# Patient Record
Sex: MALE | Race: WHITE | NOT HISPANIC OR LATINO | Employment: OTHER | URBAN - METROPOLITAN AREA
[De-identification: names, ages, dates, MRNs, and addresses within clinical notes are randomized per-mention and may not be internally consistent; named-entity substitution may affect disease eponyms.]

---

## 2017-01-03 ENCOUNTER — ANTICOAGULATION VISIT (OUTPATIENT)
Dept: FAMILY MEDICINE CLINIC | Facility: CLINIC | Age: 72
End: 2017-01-03

## 2017-01-03 LAB — INR PPP: 3.3

## 2017-01-03 RX ORDER — WARFARIN SODIUM 5 MG/1
5 TABLET ORAL
Qty: 30 TABLET | Refills: 5 | Status: SHIPPED | OUTPATIENT
Start: 2017-01-03 | End: 2017-08-01 | Stop reason: SDUPTHER

## 2017-01-03 RX ORDER — WARFARIN SODIUM 1 MG/1
1 TABLET ORAL
Qty: 30 TABLET | Refills: 5 | Status: SHIPPED | OUTPATIENT
Start: 2017-01-03 | End: 2017-01-09 | Stop reason: SDUPTHER

## 2017-01-05 ENCOUNTER — APPOINTMENT (OUTPATIENT)
Dept: WOUND CARE | Facility: HOSPITAL | Age: 72
End: 2017-01-05
Attending: SURGERY

## 2017-01-06 ENCOUNTER — ANTICOAGULATION VISIT (OUTPATIENT)
Dept: FAMILY MEDICINE CLINIC | Facility: CLINIC | Age: 72
End: 2017-01-06

## 2017-01-06 LAB — INR PPP: 2.8

## 2017-01-09 RX ORDER — WARFARIN SODIUM 1 MG/1
1 TABLET ORAL
Qty: 30 TABLET | Refills: 5 | Status: SHIPPED | OUTPATIENT
Start: 2017-01-09 | End: 2017-12-08 | Stop reason: HOSPADM

## 2017-01-10 ENCOUNTER — OFFICE VISIT (OUTPATIENT)
Dept: FAMILY MEDICINE CLINIC | Facility: CLINIC | Age: 72
End: 2017-01-10

## 2017-01-10 VITALS
SYSTOLIC BLOOD PRESSURE: 124 MMHG | BODY MASS INDEX: 32.44 KG/M2 | DIASTOLIC BLOOD PRESSURE: 70 MMHG | WEIGHT: 219 LBS | HEIGHT: 69 IN

## 2017-01-10 DIAGNOSIS — Z51.81 ANTICOAGULATION GOAL OF INR 2 TO 3: ICD-10-CM

## 2017-01-10 DIAGNOSIS — I48.0 PAROXYSMAL ATRIAL FIBRILLATION (HCC): ICD-10-CM

## 2017-01-10 DIAGNOSIS — Z79.01 ANTICOAGULATION GOAL OF INR 2 TO 3: ICD-10-CM

## 2017-01-10 DIAGNOSIS — I63.9 CEREBROVASCULAR ACCIDENT (CVA), UNSPECIFIED MECHANISM (HCC): Primary | ICD-10-CM

## 2017-01-10 DIAGNOSIS — I10 ESSENTIAL HYPERTENSION: ICD-10-CM

## 2017-01-10 LAB — INR PPP: 2.9 (ref 2–3)

## 2017-01-10 PROCEDURE — 85610 PROTHROMBIN TIME: CPT | Performed by: FAMILY MEDICINE

## 2017-01-10 PROCEDURE — 99213 OFFICE O/P EST LOW 20 MIN: CPT | Performed by: FAMILY MEDICINE

## 2017-01-10 PROCEDURE — 36416 COLLJ CAPILLARY BLOOD SPEC: CPT | Performed by: FAMILY MEDICINE

## 2017-01-10 RX ORDER — TORSEMIDE 10 MG/1
20 TABLET ORAL DAILY
COMMUNITY
Start: 2017-01-02 | End: 2018-01-02 | Stop reason: SDUPTHER

## 2017-01-10 RX ORDER — MEMANTINE HYDROCHLORIDE 10 MG/1
TABLET ORAL
COMMUNITY
Start: 2016-10-20 | End: 2017-01-10

## 2017-01-10 NOTE — MR AVS SNAPSHOT
Deacon Martin   1/10/2017 11:30 AM   Office Visit    Dept Phone:  [phone]   Encounter #:  [id number]    Provider:  Doroteo Victoria MD   Department:  [facility]                Your Full Care Plan              Today's Medication Changes          These changes are accurate as of: 1/10/17  1:31 PM.  If you have any questions, ask your nurse or doctor.               Medication(s)that have changed:     insulin  UNIT/ML injection   Commonly known as:  humuLIN N,novoLIN N   Inject 12 Units under the skin Every Night.   What changed:  Another medication with the same name was removed. Continue taking this medication, and follow the directions you see here.       insulin regular 100 UNIT/ML injection   Commonly known as:  humuLIN R,novoLIN R   Inject 7 Units under the skin Every Morning Before Breakfast.   What changed:  Another medication with the same name was removed. Continue taking this medication, and follow the directions you see here.         Stop taking medication(s)listed here:     memantine 10 MG tablet   Commonly known as:  NAMENDA   Stopped by:  Doroteo Victoria MD                      Your Updated Medication List          This list is accurate as of: 1/10/17  1:31 PM.  Always use your most recent med list.                ACCU-CHEK TYLER test strip   Generic drug:  glucose blood   Test 4 times daily and as needed       ACCU-CHEK FASTCLIX LANCET kit   Use as directed for blood glucose testing       acetaminophen 325 MG tablet   Commonly known as:  TYLENOL   Take 2 tablets by mouth Every 6 (Six) Hours As Needed for mild pain (1-3) or fever.       allopurinol 100 MG tablet   Commonly known as:  ZYLOPRIM   Take 1 tablet by mouth Daily.       amLODIPine 5 MG tablet   Commonly known as:  NORVASC   Take 1 tablet by mouth Daily.       aspirin 81 MG tablet       atorvastatin 10 MG tablet   Commonly known as:  LIPITOR   TAKE ONE TABLET BY  MOUTH DAILY       CloNIDine 0.1 MG tablet   Commonly known as:  CATAPRES   Take 1 tablet by mouth 2 (Two) Times a Day.       donepezil 10 MG tablet   Commonly known as:  ARICEPT   Take 1 tablet by mouth every night.       DULoxetine 60 MG capsule   Commonly known as:  CYMBALTA   Take 1 capsule by mouth daily.       insulin  UNIT/ML injection   Commonly known as:  humuLIN N,novoLIN N   Inject 12 Units under the skin Every Night.       Insulin Pen Needle 32G X 4 MM misc       insulin regular 100 UNIT/ML injection   Commonly known as:  humuLIN R,novoLIN R   Inject 7 Units under the skin Every Morning Before Breakfast.       metoprolol succinate XL 50 MG 24 hr tablet   Commonly known as:  TOPROL-XL   Take 1 tablet by mouth Daily.       sodium hypochlorite 0.0625 % solution topical solution 0.0625%   Commonly known as:  DAKIN'S 1/8 STRENGTH   Irrigate with 1,000 mL as directed Every 12 (Twelve) Hours.       tamsulosin 0.4 MG capsule 24 hr capsule   Commonly known as:  FLOMAX       torsemide 10 MG tablet   Commonly known as:  DEMADEX       vitamin D 41934 UNITS capsule capsule   Commonly known as:  ERGOCALCIFEROL   Take 1 capsule by mouth 2 (Two) Times a Week.       * warfarin 5 MG tablet   Commonly known as:  COUMADIN   Take 1 tablet by mouth Daily.       * warfarin 1 MG tablet   Commonly known as:  COUMADIN   Take 1 tablet by mouth Daily.       * Notice:  This list has 2 medication(s) that are the same as other medications prescribed for you. Read the directions carefully, and ask your doctor or other care provider to review them with you.            We Performed the Following     POC INR       You Were Diagnosed With        Codes Comments    Cerebrovascular accident (CVA), unspecified mechanism    -  Primary ICD-10-CM: I63.9  ICD-9-CM: 434.91     Paroxysmal atrial fibrillation     ICD-10-CM: I48.0  ICD-9-CM: 427.31     Essential hypertension     ICD-10-CM: I10  ICD-9-CM: 401.9     Anticoagulation goal of INR 2  to 3     ICD-10-CM: Z51.81, Z79.01  ICD-9-CM: V58.83, V58.61       Instructions     None    Patient Instructions History      Anticoagulation Summary as of 1/10/2017     INR goal 2.0-3.0    Today's INR 2.90    Next INR check       January 2017 Details    Sun Mon Tue Wed Thu Fri Sat     1               2               3               4               5               6               7                 8               9               10      4 mg   See details      11      4 mg         12      4 mg         13      4 mg         14      4 mg           15      4 mg         16      4 mg         17      4 mg         18      4 mg         19      4 mg         20      4 mg         21      4 mg           22      4 mg         23      4 mg         24      4 mg         25      4 mg         26      4 mg         27      4 mg         28      4 mg           29      4 mg         30      4 mg         31      4 mg              Date Details   01/10 This INR check      Date of next INR: No date specified         How to take your warfarin dose     To take:  4 mg Take 4 of the 1 mg tablets.           Upcoming Appointments     Visit Type Date Time Department    [facility]    [facility]2017  [facility]    FOLLOW UP 4/11/2017 11:40 AM [facility]    FOLLOW UP 8/25/2017  2:30 PM [facility]      MyChart Signup     Our records indicate that your [facility] [employer] account has been deactivated. If you would like to reactivate your account, please email [email or web address] or call [phone] to talk to our [employer] staff.             Other Info from Your Visit           Your Appointments     Feb 03, 2017  3:50 PM EST   Follow Up with Doroteo Victoria MD   [facility] (--)    [address] [place] [id number] [phone]           Arrive 15 minutes prior to appointment.            Apr 11, 2017 11:40 AM  "EDT   Follow Up with Doroteo Victoria MD   [facility] FAMILY AND INTERNAL MEDICINE (--)    [address]  [facility] [address] [phone]           Arrive 15 minutes prior to appointment.            Aug 25, 2017  2:30 PM EDT   Follow Up with INÉS Sen   [facility] NEUROSURGERY (--)    [address]  [facility] [address] [phone]           Arrive 15 minutes prior to appointment.              Allergies     No Known Drug Allergy        Reason for Visit     Hospital Follow Up f/u for poss stroke d/c 11/27/16 to doing fine     Coagulation Disorder Protime results in chart       Vital Signs     Blood Pressure Height Weight Body Mass Index Smoking Status       124/70 (BP Location: Left arm, Patient Position: Sitting, Cuff Size: Adult) 69\" (175.3 cm) 219 lb (99.3 kg) 32.34 kg/m2 Former Smoker       Problems and Diagnoses Noted     Anticoagulation goal of INR 2 to 3    Atrial fibrillation (irregular heartbeat)    High blood pressure    Stroke    -  Primary      Results     POC INR      Component Value Standard Range & Units    INR 2.90 2 - 3    34.4 pt taking 4mg qd                     "

## 2017-01-10 NOTE — PROGRESS NOTES
Subjective: Deacon Martin is a 71 y.o. male presents   here today for       Chief Complaint and HPI   I have reviewed the patient's medical history in detail and updated the computerized patient record.    Hospital Follow Up (f/u for poss stroke d/c 11/27/16 to doing fine ) and Coagulation Disorder (Protime results in chart )      Review of Systems   Constitutional: Negative.    HENT: Negative.    Eyes: Negative.    Respiratory: Negative.    Cardiovascular: Negative.    Gastrointestinal: Negative.    Endocrine: Negative.    Genitourinary: Negative.    Musculoskeletal: Negative.    Skin: Negative.    Allergic/Immunologic: Negative.    Neurological: Negative.    Hematological: Negative.    Psychiatric/Behavioral: Negative.        Physical Exam   Constitutional: He is oriented to person, place, and time. He appears well-developed and well-nourished.   Cardiovascular: Normal rate, regular rhythm, normal heart sounds and intact distal pulses.    Neurological: He is alert and oriented to person, place, and time. He has normal reflexes.   Psychiatric: He has a normal mood and affect. His behavior is normal.   Vitals reviewed.      Procedures    Assessment:   Diagnosis Plan   1. Cerebrovascular accident (CVA), unspecified mechanism  POC INR   2. Paroxysmal atrial fibrillation     3. Essential hypertension     4. Anticoagulation goal of INR 2 to 3         Plan:  Orders Placed This Encounter   Procedures   • POC INR     This is an external result entered through the Results Console.       Requested Prescriptions      No prescriptions requested or ordered in this encounter       All tests and consults since last visit reviewed with patient    Return in about 3 months (around 4/10/2017) for Recheck.

## 2017-01-13 ENCOUNTER — ANTICOAGULATION VISIT (OUTPATIENT)
Dept: FAMILY MEDICINE CLINIC | Facility: CLINIC | Age: 72
End: 2017-01-13

## 2017-01-13 DIAGNOSIS — I63.9 CEREBROVASCULAR ACCIDENT (CVA), UNSPECIFIED MECHANISM (HCC): Primary | ICD-10-CM

## 2017-01-13 LAB — INR PPP: 3.3 (ref 0.9–1.1)

## 2017-01-13 PROCEDURE — 36416 COLLJ CAPILLARY BLOOD SPEC: CPT | Performed by: FAMILY MEDICINE

## 2017-01-13 PROCEDURE — 85610 PROTHROMBIN TIME: CPT | Performed by: FAMILY MEDICINE

## 2017-01-17 ENCOUNTER — ANTICOAGULATION VISIT (OUTPATIENT)
Dept: FAMILY MEDICINE CLINIC | Facility: CLINIC | Age: 72
End: 2017-01-17

## 2017-01-17 LAB — INR PPP: 2.1

## 2017-01-19 ENCOUNTER — OFFICE VISIT (OUTPATIENT)
Dept: WOUND CARE | Facility: HOSPITAL | Age: 72
End: 2017-01-19
Attending: SURGERY

## 2017-01-20 NOTE — WOUND CARE CLINIC NOTE
CHIEF COMPLAINT: Bilateral venous stasis ulcers of anterior tibial areas complicated with diabetes mellitus (E11.622, I83.012, I83.022, L97.212, L97.222).     HISTORY OF PRESENT ILLNESS: This is a 71-year-old male who has had multiple medical problems, including diabetes which is now under relatively good control. He has had renal insufficiency with atrial fibrillation, cardiac disease, and chronic venous hypertension. He also has a history of stroke and mild carotid disease. He had tissue necrosis on both lower extremities over the calf and anterior tibia, and this was initially treated with Santyl. Before being seen in the Wound Care Clinic he was in the hospital for multiple medical problems, and at that time he had to have dialysis and had problems with his blood pressure. These conditions have improved and he no longer needs dialysis. He had been using Dakin's solution originally on his wounds and then we switched to using collagen. We have been lightly wrapping his legs with ACE wraps and foam dressing over his collagen. He has done well and not really any major problems, but he has noticed some minor blistered areas on his right upper leg above the wounds that we have been profiling. He thinks that this may be where some gauze dressing was. Other than that he has not had any real problems.     PHYSICAL EXAMINATION:  GENERAL: Well-nourished, well-developed, male in no acute distress.   VITAL SIGNS: Temperature 98, pulse 88, respirations 18, blood pressure 123/68.   EXTREMITIES: Examination of both of his legs reveal that there is just mild swelling which is markedly improved. He has the wounds on his right lower extremity that seem to be getting more superficial and healing. These measure 0.7 x 0.7 x 0.1 cm. He has what amounts to small skin tears on his right upper shin area which are not really measured at this time. He has the wound that has been the major wound on his left shin area that measures 2.4 x  1 x 0.3 cm. This has some exudate and it goes down to the subcutaneous fat, but is markedly improved. I think it is smaller than it was last time.     DESCRIPTION OF PROCEDURE: The patient was given informed consent of the need for an excisional debridement of necrotic tissue, eschar, and slough over the left lower extremity wound. He was given 4% lidocaine gel for topical anesthesia, and using a 4 mm curette debridement was performed to remove some necrotic subcutaneous fat, eschar, slough, and biofilm. The postdebridement size was 2.4 x 1 x 0.4 cm. The total area of debridement was 2.4 sq cm. He tolerated it well. He had minimum bleeding and the wound bed was markedly improved.     ASSESSMENT AND PLAN: The patient is doing better overall. The wounds that we have been following are much better. The wounds of his right lower extremity are very superficial and are healing and showing good signs of epithelialization and the wound on his left leg, which has continued to reduce in size and showing epithelialization, has a minimal amount of dead tissue and he has some tears on his upper right leg. We just need to be careful when he dresses this and we may use some foam dressing. It should be more gentle than the gauze I think they were using. We are going to continue with the ACE wraps, wrapping him, and we will see him again next week. Hopefully he is going to continue the same improvement.       Bryan Barajas MD  TOR:ab  D:   01/19/2017 18:32:35  T:   01/20/2017 16:27:36  Job ID:   [id number]  Document ID:   [id number]  Rev:  0  cc:

## 2017-01-24 ENCOUNTER — ANTICOAGULATION VISIT (OUTPATIENT)
Dept: FAMILY MEDICINE CLINIC | Facility: CLINIC | Age: 72
End: 2017-01-24

## 2017-01-24 LAB — INR PPP: 2.1

## 2017-01-25 PROBLEM — Z53.21 PATIENT LEFT AFTER TRIAGE: Status: ACTIVE | Noted: 2017-01-25

## 2017-01-26 ENCOUNTER — OFFICE VISIT (OUTPATIENT)
Dept: WOUND CARE | Facility: HOSPITAL | Age: 72
End: 2017-01-26
Attending: SURGERY

## 2017-01-27 NOTE — WOUND CARE CLINIC NOTE
DATE OF VISIT: 01/26/2017     CHIEF COMPLAINT: Bilateral venous stasis ulcers of anterior tibial area complicated with diabetes (E11.622, I83.012, I83.022, L97.212, L97.222).     HISTORY OF PRESENT ILLNESS: This is a 71-year-old male who has had multiple medical problems including diabetes, which is now under relatively good control. He has had renal insufficiency with atrial fibrillation, cardiac disease, chronic venous hypertension, and history of stroke and mild carotid disease. He had some necrosis on both extremities over his calves, and this was initially treated with Santyl. Before being seen in the Wound Care Clinic, he was in the hospital for multiple medical problems including dialysis and problems with his blood pressure; these conditions improved and he no longer needed dialysis. He was using Dakin's solution originally and then we switched to using collagen. We have been lightly wrapping his legs with Ace wraps and foam dressings over collagen. He has been doing well and recently really begun to improve in size. He has had no problems since last time we saw him and he feels like the right wound has essentially healed.     PHYSICAL EXAMINATION:  GENERAL: A well-nourished, well-developed, male in no acute distress.   VITAL SIGNS: Temperature 97, pulse 64, respirations 16, blood pressure 120/65.   EXTREMITIES: Examination of his right lower extremity reveals there is mild edema. The wounds that we have been following have essentially healed completely. There is no wound and this has healed. The wound on the left shin has made remarkable improvement. It measures 1.8 x 0.4 x 0.1 cm. It has a mild amount of superficial exudate and slough.     DESCRIPTION OF PROCEDURE: The patient was given informed consent for the need for debridement of the left shin wound. He was given 2% lidocaine gel for topical anesthesia. A 4 mm curette was used to remove superficial slough, exudate and biofilm. This was a  superficial selective debridement and did not go into the subcutaneous tissue. The patient tolerated this well. The final wound size after debridement was 1.8 x 0.4 x 0.1 cm. The surface area of debridement was approximately 0.7 cm sq. There was minimum bleeding, controlled easily with pressure.     ASSESSMENT/PLAN: The right wound is completely healed, the left wound is markedly improved. We are going to go to dressing changes only every 3rd day using collagen and wrapping with an Ace. We are going to get him compression hose to wear once he is completely healed.           Bryan Barajas MD  TOR:co  D:   01/26/2017 10:29:46  T:   01/27/2017 06:21:19  Job ID:   [id number]  Document ID:   [id number]  Rev:  0  cc:

## 2017-01-31 ENCOUNTER — ANTICOAGULATION VISIT (OUTPATIENT)
Dept: FAMILY MEDICINE CLINIC | Facility: CLINIC | Age: 72
End: 2017-01-31

## 2017-01-31 LAB — INR PPP: 3

## 2017-02-02 ENCOUNTER — OFFICE VISIT (OUTPATIENT)
Dept: WOUND CARE | Facility: HOSPITAL | Age: 72
End: 2017-02-02
Attending: SURGERY

## 2017-02-09 ENCOUNTER — OFFICE VISIT (OUTPATIENT)
Dept: WOUND CARE | Facility: HOSPITAL | Age: 72
End: 2017-02-09
Attending: SURGERY

## 2017-02-10 NOTE — WOUND CARE CLINIC NOTE
DATE OF SERVICE: 02/09/2017    CHIEF COMPLAINT: Bilateral venous stasis ulcers of the anterior tibial area complicated with diabetes (E11.622, I83.012, I83.022, L97.212, L97.222).     HISTORY OF PRESENT ILLNESS: This is a 71-year-old male who has had multiple medical problems including diabetes, which is now under relatively good control. He also has had renal insufficiency and he now requires dialysis. He has atrial fibrillation, cardiac disease, venous stasis and hypertension. He also has a history of stroke and mild carotid disease. He had some necrosis of both extremities over his calves and this was initially treated with Santyl and then he was switched to Dakins and most recently we have been using collagen and wrapping his legs lightly with Ace wraps and foam dressings. The wound on his right leg basically healed several weeks ago and this has not been a problem. The wound over his left shin has really finally responded and is markedly smaller. Last week there was a little blister on the inferior portion of the wound and we opened that up and that has also improved over the week. The son is doing the dressing every 3 days and is almost having trouble getting the Puracol dressing in.    PHYSICAL EXAMINATION:  VITAL SIGNS: Temperature is 97.7, pulse 88, respirations 20, blood pressure 194/95.   EXTREMITIES: Examination of the right lower leg reveals the wound is still healed. There is no swelling. There is some general redness over the area, but is not tender and is not warm. I do not think it is infection. Examination of the left lower extremity reveals that there is no swelling. The wound is remarkably smaller. It shows no redness. No drainage. There is some slough and exudate on top of the wound, which I think is probably mostly collagen. The blistered area below it seems to be improving. The wound size was 2.5 x 0.5 x 0.1 cm. There is something that needs to be debrided in a selective way.      DESCRIPTION OF PROCEDURE: The patient was given informed consent for the need for selective debridement of superficial debris and slough. The patient was given 2% lidocaine gel for topical anesthesia. A 4 mm curette was used for debridement of superficial selective slough and debris, and biofilm. The patient tolerated this well. The post debridement measurements were 2.3 x 0.4 x 0.1 cm. The total surface area debrided was approximately 1 sq cm. There was minimum bleeding.     ASSESSMENT AND PLAN: The patient continues to improve. I think that the wounds are healing rapidly now. The left leg I think after probably no more than 1-2 more weeks will probably have him healed. We will continue the same plan.        Bryan Barajas MD  TOR:kelsey  D:   02/09/2017 09:43:01  T:   02/10/2017 00:19:54  Job ID:   [id number]  Document ID:   [id number]  Rev:  1  cc:

## 2017-02-16 ENCOUNTER — OFFICE VISIT (OUTPATIENT)
Dept: WOUND CARE | Facility: HOSPITAL | Age: 72
End: 2017-02-16
Attending: SURGERY

## 2017-02-16 PROCEDURE — 17250 CHEM CAUT OF GRANLTJ TISSUE: CPT

## 2017-02-17 NOTE — WOUND CARE CLINIC NOTE
DATE OF SERVICE: 02/16/2017      CHIEF COMPLAINT: Venous stasis ulcer of the left (diagnosis E11.622, I83.012, I83.022, L97.212, L97.222).     HISTORY OF PRESENT ILLNESS: The patient is a 71-year-old gentleman with multiple medical problems including diabetes and renal insufficiency on dialysis. He has required treatment of bilateral lower extremity wounds by Dr. Barajas and is making good progress. He says he felt like the wounds were almost healed last week.     PHYSICAL EXAMINATION: Today the wound has decreased in size and measures 0.3 x 0.2 x 0.1 cm. The periwound is very fragile, but there is no significant erythema or drainage. Silver nitrate was applied over the surface of this for some hypertrophic granulation tissue in the wound and it is now smooth with the surface.     ASSESSMENT AND PLAN:   1.  Patient is going to continue topical wound care.   2.  He is going to follow up with Dr. Barajas in 1 week. Hopefully, his wound will be healed soon.          JENY Jimenez:olimpia  D:   02/16/2017 09:10:01  T:   02/16/2017 18:57:01  Job ID:   [id number]  Document ID:   [id number]  Rev:  1  cc:

## 2017-02-23 ENCOUNTER — OFFICE VISIT (OUTPATIENT)
Dept: WOUND CARE | Facility: HOSPITAL | Age: 72
End: 2017-02-23
Attending: SURGERY

## 2017-02-23 PROCEDURE — G0463 HOSPITAL OUTPT CLINIC VISIT: HCPCS

## 2017-02-24 NOTE — WOUND CARE CLINIC NOTE
DATE OF SERVICE:  02/23/2017    CHIEF COMPLAINT: Bilateral venous stasis ulcers of anterior tibial area complicated with diabetes (E11.622, I83.012, I83.022, L97.212, L97.222).     HISTORY OF PRESENT ILLNESS: The patient is a 71-year-old male who has had multiple medical problems including diabetes, which is now under relatively good control. He also has had renal insufficiency, and he now requires dialysis. He also has atrial fibrillation, cardiac disease, venous stasis and hypertension. He has a history of stroke and mild carotid disease. He had some necrosis of both lower extremities over his calves, and this was initially treated with Santyl and then switched to Dakin solution and more recently he has been using collagen and wrapping his legs with Ace wraps and foam dressings. The wound on his right leg basically healed several weeks ago, and this has not been a further problem.  The wound over his left shin has responded well and become markedly smaller, and he is getting close to healing over the last several weeks.  Last week he saw Dr. Adams who silver nitrated an area on the left side, but there was no major other issues.     PHYSICAL EXAMINATION:  VITAL SIGNS: Temperature 98.2, pulse 84, blood pressure is 158/71, respirations 20.   EXTREMITIES: Examination of his right lower extremity reveals that the edema is under control. The wounds that were there have remained healed and there is no sign of further problems.  Examination of his left lower extremity reveals that the swelling and edema is also under control with the Ace wrap. There are some scabs present over the wounds, but these are dry, and once these scabs are removed there remains no wound so the wound has completely healed, and no further problems.     ASSESSMENT AND PLAN: The patient has healed wounds bilaterally. His edema is under control. He is instructed to use some moisturizing lotion and also a foam dressing over the area of concern for  the next 2 weeks. He can use Ace wraps, but when he feels comfortable he can apply the compression hose he has at home.  He is already using one on the right leg.  He can start using the one on the left leg when he can apply this without trouble.  He can then stop using Ace wrap.  Otherwise, we will see him as needed.        Bryan Barajas MD  TOR:karlos  D:   02/23/2017 09:43:38  T:   02/23/2017 22:43:29  Job ID:   [id number]  Document ID:   [id number]  Rev:  0  cc:

## 2017-03-14 DIAGNOSIS — E78.5 DYSLIPIDEMIA: ICD-10-CM

## 2017-03-14 DIAGNOSIS — E79.0 HYPERURICEMIA: ICD-10-CM

## 2017-03-14 DIAGNOSIS — I87.2 CHRONIC VENOUS INSUFFICIENCY: ICD-10-CM

## 2017-03-14 DIAGNOSIS — I10 ESSENTIAL HYPERTENSION: ICD-10-CM

## 2017-03-14 RX ORDER — DULOXETIN HYDROCHLORIDE 60 MG/1
CAPSULE, DELAYED RELEASE ORAL
Qty: 30 CAPSULE | Refills: 4 | OUTPATIENT
Start: 2017-03-14

## 2017-04-04 DIAGNOSIS — E78.5 DYSLIPIDEMIA: ICD-10-CM

## 2017-04-04 DIAGNOSIS — I87.2 CHRONIC VENOUS INSUFFICIENCY: ICD-10-CM

## 2017-04-04 DIAGNOSIS — I10 ESSENTIAL HYPERTENSION: ICD-10-CM

## 2017-04-04 DIAGNOSIS — F01.50 VASCULAR DEMENTIA WITHOUT BEHAVIORAL DISTURBANCE (HCC): ICD-10-CM

## 2017-04-04 DIAGNOSIS — E79.0 HYPERURICEMIA: ICD-10-CM

## 2017-04-04 RX ORDER — DULOXETIN HYDROCHLORIDE 60 MG/1
CAPSULE, DELAYED RELEASE ORAL
Qty: 30 CAPSULE | Refills: 4 | OUTPATIENT
Start: 2017-04-04

## 2017-04-04 RX ORDER — DONEPEZIL HYDROCHLORIDE 10 MG/1
TABLET, FILM COATED ORAL
Qty: 30 TABLET | Refills: 4 | OUTPATIENT
Start: 2017-04-04

## 2017-04-10 ENCOUNTER — TELEPHONE (OUTPATIENT)
Dept: CARDIOLOGY | Facility: CLINIC | Age: 72
End: 2017-04-10

## 2017-04-10 ENCOUNTER — HOSPITAL ENCOUNTER (OUTPATIENT)
Facility: HOSPITAL | Age: 72
Setting detail: HOSPITAL OUTPATIENT SURGERY
Discharge: HOME | End: 2017-04-10
Attending: UROLOGY | Admitting: UROLOGY

## 2017-04-10 NOTE — TELEPHONE ENCOUNTER
----- Message from Lissa ORTIZ Darryl sent at 4/10/2017  3:42 PM EDT -----  Regarding: CARDIAC CLEARANCE FOR 4/20  [facility]/DR GARZA'S OFFICE [phone]  CYSTO W/RETRO PYLOGRAM ON 4/20

## 2017-04-18 ENCOUNTER — HOSPITAL ENCOUNTER (OUTPATIENT)
Dept: CARDIOLOGY | Facility: HOSPITAL | Age: 72
Discharge: HOME | End: 2017-04-18
Admitting: INTERNAL MEDICINE

## 2017-04-18 ENCOUNTER — OFFICE VISIT (OUTPATIENT)
Dept: CARDIOLOGY | Facility: CLINIC | Age: 72
End: 2017-04-18

## 2017-04-18 VITALS
HEIGHT: 70 IN | WEIGHT: 218 LBS | SYSTOLIC BLOOD PRESSURE: 179 MMHG | HEART RATE: 99 BPM | DIASTOLIC BLOOD PRESSURE: 81 MMHG | BODY MASS INDEX: 31.21 KG/M2

## 2017-04-18 DIAGNOSIS — M48.02 CERVICAL SPINAL STENOSIS: ICD-10-CM

## 2017-04-18 DIAGNOSIS — I10 HTN (HYPERTENSION), BENIGN: ICD-10-CM

## 2017-04-18 DIAGNOSIS — I48.92 ATRIAL FIBRILLATION AND FLUTTER (HCC): Primary | ICD-10-CM

## 2017-04-18 DIAGNOSIS — I48.91 ATRIAL FIBRILLATION AND FLUTTER (HCC): Primary | ICD-10-CM

## 2017-04-18 DIAGNOSIS — N13.8 BPH (BENIGN PROSTATIC HYPERTROPHY) WITH URINARY OBSTRUCTION: ICD-10-CM

## 2017-04-18 DIAGNOSIS — N40.1 BPH (BENIGN PROSTATIC HYPERTROPHY) WITH URINARY OBSTRUCTION: ICD-10-CM

## 2017-04-18 DIAGNOSIS — Z51.81 ANTICOAGULATION GOAL OF INR 2 TO 3: ICD-10-CM

## 2017-04-18 DIAGNOSIS — N18.4 CKD (CHRONIC KIDNEY DISEASE) STAGE 4, GFR 15-29 ML/MIN (HCC): ICD-10-CM

## 2017-04-18 DIAGNOSIS — D63.1 ANEMIA OF CHRONIC RENAL FAILURE, STAGE 4 (SEVERE) (HCC): ICD-10-CM

## 2017-04-18 DIAGNOSIS — N18.4 ANEMIA OF CHRONIC RENAL FAILURE, STAGE 4 (SEVERE) (HCC): ICD-10-CM

## 2017-04-18 DIAGNOSIS — Z79.01 ANTICOAGULATION GOAL OF INR 2 TO 3: ICD-10-CM

## 2017-04-18 DIAGNOSIS — Z86.73 HISTORY OF CARDIOEMBOLIC STROKE: ICD-10-CM

## 2017-04-18 PROCEDURE — 85610 PROTHROMBIN TIME: CPT | Performed by: INTERNAL MEDICINE

## 2017-04-18 PROCEDURE — 93000 ELECTROCARDIOGRAM COMPLETE: CPT | Performed by: INTERNAL MEDICINE

## 2017-04-18 PROCEDURE — 99214 OFFICE O/P EST MOD 30 MIN: CPT | Performed by: INTERNAL MEDICINE
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
## 2017-04-20 ENCOUNTER — TELEPHONE (OUTPATIENT)
Dept: CARDIOLOGY | Facility: CLINIC | Age: 72
End: 2017-04-20

## 2017-04-20 ENCOUNTER — HOSPITAL ENCOUNTER (OUTPATIENT)
Dept: CARDIOLOGY | Facility: HOSPITAL | Age: 72
Discharge: HOME | End: 2017-04-20
Admitting: INTERNAL MEDICINE

## 2017-04-20 DIAGNOSIS — Z01.810 PREOPERATIVE CARDIOVASCULAR EXAMINATION: Primary | ICD-10-CM

## 2017-04-20 DIAGNOSIS — I48.20 CHRONIC A-FIB (HCC): Primary | ICD-10-CM

## 2017-04-20 DIAGNOSIS — Z01.810 PREOPERATIVE CARDIOVASCULAR EXAMINATION: ICD-10-CM

## 2017-04-20 DIAGNOSIS — I10 ESSENTIAL HYPERTENSION: ICD-10-CM

## 2017-04-20 DIAGNOSIS — I48.20 CHRONIC A-FIB (HCC): ICD-10-CM

## 2017-04-20 PROCEDURE — 85610 PROTHROMBIN TIME: CPT | Performed by: INTERNAL MEDICINE
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
## 2017-04-21 ENCOUNTER — HOSPITAL ENCOUNTER (OUTPATIENT)
Dept: CARDIOLOGY | Facility: HOSPITAL | Age: 72
Discharge: HOME | End: 2017-04-21
Admitting: INTERNAL MEDICINE

## 2017-04-21 PROCEDURE — 85610 PROTHROMBIN TIME: CPT | Performed by: INTERNAL MEDICINE

## 2017-04-24 ENCOUNTER — HOSPITAL ENCOUNTER (OUTPATIENT)
Dept: CARDIOLOGY | Facility: HOSPITAL | Age: 72
Discharge: HOME | End: 2017-04-24
Admitting: INTERNAL MEDICINE

## 2017-04-24 PROCEDURE — 85610 PROTHROMBIN TIME: CPT | Performed by: INTERNAL MEDICINE

## 2017-04-25 ENCOUNTER — APPOINTMENT (OUTPATIENT)
Dept: PREADMISSION TESTING | Facility: HOSPITAL | Age: 72
End: 2017-04-25

## 2017-04-28 ENCOUNTER — HOSPITAL ENCOUNTER (OUTPATIENT)
Dept: CARDIOLOGY | Facility: HOSPITAL | Age: 72
Discharge: HOME | End: 2017-04-28
Admitting: INTERNAL MEDICINE

## 2017-04-28 PROCEDURE — 85610 PROTHROMBIN TIME: CPT | Performed by: INTERNAL MEDICINE

## 2017-05-01 ENCOUNTER — TELEPHONE (OUTPATIENT)
Dept: ENDOCRINOLOGY | Age: 72
End: 2017-05-01

## 2017-05-02 ENCOUNTER — HOSPITAL ENCOUNTER (OUTPATIENT)
Dept: CARDIOLOGY | Facility: HOSPITAL | Age: 72
Discharge: HOME | End: 2017-05-02
Attending: INTERNAL MEDICINE

## 2017-05-02 VITALS
RESPIRATION RATE: 20 BRPM | OXYGEN SATURATION: 93 % | DIASTOLIC BLOOD PRESSURE: 82 MMHG | SYSTOLIC BLOOD PRESSURE: 134 MMHG | HEART RATE: 100 BPM

## 2017-05-02 VITALS — BODY MASS INDEX: 31.21 KG/M2 | WEIGHT: 218 LBS | HEIGHT: 70 IN

## 2017-05-02 LAB
BH CV ECHO MEAS - ACS: 1.5 CM
BH CV ECHO MEAS - AO MAX PG (FULL): 6.6 MMHG
BH CV ECHO MEAS - AO MAX PG: 10.9 MMHG
BH CV ECHO MEAS - AO MEAN PG (FULL): 4 MMHG
BH CV ECHO MEAS - AO MEAN PG: 6 MMHG
BH CV ECHO MEAS - AO ROOT AREA (BSA CORRECTED): 1.8
BH CV ECHO MEAS - AO ROOT AREA: 11.9 CM^2
BH CV ECHO MEAS - AO ROOT DIAM: 3.9 CM
BH CV ECHO MEAS - AO V2 MAX: 165 CM/SEC
BH CV ECHO MEAS - AO V2 MEAN: 116 CM/SEC
BH CV ECHO MEAS - AO V2 VTI: 35.2 CM
BH CV ECHO MEAS - AVA(I,A): 2.9 CM^2
BH CV ECHO MEAS - AVA(I,D): 2.9 CM^2
BH CV ECHO MEAS - AVA(V,A): 2.6 CM^2
BH CV ECHO MEAS - AVA(V,D): 2.6 CM^2
BH CV ECHO MEAS - BSA(HAYCOCK): 2.2 M^2
BH CV ECHO MEAS - BSA: 2.2 M^2
BH CV ECHO MEAS - BZI_BMI: 31.3 KILOGRAMS/M^2
BH CV ECHO MEAS - BZI_METRIC_HEIGHT: 177.8 CM
BH CV ECHO MEAS - BZI_METRIC_WEIGHT: 98.9 KG
BH CV ECHO MEAS - CONTRAST EF 4CH: 51.1 ML/M^2
BH CV ECHO MEAS - EDV(CUBED): 64 ML
BH CV ECHO MEAS - EDV(MOD-SP4): 131 ML
BH CV ECHO MEAS - EDV(TEICH): 70 ML
BH CV ECHO MEAS - EF(CUBED): 57.8 %
BH CV ECHO MEAS - EF(MOD-SP4): 51.1 %
BH CV ECHO MEAS - EF(TEICH): 50 %
BH CV ECHO MEAS - ESV(CUBED): 27 ML
BH CV ECHO MEAS - ESV(MOD-SP4): 64 ML
BH CV ECHO MEAS - ESV(TEICH): 35 ML
BH CV ECHO MEAS - FS: 25 %
BH CV ECHO MEAS - IVS/LVPW: 1
BH CV ECHO MEAS - IVSD: 1.2 CM
BH CV ECHO MEAS - LA DIMENSION: 5.6 CM
BH CV ECHO MEAS - LA/AO: 1.4
BH CV ECHO MEAS - LAT PEAK E' VEL: 12.5 CM/SEC
BH CV ECHO MEAS - LV DIASTOLIC VOL/BSA (35-75): 60.5 ML/M^2
BH CV ECHO MEAS - LV MASS(C)D: 165.5 GRAMS
BH CV ECHO MEAS - LV MASS(C)DI: 76.4 GRAMS/M^2
BH CV ECHO MEAS - LV MAX PG: 4.3 MMHG
BH CV ECHO MEAS - LV MEAN PG: 2 MMHG
BH CV ECHO MEAS - LV SYSTOLIC VOL/BSA (12-30): 29.6 ML/M^2
BH CV ECHO MEAS - LV V1 MAX: 104 CM/SEC
BH CV ECHO MEAS - LV V1 MEAN: 74.1 CM/SEC
BH CV ECHO MEAS - LV V1 VTI: 24.7 CM
BH CV ECHO MEAS - LVIDD: 4 CM
BH CV ECHO MEAS - LVIDS: 3 CM
BH CV ECHO MEAS - LVLD AP4: 7.8 CM
BH CV ECHO MEAS - LVLS AP4: 7.3 CM
BH CV ECHO MEAS - LVOT AREA (M): 4.2 CM^2
BH CV ECHO MEAS - LVOT AREA: 4.2 CM^2
BH CV ECHO MEAS - LVOT DIAM: 2.3 CM
BH CV ECHO MEAS - LVPWD: 1.2 CM
BH CV ECHO MEAS - MED PEAK E' VEL: 6.9 CM/SEC
BH CV ECHO MEAS - MV DEC SLOPE: 787 CM/SEC^2
BH CV ECHO MEAS - MV DEC TIME: 0.13 SEC
BH CV ECHO MEAS - MV E MAX VEL: 132 CM/SEC
BH CV ECHO MEAS - MV MAX PG: 13.7 MMHG
BH CV ECHO MEAS - MV MEAN PG: 6 MMHG
BH CV ECHO MEAS - MV P1/2T MAX VEL: 176 CM/SEC
BH CV ECHO MEAS - MV P1/2T: 65.5 MSEC
BH CV ECHO MEAS - MV V2 MAX: 185 CM/SEC
BH CV ECHO MEAS - MV V2 MEAN: 108 CM/SEC
BH CV ECHO MEAS - MV V2 VTI: 29.7 CM
BH CV ECHO MEAS - MVA P1/2T LCG: 1.3 CM^2
BH CV ECHO MEAS - MVA(P1/2T): 3.4 CM^2
BH CV ECHO MEAS - MVA(VTI): 3.5 CM^2
BH CV ECHO MEAS - PA MAX PG (FULL): 1.2 MMHG
BH CV ECHO MEAS - PA MAX PG: 3.4 MMHG
BH CV ECHO MEAS - PA V2 MAX: 92.3 CM/SEC
BH CV ECHO MEAS - PVA(V,A): 4.6 CM^2
BH CV ECHO MEAS - PVA(V,D): 4.6 CM^2
BH CV ECHO MEAS - QP/QS: 0.94
BH CV ECHO MEAS - RV MAX PG: 2.2 MMHG
BH CV ECHO MEAS - RV MEAN PG: 1 MMHG
BH CV ECHO MEAS - RV V1 MAX: 74.6 CM/SEC
BH CV ECHO MEAS - RV V1 MEAN: 53.7 CM/SEC
BH CV ECHO MEAS - RV V1 VTI: 16.8 CM
BH CV ECHO MEAS - RVDD: 3 CM
BH CV ECHO MEAS - RVOT AREA: 5.7 CM^2
BH CV ECHO MEAS - RVOT DIAM: 2.7 CM
BH CV ECHO MEAS - SI(AO): 194.2 ML/M^2
BH CV ECHO MEAS - SI(CUBED): 17.1 ML/M^2
BH CV ECHO MEAS - SI(LVOT): 47.4 ML/M^2
BH CV ECHO MEAS - SI(MOD-SP4): 30.9 ML/M^2
BH CV ECHO MEAS - SI(TEICH): 16.2 ML/M^2
BH CV ECHO MEAS - SV(AO): 420.5 ML
BH CV ECHO MEAS - SV(CUBED): 37 ML
BH CV ECHO MEAS - SV(LVOT): 102.6 ML
BH CV ECHO MEAS - SV(MOD-SP4): 67 ML
BH CV ECHO MEAS - SV(RVOT): 96.2 ML
BH CV ECHO MEAS - SV(TEICH): 35 ML
BH CV ECHO MEAS - TAPSE (>1.6): 2 CM2
BH CV STRESS BP STAGE 1: NORMAL
BH CV STRESS COMMENTS STAGE 1: NORMAL
BH CV STRESS DOSE REGADENOSON STAGE 1: 0.4
BH CV STRESS DURATION MIN STAGE 1: 0
BH CV STRESS DURATION SEC STAGE 1: 15
BH CV STRESS HR STAGE 1: 105
BH CV STRESS O2 STAGE 1: 97
BH CV STRESS PROTOCOL 1: NORMAL
BH CV STRESS RECOVERY BP: NORMAL MMHG
BH CV STRESS RECOVERY HR: 100 BPM
BH CV STRESS RECOVERY O2: 95 %
BH CV STRESS STAGE 1: 1
BH CV XLRA - RV BASE: 3.5 CM
BH CV XLRA - RV LENGTH: 7.2 CM
BH CV XLRA - RV MID: 2.6 CM
BH CV XLRA - TDI S': 11.5 CM/SEC
LEFT ATRIUM VOLUME INDEX: 57.6 ML/M2
LV EF 2D ECHO EST: 45 %
MAXIMAL PREDICTED HEART RATE: 146 BPM
PERCENT MAX PREDICTED HR: 71.92 %
STRESS BASELINE BP: NORMAL MMHG
STRESS BASELINE HR: 95 BPM
STRESS O2 SAT REST: 93 %
STRESS PERCENT HR: 85 %
STRESS POST ESTIMATED WORKLOAD: 1 METS
STRESS POST EXERCISE DUR MIN: 0 MIN
STRESS POST EXERCISE DUR SEC: 0 SEC
STRESS POST O2 SAT PEAK: 97 %
STRESS POST PEAK BP: NORMAL MMHG
STRESS POST PEAK HR: 105 BPM
STRESS TARGET HR: 124 BPM

## 2017-05-02 PROCEDURE — 25010000002 AMINOPHYLLINE PER 250 MG: Performed by: INTERNAL MEDICINE

## 2017-05-02 PROCEDURE — 78452 HT MUSCLE IMAGE SPECT MULT: CPT | Performed by: INTERNAL MEDICINE

## 2017-05-02 PROCEDURE — 93306 TTE W/DOPPLER COMPLETE: CPT | Performed by: INTERNAL MEDICINE

## 2017-05-02 PROCEDURE — A9500 TC99M SESTAMIBI: HCPCS | Performed by: INTERNAL MEDICINE

## 2017-05-02 PROCEDURE — 25010000002 REGADENOSON 0.4 MG/5ML SOLUTION: Performed by: INTERNAL MEDICINE

## 2017-05-02 PROCEDURE — 93306 TTE W/DOPPLER COMPLETE: CPT

## 2017-05-02 PROCEDURE — 0 TECHNETIUM SESTAMIBI: Performed by: INTERNAL MEDICINE

## 2017-05-02 PROCEDURE — 93018 CV STRESS TEST I&R ONLY: CPT | Performed by: INTERNAL MEDICINE

## 2017-05-02 PROCEDURE — 78452 HT MUSCLE IMAGE SPECT MULT: CPT

## 2017-05-02 PROCEDURE — 93017 CV STRESS TEST TRACING ONLY: CPT

## 2017-05-02 RX ORDER — AMINOPHYLLINE DIHYDRATE 25 MG/ML
125 INJECTION, SOLUTION INTRAVENOUS ONCE
Status: COMPLETED | OUTPATIENT
Start: 2017-05-02 | End: 2017-05-02

## 2017-05-02 RX ADMIN — Medication 1 DOSE: at 08:26

## 2017-05-02 RX ADMIN — REGADENOSON 0.4 MG: 0.08 INJECTION, SOLUTION INTRAVENOUS at 11:56

## 2017-05-02 RX ADMIN — AMINOPHYLLINE 125 MG: 25 INJECTION, SOLUTION INTRAVENOUS at 11:03

## 2017-05-02 RX ADMIN — Medication 1 DOSE: at 11:56

## 2017-05-03 ENCOUNTER — TELEPHONE (OUTPATIENT)
Dept: CARDIOLOGY | Facility: CLINIC | Age: 72
End: 2017-05-03

## 2017-05-12 ENCOUNTER — HOSPITAL ENCOUNTER (OUTPATIENT)
Dept: GENERAL RADIOLOGY | Facility: HOSPITAL | Age: 72
Discharge: HOME | End: 2017-05-12
Admitting: NURSE PRACTITIONER

## 2017-05-12 ENCOUNTER — OFFICE VISIT (OUTPATIENT)
Dept: FAMILY MEDICINE CLINIC | Facility: CLINIC | Age: 72
End: 2017-05-12

## 2017-05-12 VITALS
DIASTOLIC BLOOD PRESSURE: 76 MMHG | BODY MASS INDEX: 30.82 KG/M2 | OXYGEN SATURATION: 98 % | HEART RATE: 83 BPM | TEMPERATURE: 97.6 F | SYSTOLIC BLOOD PRESSURE: 142 MMHG | HEIGHT: 70 IN | WEIGHT: 215.3 LBS

## 2017-05-12 DIAGNOSIS — E11.65 POORLY CONTROLLED TYPE 2 DIABETES MELLITUS (HCC): ICD-10-CM

## 2017-05-12 DIAGNOSIS — I10 ESSENTIAL HYPERTENSION: ICD-10-CM

## 2017-05-12 DIAGNOSIS — N18.4 CKD (CHRONIC KIDNEY DISEASE) STAGE 4, GFR 15-29 ML/MIN (HCC): ICD-10-CM

## 2017-05-12 DIAGNOSIS — Z12.5 SCREENING FOR PROSTATE CANCER: ICD-10-CM

## 2017-05-12 DIAGNOSIS — N40.0 BENIGN PROSTATIC HYPERPLASIA WITHOUT LOWER URINARY TRACT SYMPTOMS, UNSPECIFIED MORPHOLOGY: ICD-10-CM

## 2017-05-12 DIAGNOSIS — Z79.899 ENCOUNTER FOR LONG-TERM (CURRENT) USE OF MEDICATIONS: ICD-10-CM

## 2017-05-12 DIAGNOSIS — Z00.00 ROUTINE GENERAL MEDICAL EXAMINATION AT A HEALTH CARE FACILITY: ICD-10-CM

## 2017-05-12 DIAGNOSIS — Z12.5 ENCOUNTER FOR PROSTATE CANCER SCREENING: ICD-10-CM

## 2017-05-12 DIAGNOSIS — I10 ESSENTIAL HYPERTENSION: Primary | ICD-10-CM

## 2017-05-12 DIAGNOSIS — M25.552 LEFT HIP PAIN: ICD-10-CM

## 2017-05-12 DIAGNOSIS — I48.91 ATRIAL FIBRILLATION, UNSPECIFIED TYPE (HCC): ICD-10-CM

## 2017-05-12 DIAGNOSIS — N40.0 ENLARGED PROSTATE: ICD-10-CM

## 2017-05-12 DIAGNOSIS — Z00.00 ROUTINE GENERAL MEDICAL EXAMINATION AT A HEALTH CARE FACILITY: Primary | ICD-10-CM

## 2017-05-12 DIAGNOSIS — E78.5 HYPERLIPIDEMIA, UNSPECIFIED HYPERLIPIDEMIA TYPE: ICD-10-CM

## 2017-05-12 DIAGNOSIS — Z86.73 HISTORY OF CARDIOEMBOLIC STROKE: ICD-10-CM

## 2017-05-12 PROCEDURE — G0439 PPPS, SUBSEQ VISIT: HCPCS | Performed by: NURSE PRACTITIONER

## 2017-05-12 PROCEDURE — 99213 OFFICE O/P EST LOW 20 MIN: CPT | Performed by: NURSE PRACTITIONER

## 2017-05-12 PROCEDURE — 73502 X-RAY EXAM HIP UNI 2-3 VIEWS: CPT

## 2017-05-12 RX ORDER — TRAMADOL HYDROCHLORIDE 50 MG/1
TABLET ORAL
Qty: 90 TABLET | Refills: 0 | Status: SHIPPED | OUTPATIENT
Start: 2017-05-12 | End: 2017-06-27

## 2017-05-13 LAB
ALBUMIN SERPL-MCNC: 4 G/DL (ref 3.5–5.2)
ALBUMIN/CREAT UR: 5355.8 MG/G CREAT (ref 0–30)
ALBUMIN/GLOB SERPL: 1.4 G/DL
ALP SERPL-CCNC: 84 U/L (ref 39–117)
ALT SERPL-CCNC: 13 U/L (ref 1–41)
AST SERPL-CCNC: 11 U/L (ref 1–40)
BASOPHILS # BLD AUTO: 0.06 10*3/MM3 (ref 0–0.2)
BASOPHILS NFR BLD AUTO: 0.6 % (ref 0–1.5)
BILIRUB SERPL-MCNC: 1.3 MG/DL (ref 0.1–1.2)
BUN SERPL-MCNC: 43 MG/DL (ref 8–23)
BUN/CREAT SERPL: 8.5 (ref 7–25)
CALCIUM SERPL-MCNC: 9.5 MG/DL (ref 8.6–10.5)
CHLORIDE SERPL-SCNC: 91 MMOL/L (ref 98–107)
CHOLEST SERPL-MCNC: 155 MG/DL (ref 0–200)
CO2 SERPL-SCNC: 27.6 MMOL/L (ref 22–29)
CREAT SERPL-MCNC: 5.08 MG/DL (ref 0.76–1.27)
CREAT UR-MCNC: 46.8 MG/DL
EOSINOPHIL # BLD AUTO: 0.28 10*3/MM3 (ref 0–0.7)
EOSINOPHIL NFR BLD AUTO: 2.9 % (ref 0.3–6.2)
ERYTHROCYTE [DISTWIDTH] IN BLOOD BY AUTOMATED COUNT: 15.1 % (ref 11.5–14.5)
FT4I SERPL CALC-MCNC: 2.2 (ref 1.2–4.9)
GLOBULIN SER CALC-MCNC: 2.8 GM/DL
GLUCOSE SERPL-MCNC: 369 MG/DL (ref 65–99)
HBA1C MFR BLD: 9.6 % (ref 4.8–5.6)
HCT VFR BLD AUTO: 40 % (ref 40.4–52.2)
HCV AB S/CO SERPL IA: 0.1 S/CO RATIO (ref 0–0.9)
HDLC SERPL-MCNC: 31 MG/DL (ref 40–60)
HGB BLD-MCNC: 13.4 G/DL (ref 13.7–17.6)
IMM GRANULOCYTES # BLD: 0.02 10*3/MM3 (ref 0–0.03)
IMM GRANULOCYTES NFR BLD: 0.2 % (ref 0–0.5)
LDLC SERPL CALC-MCNC: 67 MG/DL (ref 0–100)
LDLC/HDLC SERPL: 2.17 {RATIO}
LYMPHOCYTES # BLD AUTO: 1.68 10*3/MM3 (ref 0.9–4.8)
LYMPHOCYTES NFR BLD AUTO: 17.2 % (ref 19.6–45.3)
MCH RBC QN AUTO: 32.1 PG (ref 27–32.7)
MCHC RBC AUTO-ENTMCNC: 33.5 G/DL (ref 32.6–36.4)
MCV RBC AUTO: 95.9 FL (ref 79.8–96.2)
MICROALBUMIN UR-MCNC: 2506.5 UG/ML
MONOCYTES # BLD AUTO: 0.73 10*3/MM3 (ref 0.2–1.2)
MONOCYTES NFR BLD AUTO: 7.5 % (ref 5–12)
NEUTROPHILS # BLD AUTO: 6.99 10*3/MM3 (ref 1.9–8.1)
NEUTROPHILS NFR BLD AUTO: 71.6 % (ref 42.7–76)
PLATELET # BLD AUTO: 192 10*3/MM3 (ref 140–500)
POTASSIUM SERPL-SCNC: 4.4 MMOL/L (ref 3.5–5.2)
PROT SERPL-MCNC: 6.8 G/DL (ref 6–8.5)
PSA SERPL-MCNC: 3.6 NG/ML (ref 0–4)
RBC # BLD AUTO: 4.17 10*6/MM3 (ref 4.6–6)
SODIUM SERPL-SCNC: 135 MMOL/L (ref 136–145)
T3RU NFR SERPL: 29 % (ref 24–39)
T4 SERPL-MCNC: 7.6 UG/DL (ref 4.5–12)
TRIGL SERPL-MCNC: 284 MG/DL (ref 0–150)
TSH SERPL DL<=0.005 MIU/L-ACNC: 2.92 UIU/ML (ref 0.45–4.5)
VLDLC SERPL CALC-MCNC: 56.8 MG/DL (ref 5–40)
WBC # BLD AUTO: 9.76 10*3/MM3 (ref 4.5–10.7)

## 2017-05-18 ENCOUNTER — APPOINTMENT (OUTPATIENT)
Dept: CARDIOLOGY | Facility: HOSPITAL | Age: 72
End: 2017-05-18

## 2017-05-19 ENCOUNTER — HOSPITAL ENCOUNTER (OUTPATIENT)
Dept: CARDIOLOGY | Facility: HOSPITAL | Age: 72
Discharge: HOME | End: 2017-05-19
Admitting: INTERNAL MEDICINE

## 2017-05-19 PROCEDURE — 85610 PROTHROMBIN TIME: CPT | Performed by: INTERNAL MEDICINE

## 2017-05-23 DIAGNOSIS — E11.65 POORLY CONTROLLED TYPE 2 DIABETES MELLITUS (HCC): Primary | ICD-10-CM

## 2017-05-23 DIAGNOSIS — E55.9 VITAMIN D DEFICIENCY: ICD-10-CM

## 2017-05-24 DIAGNOSIS — Z12.11 ENCOUNTER FOR SCREENING COLONOSCOPY: Primary | ICD-10-CM

## 2017-05-25 DIAGNOSIS — I10 ESSENTIAL HYPERTENSION: ICD-10-CM

## 2017-05-25 DIAGNOSIS — E79.0 HYPERURICEMIA: ICD-10-CM

## 2017-05-25 DIAGNOSIS — E78.5 DYSLIPIDEMIA: ICD-10-CM

## 2017-05-25 DIAGNOSIS — I87.2 CHRONIC VENOUS INSUFFICIENCY: ICD-10-CM

## 2017-05-25 RX ORDER — DULOXETIN HYDROCHLORIDE 60 MG/1
CAPSULE, DELAYED RELEASE ORAL
Qty: 30 CAPSULE | Refills: 4 | OUTPATIENT
Start: 2017-05-25

## 2017-05-25 RX ORDER — TAMSULOSIN HYDROCHLORIDE 0.4 MG/1
CAPSULE ORAL
Qty: 30 CAPSULE | Refills: 5 | Status: SHIPPED | OUTPATIENT
Start: 2017-05-25 | End: 2017-10-14 | Stop reason: SDUPTHER

## 2017-06-01 RX ORDER — ATORVASTATIN CALCIUM 10 MG/1
TABLET, FILM COATED ORAL
Qty: 90 TABLET | Refills: 0 | Status: SHIPPED | OUTPATIENT
Start: 2017-06-01 | End: 2017-06-27 | Stop reason: SDUPTHER

## 2017-06-09 ENCOUNTER — HOSPITAL ENCOUNTER (OUTPATIENT)
Dept: CARDIOLOGY | Facility: HOSPITAL | Age: 72
Discharge: HOME | End: 2017-06-09
Admitting: INTERNAL MEDICINE

## 2017-06-09 PROCEDURE — 85610 PROTHROMBIN TIME: CPT | Performed by: INTERNAL MEDICINE

## 2017-06-13 ENCOUNTER — LAB (OUTPATIENT)
Dept: ENDOCRINOLOGY | Age: 72
End: 2017-06-13

## 2017-06-13 DIAGNOSIS — E55.9 VITAMIN D DEFICIENCY: ICD-10-CM

## 2017-06-13 DIAGNOSIS — E11.65 POORLY CONTROLLED TYPE 2 DIABETES MELLITUS (HCC): ICD-10-CM

## 2017-06-14 ENCOUNTER — TELEPHONE (OUTPATIENT)
Dept: ENDOCRINOLOGY | Age: 72
End: 2017-06-14

## 2017-06-14 LAB
25(OH)D3+25(OH)D2 SERPL-MCNC: 18.7 NG/ML (ref 30–100)
ALBUMIN SERPL-MCNC: 3.6 G/DL (ref 3.5–4.8)
ALBUMIN/GLOB SERPL: 1.3 {RATIO} (ref 1.2–2.2)
ALP SERPL-CCNC: 83 IU/L (ref 39–117)
ALT SERPL-CCNC: 25 IU/L (ref 0–44)
AST SERPL-CCNC: 22 IU/L (ref 0–40)
BILIRUB SERPL-MCNC: 1.1 MG/DL (ref 0–1.2)
BUN SERPL-MCNC: 31 MG/DL (ref 8–27)
BUN/CREAT SERPL: 8 (ref 10–24)
C PEPTIDE SERPL-MCNC: 21.5 NG/ML (ref 1.1–4.4)
CALCIUM SERPL-MCNC: 9.2 MG/DL (ref 8.6–10.2)
CHLORIDE SERPL-SCNC: 90 MMOL/L (ref 96–106)
CHOLEST SERPL-MCNC: 163 MG/DL (ref 100–199)
CO2 SERPL-SCNC: 22 MMOL/L (ref 18–29)
CREAT SERPL-MCNC: 3.72 MG/DL (ref 0.76–1.27)
GLOBULIN SER CALC-MCNC: 2.8 G/DL (ref 1.5–4.5)
GLUCOSE SERPL-MCNC: 515 MG/DL (ref 65–99)
HBA1C MFR BLD: 11 % (ref 4.8–5.6)
HDLC SERPL-MCNC: 15 MG/DL
LDLC SERPL CALC-MCNC: ABNORMAL MG/DL (ref 0–99)
MICROALBUMIN UR-MCNC: 1170.3 UG/ML
POTASSIUM SERPL-SCNC: 4.4 MMOL/L (ref 3.5–5.2)
PROT SERPL-MCNC: 6.4 G/DL (ref 6–8.5)
SODIUM SERPL-SCNC: 134 MMOL/L (ref 134–144)
TRIGL SERPL-MCNC: 1312 MG/DL (ref 0–149)
VLDLC SERPL CALC-MCNC: ABNORMAL MG/DL (ref 5–40)

## 2017-06-14 NOTE — TELEPHONE ENCOUNTER
----- Message from INÉS More sent at 6/14/2017 11:11 AM EDT -----  Contact: [facility]  Call his home and find out if he is taking his meds  ----- Message -----     From: Ghislaine Baxter MA     Sent: 6/14/2017  11:00 AM       To: INÉS More        ----- Message -----     From: Toshia Boswell     Sent: 6/14/2017   9:30 AM       To: Ghislaine Baxter MA    Critical Glucose of 515 on 6-13    I have called the patients home and left a voicemail for patient. If he calls back, i just need to know if he is taking his medications.

## 2017-06-16 ENCOUNTER — HOSPITAL ENCOUNTER (OUTPATIENT)
Dept: CARDIOLOGY | Facility: HOSPITAL | Age: 72
Discharge: HOME | End: 2017-06-16
Admitting: INTERNAL MEDICINE

## 2017-06-16 PROCEDURE — 85610 PROTHROMBIN TIME: CPT | Performed by: INTERNAL MEDICINE

## 2017-06-23 ENCOUNTER — HOSPITAL ENCOUNTER (OUTPATIENT)
Dept: CARDIOLOGY | Facility: HOSPITAL | Age: 72
Discharge: HOME | End: 2017-06-23
Admitting: INTERNAL MEDICINE

## 2017-06-23 PROCEDURE — 85610 PROTHROMBIN TIME: CPT | Performed by: INTERNAL MEDICINE

## 2017-06-27 ENCOUNTER — OFFICE VISIT (OUTPATIENT)
Dept: ENDOCRINOLOGY | Age: 72
End: 2017-06-27

## 2017-06-27 VITALS
BODY MASS INDEX: 30.46 KG/M2 | WEIGHT: 212.8 LBS | HEIGHT: 70 IN | DIASTOLIC BLOOD PRESSURE: 80 MMHG | SYSTOLIC BLOOD PRESSURE: 128 MMHG

## 2017-06-27 DIAGNOSIS — E11.42 DIABETIC PERIPHERAL NEUROPATHY (HCC): ICD-10-CM

## 2017-06-27 DIAGNOSIS — E11.65 POORLY CONTROLLED TYPE 2 DIABETES MELLITUS (HCC): Primary | ICD-10-CM

## 2017-06-27 DIAGNOSIS — E78.5 HYPERLIPIDEMIA, UNSPECIFIED HYPERLIPIDEMIA TYPE: ICD-10-CM

## 2017-06-27 DIAGNOSIS — N18.4 CKD (CHRONIC KIDNEY DISEASE) STAGE 4, GFR 15-29 ML/MIN (HCC): ICD-10-CM

## 2017-06-27 PROCEDURE — 99214 OFFICE O/P EST MOD 30 MIN: CPT | Performed by: NURSE PRACTITIONER

## 2017-06-27 RX ORDER — ERGOCALCIFEROL 1.25 MG/1
CAPSULE ORAL
Qty: 36 CAPSULE | Refills: 5 | Status: SHIPPED | OUTPATIENT
Start: 2017-06-27 | End: 2017-12-08 | Stop reason: HOSPADM

## 2017-06-27 RX ORDER — LANCETS
EACH MISCELLANEOUS
Qty: 200 EACH | Refills: 5 | Status: SHIPPED | OUTPATIENT
Start: 2017-06-27 | End: 2018-06-21 | Stop reason: SDUPTHER

## 2017-06-27 RX ORDER — DULOXETIN HYDROCHLORIDE 60 MG/1
60 CAPSULE, DELAYED RELEASE ORAL DAILY
Qty: 90 CAPSULE | Refills: 1 | Status: SHIPPED | OUTPATIENT
Start: 2017-06-27 | End: 2018-01-24 | Stop reason: SDUPTHER

## 2017-06-27 RX ORDER — DONEPEZIL HYDROCHLORIDE 10 MG/1
10 TABLET, FILM COATED ORAL NIGHTLY
COMMUNITY
End: 2019-01-01 | Stop reason: SDUPTHER

## 2017-06-27 RX ORDER — ATORVASTATIN CALCIUM 40 MG/1
40 TABLET, FILM COATED ORAL DAILY
Qty: 90 TABLET | Refills: 1 | Status: SHIPPED | OUTPATIENT
Start: 2017-06-27 | End: 2018-01-26 | Stop reason: SDUPTHER

## 2017-06-27 RX ORDER — CALCIUM ACETATE 667 MG/1
1334 CAPSULE ORAL 3 TIMES DAILY
COMMUNITY
Start: 2017-05-25 | End: 2019-01-01 | Stop reason: HOSPADM

## 2017-06-27 NOTE — PROGRESS NOTES
"Subjective   Deacon Martin is a 72 y.o. male is here today for follow-up.  Chief Complaint   Patient presents with   • Diabetes     recent labs, pt is not testing BG due to wrong strips.    • Vitamin D Deficiency     /80  Ht 70\" (177.8 cm)  Wt 212 lb 12.8 oz (96.5 kg)  BMI 30.53 kg/m2  Current Outpatient Prescriptions on File Prior to Visit   Medication Sig   • acetaminophen (TYLENOL) 325 MG tablet Take 2 tablets by mouth Every 6 (Six) Hours As Needed for mild pain (1-3) or fever.   • allopurinol (ZYLOPRIM) 100 MG tablet Take 1 tablet by mouth Daily. (Patient taking differently: Take 300 mg by mouth Daily.)   • amLODIPine (NORVASC) 5 MG tablet Take 1 tablet by mouth Daily.   • aspirin 81 MG tablet Take 81 mg by mouth Daily.   • atorvastatin (LIPITOR) 10 MG tablet TAKE ONE TABLET BY MOUTH DAILY   • CloNIDine (CATAPRES) 0.1 MG tablet Take 1 tablet by mouth 2 (Two) Times a Day.   • insulin NPH (humuLIN N,novoLIN N) 100 UNIT/ML injection Inject 12 Units under the skin Every Night. (Patient taking differently: Inject  under the skin 2 (Two) Times a Day Before Meals. 18 units ac breakfast and 12 units ac dinner)   • Insulin Pen Needle 32G X 4 MM misc 4 (Four) Times a Day As Needed.   • insulin regular (humuLIN R,novoLIN R) 100 UNIT/ML injection Inject 7 Units under the skin Every Morning Before Breakfast. (Patient taking differently: Inject 7 Units under the skin 2 (Two) Times a Day Before Meals.)   • Lancets Misc. (ACCU-CHEK FASTCLIX LANCET) kit Use as directed for blood glucose testing   • metoprolol succinate XL (TOPROL-XL) 50 MG 24 hr tablet Take 1 tablet by mouth Daily.   • mupirocin (BACTROBAN) 2 % ointment Apply  topically 3 (Three) Times a Day.   • sodium hypochlorite (DAKIN'S 1/8 STRENGTH) 0.0625 % solution topical solution 0.0625% Irrigate with 1,000 mL as directed Every 12 (Twelve) Hours.   • tamsulosin (FLOMAX) 0.4 MG capsule 24 hr capsule TAKE ONE CAPSULE BY MOUTH ONCE NIGHTLY   • torsemide (DEMADEX) 10 " MG tablet Take 10 mg by mouth 2 (Two) Times a Day.   • vitamin D (ERGOCALCIFEROL) 97814 UNITS capsule capsule Take 1 capsule by mouth 2 (Two) Times a Week.   • warfarin (COUMADIN) 1 MG tablet Take 1 tablet by mouth Daily.   • warfarin (COUMADIN) 5 MG tablet Take 1 tablet by mouth Daily.   • [DISCONTINUED] tamsulosin (FLOMAX) 0.4 MG capsule 24 hr capsule Take 1 capsule by mouth Daily.   • [DISCONTINUED] ACCU-CHEK TYLER test strip Test 4 times daily and as needed   • [DISCONTINUED] traMADol (ULTRAM) 50 MG tablet Three times a day as needed for pain     No current facility-administered medications on file prior to visit.      Family History   Problem Relation Age of Onset   • Arthritis Mother    • Diabetes Father    • Heart disease Father    • Hyperlipidemia Father    • Hypertension Father    • Obesity Father    • Cancer Brother      esophagus   • Heart disease Maternal Grandfather      Social History   Substance Use Topics   • Smoking status: Former Smoker     Packs/day: 1.00     Years: 5.00     Types: Cigarettes     Quit date: 1966   • Smokeless tobacco: Never Used   • Alcohol use Yes     Allergies   Allergen Reactions   • No Known Drug Allergy          History of Present Illness  Encounter Diagnoses   Name Primary?   • Poorly controlled type 2 diabetes mellitus Yes   • Diabetic peripheral neuropathy    • CKD (chronic kidney disease) stage 4, GFR 15-29 ml/min    • Hyperlipidemia, unspecified hyperlipidemia type    This is a 72-year-old gentleman here today for routine follow-up visit.  He has not been seen since July of last year.  He apparently no showed for his appointment with Dr. Lehman.  He has been off his Cymbalta due to denial of refills for not being seen.  He is currently on hemodialysis and has a shunt in his right upper arm.  His hemoglobin A1c is 11 which reflects poor diabetes management.  He has not been checking his blood sugars because he states the strips that he purchases do not match his meter.  He  was provided a new meter at today's visit.  He is a poor historian regarding his medications.  His blood sugar on the day the he had labs from was 515 mg/dL.  He also admits to poor dietary choices.  He has some dementia and is on medication.  His wife helps him with his medical needs.    The following portions of the patient's history were reviewed and updated as appropriate: allergies, current medications, past family history, past medical history, past social history, past surgical history and problem list.    Review of Systems   Constitutional: Negative for fatigue.   HENT: Negative for trouble swallowing.    Eyes: Negative for visual disturbance.   Respiratory: Negative for shortness of breath.    Cardiovascular: Negative for leg swelling.   Endocrine: Negative for polyphagia.   Skin: Negative for wound.   Neurological: Negative for numbness.       Objective   Physical Exam   Constitutional: He is oriented to person, place, and time. He appears well-developed and well-nourished. No distress.   HENT:   Head: Normocephalic and atraumatic.   Right Ear: External ear normal.   Left Ear: External ear normal.   Nose: Nose normal.   Eyes: Pupils are equal, round, and reactive to light. Right eye exhibits no discharge. Left eye exhibits no discharge.   Neck: Normal range of motion. Neck supple. Carotid bruit is not present. No tracheal deviation, no edema and no erythema present. No thyromegaly present.   Cardiovascular: Normal rate, regular rhythm, normal heart sounds and intact distal pulses.  Exam reveals no gallop and no friction rub.    No murmur heard.  Pulmonary/Chest: Effort normal and breath sounds normal. No respiratory distress. He has no wheezes. He has no rales.   Abdominal: Soft. Bowel sounds are normal. He exhibits no distension. There is no tenderness.   Musculoskeletal: Normal range of motion. He exhibits no edema or deformity.   Lymphadenopathy:     He has no cervical adenopathy.   Neurological: He is  alert and oriented to person, place, and time. Coordination normal.   Neuropathy in both feet   Skin: Skin is warm and dry. No rash noted. He is not diaphoretic. No erythema. No pallor.   Shunt in rt upper arm for dialysis   Psychiatric: He has a normal mood and affect. His behavior is normal. Judgment and thought content normal.   Nursing note and vitals reviewed.        Assessment/Plan   Deacon was seen today for diabetes and vitamin d deficiency.    Diagnoses and all orders for this visit:    Poorly controlled type 2 diabetes mellitus    Diabetic peripheral neuropathy    CKD (chronic kidney disease) stage 4, GFR 15-29 ml/min    Hyperlipidemia, unspecified hyperlipidemia type    Other orders  -     glucose blood (ACCU-CHEK GUIDE) test strip; Use as instructed to check 4 times daily  -     ACCU-CHEK FASTCLIX LANCETS misc; Use as directed for blood glucose testing    In summary, patient was seen and examined.  He was given a new blood glucose meter at today's visit.  I spent considerable time with him discussing the need to get his hemoglobin A1c under better control.  He was educated on with a hemoglobin A1c means.  He has not been checking his blood sugars and he was given a new blood glucose meter at today's visit.  He is accompanied by his wife.  He has been told to resume Cymbalta 60 mg once daily.  I've started him on vascepa 1 g 2 pills twice daily for very high triglycerides.  He is on blood thinners and he has been advised of the caution with using vascepa as well as to let dialysis no.  I've asked that he check his blood sugars 4 times a day and keep me informed what his blood sugars are doing so that I can make additional changes to his medications.  I've increased his regular insulin to 10 units 3 times daily with each meal.  I do not feel that these increases are going to be enough to get his blood sugars down however I want to be more cautious of hypoglycemia and adjust slightly to get blood sugars under  better control.  He is to follow-up with me in 3 months with labs prior  Increase NPH insulin to 30 units twice daily with food  Increase regular insulin to 10 units 3 times daily with a meal  Vascepa 1 g take 2 pills twice daily for high triglycerides.  Caution is advised if you on a blood thinner so please let dialysis know this  Have been provided a new meter and strips and been sent to your pharmacy  Resume Cymbalta 60 mg once daily  Check blood sugars at least 4 times daily prior to your meal  If you have any blood sugars less than 70 mg/dL please contact the office during business hours to let us know.  In the meantime treat with 15 g of a simple sugar an example would be 4 ounces of juice or 4 glucose tablets to raise the blood sugar  Increase vitamin D to 1 capsule 3 times weekly for vitamin D deficiency  Increase atorvastatin to 40 mg once daily      Current Outpatient Prescriptions:   •  acetaminophen (TYLENOL) 325 MG tablet, Take 2 tablets by mouth Every 6 (Six) Hours As Needed for mild pain (1-3) or fever., Disp: 100 tablet, Rfl: 0  •  allopurinol (ZYLOPRIM) 100 MG tablet, Take 1 tablet by mouth Daily. (Patient taking differently: Take 300 mg by mouth Daily.), Disp: 100 tablet, Rfl: 3  •  amLODIPine (NORVASC) 5 MG tablet, Take 1 tablet by mouth Daily., Disp: 30 tablet, Rfl: 5  •  aspirin 81 MG tablet, Take 81 mg by mouth Daily., Disp: , Rfl:   •  atorvastatin (LIPITOR) 40 MG tablet, Take 1 tablet by mouth Daily., Disp: 90 tablet, Rfl: 1  •  calcium acetate (PHOS BINDER,) 667 MG capsule capsule, Take 667 mg by mouth Daily., Disp: , Rfl:   •  CloNIDine (CATAPRES) 0.1 MG tablet, Take 1 tablet by mouth 2 (Two) Times a Day., Disp: 180 tablet, Rfl: 3  •  donepezil (ARICEPT) 10 MG tablet, Take 10 mg by mouth Every Night., Disp: , Rfl:   •  insulin NPH (humuLIN N,novoLIN N) 100 UNIT/ML injection, Inject 30 Units under the skin 2 (Two) Times a Day Before Meals., Disp: 1 Units, Rfl: 12  •  Insulin Pen Needle 32G X  4 MM misc, 4 (Four) Times a Day As Needed., Disp: , Rfl:   •  insulin regular (humuLIN R,novoLIN R) 100 UNIT/ML injection, Inject 10 Units under the skin 3 (Three) Times a Day Before Meals., Disp: 10 mL, Rfl: 1  •  Lancets Misc. (ACCU-CHEK FASTCLIX LANCET) kit, Use as directed for blood glucose testing, Disp: 200 kit, Rfl: 5  •  metoprolol succinate XL (TOPROL-XL) 50 MG 24 hr tablet, Take 1 tablet by mouth Daily., Disp: 30 tablet, Rfl: 11  •  mupirocin (BACTROBAN) 2 % ointment, Apply  topically 3 (Three) Times a Day., Disp: 22 g, Rfl: 1  •  sodium hypochlorite (DAKIN'S 1/8 STRENGTH) 0.0625 % solution topical solution 0.0625%, Irrigate with 1,000 mL as directed Every 12 (Twelve) Hours., Disp: 1000 mL, Rfl: 6  •  tamsulosin (FLOMAX) 0.4 MG capsule 24 hr capsule, TAKE ONE CAPSULE BY MOUTH ONCE NIGHTLY, Disp: 30 capsule, Rfl: 5  •  torsemide (DEMADEX) 10 MG tablet, Take 10 mg by mouth 2 (Two) Times a Day., Disp: , Rfl:   •  vitamin D (ERGOCALCIFEROL) 28333 UNITS capsule capsule, Take 1 capsule 3 times weekly, Disp: 36 capsule, Rfl: 5  •  warfarin (COUMADIN) 1 MG tablet, Take 1 tablet by mouth Daily., Disp: 30 tablet, Rfl: 5  •  warfarin (COUMADIN) 5 MG tablet, Take 1 tablet by mouth Daily., Disp: 30 tablet, Rfl: 5  •  ACCU-CHEK FASTCLIX LANCETS misc, Use as directed for blood glucose testing, Disp: 200 each, Rfl: 5  •  DULoxetine (CYMBALTA) 60 MG capsule, Take 1 capsule by mouth Daily., Disp: 90 capsule, Rfl: 1  •  glucose blood (ACCU-CHEK GUIDE) test strip, Use as instructed to check 4 times daily, Disp: 150 each, Rfl: 5

## 2017-06-27 NOTE — PATIENT INSTRUCTIONS
Increase NPH insulin to 30 units twice daily with food  Increase regular insulin to 10 units 3 times daily with a meal  Vascepa 1 g take 2 pills twice daily for high triglycerides.  Caution is advised if you on a blood thinner so please let dialysis know this  Have been provided a new meter and strips and been sent to your pharmacy  Resume Cymbalta 60 mg once daily  Check blood sugars at least 4 times daily prior to your meal  If you have any blood sugars less than 70 mg/dL please contact the office during business hours to let us know.  In the meantime treat with 15 g of a simple sugar an example would be 4 ounces of juice or 4 glucose tablets to raise the blood sugar  Increase vitamin D to 1 capsule 3 times weekly for vitamin D deficiency  Increase atorvastatin to 40 mg once daily

## 2017-06-30 ENCOUNTER — HOSPITAL ENCOUNTER (OUTPATIENT)
Dept: CARDIOLOGY | Facility: HOSPITAL | Age: 72
Discharge: HOME | End: 2017-06-30
Admitting: INTERNAL MEDICINE

## 2017-06-30 PROCEDURE — 85610 PROTHROMBIN TIME: CPT | Performed by: INTERNAL MEDICINE

## 2017-07-31 RX ORDER — ATORVASTATIN CALCIUM 10 MG/1
TABLET, FILM COATED ORAL
Qty: 90 TABLET | Refills: 0 | Status: SHIPPED | OUTPATIENT
Start: 2017-07-31 | End: 2017-12-08 | Stop reason: HOSPADM

## 2017-08-01 RX ORDER — WARFARIN SODIUM 5 MG/1
5 TABLET ORAL
Qty: 30 TABLET | Refills: 1 | Status: SHIPPED | OUTPATIENT
Start: 2017-08-01 | End: 2017-10-14 | Stop reason: SDUPTHER

## 2017-08-04 ENCOUNTER — HOSPITAL ENCOUNTER (OUTPATIENT)
Dept: CARDIOLOGY | Facility: HOSPITAL | Age: 72
Discharge: HOME | End: 2017-08-04
Admitting: INTERNAL MEDICINE

## 2017-08-04 PROCEDURE — 85610 PROTHROMBIN TIME: CPT | Performed by: INTERNAL MEDICINE

## 2017-08-07 ENCOUNTER — HOSPITAL ENCOUNTER (OUTPATIENT)
Dept: CARDIOLOGY | Facility: HOSPITAL | Age: 72
Discharge: HOME | End: 2017-08-07
Admitting: INTERNAL MEDICINE

## 2017-08-07 PROCEDURE — 85610 PROTHROMBIN TIME: CPT | Performed by: INTERNAL MEDICINE

## 2017-09-14 DIAGNOSIS — E55.9 VITAMIN D DEFICIENCY: ICD-10-CM

## 2017-09-14 DIAGNOSIS — E11.65 POORLY CONTROLLED TYPE 2 DIABETES MELLITUS (HCC): Primary | ICD-10-CM

## 2017-09-20 ENCOUNTER — LAB (OUTPATIENT)
Dept: ENDOCRINOLOGY | Age: 72
End: 2017-09-20

## 2017-09-20 DIAGNOSIS — E55.9 VITAMIN D DEFICIENCY: ICD-10-CM

## 2017-09-20 DIAGNOSIS — E11.65 POORLY CONTROLLED TYPE 2 DIABETES MELLITUS (HCC): ICD-10-CM

## 2017-09-21 LAB
25(OH)D3+25(OH)D2 SERPL-MCNC: 50.7 NG/ML (ref 30–100)
ALBUMIN SERPL-MCNC: 3.8 G/DL (ref 3.5–5.2)
ALBUMIN/GLOB SERPL: 1.7 G/DL
ALP SERPL-CCNC: 82 U/L (ref 39–117)
ALT SERPL-CCNC: 20 U/L (ref 1–41)
APTT PPP: 31 SECONDS (ref 22.7–35.4)
AST SERPL-CCNC: 11 U/L (ref 1–40)
BILIRUB SERPL-MCNC: 1 MG/DL (ref 0.1–1.2)
BUN SERPL-MCNC: 37 MG/DL (ref 8–23)
BUN/CREAT SERPL: 8 (ref 7–25)
C PEPTIDE SERPL-MCNC: 16.8 NG/ML (ref 1.1–4.4)
CALCIUM SERPL-MCNC: 9.4 MG/DL (ref 8.6–10.5)
CHLORIDE SERPL-SCNC: 93 MMOL/L (ref 98–107)
CHOLEST SERPL-MCNC: 112 MG/DL (ref 0–200)
CO2 SERPL-SCNC: 27.4 MMOL/L (ref 22–29)
CREAT SERPL-MCNC: 4.6 MG/DL (ref 0.76–1.27)
GLOBULIN SER CALC-MCNC: 2.3 GM/DL
GLUCOSE SERPL-MCNC: 344 MG/DL (ref 65–99)
HBA1C MFR BLD: 11.1 % (ref 4.8–5.6)
HDLC SERPL-MCNC: 23 MG/DL (ref 40–60)
INR PPP: 1.26 (ref 0.9–1.1)
LDLC SERPL CALC-MCNC: 12 MG/DL (ref 0–100)
MICROALBUMIN UR-MCNC: 460.8 UG/ML
POTASSIUM SERPL-SCNC: 4.2 MMOL/L (ref 3.5–5.2)
PROT SERPL-MCNC: 6.1 G/DL (ref 6–8.5)
PROTHROMBIN TIME: 15.4 SECONDS (ref 11.7–14.2)
SODIUM SERPL-SCNC: 135 MMOL/L (ref 136–145)
TRIGL SERPL-MCNC: 384 MG/DL (ref 0–150)
VLDLC SERPL CALC-MCNC: 76.8 MG/DL (ref 5–40)

## 2017-10-14 ENCOUNTER — OFFICE VISIT (OUTPATIENT)
Dept: FAMILY MEDICINE CLINIC | Facility: CLINIC | Age: 72
End: 2017-10-14

## 2017-10-14 VITALS
DIASTOLIC BLOOD PRESSURE: 84 MMHG | TEMPERATURE: 98.2 F | OXYGEN SATURATION: 99 % | HEIGHT: 70 IN | HEART RATE: 69 BPM | SYSTOLIC BLOOD PRESSURE: 126 MMHG | BODY MASS INDEX: 29.6 KG/M2 | WEIGHT: 206.8 LBS

## 2017-10-14 DIAGNOSIS — F01.50 VASCULAR DEMENTIA WITHOUT BEHAVIORAL DISTURBANCE (HCC): ICD-10-CM

## 2017-10-14 DIAGNOSIS — N40.1 BENIGN PROSTATIC HYPERPLASIA WITH URINARY OBSTRUCTION: ICD-10-CM

## 2017-10-14 DIAGNOSIS — E11.65 POORLY CONTROLLED TYPE 2 DIABETES MELLITUS (HCC): ICD-10-CM

## 2017-10-14 DIAGNOSIS — I10 ESSENTIAL HYPERTENSION: Primary | ICD-10-CM

## 2017-10-14 DIAGNOSIS — Z79.01 ANTICOAGULATION GOAL OF INR 2 TO 3: ICD-10-CM

## 2017-10-14 DIAGNOSIS — N13.8 BENIGN PROSTATIC HYPERPLASIA WITH URINARY OBSTRUCTION: ICD-10-CM

## 2017-10-14 DIAGNOSIS — Z51.81 ANTICOAGULATION GOAL OF INR 2 TO 3: ICD-10-CM

## 2017-10-14 LAB — INR PPP: 2.7 (ref 0.9–1.1)

## 2017-10-14 PROCEDURE — 99213 OFFICE O/P EST LOW 20 MIN: CPT | Performed by: FAMILY MEDICINE

## 2017-10-14 PROCEDURE — 85610 PROTHROMBIN TIME: CPT | Performed by: FAMILY MEDICINE

## 2017-10-14 PROCEDURE — 36416 COLLJ CAPILLARY BLOOD SPEC: CPT | Performed by: FAMILY MEDICINE

## 2017-10-14 RX ORDER — WARFARIN SODIUM 5 MG/1
5 TABLET ORAL
Qty: 90 TABLET | Refills: 3 | Status: SHIPPED | OUTPATIENT
Start: 2017-10-14 | End: 2018-01-01 | Stop reason: HOSPADM

## 2017-10-14 RX ORDER — METOPROLOL SUCCINATE 50 MG/1
50 TABLET, EXTENDED RELEASE ORAL
Qty: 90 TABLET | Refills: 3 | Status: SHIPPED | OUTPATIENT
Start: 2017-10-14 | End: 2018-01-02 | Stop reason: SDUPTHER

## 2017-10-14 RX ORDER — TAMSULOSIN HYDROCHLORIDE 0.4 MG/1
0.4 CAPSULE ORAL DAILY
Qty: 90 CAPSULE | Refills: 3 | Status: SHIPPED | OUTPATIENT
Start: 2017-10-14 | End: 2019-01-01 | Stop reason: SDUPTHER

## 2017-10-14 NOTE — PROGRESS NOTES
Subjective   Deacon Martin is a 72 y.o. male.     History of Present Illness 1) anticoagulation   2)Wart R  2nd finger  3) BPH- nocturia x2  4)Deacon has a history of chronic hypertension and has been well controlled on current medications.  Patient reports has had hypertension for 15 years. He is tolerating medications without side effect. He reports no vision changes, headaches or lightheadedness. He is requesting refills of medications  5) DM has endocriknologist  ok    Review of Systems   Constitutional: Negative.    HENT: Negative.    Eyes: Negative.    Respiratory: Negative.    Cardiovascular: Negative.    Gastrointestinal: Negative.    Endocrine: Negative.    Genitourinary: Negative.    Musculoskeletal: Negative.    Skin: Negative.    Allergic/Immunologic: Negative.    Neurological: Negative.    Hematological: Negative.    Psychiatric/Behavioral: Negative.        Objective   Physical Exam   Constitutional: He is oriented to person, place, and time. He appears well-developed and well-nourished.   Cardiovascular: Normal rate, regular rhythm, normal heart sounds and intact distal pulses.    Pulmonary/Chest: Effort normal and breath sounds normal.   Neurological: He is alert and oriented to person, place, and time. He has normal reflexes.   Psychiatric: He has a normal mood and affect. His behavior is normal.   Nursing note and vitals reviewed.      Assessment/Plan   Problems Addressed this Visit        Cardiovascular and Mediastinum    Essential hypertension - Primary    Relevant Medications    metoprolol succinate XL (TOPROL-XL) 50 MG 24 hr tablet       Endocrine    Poorly controlled type 2 diabetes mellitus       Nervous and Auditory    Vascular dementia       Genitourinary    BPH (benign prostatic hypertrophy) with urinary obstruction    Relevant Medications    tamsulosin (FLOMAX) 0.4 MG capsule 24 hr capsule       Hematopoietic and Hemostatic    Anticoagulation goal of INR 2 to 3    Relevant Orders    Protime-INR

## 2017-12-04 ENCOUNTER — HOSPITAL ENCOUNTER (INPATIENT)
Facility: HOSPITAL | Age: 72
LOS: 3 days | Discharge: HOME HEALTH | End: 2017-12-08
Attending: FAMILY MEDICINE | Admitting: FAMILY MEDICINE

## 2017-12-04 DIAGNOSIS — L98.8 FISTULA: ICD-10-CM

## 2017-12-04 DIAGNOSIS — I48.91 ATRIAL FIBRILLATION, CONTROLLED (HCC): ICD-10-CM

## 2017-12-04 LAB — GLUCOSE BLDC GLUCOMTR-MCNC: 196 MG/DL (ref 70–130)

## 2017-12-04 PROCEDURE — 82962 GLUCOSE BLOOD TEST: CPT

## 2017-12-04 PROCEDURE — G0378 HOSPITAL OBSERVATION PER HR: HCPCS

## 2017-12-04 RX ORDER — NICOTINE POLACRILEX 4 MG
15 LOZENGE BUCCAL
Status: DISCONTINUED | OUTPATIENT
Start: 2017-12-04 | End: 2017-12-08 | Stop reason: HOSPADM

## 2017-12-04 RX ORDER — DEXTROSE MONOHYDRATE 25 G/50ML
25 INJECTION, SOLUTION INTRAVENOUS
Status: DISCONTINUED | OUTPATIENT
Start: 2017-12-04 | End: 2017-12-08 | Stop reason: HOSPADM

## 2017-12-04 RX ORDER — CLONIDINE HYDROCHLORIDE 0.1 MG/1
0.1 TABLET ORAL 2 TIMES DAILY
Status: DISCONTINUED | OUTPATIENT
Start: 2017-12-04 | End: 2017-12-08 | Stop reason: HOSPADM

## 2017-12-04 RX ADMIN — CLONIDINE HYDROCHLORIDE 0.1 MG: 0.1 TABLET ORAL at 23:10

## 2017-12-05 PROBLEM — T85.618A SHUNT MALFUNCTION: Status: ACTIVE | Noted: 2017-12-05

## 2017-12-05 LAB
GLUCOSE BLDC GLUCOMTR-MCNC: 206 MG/DL (ref 70–130)
GLUCOSE BLDC GLUCOMTR-MCNC: 342 MG/DL (ref 70–130)
GLUCOSE BLDC GLUCOMTR-MCNC: 368 MG/DL (ref 70–130)
HBV SURFACE AG SERPL QL IA: NORMAL
INR PPP: 2.95 (ref 0.9–1.1)
PROTHROMBIN TIME: 29.8 SECONDS (ref 11.7–14.2)

## 2017-12-05 PROCEDURE — 87340 HEPATITIS B SURFACE AG IA: CPT | Performed by: INTERNAL MEDICINE

## 2017-12-05 PROCEDURE — 82962 GLUCOSE BLOOD TEST: CPT

## 2017-12-05 PROCEDURE — 85610 PROTHROMBIN TIME: CPT | Performed by: FAMILY MEDICINE

## 2017-12-05 PROCEDURE — 63710000001 INSULIN REGULAR HUMAN PER 5 UNITS: Performed by: FAMILY MEDICINE

## 2017-12-05 PROCEDURE — 63710000001 INSULIN ASPART PER 5 UNITS: Performed by: FAMILY MEDICINE

## 2017-12-05 RX ORDER — DULOXETIN HYDROCHLORIDE 60 MG/1
60 CAPSULE, DELAYED RELEASE ORAL DAILY
Status: DISCONTINUED | OUTPATIENT
Start: 2017-12-05 | End: 2017-12-08 | Stop reason: HOSPADM

## 2017-12-05 RX ORDER — DONEPEZIL HYDROCHLORIDE 10 MG/1
10 TABLET, FILM COATED ORAL NIGHTLY
Status: DISCONTINUED | OUTPATIENT
Start: 2017-12-05 | End: 2017-12-08 | Stop reason: HOSPADM

## 2017-12-05 RX ORDER — SODIUM CHLORIDE 0.9 % (FLUSH) 0.9 %
1-10 SYRINGE (ML) INJECTION AS NEEDED
Status: DISCONTINUED | OUTPATIENT
Start: 2017-12-05 | End: 2017-12-08 | Stop reason: HOSPADM

## 2017-12-05 RX ORDER — ATORVASTATIN CALCIUM 10 MG/1
10 TABLET, FILM COATED ORAL DAILY
Status: DISCONTINUED | OUTPATIENT
Start: 2017-12-05 | End: 2017-12-08 | Stop reason: HOSPADM

## 2017-12-05 RX ORDER — ASPIRIN 81 MG/1
81 TABLET, CHEWABLE ORAL DAILY
Status: DISCONTINUED | OUTPATIENT
Start: 2017-12-05 | End: 2017-12-08 | Stop reason: HOSPADM

## 2017-12-05 RX ORDER — METOPROLOL SUCCINATE 50 MG/1
50 TABLET, EXTENDED RELEASE ORAL
Status: DISCONTINUED | OUTPATIENT
Start: 2017-12-05 | End: 2017-12-08 | Stop reason: HOSPADM

## 2017-12-05 RX ORDER — CALCIUM ACETATE 667 MG/1
667 CAPSULE ORAL DAILY
Status: DISCONTINUED | OUTPATIENT
Start: 2017-12-05 | End: 2017-12-08 | Stop reason: HOSPADM

## 2017-12-05 RX ORDER — TORSEMIDE 10 MG/1
10 TABLET ORAL 2 TIMES DAILY
Status: DISCONTINUED | OUTPATIENT
Start: 2017-12-05 | End: 2017-12-08 | Stop reason: HOSPADM

## 2017-12-05 RX ORDER — TAMSULOSIN HYDROCHLORIDE 0.4 MG/1
0.4 CAPSULE ORAL DAILY
Status: DISCONTINUED | OUTPATIENT
Start: 2017-12-05 | End: 2017-12-08 | Stop reason: HOSPADM

## 2017-12-05 RX ORDER — HYDROCODONE BITARTRATE AND ACETAMINOPHEN 7.5; 325 MG/1; MG/1
1 TABLET ORAL EVERY 6 HOURS PRN
Status: DISCONTINUED | OUTPATIENT
Start: 2017-12-05 | End: 2017-12-08 | Stop reason: HOSPADM

## 2017-12-05 RX ORDER — AMLODIPINE BESYLATE 5 MG/1
5 TABLET ORAL DAILY
Status: DISCONTINUED | OUTPATIENT
Start: 2017-12-05 | End: 2017-12-08 | Stop reason: HOSPADM

## 2017-12-05 RX ORDER — ACETAMINOPHEN 325 MG/1
650 TABLET ORAL EVERY 6 HOURS PRN
Status: DISCONTINUED | OUTPATIENT
Start: 2017-12-05 | End: 2017-12-08 | Stop reason: HOSPADM

## 2017-12-05 RX ORDER — ALLOPURINOL 100 MG/1
100 TABLET ORAL DAILY
Status: DISCONTINUED | OUTPATIENT
Start: 2017-12-05 | End: 2017-12-08 | Stop reason: HOSPADM

## 2017-12-05 RX ADMIN — TORSEMIDE 10 MG: 10 TABLET ORAL at 17:17

## 2017-12-05 RX ADMIN — INSULIN ASPART 7 UNITS: 100 INJECTION, SOLUTION INTRAVENOUS; SUBCUTANEOUS at 22:08

## 2017-12-05 RX ADMIN — TAMSULOSIN HYDROCHLORIDE 0.4 MG: 0.4 CAPSULE ORAL at 17:16

## 2017-12-05 RX ADMIN — ASPIRIN 81 MG: 81 TABLET, CHEWABLE ORAL at 17:16

## 2017-12-05 RX ADMIN — CALCIUM ACETATE 667 MG: 667 CAPSULE ORAL at 17:18

## 2017-12-05 RX ADMIN — ATORVASTATIN CALCIUM 10 MG: 10 TABLET, FILM COATED ORAL at 17:17

## 2017-12-05 RX ADMIN — INSULIN ASPART 2 UNITS: 100 INJECTION, SOLUTION INTRAVENOUS; SUBCUTANEOUS at 17:23

## 2017-12-05 RX ADMIN — INSULIN ASPART 8 UNITS: 100 INJECTION, SOLUTION INTRAVENOUS; SUBCUTANEOUS at 08:18

## 2017-12-05 RX ADMIN — ALLOPURINOL 100 MG: 100 TABLET ORAL at 17:16

## 2017-12-05 RX ADMIN — HUMAN INSULIN 10 UNITS: 100 INJECTION, SOLUTION SUBCUTANEOUS at 17:23

## 2017-12-05 RX ADMIN — CLONIDINE HYDROCHLORIDE 0.1 MG: 0.1 TABLET ORAL at 08:19

## 2017-12-05 RX ADMIN — AMLODIPINE BESYLATE 5 MG: 5 TABLET ORAL at 17:16

## 2017-12-05 RX ADMIN — HYDROCODONE BITARTRATE AND ACETAMINOPHEN 1 TABLET: 7.5; 325 TABLET ORAL at 23:27

## 2017-12-05 RX ADMIN — METOPROLOL SUCCINATE 50 MG: 50 TABLET, FILM COATED, EXTENDED RELEASE ORAL at 17:16

## 2017-12-05 RX ADMIN — DONEPEZIL HYDROCHLORIDE 10 MG: 10 TABLET, FILM COATED ORAL at 21:00

## 2017-12-05 RX ADMIN — DULOXETINE HYDROCHLORIDE 60 MG: 60 CAPSULE, DELAYED RELEASE ORAL at 17:17

## 2017-12-05 RX ADMIN — CLONIDINE HYDROCHLORIDE 0.1 MG: 0.1 TABLET ORAL at 17:17

## 2017-12-06 ENCOUNTER — ANESTHESIA (OUTPATIENT)
Dept: PERIOP | Facility: HOSPITAL | Age: 72
End: 2017-12-06

## 2017-12-06 ENCOUNTER — ANESTHESIA EVENT (OUTPATIENT)
Dept: PERIOP | Facility: HOSPITAL | Age: 72
End: 2017-12-06

## 2017-12-06 LAB
GLUCOSE BLDC GLUCOMTR-MCNC: 230 MG/DL (ref 70–130)
GLUCOSE BLDC GLUCOMTR-MCNC: 243 MG/DL (ref 70–130)
GLUCOSE BLDC GLUCOMTR-MCNC: 247 MG/DL (ref 70–130)
GLUCOSE BLDC GLUCOMTR-MCNC: 252 MG/DL (ref 70–130)
GLUCOSE BLDC GLUCOMTR-MCNC: 258 MG/DL (ref 70–130)
GLUCOSE BLDC GLUCOMTR-MCNC: 271 MG/DL (ref 70–130)
INR PPP: 1.9 (ref 0.9–1.1)
PROTHROMBIN TIME: 21.1 SECONDS (ref 11.7–14.2)

## 2017-12-06 PROCEDURE — P9017 PLASMA 1 DONOR FRZ W/IN 8 HR: HCPCS

## 2017-12-06 PROCEDURE — 87205 SMEAR GRAM STAIN: CPT | Performed by: SURGERY

## 2017-12-06 PROCEDURE — 25010000002 PROPOFOL 10 MG/ML EMULSION: Performed by: NURSE ANESTHETIST, CERTIFIED REGISTERED

## 2017-12-06 PROCEDURE — 87070 CULTURE OTHR SPECIMN AEROBIC: CPT | Performed by: SURGERY

## 2017-12-06 PROCEDURE — 87186 SC STD MICRODIL/AGAR DIL: CPT | Performed by: SURGERY

## 2017-12-06 PROCEDURE — 99231 SBSQ HOSP IP/OBS SF/LOW 25: CPT | Performed by: FAMILY MEDICINE

## 2017-12-06 PROCEDURE — 25010000002 FENTANYL CITRATE (PF) 100 MCG/2ML SOLUTION: Performed by: NURSE ANESTHETIST, CERTIFIED REGISTERED

## 2017-12-06 PROCEDURE — 36430 TRANSFUSION BLD/BLD COMPNT: CPT

## 2017-12-06 PROCEDURE — 85610 PROTHROMBIN TIME: CPT | Performed by: FAMILY MEDICINE

## 2017-12-06 PROCEDURE — 87147 CULTURE TYPE IMMUNOLOGIC: CPT | Performed by: SURGERY

## 2017-12-06 PROCEDURE — 25010000002 MIDAZOLAM PER 1 MG: Performed by: ANESTHESIOLOGY

## 2017-12-06 PROCEDURE — 25010000003 CEFAZOLIN PER 500 MG: Performed by: SURGERY

## 2017-12-06 PROCEDURE — 93005 ELECTROCARDIOGRAM TRACING: CPT | Performed by: FAMILY MEDICINE

## 2017-12-06 PROCEDURE — 25010000002 MEPIVACAINE PF 2 % SOLUTION 20 ML VIAL: Performed by: ANESTHESIOLOGY

## 2017-12-06 PROCEDURE — C1768 GRAFT, VASCULAR: HCPCS | Performed by: SURGERY

## 2017-12-06 PROCEDURE — 25010000002 VANCOMYCIN PER 500 MG: Performed by: SURGERY

## 2017-12-06 PROCEDURE — 86927 PLASMA FRESH FROZEN: CPT

## 2017-12-06 PROCEDURE — 93010 ELECTROCARDIOGRAM REPORT: CPT | Performed by: INTERNAL MEDICINE

## 2017-12-06 PROCEDURE — 25010000002 METHYLPREDNISOLONE PER 125 MG: Performed by: ANESTHESIOLOGY

## 2017-12-06 PROCEDURE — 25010000002 HEPARIN (PORCINE) PER 1000 UNITS: Performed by: NURSE ANESTHETIST, CERTIFIED REGISTERED

## 2017-12-06 PROCEDURE — 63710000001 INSULIN ASPART PER 5 UNITS: Performed by: FAMILY MEDICINE

## 2017-12-06 PROCEDURE — 25010000002 PROTAMINE SULFATE PER 10 MG: Performed by: NURSE ANESTHETIST, CERTIFIED REGISTERED

## 2017-12-06 PROCEDURE — 031709D BYPASS RIGHT BRACHIAL ARTERY TO UPPER ARM VEIN WITH AUTOLOGOUS VENOUS TISSUE, OPEN APPROACH: ICD-10-PCS | Performed by: SURGERY

## 2017-12-06 PROCEDURE — 88304 TISSUE EXAM BY PATHOLOGIST: CPT | Performed by: SURGERY

## 2017-12-06 PROCEDURE — 25010000002 ROPIVACAINE PER 1 MG: Performed by: ANESTHESIOLOGY

## 2017-12-06 PROCEDURE — 82962 GLUCOSE BLOOD TEST: CPT

## 2017-12-06 PROCEDURE — 5A1D70Z PERFORMANCE OF URINARY FILTRATION, INTERMITTENT, LESS THAN 6 HOURS PER DAY: ICD-10-PCS | Performed by: INTERNAL MEDICINE

## 2017-12-06 PROCEDURE — 25010000002 HEPARIN (PORCINE) PER 1000 UNITS: Performed by: SURGERY

## 2017-12-06 DEVICE — IMPLANTABLE DEVICE: Type: IMPLANTABLE DEVICE | Status: FUNCTIONAL

## 2017-12-06 RX ORDER — PROPOFOL 10 MG/ML
VIAL (ML) INTRAVENOUS CONTINUOUS PRN
Status: DISCONTINUED | OUTPATIENT
Start: 2017-12-06 | End: 2017-12-06 | Stop reason: SURG

## 2017-12-06 RX ORDER — SODIUM CHLORIDE 0.9 % (FLUSH) 0.9 %
1-10 SYRINGE (ML) INJECTION AS NEEDED
Status: DISCONTINUED | OUTPATIENT
Start: 2017-12-06 | End: 2017-12-06 | Stop reason: HOSPADM

## 2017-12-06 RX ORDER — DIPHENHYDRAMINE HYDROCHLORIDE 50 MG/ML
12.5 INJECTION INTRAMUSCULAR; INTRAVENOUS
Status: CANCELLED | OUTPATIENT
Start: 2017-12-06

## 2017-12-06 RX ORDER — ONDANSETRON 2 MG/ML
4 INJECTION INTRAMUSCULAR; INTRAVENOUS ONCE AS NEEDED
Status: DISCONTINUED | OUTPATIENT
Start: 2017-12-06 | End: 2017-12-06 | Stop reason: HOSPADM

## 2017-12-06 RX ORDER — PROTAMINE SULFATE 10 MG/ML
INJECTION, SOLUTION INTRAVENOUS AS NEEDED
Status: DISCONTINUED | OUTPATIENT
Start: 2017-12-06 | End: 2017-12-06 | Stop reason: SURG

## 2017-12-06 RX ORDER — HYDROCODONE BITARTRATE AND ACETAMINOPHEN 7.5; 325 MG/1; MG/1
1 TABLET ORAL ONCE AS NEEDED
Status: DISCONTINUED | OUTPATIENT
Start: 2017-12-06 | End: 2017-12-06 | Stop reason: HOSPADM

## 2017-12-06 RX ORDER — SODIUM CHLORIDE 9 MG/ML
9 INJECTION, SOLUTION INTRAVENOUS CONTINUOUS
Status: DISCONTINUED | OUTPATIENT
Start: 2017-12-06 | End: 2017-12-06

## 2017-12-06 RX ORDER — EPHEDRINE SULFATE 50 MG/ML
5 INJECTION, SOLUTION INTRAVENOUS ONCE AS NEEDED
Status: CANCELLED | OUTPATIENT
Start: 2017-12-06

## 2017-12-06 RX ORDER — PROMETHAZINE HYDROCHLORIDE 25 MG/ML
12.5 INJECTION, SOLUTION INTRAMUSCULAR; INTRAVENOUS ONCE AS NEEDED
Status: DISCONTINUED | OUTPATIENT
Start: 2017-12-06 | End: 2017-12-06 | Stop reason: HOSPADM

## 2017-12-06 RX ORDER — OXYCODONE AND ACETAMINOPHEN 7.5; 325 MG/1; MG/1
1 TABLET ORAL ONCE AS NEEDED
Status: CANCELLED | OUTPATIENT
Start: 2017-12-06 | End: 2017-12-07

## 2017-12-06 RX ORDER — MIDAZOLAM HYDROCHLORIDE 1 MG/ML
2 INJECTION INTRAMUSCULAR; INTRAVENOUS
Status: DISCONTINUED | OUTPATIENT
Start: 2017-12-06 | End: 2017-12-06 | Stop reason: HOSPADM

## 2017-12-06 RX ORDER — DIPHENHYDRAMINE HYDROCHLORIDE 50 MG/ML
12.5 INJECTION INTRAMUSCULAR; INTRAVENOUS
Status: DISCONTINUED | OUTPATIENT
Start: 2017-12-06 | End: 2017-12-06 | Stop reason: HOSPADM

## 2017-12-06 RX ORDER — NALOXONE HCL 0.4 MG/ML
0.2 VIAL (ML) INJECTION AS NEEDED
Status: CANCELLED | OUTPATIENT
Start: 2017-12-06

## 2017-12-06 RX ORDER — HYDRALAZINE HYDROCHLORIDE 20 MG/ML
5 INJECTION INTRAMUSCULAR; INTRAVENOUS
Status: CANCELLED | OUTPATIENT
Start: 2017-12-06

## 2017-12-06 RX ORDER — ROPIVACAINE HYDROCHLORIDE 5 MG/ML
INJECTION, SOLUTION EPIDURAL; INFILTRATION; PERINEURAL AS NEEDED
Status: DISCONTINUED | OUTPATIENT
Start: 2017-12-06 | End: 2017-12-06 | Stop reason: SURG

## 2017-12-06 RX ORDER — HYDROCODONE BITARTRATE AND ACETAMINOPHEN 7.5; 325 MG/1; MG/1
1 TABLET ORAL ONCE AS NEEDED
Status: CANCELLED | OUTPATIENT
Start: 2017-12-06 | End: 2017-12-07

## 2017-12-06 RX ORDER — HYDRALAZINE HYDROCHLORIDE 20 MG/ML
5 INJECTION INTRAMUSCULAR; INTRAVENOUS
Status: DISCONTINUED | OUTPATIENT
Start: 2017-12-06 | End: 2017-12-06 | Stop reason: HOSPADM

## 2017-12-06 RX ORDER — OXYCODONE AND ACETAMINOPHEN 7.5; 325 MG/1; MG/1
1 TABLET ORAL ONCE AS NEEDED
Status: DISCONTINUED | OUTPATIENT
Start: 2017-12-06 | End: 2017-12-06 | Stop reason: HOSPADM

## 2017-12-06 RX ORDER — HEPARIN SODIUM 1000 [USP'U]/ML
INJECTION, SOLUTION INTRAVENOUS; SUBCUTANEOUS AS NEEDED
Status: DISCONTINUED | OUTPATIENT
Start: 2017-12-06 | End: 2017-12-06 | Stop reason: SURG

## 2017-12-06 RX ORDER — FENTANYL CITRATE 50 UG/ML
50 INJECTION, SOLUTION INTRAMUSCULAR; INTRAVENOUS
Status: DISCONTINUED | OUTPATIENT
Start: 2017-12-06 | End: 2017-12-06 | Stop reason: HOSPADM

## 2017-12-06 RX ORDER — FLUMAZENIL 0.1 MG/ML
0.2 INJECTION INTRAVENOUS AS NEEDED
Status: CANCELLED | OUTPATIENT
Start: 2017-12-06

## 2017-12-06 RX ORDER — MIDAZOLAM HYDROCHLORIDE 1 MG/ML
1 INJECTION INTRAMUSCULAR; INTRAVENOUS
Status: DISCONTINUED | OUTPATIENT
Start: 2017-12-06 | End: 2017-12-06 | Stop reason: HOSPADM

## 2017-12-06 RX ORDER — FENTANYL CITRATE 50 UG/ML
50 INJECTION, SOLUTION INTRAMUSCULAR; INTRAVENOUS
Status: CANCELLED | OUTPATIENT
Start: 2017-12-06

## 2017-12-06 RX ORDER — FAMOTIDINE 10 MG/ML
20 INJECTION, SOLUTION INTRAVENOUS ONCE
Status: COMPLETED | OUTPATIENT
Start: 2017-12-06 | End: 2017-12-06

## 2017-12-06 RX ORDER — HYDROMORPHONE HCL 110MG/55ML
0.5 PATIENT CONTROLLED ANALGESIA SYRINGE INTRAVENOUS
Status: DISCONTINUED | OUTPATIENT
Start: 2017-12-06 | End: 2017-12-06 | Stop reason: HOSPADM

## 2017-12-06 RX ORDER — PROMETHAZINE HYDROCHLORIDE 25 MG/1
25 TABLET ORAL ONCE AS NEEDED
Status: DISCONTINUED | OUTPATIENT
Start: 2017-12-06 | End: 2017-12-06 | Stop reason: HOSPADM

## 2017-12-06 RX ORDER — PROMETHAZINE HYDROCHLORIDE 25 MG/1
25 SUPPOSITORY RECTAL ONCE AS NEEDED
Status: DISCONTINUED | OUTPATIENT
Start: 2017-12-06 | End: 2017-12-06 | Stop reason: HOSPADM

## 2017-12-06 RX ORDER — FENTANYL CITRATE 50 UG/ML
INJECTION, SOLUTION INTRAMUSCULAR; INTRAVENOUS AS NEEDED
Status: DISCONTINUED | OUTPATIENT
Start: 2017-12-06 | End: 2017-12-06 | Stop reason: SURG

## 2017-12-06 RX ORDER — ONDANSETRON 2 MG/ML
4 INJECTION INTRAMUSCULAR; INTRAVENOUS ONCE AS NEEDED
Status: CANCELLED | OUTPATIENT
Start: 2017-12-06

## 2017-12-06 RX ORDER — FLUMAZENIL 0.1 MG/ML
0.2 INJECTION INTRAVENOUS AS NEEDED
Status: DISCONTINUED | OUTPATIENT
Start: 2017-12-06 | End: 2017-12-06 | Stop reason: HOSPADM

## 2017-12-06 RX ORDER — LABETALOL HYDROCHLORIDE 5 MG/ML
5 INJECTION, SOLUTION INTRAVENOUS
Status: DISCONTINUED | OUTPATIENT
Start: 2017-12-06 | End: 2017-12-06 | Stop reason: HOSPADM

## 2017-12-06 RX ORDER — SODIUM CHLORIDE, SODIUM LACTATE, POTASSIUM CHLORIDE, CALCIUM CHLORIDE 600; 310; 30; 20 MG/100ML; MG/100ML; MG/100ML; MG/100ML
9 INJECTION, SOLUTION INTRAVENOUS CONTINUOUS
Status: DISCONTINUED | OUTPATIENT
Start: 2017-12-06 | End: 2017-12-06

## 2017-12-06 RX ORDER — EPHEDRINE SULFATE 50 MG/ML
5 INJECTION, SOLUTION INTRAVENOUS ONCE AS NEEDED
Status: DISCONTINUED | OUTPATIENT
Start: 2017-12-06 | End: 2017-12-06 | Stop reason: HOSPADM

## 2017-12-06 RX ORDER — NALOXONE HCL 0.4 MG/ML
0.2 VIAL (ML) INJECTION AS NEEDED
Status: DISCONTINUED | OUTPATIENT
Start: 2017-12-06 | End: 2017-12-06 | Stop reason: HOSPADM

## 2017-12-06 RX ORDER — LABETALOL HYDROCHLORIDE 5 MG/ML
5 INJECTION, SOLUTION INTRAVENOUS
Status: CANCELLED | OUTPATIENT
Start: 2017-12-06

## 2017-12-06 RX ORDER — METHYLPREDNISOLONE SODIUM SUCCINATE 125 MG/2ML
INJECTION, POWDER, LYOPHILIZED, FOR SOLUTION INTRAMUSCULAR; INTRAVENOUS AS NEEDED
Status: DISCONTINUED | OUTPATIENT
Start: 2017-12-06 | End: 2017-12-06 | Stop reason: SURG

## 2017-12-06 RX ORDER — VANCOMYCIN HYDROCHLORIDE 1 G/200ML
1000 INJECTION, SOLUTION INTRAVENOUS ONCE
Status: COMPLETED | OUTPATIENT
Start: 2017-12-06 | End: 2017-12-06

## 2017-12-06 RX ORDER — PROMETHAZINE HYDROCHLORIDE 25 MG/1
12.5 TABLET ORAL ONCE AS NEEDED
Status: DISCONTINUED | OUTPATIENT
Start: 2017-12-06 | End: 2017-12-06 | Stop reason: HOSPADM

## 2017-12-06 RX ORDER — SODIUM CHLORIDE 9 MG/ML
INJECTION, SOLUTION INTRAVENOUS CONTINUOUS PRN
Status: DISCONTINUED | OUTPATIENT
Start: 2017-12-06 | End: 2017-12-06 | Stop reason: SURG

## 2017-12-06 RX ADMIN — PROPOFOL 100 MCG/KG/MIN: 10 INJECTION, EMULSION INTRAVENOUS at 14:18

## 2017-12-06 RX ADMIN — SODIUM CHLORIDE: 9 INJECTION, SOLUTION INTRAVENOUS at 14:09

## 2017-12-06 RX ADMIN — VANCOMYCIN HYDROCHLORIDE 1000 MG: 1 INJECTION, SOLUTION INTRAVENOUS at 13:39

## 2017-12-06 RX ADMIN — SODIUM CHLORIDE: 900 INJECTION, SOLUTION INTRAVENOUS at 14:09

## 2017-12-06 RX ADMIN — METHYLPREDNISOLONE SODIUM SUCCINATE 40 MG: 125 INJECTION, POWDER, FOR SOLUTION INTRAMUSCULAR; INTRAVENOUS at 13:50

## 2017-12-06 RX ADMIN — INSULIN ASPART 4 UNITS: 100 INJECTION, SOLUTION INTRAVENOUS; SUBCUTANEOUS at 09:25

## 2017-12-06 RX ADMIN — CLONIDINE HYDROCHLORIDE 0.1 MG: 0.1 TABLET ORAL at 09:25

## 2017-12-06 RX ADMIN — MEPIVACAINE HYDROCHLORIDE 10 ML: 20 INJECTION, SOLUTION EPIDURAL; INFILTRATION at 13:50

## 2017-12-06 RX ADMIN — FAMOTIDINE 20 MG: 10 INJECTION, SOLUTION INTRAVENOUS at 13:46

## 2017-12-06 RX ADMIN — INSULIN ASPART 4 UNITS: 100 INJECTION, SOLUTION INTRAVENOUS; SUBCUTANEOUS at 21:09

## 2017-12-06 RX ADMIN — TORSEMIDE 10 MG: 10 TABLET ORAL at 09:24

## 2017-12-06 RX ADMIN — ROPIVACAINE HYDROCHLORIDE 30 ML: 5 INJECTION, SOLUTION EPIDURAL; INFILTRATION; PERINEURAL at 13:50

## 2017-12-06 RX ADMIN — CLONIDINE HYDROCHLORIDE 0.1 MG: 0.1 TABLET ORAL at 18:48

## 2017-12-06 RX ADMIN — DONEPEZIL HYDROCHLORIDE 10 MG: 10 TABLET, FILM COATED ORAL at 21:09

## 2017-12-06 RX ADMIN — HYDROCODONE BITARTRATE AND ACETAMINOPHEN 1 TABLET: 7.5; 325 TABLET ORAL at 18:54

## 2017-12-06 RX ADMIN — HEPARIN SODIUM 5000 UNITS: 1000 INJECTION, SOLUTION INTRAVENOUS; SUBCUTANEOUS at 14:50

## 2017-12-06 RX ADMIN — MIDAZOLAM 1 MG: 1 INJECTION INTRAMUSCULAR; INTRAVENOUS at 13:45

## 2017-12-06 RX ADMIN — AMLODIPINE BESYLATE 5 MG: 5 TABLET ORAL at 09:25

## 2017-12-06 RX ADMIN — PROTAMINE SULFATE 30 MG: 10 INJECTION, SOLUTION INTRAVENOUS at 15:34

## 2017-12-06 RX ADMIN — INSULIN ASPART 4 UNITS: 100 INJECTION, SOLUTION INTRAVENOUS; SUBCUTANEOUS at 12:37

## 2017-12-06 RX ADMIN — METOPROLOL SUCCINATE 50 MG: 50 TABLET, FILM COATED, EXTENDED RELEASE ORAL at 09:24

## 2017-12-06 RX ADMIN — FENTANYL CITRATE 50 MCG: 50 INJECTION INTRAMUSCULAR; INTRAVENOUS at 14:14

## 2017-12-06 NOTE — ANESTHESIA PROCEDURE NOTES
Peripheral Block    Patient location during procedure: pre-op  Start time: 12/6/2017 1:45 PM  Stop time: 12/6/2017 1:55 PM  Reason for block: at surgeon's request and primary anesthetic  Performed by  Anesthesiologist: REHAN CANNON  Preanesthetic Checklist  Completed: patient identified, site marked, surgical consent, pre-op evaluation, timeout performed, IV checked, risks and benefits discussed and monitors and equipment checked  Prep:  Sterile barriers:cap, gloves, gown, mask and sterile barriers  Prep: ChloraPrep  Patient monitoring: blood pressure monitoring, continuous pulse oximetry and EKG  Procedure  Sedation:yes    Guidance:ultrasound guided  ULTRASOUND INTERPRETATION.  Using ultrasound guidance a 21 G gauge needle was placed in close proximity to the brachial plexus nerve, at which point, under ultrasound guidance anesthetic was injected in the area of the nerve and spread of the anesthesia was seen on ultrasound in close proximity thereto.  There were no abnormalities seen on ultrasound; a digital image was taken; and the patient tolerated the procedure with no complications. Images:still images obtained    Laterality:right  Block Type:supraclavicular  Injection Technique:single-shot  Needle Type:echogenic  Needle Gauge:21 G    Medications  Depomedrol:40 mg  Local Injected:ropivacaine 0.5% and 1.5% Mepivacaine  Post Assessment  Injection Assessment: negative aspiration for heme, no paresthesia on injection and incremental injection  Patient Tolerance:comfortable throughout block  Complications:no

## 2017-12-06 NOTE — ANESTHESIA PREPROCEDURE EVALUATION
Anesthesia Evaluation     Patient summary reviewed and Nursing notes reviewed          Airway   Mallampati: II  no difficulty expected  Dental    (+) upper dentures and poor dentition        Pulmonary    (+) a smoker Former,   Cardiovascular     ECG reviewed  Rhythm: irregular    (+) dysrhythmias Atrial Fib,       Neuro/Psych- negative ROS  GI/Hepatic/Renal/Endo    (+)  renal disease ESRD, diabetes mellitus type 2 using insulin,     Musculoskeletal (-) negative ROS    Abdominal    Substance History - negative use     OB/GYN negative ob/gyn ROS         Other                                        Anesthesia Plan    ASA 4     regional   (Surgeon requests regional)  Anesthetic plan and risks discussed with patient.

## 2017-12-06 NOTE — ANESTHESIA POSTPROCEDURE EVALUATION
Patient: Deacon Martin    Procedure Summary     Date Anesthesia Start Anesthesia Stop Room / Location    12/06/17 [id number]  [facility] / [facility]       Procedure Diagnosis Surgeon Provider    RT. ARM FISTULA REVISION/POSS. TUNNELED CATH (Right ) No diagnosis on file. MD Ki Rai MD          Anesthesia Type: regional  Last vitals  BP   146/77 (12/06/17 1354)   Temp   36.4 °C (97.6 °F) (12/06/17 1354)   Pulse   72 (12/06/17 1354)   Resp   15 (12/06/17 1354)     SpO2   97 % (12/06/17 1354)     Post Anesthesia Care and Evaluation    Patient location during evaluation: PACU  Patient participation: complete - patient participated  Level of consciousness: awake and alert  Pain management: adequate  Airway patency: patent  Anesthetic complications: No anesthetic complications    Cardiovascular status: acceptable  Respiratory status: acceptable  Hydration status: acceptable    Comments: ---------------------------               12/06/17                      1354         ---------------------------   BP:          146/77         Pulse:         72           Resp:          15           Temp:   36.4 °C (97.6 °F)   SpO2:          97%         ---------------------------

## 2017-12-06 NOTE — ANESTHESIA POSTPROCEDURE EVALUATION
Patient: Deacon Martin    Procedure Summary     Date Anesthesia Start Anesthesia Stop Room / Location    12/06/17 [id number]  [facility] / [facility]       Procedure Diagnosis Surgeon Provider    RT. ARM FISTULA REVISION/POSS. TUNNELED CATH (Right ) No diagnosis on file. MD Ki Rai MD          Anesthesia Type: regional  Last vitals  BP   146/77 (12/06/17 1354)   Temp   36.4 °C (97.6 °F) (12/06/17 1354)   Pulse   72 (12/06/17 1354)   Resp   15 (12/06/17 1354)     SpO2   97 % (12/06/17 1354)     Post Anesthesia Care and Evaluation    Patient location during evaluation: PACU  Patient participation: complete - patient participated  Level of consciousness: awake and alert  Pain management: adequate  Airway patency: patent  Anesthetic complications: No anesthetic complications    Cardiovascular status: acceptable  Respiratory status: acceptable  Hydration status: acceptable    Comments: ---------------------------               12/06/17                      1354         ---------------------------   BP:          146/77         Pulse:         72           Resp:          15           Temp:   36.4 °C (97.6 °F)   SpO2:          97%         ---------------------------

## 2017-12-07 LAB
ABO + RH BLD: NORMAL
ABO + RH BLD: NORMAL
ALBUMIN SERPL-MCNC: 3.2 G/DL (ref 3.5–5.2)
ANION GAP SERPL CALCULATED.3IONS-SCNC: 16 MMOL/L
BASOPHILS # BLD AUTO: 0.03 10*3/MM3 (ref 0–0.2)
BASOPHILS NFR BLD AUTO: 0.3 % (ref 0–1.5)
BH BB BLOOD EXPIRATION DATE: NORMAL
BH BB BLOOD EXPIRATION DATE: NORMAL
BH BB BLOOD TYPE BARCODE: 5100
BH BB BLOOD TYPE BARCODE: 5100
BH BB DISPENSE STATUS: NORMAL
BH BB DISPENSE STATUS: NORMAL
BH BB PRODUCT CODE: NORMAL
BH BB PRODUCT CODE: NORMAL
BH BB UNIT NUMBER: NORMAL
BH BB UNIT NUMBER: NORMAL
BUN BLD-MCNC: 51 MG/DL (ref 8–23)
BUN/CREAT SERPL: 9.7 (ref 7–25)
CALCIUM SPEC-SCNC: 8.9 MG/DL (ref 8.6–10.5)
CHLORIDE SERPL-SCNC: 97 MMOL/L (ref 98–107)
CO2 SERPL-SCNC: 24 MMOL/L (ref 22–29)
CREAT BLD-MCNC: 5.28 MG/DL (ref 0.76–1.27)
CRP SERPL-MCNC: 4.33 MG/DL (ref 0–0.5)
DEPRECATED RDW RBC AUTO: 49.3 FL (ref 37–54)
EOSINOPHIL # BLD AUTO: 0.2 10*3/MM3 (ref 0–0.7)
EOSINOPHIL NFR BLD AUTO: 2.1 % (ref 0.3–6.2)
ERYTHROCYTE [DISTWIDTH] IN BLOOD BY AUTOMATED COUNT: 13.8 % (ref 11.5–14.5)
GFR SERPL CREATININE-BSD FRML MDRD: 11 ML/MIN/1.73
GLUCOSE BLD-MCNC: 247 MG/DL (ref 65–99)
GLUCOSE BLDC GLUCOMTR-MCNC: 166 MG/DL (ref 70–130)
GLUCOSE BLDC GLUCOMTR-MCNC: 232 MG/DL (ref 70–130)
GLUCOSE BLDC GLUCOMTR-MCNC: 274 MG/DL (ref 70–130)
GLUCOSE BLDC GLUCOMTR-MCNC: 332 MG/DL (ref 70–130)
HBA1C MFR BLD: 10.81 % (ref 4.8–5.6)
HCT VFR BLD AUTO: 38.8 % (ref 40.4–52.2)
HGB BLD-MCNC: 12.6 G/DL (ref 13.7–17.6)
IMM GRANULOCYTES # BLD: 0.02 10*3/MM3 (ref 0–0.03)
IMM GRANULOCYTES NFR BLD: 0.2 % (ref 0–0.5)
INR PPP: 1.56 (ref 0.9–1.1)
LYMPHOCYTES # BLD AUTO: 0.98 10*3/MM3 (ref 0.9–4.8)
LYMPHOCYTES NFR BLD AUTO: 10.5 % (ref 19.6–45.3)
MCH RBC QN AUTO: 31.7 PG (ref 27–32.7)
MCHC RBC AUTO-ENTMCNC: 32.5 G/DL (ref 32.6–36.4)
MCV RBC AUTO: 97.5 FL (ref 79.8–96.2)
MONOCYTES # BLD AUTO: 0.51 10*3/MM3 (ref 0.2–1.2)
MONOCYTES NFR BLD AUTO: 5.5 % (ref 5–12)
NEUTROPHILS # BLD AUTO: 7.59 10*3/MM3 (ref 1.9–8.1)
NEUTROPHILS NFR BLD AUTO: 81.4 % (ref 42.7–76)
PHOSPHATE SERPL-MCNC: 5.5 MG/DL (ref 2.5–4.5)
PLATELET # BLD AUTO: 172 10*3/MM3 (ref 140–500)
PMV BLD AUTO: 10.8 FL (ref 6–12)
POTASSIUM BLD-SCNC: 4.9 MMOL/L (ref 3.5–5.2)
PROTHROMBIN TIME: 18.1 SECONDS (ref 11.7–14.2)
RBC # BLD AUTO: 3.98 10*6/MM3 (ref 4.6–6)
SODIUM BLD-SCNC: 137 MMOL/L (ref 136–145)
UNIT  ABO: NORMAL
UNIT  ABO: NORMAL
UNIT  RH: NORMAL
UNIT  RH: NORMAL
VANCOMYCIN SERPL-MCNC: 12.6 MCG/ML (ref 5–40)
WBC NRBC COR # BLD: 9.33 10*3/MM3 (ref 4.5–10.7)

## 2017-12-07 PROCEDURE — 85025 COMPLETE CBC W/AUTO DIFF WBC: CPT | Performed by: FAMILY MEDICINE

## 2017-12-07 PROCEDURE — 25010000002 VANCOMYCIN 10 G RECONSTITUTED SOLUTION: Performed by: SURGERY

## 2017-12-07 PROCEDURE — 99223 1ST HOSP IP/OBS HIGH 75: CPT | Performed by: INTERNAL MEDICINE

## 2017-12-07 PROCEDURE — 85610 PROTHROMBIN TIME: CPT | Performed by: FAMILY MEDICINE

## 2017-12-07 PROCEDURE — 80069 RENAL FUNCTION PANEL: CPT | Performed by: INTERNAL MEDICINE

## 2017-12-07 PROCEDURE — 86140 C-REACTIVE PROTEIN: CPT | Performed by: INTERNAL MEDICINE

## 2017-12-07 PROCEDURE — 82962 GLUCOSE BLOOD TEST: CPT

## 2017-12-07 PROCEDURE — 83036 HEMOGLOBIN GLYCOSYLATED A1C: CPT | Performed by: FAMILY MEDICINE

## 2017-12-07 PROCEDURE — 87040 BLOOD CULTURE FOR BACTERIA: CPT | Performed by: INTERNAL MEDICINE

## 2017-12-07 PROCEDURE — 25010000002 ONDANSETRON PER 1 MG: Performed by: FAMILY MEDICINE

## 2017-12-07 PROCEDURE — 63710000001 INSULIN REGULAR HUMAN PER 5 UNITS: Performed by: FAMILY MEDICINE

## 2017-12-07 PROCEDURE — 63710000001 INSULIN ASPART PER 5 UNITS: Performed by: FAMILY MEDICINE

## 2017-12-07 PROCEDURE — 80202 ASSAY OF VANCOMYCIN: CPT | Performed by: SURGERY

## 2017-12-07 PROCEDURE — 63710000001 INSULIN ISOPHANE HUMAN PER 5 UNITS: Performed by: FAMILY MEDICINE

## 2017-12-07 RX ORDER — CLONIDINE HYDROCHLORIDE 0.1 MG/1
0.1 TABLET ORAL ONCE
Status: COMPLETED | OUTPATIENT
Start: 2017-12-07 | End: 2017-12-07

## 2017-12-07 RX ORDER — ALBUMIN (HUMAN) 12.5 G/50ML
12.5 SOLUTION INTRAVENOUS AS NEEDED
Status: DISCONTINUED | OUTPATIENT
Start: 2017-12-07 | End: 2017-12-08 | Stop reason: HOSPADM

## 2017-12-07 RX ORDER — ALBUMIN (HUMAN) 12.5 G/50ML
12.5 SOLUTION INTRAVENOUS AS NEEDED
Status: ACTIVE | OUTPATIENT
Start: 2017-12-07 | End: 2017-12-07

## 2017-12-07 RX ORDER — ONDANSETRON 2 MG/ML
4 INJECTION INTRAMUSCULAR; INTRAVENOUS EVERY 6 HOURS PRN
Status: DISCONTINUED | OUTPATIENT
Start: 2017-12-07 | End: 2017-12-08 | Stop reason: HOSPADM

## 2017-12-07 RX ADMIN — DAKIN'S SOLUTION 0.125% (QUARTER STRENGTH) 946 ML: 0.12 SOLUTION at 13:38

## 2017-12-07 RX ADMIN — AMLODIPINE BESYLATE 5 MG: 5 TABLET ORAL at 13:33

## 2017-12-07 RX ADMIN — DAKIN'S SOLUTION 0.125% (QUARTER STRENGTH) 946 ML: 0.12 SOLUTION at 20:35

## 2017-12-07 RX ADMIN — ATORVASTATIN CALCIUM 10 MG: 10 TABLET, FILM COATED ORAL at 13:34

## 2017-12-07 RX ADMIN — VANCOMYCIN HYDROCHLORIDE 750 MG: 10 INJECTION, POWDER, LYOPHILIZED, FOR SOLUTION INTRAVENOUS at 15:12

## 2017-12-07 RX ADMIN — CALCIUM ACETATE 667 MG: 667 CAPSULE ORAL at 13:32

## 2017-12-07 RX ADMIN — TAMSULOSIN HYDROCHLORIDE 0.4 MG: 0.4 CAPSULE ORAL at 13:31

## 2017-12-07 RX ADMIN — INSULIN ASPART 7 UNITS: 100 INJECTION, SOLUTION INTRAVENOUS; SUBCUTANEOUS at 18:34

## 2017-12-07 RX ADMIN — TORSEMIDE 10 MG: 10 TABLET ORAL at 18:44

## 2017-12-07 RX ADMIN — CLONIDINE HYDROCHLORIDE 0.1 MG: 0.1 TABLET ORAL at 02:29

## 2017-12-07 RX ADMIN — INSULIN ASPART 2 UNITS: 100 INJECTION, SOLUTION INTRAVENOUS; SUBCUTANEOUS at 13:29

## 2017-12-07 RX ADMIN — ONDANSETRON 4 MG: 2 INJECTION INTRAMUSCULAR; INTRAVENOUS at 00:57

## 2017-12-07 RX ADMIN — METOPROLOL SUCCINATE 50 MG: 50 TABLET, FILM COATED, EXTENDED RELEASE ORAL at 13:31

## 2017-12-07 RX ADMIN — ASPIRIN 81 MG: 81 TABLET, CHEWABLE ORAL at 13:34

## 2017-12-07 RX ADMIN — DONEPEZIL HYDROCHLORIDE 10 MG: 10 TABLET, FILM COATED ORAL at 20:34

## 2017-12-07 RX ADMIN — HUMAN INSULIN 30 UNITS: 100 INJECTION, SUSPENSION SUBCUTANEOUS at 18:34

## 2017-12-07 RX ADMIN — HUMAN INSULIN 10 UNITS: 100 INJECTION, SOLUTION SUBCUTANEOUS at 18:34

## 2017-12-07 RX ADMIN — INSULIN ASPART 6 UNITS: 100 INJECTION, SOLUTION INTRAVENOUS; SUBCUTANEOUS at 20:34

## 2017-12-07 RX ADMIN — ALLOPURINOL 100 MG: 100 TABLET ORAL at 13:34

## 2017-12-07 RX ADMIN — CLONIDINE HYDROCHLORIDE 0.1 MG: 0.1 TABLET ORAL at 19:32

## 2017-12-07 RX ADMIN — HYDROCODONE BITARTRATE AND ACETAMINOPHEN 1 TABLET: 7.5; 325 TABLET ORAL at 02:29

## 2017-12-07 RX ADMIN — HYDROCODONE BITARTRATE AND ACETAMINOPHEN 1 TABLET: 7.5; 325 TABLET ORAL at 20:34

## 2017-12-07 RX ADMIN — DULOXETINE HYDROCHLORIDE 60 MG: 60 CAPSULE, DELAYED RELEASE ORAL at 13:32

## 2017-12-07 RX ADMIN — CLONIDINE HYDROCHLORIDE 0.1 MG: 0.1 TABLET ORAL at 13:33

## 2017-12-08 VITALS
HEART RATE: 66 BPM | TEMPERATURE: 98.1 F | HEIGHT: 69 IN | OXYGEN SATURATION: 93 % | WEIGHT: 205.03 LBS | RESPIRATION RATE: 16 BRPM | BODY MASS INDEX: 30.37 KG/M2 | DIASTOLIC BLOOD PRESSURE: 85 MMHG | SYSTOLIC BLOOD PRESSURE: 152 MMHG

## 2017-12-08 LAB
ANION GAP SERPL CALCULATED.3IONS-SCNC: 12.3 MMOL/L
BUN BLD-MCNC: 44 MG/DL (ref 8–23)
BUN/CREAT SERPL: 10 (ref 7–25)
CALCIUM SPEC-SCNC: 9.2 MG/DL (ref 8.6–10.5)
CHLORIDE SERPL-SCNC: 98 MMOL/L (ref 98–107)
CO2 SERPL-SCNC: 28.7 MMOL/L (ref 22–29)
CREAT BLD-MCNC: 4.41 MG/DL (ref 0.76–1.27)
DEPRECATED RDW RBC AUTO: 49.1 FL (ref 37–54)
ERYTHROCYTE [DISTWIDTH] IN BLOOD BY AUTOMATED COUNT: 13.8 % (ref 11.5–14.5)
GFR SERPL CREATININE-BSD FRML MDRD: 13 ML/MIN/1.73
GLUCOSE BLD-MCNC: 162 MG/DL (ref 65–99)
GLUCOSE BLDC GLUCOMTR-MCNC: 178 MG/DL (ref 70–130)
HCT VFR BLD AUTO: 36.6 % (ref 40.4–52.2)
HGB BLD-MCNC: 12 G/DL (ref 13.7–17.6)
INR PPP: 1.39 (ref 0.9–1.1)
LAB AP CASE REPORT: NORMAL
Lab: NORMAL
MCH RBC QN AUTO: 31.7 PG (ref 27–32.7)
MCHC RBC AUTO-ENTMCNC: 32.8 G/DL (ref 32.6–36.4)
MCV RBC AUTO: 96.8 FL (ref 79.8–96.2)
PATH REPORT.FINAL DX SPEC: NORMAL
PATH REPORT.GROSS SPEC: NORMAL
PLATELET # BLD AUTO: 198 10*3/MM3 (ref 140–500)
PMV BLD AUTO: 11.1 FL (ref 6–12)
POTASSIUM BLD-SCNC: 4.3 MMOL/L (ref 3.5–5.2)
PROTHROMBIN TIME: 16.6 SECONDS (ref 11.7–14.2)
RBC # BLD AUTO: 3.78 10*6/MM3 (ref 4.6–6)
SODIUM BLD-SCNC: 139 MMOL/L (ref 136–145)
VANCOMYCIN SERPL-MCNC: 16.9 MCG/ML (ref 5–40)
WBC NRBC COR # BLD: 8.59 10*3/MM3 (ref 4.5–10.7)

## 2017-12-08 PROCEDURE — 99238 HOSP IP/OBS DSCHRG MGMT 30/<: CPT | Performed by: FAMILY MEDICINE

## 2017-12-08 PROCEDURE — 82962 GLUCOSE BLOOD TEST: CPT

## 2017-12-08 PROCEDURE — 99232 SBSQ HOSP IP/OBS MODERATE 35: CPT | Performed by: INTERNAL MEDICINE

## 2017-12-08 PROCEDURE — 80202 ASSAY OF VANCOMYCIN: CPT | Performed by: SURGERY

## 2017-12-08 PROCEDURE — 80048 BASIC METABOLIC PNL TOTAL CA: CPT | Performed by: INTERNAL MEDICINE

## 2017-12-08 PROCEDURE — 63710000001 INSULIN ISOPHANE HUMAN PER 5 UNITS: Performed by: FAMILY MEDICINE

## 2017-12-08 PROCEDURE — 85027 COMPLETE CBC AUTOMATED: CPT | Performed by: INTERNAL MEDICINE

## 2017-12-08 PROCEDURE — 63710000001 INSULIN REGULAR HUMAN PER 5 UNITS: Performed by: FAMILY MEDICINE

## 2017-12-08 PROCEDURE — 85610 PROTHROMBIN TIME: CPT | Performed by: FAMILY MEDICINE

## 2017-12-08 RX ORDER — HYDROCODONE BITARTRATE AND ACETAMINOPHEN 7.5; 325 MG/1; MG/1
1 TABLET ORAL EVERY 6 HOURS PRN
Qty: 40 TABLET | Refills: 0 | Status: SHIPPED | OUTPATIENT
Start: 2017-12-08 | End: 2018-09-25

## 2017-12-08 RX ORDER — WARFARIN SODIUM 5 MG/1
5 TABLET ORAL
Status: DISCONTINUED | OUTPATIENT
Start: 2017-12-08 | End: 2017-12-08 | Stop reason: HOSPADM

## 2017-12-08 RX ADMIN — AMLODIPINE BESYLATE 5 MG: 5 TABLET ORAL at 09:19

## 2017-12-08 RX ADMIN — METOPROLOL SUCCINATE 50 MG: 50 TABLET, FILM COATED, EXTENDED RELEASE ORAL at 09:19

## 2017-12-08 RX ADMIN — HUMAN INSULIN 10 UNITS: 100 INJECTION, SOLUTION SUBCUTANEOUS at 09:22

## 2017-12-08 RX ADMIN — ASPIRIN 81 MG: 81 TABLET, CHEWABLE ORAL at 09:19

## 2017-12-08 RX ADMIN — HUMAN INSULIN 30 UNITS: 100 INJECTION, SUSPENSION SUBCUTANEOUS at 09:22

## 2017-12-08 RX ADMIN — DAKIN'S SOLUTION 0.125% (QUARTER STRENGTH) 946 ML: 0.12 SOLUTION at 09:28

## 2017-12-08 RX ADMIN — CALCIUM ACETATE 667 MG: 667 CAPSULE ORAL at 09:19

## 2017-12-08 RX ADMIN — TAMSULOSIN HYDROCHLORIDE 0.4 MG: 0.4 CAPSULE ORAL at 09:19

## 2017-12-08 RX ADMIN — TORSEMIDE 10 MG: 10 TABLET ORAL at 09:19

## 2017-12-08 RX ADMIN — ALLOPURINOL 100 MG: 100 TABLET ORAL at 09:19

## 2017-12-08 RX ADMIN — ATORVASTATIN CALCIUM 10 MG: 10 TABLET, FILM COATED ORAL at 09:19

## 2017-12-08 RX ADMIN — CLONIDINE HYDROCHLORIDE 0.1 MG: 0.1 TABLET ORAL at 09:19

## 2017-12-08 RX ADMIN — DULOXETINE HYDROCHLORIDE 60 MG: 60 CAPSULE, DELAYED RELEASE ORAL at 09:19

## 2017-12-09 LAB
BACTERIA SPEC AEROBE CULT: ABNORMAL
GRAM STN SPEC: ABNORMAL

## 2017-12-11 ENCOUNTER — EPISODE CHANGES (OUTPATIENT)
Dept: CASE MANAGEMENT | Facility: OTHER | Age: 72
End: 2017-12-11

## 2017-12-12 ENCOUNTER — TELEPHONE (OUTPATIENT)
Dept: FAMILY MEDICINE CLINIC | Facility: CLINIC | Age: 72
End: 2017-12-12

## 2017-12-12 LAB
BACTERIA SPEC AEROBE CULT: NORMAL
BACTERIA SPEC AEROBE CULT: NORMAL

## 2017-12-12 NOTE — TELEPHONE ENCOUNTER
----- Message from Susan Aiken sent at 12/12/2017  4:14 PM EST -----  [facility] [phone]    Please call her regarding the patients blood pressure

## 2017-12-14 NOTE — TELEPHONE ENCOUNTER
Lidya from [facility] called again stating that she did not here back from Dr. Victoria re: pt's Blood Pressure Sugar or INR.  Pt had an INR of 1.6 on 12/11/17.  Her Blood pressure ont he 11th was 162/84 and has been running 160's-170's over 80's-90's. Pt's Blood sugar was 308 on the 11th.  Lidya stated that I am the 4th per she has spoken to since her first call on the 11th.       Please call her at [phone].  She will do another INR today the 14th.

## 2017-12-18 ENCOUNTER — HOSPITAL ENCOUNTER (EMERGENCY)
Facility: HOSPITAL | Age: 72
Discharge: HOME | End: 2017-12-18
Attending: EMERGENCY MEDICINE | Admitting: EMERGENCY MEDICINE

## 2017-12-18 ENCOUNTER — APPOINTMENT (OUTPATIENT)
Dept: CARDIOLOGY | Facility: HOSPITAL | Age: 72
End: 2017-12-18
Attending: EMERGENCY MEDICINE

## 2017-12-18 VITALS
TEMPERATURE: 98.2 F | HEART RATE: 76 BPM | HEIGHT: 70 IN | OXYGEN SATURATION: 100 % | SYSTOLIC BLOOD PRESSURE: 166 MMHG | WEIGHT: 208 LBS | DIASTOLIC BLOOD PRESSURE: 90 MMHG | BODY MASS INDEX: 29.78 KG/M2 | RESPIRATION RATE: 18 BRPM

## 2017-12-18 DIAGNOSIS — M79.89 ARM SWELLING: Primary | ICD-10-CM

## 2017-12-18 DIAGNOSIS — S40.021A CONTUSION OF RIGHT UPPER EXTREMITY, INITIAL ENCOUNTER: ICD-10-CM

## 2017-12-18 LAB
ALBUMIN SERPL-MCNC: 4 G/DL (ref 3.5–5.2)
ALBUMIN/GLOB SERPL: 1.1 G/DL
ALP SERPL-CCNC: 111 U/L (ref 39–117)
ALT SERPL W P-5'-P-CCNC: 25 U/L (ref 1–41)
ANION GAP SERPL CALCULATED.3IONS-SCNC: 10.8 MMOL/L
AST SERPL-CCNC: 17 U/L (ref 1–40)
BASOPHILS # BLD AUTO: 0.07 10*3/MM3 (ref 0–0.2)
BASOPHILS NFR BLD AUTO: 0.8 % (ref 0–1.5)
BILIRUB SERPL-MCNC: 1.2 MG/DL (ref 0.1–1.2)
BUN BLD-MCNC: 23 MG/DL (ref 8–23)
BUN/CREAT SERPL: 8.3 (ref 7–25)
CALCIUM SPEC-SCNC: 9.5 MG/DL (ref 8.6–10.5)
CHLORIDE SERPL-SCNC: 94 MMOL/L (ref 98–107)
CO2 SERPL-SCNC: 32.2 MMOL/L (ref 22–29)
CREAT BLD-MCNC: 2.78 MG/DL (ref 0.76–1.27)
DEPRECATED RDW RBC AUTO: 50 FL (ref 37–54)
EOSINOPHIL # BLD AUTO: 0.28 10*3/MM3 (ref 0–0.7)
EOSINOPHIL NFR BLD AUTO: 3.3 % (ref 0.3–6.2)
ERYTHROCYTE [DISTWIDTH] IN BLOOD BY AUTOMATED COUNT: 14.1 % (ref 11.5–14.5)
GFR SERPL CREATININE-BSD FRML MDRD: 22 ML/MIN/1.73
GLOBULIN UR ELPH-MCNC: 3.7 GM/DL
GLUCOSE BLD-MCNC: 181 MG/DL (ref 65–99)
HCT VFR BLD AUTO: 34.4 % (ref 40.4–52.2)
HGB BLD-MCNC: 11.3 G/DL (ref 13.7–17.6)
HOLD SPECIMEN: NORMAL
HOLD SPECIMEN: NORMAL
IMM GRANULOCYTES # BLD: 0.05 10*3/MM3 (ref 0–0.03)
IMM GRANULOCYTES NFR BLD: 0.6 % (ref 0–0.5)
INR PPP: 2.29 (ref 0.9–1.1)
LYMPHOCYTES # BLD AUTO: 1.67 10*3/MM3 (ref 0.9–4.8)
LYMPHOCYTES NFR BLD AUTO: 19.6 % (ref 19.6–45.3)
MCH RBC QN AUTO: 31.8 PG (ref 27–32.7)
MCHC RBC AUTO-ENTMCNC: 32.8 G/DL (ref 32.6–36.4)
MCV RBC AUTO: 96.9 FL (ref 79.8–96.2)
MONOCYTES # BLD AUTO: 0.69 10*3/MM3 (ref 0.2–1.2)
MONOCYTES NFR BLD AUTO: 8.1 % (ref 5–12)
NEUTROPHILS # BLD AUTO: 5.78 10*3/MM3 (ref 1.9–8.1)
NEUTROPHILS NFR BLD AUTO: 67.6 % (ref 42.7–76)
PLATELET # BLD AUTO: 219 10*3/MM3 (ref 140–500)
PMV BLD AUTO: 10.6 FL (ref 6–12)
POTASSIUM BLD-SCNC: 4.3 MMOL/L (ref 3.5–5.2)
PROT SERPL-MCNC: 7.7 G/DL (ref 6–8.5)
PROTHROMBIN TIME: 24.5 SECONDS (ref 11.7–14.2)
RBC # BLD AUTO: 3.55 10*6/MM3 (ref 4.6–6)
SODIUM BLD-SCNC: 137 MMOL/L (ref 136–145)
WBC NRBC COR # BLD: 8.54 10*3/MM3 (ref 4.5–10.7)
WHOLE BLOOD HOLD SPECIMEN: NORMAL
WHOLE BLOOD HOLD SPECIMEN: NORMAL

## 2017-12-18 PROCEDURE — 93990 DOPPLER FLOW TESTING: CPT

## 2017-12-18 PROCEDURE — 80053 COMPREHEN METABOLIC PANEL: CPT | Performed by: EMERGENCY MEDICINE

## 2017-12-18 PROCEDURE — 93971 EXTREMITY STUDY: CPT

## 2017-12-18 PROCEDURE — 85025 COMPLETE CBC W/AUTO DIFF WBC: CPT | Performed by: EMERGENCY MEDICINE

## 2017-12-18 PROCEDURE — 85610 PROTHROMBIN TIME: CPT | Performed by: EMERGENCY MEDICINE

## 2017-12-18 PROCEDURE — 99284 EMERGENCY DEPT VISIT MOD MDM: CPT

## 2017-12-18 PROCEDURE — 36415 COLL VENOUS BLD VENIPUNCTURE: CPT | Performed by: EMERGENCY MEDICINE

## 2017-12-18 RX ORDER — SODIUM CHLORIDE 0.9 % (FLUSH) 0.9 %
10 SYRINGE (ML) INJECTION AS NEEDED
Status: DISCONTINUED | OUTPATIENT
Start: 2017-12-18 | End: 2017-12-19 | Stop reason: HOSPADM

## 2017-12-19 ENCOUNTER — TELEPHONE (OUTPATIENT)
Dept: SOCIAL WORK | Facility: HOSPITAL | Age: 72
End: 2017-12-19

## 2017-12-19 LAB
BH CV DIALYSIS ACCESS ARTERIAL ANASTOMOSIS FLOW VOLUME PROXIMAL: 0.46 ML/MIN
BH CV DIALYSIS ACCESS ARTERIAL ANASTOMOSIS VELOCITY PROXIMAL: 194 CM/SEC
BH CV DIALYSIS ACCESS GRAFT DIAMETER DISTAL: 0.53 CM
BH CV DIALYSIS ACCESS GRAFT DIAMETER MID DISTAL: 0.73 CM
BH CV DIALYSIS ACCESS GRAFT DIAMETER MID: 1.12 CM
BH CV DIALYSIS ACCESS GRAFT DIAMETER PROXIMAL MID: 0.76 CM
BH CV DIALYSIS ACCESS GRAFT DIAMETER PROXIMAL: 0.18 CM
BH CV DIALYSIS ACCESS GRAFT FLOW VOLUME DISTAL: 467 ML/MIN
BH CV DIALYSIS ACCESS GRAFT FLOW VOLUME MID DISTAL: 553 ML/MIN
BH CV DIALYSIS ACCESS GRAFT FLOW VOLUME MID: 1143 ML/MIN
BH CV DIALYSIS ACCESS GRAFT FLOW VOLUME PROXIMAL MID: 2406 ML/MIN
BH CV DIALYSIS ACCESS GRAFT FLOW VOLUME PROXIMAL: 1000 ML/MIN
BH CV DIALYSIS ACCESS GRAFT VELOCITY DISTAL: 89.6 CM/SEC
BH CV DIALYSIS ACCESS GRAFT VELOCITY MID DISTAL: 103 CM/SEC
BH CV DIALYSIS ACCESS GRAFT VELOCITY MID: 94.3 CM/SEC
BH CV DIALYSIS ACCESS GRAFT VELOCITY PROXIMAL MID: 174 CM/SEC
BH CV DIALYSIS ACCESS GRAFT VELOCITY PROXIMAL: 600 CM/SEC
BH CV DIALYSIS ACCESS INFLOW DIAMETER PRE INFLOW: 0.69 CM
BH CV DIALYSIS ACCESS INFLOW FLOW VOLUME PRE INFLOW: 943 ML/MIN
BH CV DIALYSIS ACCESS INFLOW VELOCITY DISTAL: 178 CM/SEC
BH CV DIALYSIS ACCESS INFLOW VELOCITY PRE INFLOW: 232 CM/SEC
BH CV DIALYSIS ACCESS OUTFLOW DIAMETER DISTAL: 0.65 CM
BH CV DIALYSIS ACCESS OUTFLOW DIAMETER POST OUTFLOW: 0.7 CM
BH CV DIALYSIS ACCESS OUTFLOW FLOW VOLUME POST OUTFLOW: 1071 ML/MIN
BH CV DIALYSIS ACCESS OUTFLOW VELOCITY DISTAL: 86.4 CM/SEC
BH CV DIALYSIS ACCESS OUTFLOW VELOCITY POST OUTFLOW: 143 CM/SEC
BH CV DIALYSIS ACCESS VENOUS ANASTOMOSIS VELOCITY PROXIMAL: 116 CM/SEC
BH CV UPPER VENOUS LEFT INTERNAL JUGULAR AUGMENT: NORMAL
BH CV UPPER VENOUS LEFT INTERNAL JUGULAR COMPETENT: NORMAL
BH CV UPPER VENOUS LEFT INTERNAL JUGULAR COMPRESS: NORMAL
BH CV UPPER VENOUS LEFT INTERNAL JUGULAR PHASIC: NORMAL
BH CV UPPER VENOUS LEFT INTERNAL JUGULAR SPONT: NORMAL
BH CV UPPER VENOUS LEFT SUBCLAVIAN AUGMENT: NORMAL
BH CV UPPER VENOUS LEFT SUBCLAVIAN COMPETENT: NORMAL
BH CV UPPER VENOUS LEFT SUBCLAVIAN COMPRESS: NORMAL
BH CV UPPER VENOUS LEFT SUBCLAVIAN PHASIC: NORMAL
BH CV UPPER VENOUS LEFT SUBCLAVIAN SPONT: NORMAL
BH CV UPPER VENOUS RIGHT AXILLARY AUGMENT: NORMAL
BH CV UPPER VENOUS RIGHT AXILLARY COMPETENT: NORMAL
BH CV UPPER VENOUS RIGHT AXILLARY COMPRESS: NORMAL
BH CV UPPER VENOUS RIGHT AXILLARY PHASIC: NORMAL
BH CV UPPER VENOUS RIGHT AXILLARY SPONT: NORMAL
BH CV UPPER VENOUS RIGHT BASILIC FOREARM COMPRESS: NORMAL
BH CV UPPER VENOUS RIGHT BASILIC UPPER COMPRESS: NORMAL
BH CV UPPER VENOUS RIGHT BRACHIAL COMPRESS: NORMAL
BH CV UPPER VENOUS RIGHT CEPHALIC FOREARM COMPRESS: NORMAL
BH CV UPPER VENOUS RIGHT CEPHALIC UPPER COLOR: 1
BH CV UPPER VENOUS RIGHT CEPHALIC UPPER COMPRESS: NORMAL
BH CV UPPER VENOUS RIGHT CEPHALIC UPPER THROMBUS: NORMAL
BH CV UPPER VENOUS RIGHT INTERNAL JUGULAR AUGMENT: NORMAL
BH CV UPPER VENOUS RIGHT INTERNAL JUGULAR COMPETENT: NORMAL
BH CV UPPER VENOUS RIGHT INTERNAL JUGULAR COMPRESS: NORMAL
BH CV UPPER VENOUS RIGHT INTERNAL JUGULAR PHASIC: NORMAL
BH CV UPPER VENOUS RIGHT INTERNAL JUGULAR SPONT: NORMAL
BH CV UPPER VENOUS RIGHT RADIAL COMPRESS: NORMAL
BH CV UPPER VENOUS RIGHT SUBCLAVIAN AUGMENT: NORMAL
BH CV UPPER VENOUS RIGHT SUBCLAVIAN COMPETENT: NORMAL
BH CV UPPER VENOUS RIGHT SUBCLAVIAN COMPRESS: NORMAL
BH CV UPPER VENOUS RIGHT SUBCLAVIAN PHASIC: NORMAL
BH CV UPPER VENOUS RIGHT SUBCLAVIAN SPONT: NORMAL
BH CV UPPER VENOUS RIGHT ULNAR COMPRESS: NORMAL
Lab: 594 ML/MIN

## 2017-12-19 NOTE — ED PROVIDER NOTES
" EMERGENCY DEPARTMENT ENCOUNTER    CHIEF COMPLAINT  Chief Complaint: Arm Pain  History given by: Patient, Family  History limited by: N/A  Room Number: 12/12  PMD: Doroteo Victoria MD      HPI:  Pt has h/o chronic renal disease for which pt has a right arm fistula and undergoes dialysis on Mo, We, Fr. Pt reports that he last fully dialyzed earlier today. Pt states that he had a right arm fistula revision on 12/06/17 secondary to infected nino-fistula hematoma. Pt also had resection of the infected fistula in early 12/2017. Pt presents to the ED c/o pain of the right arm onset about 3 days ago after pt underwent dialysis. Pt states that he has also developed swelling of the RUE, but denies documented fever, dyspnea, chest pain, and focal weakness/numbness of the RUE. There are no other complaints at this time.     Dr. Muro - vascular surgeon    Pain Location: Right arm  Radiation: None  Quality: \"aching\"  Intensity/Severity: Moderate  Duration: Onset about 3 days ago  Onset quality: Gradual  Timing: Intermittent  Progression: Waxing and waning  Aggravating Factors: Nothing  Alleviating Factors: Nothing  Previous Episodes: None  Treatment before arrival: None  Associated Symptoms: Swelling of the RUE       PAST MEDICAL HISTORY  Active Ambulatory Problems     Diagnosis Date Noted   • Cervical spinal stenosis 03/21/2016   • Cervical osteoarthritis 03/21/2016   • Cervical pain 03/21/2016   • Chronic venous insufficiency 03/21/2016   • Disturbance, visual, subjective 03/21/2016   • Poorly controlled type 2 diabetes mellitus 03/21/2016   • Essential hypertension 03/21/2016   • Hypertriglyceridemia 03/21/2016   • BPH (benign prostatic hypertrophy) with urinary obstruction 06/17/2016   • Atrial fibrillation 06/17/2016   • Anticoagulation goal of INR 2 to 3 07/29/2016   • Venous stasis ulcers of both lower extremities 10/07/2016   • Altered mental status 11/12/2016   • Gouty arthritis 11/14/2016   • Volume overload due " to LUIS A 11/14/2016   • LUIS A (acute kidney injury) 11/14/2016   • CKD (chronic kidney disease) stage 4, GFR 15-29 ml/min 11/14/2016   • Urinary incontinence without sensory awareness 11/14/2016   • Hemianopia, homonymous, right 11/14/2016   • History of cardioembolic stroke (Left occipital) 11/14/2016   • Hypersomnolence 11/14/2016   • Vascular dementia 11/14/2016   • Anemia of chronic renal failure, stage 4 (severe) 11/14/2016   • Left ventricular hypertrophy 11/14/2016   • Nonrheumatic aortic valve stenosis 11/14/2016   • Patient left after triage 01/25/2017   • Diabetic peripheral neuropathy 06/27/2017   • Shunt malfunction 12/05/2017     Resolved Ambulatory Problems     Diagnosis Date Noted   • Controlled diabetes mellitus 03/21/2016   • Diabetes mellitus type 2, uncontrolled 03/21/2016   • Diabetes 03/21/2016   • Unspecified visual disturbance 03/21/2016   • Hyperuricemia 03/21/2016     Past Medical History:   Diagnosis Date   • Atrial fibrillation    • Edema    • Gout    • HBP (high blood pressure)    • HX: anticoagulation    • Hyperlipidemia    • Memory problem    • Other hemorrhagic disorder due to intrinsic circulating anticoagulants, antibodies, or inhibitors    • Renal failure    • Stroke    • Type 2 diabetes mellitus    • Vitamin D deficiency          PAST SURGICAL HISTORY  Past Surgical History:   Procedure Laterality Date   • ARTERIOVENOUS FISTULA/SHUNT SURGERY Right 11/21/2016    Procedure: RT BRACHIAL CEPHALIC FISTULA;  Surgeon: Ar Muro MD;  Location: [facility];  Service:    • ARTERIOVENOUS FISTULA/SHUNT SURGERY W/ HEMODIALYSIS CATHETER INSERTION Right 12/6/2017    Procedure: RT. ARM FISTULA REVISION/POSS. TUNNELED CATH;  Surgeon: Ar Muro MD;  Location: [facility] OR;  Service:    • COLONOSCOPY  2003   • INSERTION HEMODIALYSIS CATHETER N/A 11/25/2016    Procedure: TUNNEL CATHETER INSERTION;  Surgeon: Silvia Lim Jr., MD;  Location: [facility] OR;  Service:           FAMILY HISTORY  Family History   Problem Relation Age of Onset   • Arthritis Mother    • Diabetes Father    • Heart disease Father    • Hyperlipidemia Father    • Hypertension Father    • Obesity Father    • Cancer Brother      esophagus   • Heart disease Maternal Grandfather          SOCIAL HISTORY  Social History     Social History   • Marital status:      Spouse name: N/A   • Number of children: N/A   • Years of education: N/A     Occupational History   • Not on file.     Social History Main Topics   • Smoking status: Former Smoker     Packs/day: 1.00     Years: 5.00     Types: Cigarettes     Quit date: 1966   • Smokeless tobacco: Never Used   • Alcohol use 0.6 oz/week     1 Cans of beer per week      Comment: occasionally    • Drug use: No   • Sexual activity: Defer     Other Topics Concern   • Not on file     Social History Narrative         ALLERGIES  Review of patient's allergies indicates no known allergies.        REVIEW OF SYSTEMS  Review of Systems   Constitutional: Negative for chills and fever (pt denies documented fever).   HENT: Negative for congestion, rhinorrhea and sore throat.    Eyes: Negative for pain.   Respiratory: Negative for cough and shortness of breath.    Cardiovascular: Negative for chest pain, palpitations and leg swelling.   Gastrointestinal: Negative for abdominal pain, diarrhea, nausea and vomiting.   Genitourinary: Negative for difficulty urinating, dysuria, flank pain and frequency.   Musculoskeletal: Negative for neck pain and neck stiffness.        Right arm pain, RUE swelling   Skin: Negative for rash.   Neurological: Negative for dizziness, speech difficulty, weakness, light-headedness, numbness and headaches.   Psychiatric/Behavioral: Negative.    All other systems reviewed and are negative.           PHYSICAL EXAM  ED Triage Vitals   Temp Heart Rate Resp BP SpO2   12/18/17 1714 12/18/17 1710 12/18/17 1710 12/18/17 1714 12/18/17 1710   97.4 °F (36.3 °C) 83 19  136/88 100 % WNL      Temp src Heart Rate Source Patient Position BP Location FiO2 (%)   12/18/17 1714 12/18/17 1710 12/18/17 1714 12/18/17 1714 --   Tympanic Monitor Sitting Left arm        Physical Exam   Constitutional: He is oriented to person, place, and time. No distress.   HENT:   Head: Normocephalic.   Mouth/Throat: Mucous membranes are normal.   Eyes: EOM are normal. Pupils are equal, round, and reactive to light.   Neck: Normal range of motion. Neck supple.   Cardiovascular: Normal rate, regular rhythm and normal heart sounds.    Pulmonary/Chest: Effort normal and breath sounds normal. No respiratory distress. He has no decreased breath sounds. He has no wheezes. He has no rhonchi. He has no rales.   Abdominal: Soft. There is no tenderness. There is no rebound and no guarding.   Musculoskeletal:   Swelling of the RUE. There is a fistula present to the RUE with palpable thrill.    Neurological: He is alert and oriented to person, place, and time. He has normal sensation.   Skin: Skin is warm and dry. Bruising (Large amount of bruising to the right arm) noted.   Psychiatric: Mood and affect normal.   Nursing note and vitals reviewed.          LAB RESULTS  Recent Results (from the past 24 hour(s))   Protime-INR    Collection Time: 12/18/17  5:49 PM   Result Value Ref Range    Protime 24.5 (H) 11.7 - 14.2 Seconds    INR 2.29 (H) 0.90 - 1.10   Light Blue Top    Collection Time: 12/18/17  5:49 PM   Result Value Ref Range    Extra Tube hold for add-on    Green Top (Gel)    Collection Time: 12/18/17  5:49 PM   Result Value Ref Range    Extra Tube Hold for add-ons.    Lavender Top    Collection Time: 12/18/17  5:49 PM   Result Value Ref Range    Extra Tube hold for add-on    Gold Top - SST    Collection Time: 12/18/17  5:49 PM   Result Value Ref Range    Extra Tube Hold for add-ons.    Comprehensive Metabolic Panel    Collection Time: 12/18/17  5:49 PM   Result Value Ref Range    Glucose 181 (H) 65 - 99 mg/dL     BUN 23 8 - 23 mg/dL    Creatinine 2.78 (H) 0.76 - 1.27 mg/dL    Sodium 137 136 - 145 mmol/L    Potassium 4.3 3.5 - 5.2 mmol/L    Chloride 94 (L) 98 - 107 mmol/L    CO2 32.2 (H) 22.0 - 29.0 mmol/L    Calcium 9.5 8.6 - 10.5 mg/dL    Total Protein 7.7 6.0 - 8.5 g/dL    Albumin 4.00 3.50 - 5.20 g/dL    ALT (SGPT) 25 1 - 41 U/L    AST (SGOT) 17 1 - 40 U/L    Alkaline Phosphatase 111 39 - 117 U/L    Total Bilirubin 1.2 0.1 - 1.2 mg/dL    eGFR Non African Amer 22 (L) >60 mL/min/1.73    Globulin 3.7 gm/dL    A/G Ratio 1.1 g/dL    BUN/Creatinine Ratio 8.3 7.0 - 25.0    Anion Gap 10.8 mmol/L   CBC Auto Differential    Collection Time: 12/18/17  5:49 PM   Result Value Ref Range    WBC 8.54 4.50 - 10.70 10*3/mm3    RBC 3.55 (L) 4.60 - 6.00 10*6/mm3    Hemoglobin 11.3 (L) 13.7 - 17.6 g/dL    Hematocrit 34.4 (L) 40.4 - 52.2 %    MCV 96.9 (H) 79.8 - 96.2 fL    MCH 31.8 27.0 - 32.7 pg    MCHC 32.8 32.6 - 36.4 g/dL    RDW 14.1 11.5 - 14.5 %    RDW-SD 50.0 37.0 - 54.0 fl    MPV 10.6 6.0 - 12.0 fL    Platelets 219 140 - 500 10*3/mm3    Neutrophil % 67.6 42.7 - 76.0 %    Lymphocyte % 19.6 19.6 - 45.3 %    Monocyte % 8.1 5.0 - 12.0 %    Eosinophil % 3.3 0.3 - 6.2 %    Basophil % 0.8 0.0 - 1.5 %    Immature Grans % 0.6 (H) 0.0 - 0.5 %    Neutrophils, Absolute 5.78 1.90 - 8.10 10*3/mm3    Lymphocytes, Absolute 1.67 0.90 - 4.80 10*3/mm3    Monocytes, Absolute 0.69 0.20 - 1.20 10*3/mm3    Eosinophils, Absolute 0.28 0.00 - 0.70 10*3/mm3    Basophils, Absolute 0.07 0.00 - 0.20 10*3/mm3    Immature Grans, Absolute 0.05 (H) 0.00 - 0.03 10*3/mm3   Duplex Venous Upper Extremity - Right CAR    Collection Time: 12/18/17 10:01 PM   Result Value Ref Range    Right Internal Jugular Augment Y     Right Internal Jugular Competent Y     Right Internal Jugular Compress C     Right Internal Jugular Phasic Y     Right Internal Jugular Spont Y     Left Internal Jugular Augment Y     Left Internal Jugular Competent Y     Left Internal Jugular Compress C      Left Internal Jugular Phasic Y     Left Internal Jugular Spont Y     Right Subclavian Augment Y     Right Subclavian Competent Y     Right Subclavian Compress C     Right Subclavian Phasic Y     Right Subclavian Spont Y     Left Subclavian Augment Y     Left Subclavian Competent Y     Left Subclavian Compress C     Left Subclavian Phasic Y     Left Subclavian Spont Y     Right Axillary Augment Y     Right Axillary Competent Y     Right Axillary Compress C     Right Axillary Phasic Y     Right Axillary Spont Y     Right Brachial Compress C     Right Radial Compress C     Right Ulnar Compress C     Right Basilic Upper Compress C     Right Basilic Forearm Compress C     Right Cephalic Upper Compress N     Right Cephalic Upper Thrombus A     Right Cephalic Upper Color 1     Right Cephalic Forearm Compress C    Duplex Hemodialysis Access CAR    Collection Time: 12/18/17 10:01 PM   Result Value Ref Range    Inflow Velocity Pre Inflow 232.00 cm/sec    Inflow Velocity Distal 178.00 cm/sec    Inflow Diameter Pre Inflow 0.69 cm    Inflow Flow Volume Pre Inflow 943.00 mL/min    Arterial Anastomosis Velocity Proximal 194.00 cm/sec    Arterial Anastomosis Flow Volume Proximal 0.46 mL/min    Graft Velocity Proximal Mid 174.00 cm/sec    Graft Velocity Proximal 600.00 cm/sec    Graft Velocity Mid 94.30 cm/sec    Graft Velocity Distal 89.60 cm/sec    Graft Velocity Mid Distal 103.00 cm/sec    Graft Diameter Proximal Mid 0.76 cm    Graft Diameter Proximal 0.18 cm    Graft Diameter Mid 1.12 cm    Graft Diameter Distal 0.53 cm    Graft Diameter Mid Distal 0.73 cm    Graft Flow Volume Proximal Mid 2406.00 mL/min    Graft Flow Volume Proximal 1000.00 mL/min    Graft Flow Volume Mid 1143.00 mL/min    Graft Flow Volume Distal 467.00 mL/min    Graft Flow Volume Mid Distal 553.00 mL/min    Venous Anastomosis Velocity Proximal 116.00 cm/sec    Outflow Velocity Post Outflow 143.00 cm/sec    Outflow Velocity Distal 86.40 cm/sec    Outflow  Diameter Post Outflow 0.70 cm    Outflow Diameter Distal 0.65 cm    Outflow Flow Volume Post Outflow 1071.00 mL/min    Outflow Flow Volume Distal 594.00 mL/min       Ordered the above labs and reviewed the results.        RADIOLOGY    RUE Venous Doppler: No DVT. Superficial clot in the cephalic vein that is thrombosed. There is hematoma deep in the muscle that is not vascular.     Right Hemodialysis Access Duplex: No DVT. Superficial clot in the cephalic vein that is thrombosed. There is hematoma deep in the muscle that is not vascular.     Ordered the above noted radiological studies. Reviewed by me in PACS.       PROCEDURES  Procedures            PROGRESS AND CONSULTS  ED Course   Comment By Time   10:20 PM  Patient with right arm swelling.  Has large hematoma and superficial thrombus but no DVT and graft looks good.  Patient discussed with Dr. Henson.  He will call tomorrow to be seen in the office on Wednesday.  Return for fevers, worsening swelling, other concerns. Hank Lane MD 12/18 2221     7:41 PM:  RUE venous doppler and blood work ordered for further evaluation. Placed call to Dr. Muro, vascular surgeon, to discuss further course of care.     7:51 PM:  Discussed the case with Dr. Muro, vascular surgeon. He agrees with plan for ordering RUE venous doppler. He would also like right hemodialysis access duplex ordered and would like me to call him back with the results.     10:14 PM:  Discussed pt's doppler findings with Dr. Muro. He would like pt to f/u in his office later this week.     10:18 PM:  Rechecked pt. Pt is resting comfortably and appears in no acute distress. Informed pt that his RUE venous doppler and right hemodialysis access duplex show no DVT. There is a superficial clot in the cephalic vein that is thrombosed. There is hematoma deep in the muscle that is not vascular. I have discussed these findings with Dr. Muro, vascular surgeon, who would like pt to f/u in the  office -> pt was advised to f/u with him this week. RTER warnings given. Pt agrees with plan for discharge.            MEDICAL DECISION MAKING      MDM  Number of Diagnoses or Management Options  Arm swelling:   Contusion of right upper extremity, initial encounter:      Amount and/or Complexity of Data Reviewed  Clinical lab tests: ordered and reviewed (INR is 2.29. )  Tests in the radiology section of CPT®: ordered and reviewed (RUE venous doppler: No DVT. Superficial clot in the cephalic vein that is thrombosed. There is hematoma deep in the muscle that is not vascular. )  Decide to obtain previous medical records or to obtain history from someone other than the patient: yes  Review and summarize past medical records: yes (Pt had a right arm fistula revision on 12/06/17 secondary to infected nino-fistula hematoma. Pt also had resection of the infected fistula.)  Discuss the patient with other providers: yes (Case d/w Dr. Muro, vascular surgeon.)    Patient Progress  Patient progress: stable             DIAGNOSIS  Final diagnoses:   Arm swelling   Contusion of right upper extremity, initial encounter         DISPOSITION  Pt discharged.      DISCHARGE    Patient discharged in stable condition.    Reviewed implications of results, diagnosis, meds, responsibility to follow up, warning signs and symptoms of possible worsening, potential complications and reasons to return to ER.    Patient/Family voiced understanding of above instructions.    Discussed plan for discharge, as there is no emergent indication for admission.  Pt/family is agreeable and understands need for follow up and repeat testing.  Pt is aware that discharge does not mean that nothing is wrong but it indicates no emergency is present that requires admission and they must continue care with follow-up as given below or physician of their choice.     FOLLOW-UP  Ar Muro MD  [address] [address] [phone]    Call  in 1 day           Medication List      Stop          HYDROcodone-acetaminophen 7.5-325 MG per tablet   Commonly known as:  NORCO       insulin regular 100 UNIT/ML injection   Commonly known as:  humuLIN R,novoLIN R       mupirocin 2 % ointment   Commonly known as:  BACTROBAN                       Latest Documented Vital Signs:  As of 11:03 PM  BP- 166/90 HR- 76 Temp- 98.2 °F (36.8 °C) (Oral) O2 sat- 100%        --  Documentation assistance provided by rodríguez Woodall for Dr. Ariana MD.  Information recorded by the scribe was done at my direction and has been verified and validated by me.              Elton Woodall  12/18/17 0039       Hank Lane MD  12/18/17 [id number]

## 2017-12-21 RX ORDER — AMLODIPINE BESYLATE 5 MG/1
TABLET ORAL
Qty: 30 TABLET | Refills: 4 | Status: SHIPPED | OUTPATIENT
Start: 2017-12-21 | End: 2018-07-01 | Stop reason: SDUPTHER

## 2017-12-22 ENCOUNTER — APPOINTMENT (OUTPATIENT)
Dept: WOUND CARE | Facility: HOSPITAL | Age: 72
End: 2017-12-22
Attending: SURGERY

## 2017-12-22 PROCEDURE — G0463 HOSPITAL OUTPT CLINIC VISIT: HCPCS

## 2017-12-26 ENCOUNTER — EPISODE CHANGES (OUTPATIENT)
Dept: CASE MANAGEMENT | Facility: OTHER | Age: 72
End: 2017-12-26

## 2017-12-29 ENCOUNTER — APPOINTMENT (OUTPATIENT)
Dept: WOUND CARE | Facility: HOSPITAL | Age: 72
End: 2017-12-29
Attending: SURGERY

## 2018-01-01 ENCOUNTER — HOSPITAL ENCOUNTER (EMERGENCY)
Facility: HOSPITAL | Age: 73
Discharge: HOME | End: 2018-11-30
Attending: EMERGENCY MEDICINE | Admitting: EMERGENCY MEDICINE

## 2018-01-01 ENCOUNTER — READMISSION MANAGEMENT (OUTPATIENT)
Dept: CALL CENTER | Facility: HOSPITAL | Age: 73
End: 2018-01-01

## 2018-01-01 ENCOUNTER — APPOINTMENT (OUTPATIENT)
Dept: GENERAL RADIOLOGY | Facility: HOSPITAL | Age: 73
End: 2018-01-01

## 2018-01-01 ENCOUNTER — HOSPITAL ENCOUNTER (OUTPATIENT)
Facility: HOSPITAL | Age: 73
Setting detail: OBSERVATION
Discharge: HOME | End: 2018-11-22
Attending: EMERGENCY MEDICINE | Admitting: HOSPITALIST

## 2018-01-01 ENCOUNTER — LAB (OUTPATIENT)
Dept: LAB | Facility: HOSPITAL | Age: 73
End: 2018-01-01

## 2018-01-01 ENCOUNTER — ANTICOAGULATION VISIT (OUTPATIENT)
Dept: INTERNAL MEDICINE | Facility: CLINIC | Age: 73
End: 2018-01-01

## 2018-01-01 VITALS
RESPIRATION RATE: 18 BRPM | HEART RATE: 88 BPM | OXYGEN SATURATION: 96 % | TEMPERATURE: 97.9 F | HEIGHT: 67 IN | DIASTOLIC BLOOD PRESSURE: 72 MMHG | BODY MASS INDEX: 30.26 KG/M2 | WEIGHT: 192.8 LBS | SYSTOLIC BLOOD PRESSURE: 116 MMHG

## 2018-01-01 VITALS
SYSTOLIC BLOOD PRESSURE: 182 MMHG | TEMPERATURE: 96.8 F | RESPIRATION RATE: 16 BRPM | HEART RATE: 72 BPM | HEIGHT: 70 IN | WEIGHT: 206 LBS | OXYGEN SATURATION: 100 % | DIASTOLIC BLOOD PRESSURE: 98 MMHG | BODY MASS INDEX: 29.49 KG/M2

## 2018-01-01 DIAGNOSIS — T82.898A: Primary | ICD-10-CM

## 2018-01-01 DIAGNOSIS — E87.79 OTHER HYPERVOLEMIA: ICD-10-CM

## 2018-01-01 DIAGNOSIS — N18.6 ESRD (END STAGE RENAL DISEASE) ON DIALYSIS (HCC): Primary | ICD-10-CM

## 2018-01-01 DIAGNOSIS — Z79.01 CHRONIC ANTICOAGULATION: ICD-10-CM

## 2018-01-01 DIAGNOSIS — R06.02 SHORTNESS OF BREATH: ICD-10-CM

## 2018-01-01 DIAGNOSIS — Z79.01 ANTICOAGULATED ON COUMADIN: ICD-10-CM

## 2018-01-01 DIAGNOSIS — I16.0 HYPERTENSIVE URGENCY: ICD-10-CM

## 2018-01-01 DIAGNOSIS — Z86.73 HISTORY OF CARDIOEMBOLIC STROKE: ICD-10-CM

## 2018-01-01 DIAGNOSIS — R79.1 ELEVATED INR: ICD-10-CM

## 2018-01-01 DIAGNOSIS — Z99.2 ESRD (END STAGE RENAL DISEASE) ON DIALYSIS (HCC): Primary | ICD-10-CM

## 2018-01-01 LAB
ALBUMIN SERPL-MCNC: 3.4 G/DL (ref 3.5–5.2)
ALBUMIN SERPL-MCNC: 4.2 G/DL (ref 3.5–5.2)
ALBUMIN/GLOB SERPL: 1.2 G/DL
ALP SERPL-CCNC: 104 U/L (ref 39–117)
ALT SERPL W P-5'-P-CCNC: 12 U/L (ref 1–41)
ANION GAP SERPL CALCULATED.3IONS-SCNC: 13.1 MMOL/L
ANION GAP SERPL CALCULATED.3IONS-SCNC: 17.5 MMOL/L
AST SERPL-CCNC: 11 U/L (ref 1–40)
BACTERIA SPEC AEROBE CULT: NORMAL
BACTERIA SPEC AEROBE CULT: NORMAL
BASOPHILS # BLD AUTO: 0.04 10*3/MM3 (ref 0–0.2)
BASOPHILS # BLD AUTO: 0.06 10*3/MM3 (ref 0–0.2)
BASOPHILS NFR BLD AUTO: 0.3 % (ref 0–1.5)
BASOPHILS NFR BLD AUTO: 0.8 % (ref 0–1.5)
BILIRUB SERPL-MCNC: 0.9 MG/DL (ref 0.1–1.2)
BUN BLD-MCNC: 27 MG/DL (ref 8–23)
BUN BLD-MCNC: 43 MG/DL (ref 8–23)
BUN/CREAT SERPL: 5.4 (ref 7–25)
BUN/CREAT SERPL: 6.8 (ref 7–25)
CALCIUM SPEC-SCNC: 9.1 MG/DL (ref 8.6–10.5)
CALCIUM SPEC-SCNC: 9.3 MG/DL (ref 8.6–10.5)
CHLORIDE SERPL-SCNC: 95 MMOL/L (ref 98–107)
CHLORIDE SERPL-SCNC: 97 MMOL/L (ref 98–107)
CO2 SERPL-SCNC: 24.5 MMOL/L (ref 22–29)
CO2 SERPL-SCNC: 26.9 MMOL/L (ref 22–29)
CREAT BLD-MCNC: 4.98 MG/DL (ref 0.76–1.27)
CREAT BLD-MCNC: 6.33 MG/DL (ref 0.76–1.27)
D-LACTATE SERPL-SCNC: 1.1 MMOL/L (ref 0.5–2)
DEPRECATED RDW RBC AUTO: 48.1 FL (ref 37–54)
DEPRECATED RDW RBC AUTO: 51.3 FL (ref 37–54)
EOSINOPHIL # BLD AUTO: 0.15 10*3/MM3 (ref 0–0.7)
EOSINOPHIL # BLD AUTO: 0.4 10*3/MM3 (ref 0–0.7)
EOSINOPHIL NFR BLD AUTO: 2 % (ref 0.3–6.2)
EOSINOPHIL NFR BLD AUTO: 3.4 % (ref 0.3–6.2)
ERYTHROCYTE [DISTWIDTH] IN BLOOD BY AUTOMATED COUNT: 14.1 % (ref 11.5–14.5)
ERYTHROCYTE [DISTWIDTH] IN BLOOD BY AUTOMATED COUNT: 14.4 % (ref 11.5–14.5)
GFR SERPL CREATININE-BSD FRML MDRD: 11 ML/MIN/1.73
GFR SERPL CREATININE-BSD FRML MDRD: 9 ML/MIN/1.73
GFR SERPL CREATININE-BSD FRML MDRD: ABNORMAL ML/MIN/1.73
GFR SERPL CREATININE-BSD FRML MDRD: ABNORMAL ML/MIN/1.73
GLOBULIN UR ELPH-MCNC: 3.6 GM/DL
GLUCOSE BLD-MCNC: 236 MG/DL (ref 65–99)
GLUCOSE BLD-MCNC: 247 MG/DL (ref 65–99)
GLUCOSE BLDC GLUCOMTR-MCNC: 211 MG/DL (ref 70–130)
GLUCOSE BLDC GLUCOMTR-MCNC: 241 MG/DL (ref 70–130)
GLUCOSE BLDC GLUCOMTR-MCNC: 271 MG/DL (ref 70–130)
HBA1C MFR BLD: 8.88 % (ref 4.8–5.6)
HBV SURFACE AG SERPL QL IA: NORMAL
HCT VFR BLD AUTO: 34.8 % (ref 40.4–52.2)
HCT VFR BLD AUTO: 36.6 % (ref 40.4–52.2)
HGB BLD-MCNC: 11 G/DL (ref 13.7–17.6)
HGB BLD-MCNC: 12.6 G/DL (ref 13.7–17.6)
HOLD SPECIMEN: NORMAL
HOLD SPECIMEN: NORMAL
IMM GRANULOCYTES # BLD: 0.02 10*3/MM3 (ref 0–0.03)
IMM GRANULOCYTES # BLD: 0.03 10*3/MM3 (ref 0–0.03)
IMM GRANULOCYTES NFR BLD: 0.3 % (ref 0–0.5)
IMM GRANULOCYTES NFR BLD: 0.3 % (ref 0–0.5)
INR PPP: 2.7 (ref 2–3)
INR PPP: 3.62 (ref 0.9–1.1)
INR PPP: 3.99 (ref 0.9–1.1)
INR PPP: 4.4 (ref 2–3)
INR PPP: 5.09 (ref 0.9–1.1)
INR PPP: 5.36 (ref 0.9–1.1)
LYMPHOCYTES # BLD AUTO: 1.44 10*3/MM3 (ref 0.9–4.8)
LYMPHOCYTES # BLD AUTO: 1.65 10*3/MM3 (ref 0.9–4.8)
LYMPHOCYTES NFR BLD AUTO: 12.2 % (ref 19.6–45.3)
LYMPHOCYTES NFR BLD AUTO: 21.5 % (ref 19.6–45.3)
MAGNESIUM SERPL-MCNC: 1.9 MG/DL (ref 1.6–2.4)
MCH RBC QN AUTO: 30.6 PG (ref 27–32.7)
MCH RBC QN AUTO: 32 PG (ref 27–32.7)
MCHC RBC AUTO-ENTMCNC: 31.6 G/DL (ref 32.6–36.4)
MCHC RBC AUTO-ENTMCNC: 34.4 G/DL (ref 32.6–36.4)
MCV RBC AUTO: 92.9 FL (ref 79.8–96.2)
MCV RBC AUTO: 96.9 FL (ref 79.8–96.2)
MONOCYTES # BLD AUTO: 0.59 10*3/MM3 (ref 0.2–1.2)
MONOCYTES # BLD AUTO: 0.65 10*3/MM3 (ref 0.2–1.2)
MONOCYTES NFR BLD AUTO: 5 % (ref 5–12)
MONOCYTES NFR BLD AUTO: 8.5 % (ref 5–12)
NEUTROPHILS # BLD AUTO: 5.13 10*3/MM3 (ref 1.9–8.1)
NEUTROPHILS # BLD AUTO: 9.32 10*3/MM3 (ref 1.9–8.1)
NEUTROPHILS NFR BLD AUTO: 66.9 % (ref 42.7–76)
NEUTROPHILS NFR BLD AUTO: 79.1 % (ref 42.7–76)
NT-PROBNP SERPL-MCNC: ABNORMAL PG/ML (ref 0–1800)
PHOSPHATE SERPL-MCNC: 4.9 MG/DL (ref 2.5–4.5)
PLATELET # BLD AUTO: 214 10*3/MM3 (ref 140–500)
PLATELET # BLD AUTO: 215 10*3/MM3 (ref 140–500)
PMV BLD AUTO: 10.2 FL (ref 6–12)
PMV BLD AUTO: 10.6 FL (ref 6–12)
POTASSIUM BLD-SCNC: 3.8 MMOL/L (ref 3.5–5.2)
POTASSIUM BLD-SCNC: 3.8 MMOL/L (ref 3.5–5.2)
PROCALCITONIN SERPL-MCNC: 0.19 NG/ML (ref 0.1–0.25)
PROT SERPL-MCNC: 7.8 G/DL (ref 6–8.5)
PROTHROMBIN TIME: 35.5 SECONDS (ref 11.7–14.2)
PROTHROMBIN TIME: 38.3 SECONDS (ref 11.7–14.2)
PROTHROMBIN TIME: 46.3 SECONDS (ref 11.7–14.2)
PROTHROMBIN TIME: 48.3 SECONDS (ref 11.7–14.2)
RBC # BLD AUTO: 3.59 10*6/MM3 (ref 4.6–6)
RBC # BLD AUTO: 3.94 10*6/MM3 (ref 4.6–6)
SODIUM BLD-SCNC: 137 MMOL/L (ref 136–145)
SODIUM BLD-SCNC: 137 MMOL/L (ref 136–145)
WBC NRBC COR # BLD: 11.79 10*3/MM3 (ref 4.5–10.7)
WBC NRBC COR # BLD: 7.66 10*3/MM3 (ref 4.5–10.7)
WHOLE BLOOD HOLD SPECIMEN: NORMAL
WHOLE BLOOD HOLD SPECIMEN: NORMAL

## 2018-01-01 PROCEDURE — G0257 UNSCHED DIALYSIS ESRD PT HOS: HCPCS

## 2018-01-01 PROCEDURE — 85610 PROTHROMBIN TIME: CPT | Performed by: EMERGENCY MEDICINE

## 2018-01-01 PROCEDURE — 63710000001 INSULIN REGULAR HUMAN PER 5 UNITS: Performed by: HOSPITALIST

## 2018-01-01 PROCEDURE — 63710000001 INSULIN ISOPHANE HUMAN PER 5 UNITS: Performed by: HOSPITALIST

## 2018-01-01 PROCEDURE — 83036 HEMOGLOBIN GLYCOSYLATED A1C: CPT | Performed by: HOSPITALIST

## 2018-01-01 PROCEDURE — 93010 ELECTROCARDIOGRAM REPORT: CPT | Performed by: INTERNAL MEDICINE

## 2018-01-01 PROCEDURE — 82962 GLUCOSE BLOOD TEST: CPT

## 2018-01-01 PROCEDURE — 36415 COLL VENOUS BLD VENIPUNCTURE: CPT | Performed by: INTERNAL MEDICINE

## 2018-01-01 PROCEDURE — 80053 COMPREHEN METABOLIC PANEL: CPT | Performed by: EMERGENCY MEDICINE

## 2018-01-01 PROCEDURE — G0378 HOSPITAL OBSERVATION PER HR: HCPCS

## 2018-01-01 PROCEDURE — 36415 COLL VENOUS BLD VENIPUNCTURE: CPT

## 2018-01-01 PROCEDURE — 85610 PROTHROMBIN TIME: CPT | Performed by: FAMILY MEDICINE

## 2018-01-01 PROCEDURE — 83880 ASSAY OF NATRIURETIC PEPTIDE: CPT | Performed by: EMERGENCY MEDICINE

## 2018-01-01 PROCEDURE — 36416 COLLJ CAPILLARY BLOOD SPEC: CPT | Performed by: FAMILY MEDICINE

## 2018-01-01 PROCEDURE — 96374 THER/PROPH/DIAG INJ IV PUSH: CPT

## 2018-01-01 PROCEDURE — 85610 PROTHROMBIN TIME: CPT | Performed by: NURSE PRACTITIONER

## 2018-01-01 PROCEDURE — 83605 ASSAY OF LACTIC ACID: CPT | Performed by: EMERGENCY MEDICINE

## 2018-01-01 PROCEDURE — 85025 COMPLETE CBC W/AUTO DIFF WBC: CPT | Performed by: EMERGENCY MEDICINE

## 2018-01-01 PROCEDURE — 84145 PROCALCITONIN (PCT): CPT | Performed by: EMERGENCY MEDICINE

## 2018-01-01 PROCEDURE — 87340 HEPATITIS B SURFACE AG IA: CPT | Performed by: INTERNAL MEDICINE

## 2018-01-01 PROCEDURE — 99284 EMERGENCY DEPT VISIT MOD MDM: CPT

## 2018-01-01 PROCEDURE — 93005 ELECTROCARDIOGRAM TRACING: CPT | Performed by: EMERGENCY MEDICINE

## 2018-01-01 PROCEDURE — 87040 BLOOD CULTURE FOR BACTERIA: CPT | Performed by: EMERGENCY MEDICINE

## 2018-01-01 PROCEDURE — 85610 PROTHROMBIN TIME: CPT

## 2018-01-01 PROCEDURE — 85025 COMPLETE CBC W/AUTO DIFF WBC: CPT | Performed by: INTERNAL MEDICINE

## 2018-01-01 PROCEDURE — 71045 X-RAY EXAM CHEST 1 VIEW: CPT

## 2018-01-01 PROCEDURE — 83735 ASSAY OF MAGNESIUM: CPT | Performed by: EMERGENCY MEDICINE

## 2018-01-01 PROCEDURE — 80069 RENAL FUNCTION PANEL: CPT | Performed by: INTERNAL MEDICINE

## 2018-01-01 PROCEDURE — 85610 PROTHROMBIN TIME: CPT | Performed by: HOSPITALIST

## 2018-01-01 PROCEDURE — 99283 EMERGENCY DEPT VISIT LOW MDM: CPT

## 2018-01-01 PROCEDURE — 63710000001 INSULIN LISPRO (HUMAN) PER 5 UNITS: Performed by: HOSPITALIST

## 2018-01-01 RX ORDER — ATORVASTATIN CALCIUM 20 MG/1
40 TABLET, FILM COATED ORAL DAILY
Status: DISCONTINUED | OUTPATIENT
Start: 2018-01-01 | End: 2018-01-01 | Stop reason: HOSPADM

## 2018-01-01 RX ORDER — DEXTROSE MONOHYDRATE 25 G/50ML
25 INJECTION, SOLUTION INTRAVENOUS
Status: DISCONTINUED | OUTPATIENT
Start: 2018-01-01 | End: 2018-01-01 | Stop reason: HOSPADM

## 2018-01-01 RX ORDER — AMLODIPINE BESYLATE 5 MG/1
5 TABLET ORAL DAILY
Status: DISCONTINUED | OUTPATIENT
Start: 2018-01-01 | End: 2018-01-01 | Stop reason: HOSPADM

## 2018-01-01 RX ORDER — METOPROLOL SUCCINATE 50 MG/1
50 TABLET, EXTENDED RELEASE ORAL
Status: DISCONTINUED | OUTPATIENT
Start: 2018-01-01 | End: 2018-01-01 | Stop reason: HOSPADM

## 2018-01-01 RX ORDER — ACETAMINOPHEN 325 MG/1
650 TABLET ORAL EVERY 6 HOURS PRN
Status: DISCONTINUED | OUTPATIENT
Start: 2018-01-01 | End: 2018-01-01 | Stop reason: HOSPADM

## 2018-01-01 RX ORDER — SODIUM CHLORIDE 0.9 % (FLUSH) 0.9 %
3-10 SYRINGE (ML) INJECTION AS NEEDED
Status: DISCONTINUED | OUTPATIENT
Start: 2018-01-01 | End: 2018-01-01 | Stop reason: HOSPADM

## 2018-01-01 RX ORDER — SODIUM CHLORIDE 0.9 % (FLUSH) 0.9 %
3 SYRINGE (ML) INJECTION EVERY 12 HOURS SCHEDULED
Status: DISCONTINUED | OUTPATIENT
Start: 2018-01-01 | End: 2018-01-01 | Stop reason: HOSPADM

## 2018-01-01 RX ORDER — CLONIDINE HYDROCHLORIDE 0.1 MG/1
0.1 TABLET ORAL 2 TIMES DAILY
Status: DISCONTINUED | OUTPATIENT
Start: 2018-01-01 | End: 2018-01-01 | Stop reason: HOSPADM

## 2018-01-01 RX ORDER — GABAPENTIN 100 MG/1
100 CAPSULE ORAL 3 TIMES DAILY
Status: DISCONTINUED | OUTPATIENT
Start: 2018-01-01 | End: 2018-01-01 | Stop reason: HOSPADM

## 2018-01-01 RX ORDER — TAMSULOSIN HYDROCHLORIDE 0.4 MG/1
0.4 CAPSULE ORAL DAILY
Status: DISCONTINUED | OUTPATIENT
Start: 2018-01-01 | End: 2018-01-01 | Stop reason: HOSPADM

## 2018-01-01 RX ORDER — ASPIRIN 81 MG/1
81 TABLET ORAL DAILY
Status: DISCONTINUED | OUTPATIENT
Start: 2018-01-01 | End: 2018-01-01 | Stop reason: HOSPADM

## 2018-01-01 RX ORDER — DONEPEZIL HYDROCHLORIDE 10 MG/1
10 TABLET, FILM COATED ORAL NIGHTLY
Status: DISCONTINUED | OUTPATIENT
Start: 2018-01-01 | End: 2018-01-01 | Stop reason: HOSPADM

## 2018-01-01 RX ORDER — BUMETANIDE 0.25 MG/ML
2 INJECTION INTRAMUSCULAR; INTRAVENOUS ONCE
Status: COMPLETED | OUTPATIENT
Start: 2018-01-01 | End: 2018-01-01

## 2018-01-01 RX ORDER — TORSEMIDE 20 MG/1
20 TABLET ORAL DAILY
Status: DISCONTINUED | OUTPATIENT
Start: 2018-01-01 | End: 2018-01-01 | Stop reason: HOSPADM

## 2018-01-01 RX ORDER — SODIUM CHLORIDE 0.9 % (FLUSH) 0.9 %
10 SYRINGE (ML) INJECTION AS NEEDED
Status: DISCONTINUED | OUTPATIENT
Start: 2018-01-01 | End: 2018-01-01 | Stop reason: HOSPADM

## 2018-01-01 RX ORDER — ALLOPURINOL 100 MG/1
TABLET ORAL
Qty: 90 TABLET | Refills: 1 | Status: SHIPPED | OUTPATIENT
Start: 2018-01-01 | End: 2019-01-01 | Stop reason: SDUPTHER

## 2018-01-01 RX ORDER — SODIUM CHLORIDE 0.9 % (FLUSH) 0.9 %
10 SYRINGE (ML) INJECTION AS NEEDED
Status: DISCONTINUED | OUTPATIENT
Start: 2018-01-01 | End: 2018-01-01

## 2018-01-01 RX ORDER — NICOTINE POLACRILEX 4 MG
15 LOZENGE BUCCAL
Status: DISCONTINUED | OUTPATIENT
Start: 2018-01-01 | End: 2018-01-01 | Stop reason: HOSPADM

## 2018-01-01 RX ORDER — ALBUMIN (HUMAN) 12.5 G/50ML
12.5 SOLUTION INTRAVENOUS AS NEEDED
Status: DISCONTINUED | OUTPATIENT
Start: 2018-01-01 | End: 2018-01-01 | Stop reason: HOSPADM

## 2018-01-01 RX ORDER — ALLOPURINOL 100 MG/1
100 TABLET ORAL DAILY
Status: DISCONTINUED | OUTPATIENT
Start: 2018-01-01 | End: 2018-01-01 | Stop reason: HOSPADM

## 2018-01-01 RX ORDER — DULOXETIN HYDROCHLORIDE 60 MG/1
60 CAPSULE, DELAYED RELEASE ORAL DAILY
Status: DISCONTINUED | OUTPATIENT
Start: 2018-01-01 | End: 2018-01-01 | Stop reason: HOSPADM

## 2018-01-01 RX ADMIN — GABAPENTIN 100 MG: 100 CAPSULE ORAL at 20:43

## 2018-01-01 RX ADMIN — AMLODIPINE BESYLATE 5 MG: 5 TABLET ORAL at 09:10

## 2018-01-01 RX ADMIN — DULOXETINE HYDROCHLORIDE 60 MG: 60 CAPSULE, DELAYED RELEASE ORAL at 09:10

## 2018-01-01 RX ADMIN — TORSEMIDE 20 MG: 20 TABLET ORAL at 09:10

## 2018-01-01 RX ADMIN — ALLOPURINOL 100 MG: 100 TABLET ORAL at 09:10

## 2018-01-01 RX ADMIN — CLONIDINE HYDROCHLORIDE 0.1 MG: 0.1 TABLET ORAL at 20:43

## 2018-01-01 RX ADMIN — INSULIN HUMAN 10 UNITS: 100 INJECTION, SOLUTION PARENTERAL at 09:10

## 2018-01-01 RX ADMIN — DONEPEZIL HYDROCHLORIDE 10 MG: 10 TABLET, FILM COATED ORAL at 20:42

## 2018-01-01 RX ADMIN — SODIUM CHLORIDE, PRESERVATIVE FREE 10 ML: 5 INJECTION INTRAVENOUS at 11:28

## 2018-01-01 RX ADMIN — CALCIUM ACETATE 667 MG: 667 CAPSULE ORAL at 09:10

## 2018-01-01 RX ADMIN — Medication 3 ML: at 20:59

## 2018-01-01 RX ADMIN — ASPIRIN 81 MG: 81 TABLET, DELAYED RELEASE ORAL at 09:10

## 2018-01-01 RX ADMIN — INSULIN LISPRO 4 UNITS: 100 INJECTION, SOLUTION INTRAVENOUS; SUBCUTANEOUS at 21:02

## 2018-01-01 RX ADMIN — SODIUM CHLORIDE 7.5 MG/HR: 0.9 INJECTION, SOLUTION INTRAVENOUS at 10:57

## 2018-01-01 RX ADMIN — INSULIN LISPRO 3 UNITS: 100 INJECTION, SOLUTION INTRAVENOUS; SUBCUTANEOUS at 12:06

## 2018-01-01 RX ADMIN — METOPROLOL SUCCINATE 50 MG: 50 TABLET, FILM COATED, EXTENDED RELEASE ORAL at 09:10

## 2018-01-01 RX ADMIN — INSULIN HUMAN 30 UNITS: 100 INJECTION, SUSPENSION SUBCUTANEOUS at 09:15

## 2018-01-01 RX ADMIN — ATORVASTATIN CALCIUM 40 MG: 20 TABLET, FILM COATED ORAL at 09:10

## 2018-01-01 RX ADMIN — INSULIN LISPRO 3 UNITS: 100 INJECTION, SOLUTION INTRAVENOUS; SUBCUTANEOUS at 09:10

## 2018-01-01 RX ADMIN — TAMSULOSIN HYDROCHLORIDE 0.4 MG: 0.4 CAPSULE ORAL at 09:10

## 2018-01-01 RX ADMIN — CLONIDINE HYDROCHLORIDE 0.1 MG: 0.1 TABLET ORAL at 09:10

## 2018-01-01 RX ADMIN — INSULIN HUMAN 10 UNITS: 100 INJECTION, SOLUTION PARENTERAL at 12:06

## 2018-01-01 RX ADMIN — GABAPENTIN 100 MG: 100 CAPSULE ORAL at 09:10

## 2018-01-01 RX ADMIN — BUMETANIDE 2 MG: 0.25 INJECTION INTRAMUSCULAR; INTRAVENOUS at 11:26

## 2018-01-02 RX ORDER — METOPROLOL SUCCINATE 50 MG/1
50 TABLET, EXTENDED RELEASE ORAL
Qty: 90 TABLET | Refills: 3 | Status: SHIPPED | OUTPATIENT
Start: 2018-01-02 | End: 2018-01-04 | Stop reason: SDUPTHER

## 2018-01-02 RX ORDER — TORSEMIDE 10 MG/1
20 TABLET ORAL DAILY
Qty: 60 TABLET | Refills: 2 | Status: SHIPPED | OUTPATIENT
Start: 2018-01-02 | End: 2018-01-08 | Stop reason: SDUPTHER

## 2018-01-04 RX ORDER — METOPROLOL SUCCINATE 50 MG/1
50 TABLET, EXTENDED RELEASE ORAL
Qty: 90 TABLET | Refills: 3 | Status: SHIPPED | OUTPATIENT
Start: 2018-01-04 | End: 2018-01-09 | Stop reason: SDUPTHER

## 2018-01-05 ENCOUNTER — APPOINTMENT (OUTPATIENT)
Dept: WOUND CARE | Facility: HOSPITAL | Age: 73
End: 2018-01-05
Attending: SURGERY

## 2018-01-05 PROCEDURE — G0463 HOSPITAL OUTPT CLINIC VISIT: HCPCS

## 2018-01-08 RX ORDER — TORSEMIDE 10 MG/1
10 TABLET ORAL DAILY
Qty: 150 TABLET | Refills: 2 | Status: SHIPPED | OUTPATIENT
Start: 2018-01-08 | End: 2018-08-02 | Stop reason: SDUPTHER

## 2018-01-09 RX ORDER — METOPROLOL SUCCINATE 50 MG/1
50 TABLET, EXTENDED RELEASE ORAL
Qty: 90 TABLET | Refills: 3 | Status: SHIPPED | OUTPATIENT
Start: 2018-01-09 | End: 2019-01-01 | Stop reason: SDUPTHER

## 2018-01-25 RX ORDER — ALLOPURINOL 100 MG/1
TABLET ORAL
Qty: 90 TABLET | Refills: 2 | Status: SHIPPED | OUTPATIENT
Start: 2018-01-25 | End: 2018-01-01 | Stop reason: SDUPTHER

## 2018-01-25 RX ORDER — DULOXETIN HYDROCHLORIDE 60 MG/1
CAPSULE, DELAYED RELEASE ORAL
Qty: 90 CAPSULE | Refills: 0 | Status: SHIPPED | OUTPATIENT
Start: 2018-01-25 | End: 2018-06-21 | Stop reason: SDUPTHER

## 2018-01-29 RX ORDER — ATORVASTATIN CALCIUM 40 MG/1
TABLET, FILM COATED ORAL
Qty: 90 TABLET | Refills: 0 | Status: SHIPPED | OUTPATIENT
Start: 2018-01-29 | End: 2018-06-21 | Stop reason: SDUPTHER

## 2018-01-31 ENCOUNTER — TELEPHONE (OUTPATIENT)
Dept: INTERNAL MEDICINE | Facility: CLINIC | Age: 73
End: 2018-01-31

## 2018-02-02 ENCOUNTER — ANTICOAGULATION VISIT (OUTPATIENT)
Dept: INTERNAL MEDICINE | Facility: CLINIC | Age: 73
End: 2018-02-02

## 2018-02-02 DIAGNOSIS — I48.91 ATRIAL FIBRILLATION, UNSPECIFIED TYPE (HCC): Primary | ICD-10-CM

## 2018-02-02 LAB — INR PPP: 1.5 (ref 0.9–1.1)

## 2018-02-02 PROCEDURE — 85610 PROTHROMBIN TIME: CPT | Performed by: FAMILY MEDICINE

## 2018-02-02 PROCEDURE — 36416 COLLJ CAPILLARY BLOOD SPEC: CPT | Performed by: FAMILY MEDICINE

## 2018-03-08 RX ORDER — CLONIDINE HYDROCHLORIDE 0.1 MG/1
TABLET ORAL
Qty: 180 TABLET | Refills: 2 | Status: SHIPPED | OUTPATIENT
Start: 2018-03-08 | End: 2019-01-01 | Stop reason: HOSPADM

## 2018-05-08 RX ORDER — TORSEMIDE 10 MG/1
TABLET ORAL
Qty: 60 TABLET | Refills: 1 | Status: SHIPPED | OUTPATIENT
Start: 2018-05-08 | End: 2018-08-02 | Stop reason: SDUPTHER

## 2018-05-21 ENCOUNTER — ANTICOAGULATION VISIT (OUTPATIENT)
Dept: INTERNAL MEDICINE | Facility: CLINIC | Age: 73
End: 2018-05-21

## 2018-05-21 DIAGNOSIS — I48.91 ATRIAL FIBRILLATION, UNSPECIFIED TYPE (HCC): Primary | ICD-10-CM

## 2018-05-21 LAB
INR PPP: 1.8 (ref 2–3)
INR PPP: 1.8 (ref 2–3)

## 2018-05-21 PROCEDURE — 36416 COLLJ CAPILLARY BLOOD SPEC: CPT | Performed by: FAMILY MEDICINE

## 2018-05-21 PROCEDURE — 85610 PROTHROMBIN TIME: CPT | Performed by: FAMILY MEDICINE

## 2018-05-21 NOTE — PROGRESS NOTES
Ask Dr Victoria about changing his anticoagulation since he is not able to be controlled with warfarin

## 2018-05-21 NOTE — PROGRESS NOTES
I do not know how to handle this.  None of the prothrombin times the last 3 months have been therapeutic.  What  is his goal for anticoagulation and why has been held.  He'll likely needs a different agent

## 2018-05-22 RX ORDER — ATORVASTATIN CALCIUM 40 MG/1
TABLET, FILM COATED ORAL
Qty: 90 TABLET | Refills: 0 | OUTPATIENT
Start: 2018-05-22

## 2018-05-22 RX ORDER — DULOXETIN HYDROCHLORIDE 60 MG/1
CAPSULE, DELAYED RELEASE ORAL
Qty: 90 CAPSULE | Refills: 0 | OUTPATIENT
Start: 2018-05-22

## 2018-06-01 ENCOUNTER — ANTICOAGULATION VISIT (OUTPATIENT)
Dept: INTERNAL MEDICINE | Facility: CLINIC | Age: 73
End: 2018-06-01

## 2018-06-01 ENCOUNTER — TELEPHONE (OUTPATIENT)
Dept: INTERNAL MEDICINE | Facility: CLINIC | Age: 73
End: 2018-06-01

## 2018-06-01 NOTE — TELEPHONE ENCOUNTER
Please have him start checking INR in the office.  Please have him restart usual warfarin dose and start rechecking in the office here if possible in 2 weeks.

## 2018-06-06 RX ORDER — ATORVASTATIN CALCIUM 40 MG/1
TABLET, FILM COATED ORAL
Qty: 90 TABLET | Refills: 0 | OUTPATIENT
Start: 2018-06-06

## 2018-06-06 RX ORDER — DULOXETIN HYDROCHLORIDE 60 MG/1
CAPSULE, DELAYED RELEASE ORAL
Qty: 90 CAPSULE | Refills: 0 | OUTPATIENT
Start: 2018-06-06

## 2018-06-21 ENCOUNTER — ANTICOAGULATION VISIT (OUTPATIENT)
Dept: INTERNAL MEDICINE | Facility: CLINIC | Age: 73
End: 2018-06-21

## 2018-06-21 ENCOUNTER — OFFICE VISIT (OUTPATIENT)
Dept: INTERNAL MEDICINE | Facility: CLINIC | Age: 73
End: 2018-06-21

## 2018-06-21 VITALS
TEMPERATURE: 98 F | OXYGEN SATURATION: 98 % | HEART RATE: 87 BPM | SYSTOLIC BLOOD PRESSURE: 137 MMHG | DIASTOLIC BLOOD PRESSURE: 71 MMHG | BODY MASS INDEX: 29.16 KG/M2 | WEIGHT: 203.2 LBS

## 2018-06-21 DIAGNOSIS — Z79.01 ANTICOAGULATION GOAL OF INR 2 TO 3: ICD-10-CM

## 2018-06-21 DIAGNOSIS — Z86.73 HISTORY OF CVA (CEREBROVASCULAR ACCIDENT): Primary | ICD-10-CM

## 2018-06-21 DIAGNOSIS — E11.8 DM (DIABETES MELLITUS) WITH COMPLICATIONS (HCC): ICD-10-CM

## 2018-06-21 DIAGNOSIS — E78.1 HYPERTRIGLYCERIDEMIA: Primary | ICD-10-CM

## 2018-06-21 DIAGNOSIS — G63 NEUROPATHY ASSOCIATED WITH ENDOCRINE DISORDER (HCC): ICD-10-CM

## 2018-06-21 DIAGNOSIS — Z51.81 ANTICOAGULATION GOAL OF INR 2 TO 3: ICD-10-CM

## 2018-06-21 DIAGNOSIS — E34.9 NEUROPATHY ASSOCIATED WITH ENDOCRINE DISORDER (HCC): ICD-10-CM

## 2018-06-21 LAB
INR PPP: 1.1 (ref 2–3)
INR PPP: 1.1 (ref 2–3)

## 2018-06-21 PROCEDURE — 85610 PROTHROMBIN TIME: CPT | Performed by: FAMILY MEDICINE

## 2018-06-21 PROCEDURE — 36416 COLLJ CAPILLARY BLOOD SPEC: CPT | Performed by: FAMILY MEDICINE

## 2018-06-21 PROCEDURE — 99213 OFFICE O/P EST LOW 20 MIN: CPT | Performed by: FAMILY MEDICINE

## 2018-06-21 RX ORDER — DULOXETIN HYDROCHLORIDE 60 MG/1
60 CAPSULE, DELAYED RELEASE ORAL DAILY
Qty: 90 CAPSULE | Refills: 3 | Status: SHIPPED | OUTPATIENT
Start: 2018-06-21 | End: 2019-01-01 | Stop reason: HOSPADM

## 2018-06-21 RX ORDER — PREGABALIN 50 MG/1
50 CAPSULE ORAL 2 TIMES DAILY
Qty: 20 CAPSULE | Refills: 0 | Status: SHIPPED | OUTPATIENT
Start: 2018-06-21 | End: 2018-09-25

## 2018-06-21 RX ORDER — PREGABALIN 100 MG/1
100 CAPSULE ORAL 2 TIMES DAILY
Qty: 60 CAPSULE | Refills: 6 | Status: SHIPPED | OUTPATIENT
Start: 2018-06-21 | End: 2018-09-25

## 2018-06-21 RX ORDER — ATORVASTATIN CALCIUM 40 MG/1
40 TABLET, FILM COATED ORAL DAILY
Qty: 90 TABLET | Refills: 3 | Status: SHIPPED | OUTPATIENT
Start: 2018-06-21 | End: 2019-01-01 | Stop reason: SDUPTHER

## 2018-06-21 RX ORDER — LANCETS
EACH MISCELLANEOUS
Qty: 200 EACH | Refills: 5 | Status: SHIPPED | OUTPATIENT
Start: 2018-06-21 | End: 2018-06-27 | Stop reason: SDUPTHER

## 2018-06-21 NOTE — PROGRESS NOTES
CC:ballance, CVA,lipids. Feet pain    Subjective.../HPI  Patient present today with1) Deacon has a history of chronic hyperlipidemia and has been well controlled on current medications.  Patient reports has had hyperlipidemia for 10 years. He is tolerating medications without side effect.  Hyperlipidemia labs will be reviewed today.  2)both feet with DM neuropathy      I have reviewed the patient's medical history in detail and updated the computerized patient record.    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 ARM FISTULA REVISION/POSS. TUNNELED CATH;  Surgeon: Ar Muro MD;  Location: [facility];  Service:    • COLONOSCOPY  2003   • INSERTION HEMODIALYSIS CATHETER N/A 11/25/2016    Procedure: TUNNEL CATHETER INSERTION;  Surgeon: Silvia Lim Jr., MD;  Location: [facility];  Service:        Family History   Problem Relation Age of Onset   • Arthritis Mother    • Diabetes Father    • Heart disease Father    • Hyperlipidemia Father    • Hypertension Father    • Obesity Father    • Cancer Brother         esophagus   • Heart disease Maternal Grandfather        Social History     Social History   • Marital status:      Spouse name: N/A   • Number of children: N/A   • Years of education: N/A     Occupational  History   • Not on file.     Social History Main Topics   • Smoking status: Former Smoker     Packs/day: 1.00     Years: 5.00     Types: Cigarettes     Quit date: 1966   • Smokeless tobacco: Never Used   • Alcohol use 0.6 oz/week     1 Cans of beer per week      Comment: occasionally    • Drug use: No   • Sexual activity: Defer     Other Topics Concern   • Not on file     Social History Narrative   • No narrative on file       Most Recent Immunizations   Administered Date(s) Administered   • Flu Vaccine High Dose PF 65YR+ 10/21/2016   • Flu Vaccine Quad PF >18YRS 10/01/2014   • Influenza, Quadrivalent 10/02/2017   • Influenza, Unspecified 12/02/2013   • Pneumococcal Conjugate 13-Valent (PCV13) 05/13/2015   • Pneumococcal Polysaccharide (PPSV23) 04/30/2014       Review of Systems:   Review of Systems   Constitutional: Negative.    HENT: Negative.    Eyes: Negative.    Respiratory: Negative.    Cardiovascular: Negative.    Gastrointestinal: Negative.    Endocrine: Negative.    Genitourinary: Negative.    Musculoskeletal: Negative.    Skin: Negative.    Allergic/Immunologic: Negative.    Neurological: Negative.    Hematological: Negative.    Psychiatric/Behavioral: Negative.          Physical Exam   Constitutional: He is oriented to person, place, and time. He appears well-developed and well-nourished.   Cardiovascular: Normal rate, regular rhythm and normal heart sounds.    Pulmonary/Chest: Effort normal and breath sounds normal.   Neurological: He is alert and oriented to person, place, and time.   Skin:   R hand with keratotic lesions   Psychiatric: He has a normal mood and affect. His behavior is normal.         Vital Signs     Vitals:    06/21/18 1054   BP: 137/71   BP Location: Left arm   Patient Position: Sitting   Cuff Size: Adult   Pulse: 87   Temp: 98 °F (36.7 °C)   TempSrc: Oral   SpO2: 98%   Weight: 92.2 kg (203 lb 3.2 oz)          Results Review:      REVIEWED AND DISCUSSED CLINICAL RESULTS WITH  PATIENT      Requested Prescriptions     Signed Prescriptions Disp Refills   • ACCU-CHEK FASTCLIX LANCETS misc 200 each 5     Sig: Use as directed for blood glucose testing   • glucose blood test strip 120 each 12     Sig: Use as instructed   • glucose blood (ACCU-CHEK GUIDE) test strip 150 each 5     Sig: Use as instructed to check 4 times daily   • atorvastatin (LIPITOR) 40 MG tablet 90 tablet 3     Sig: Take 1 tablet by mouth Daily.   • DULoxetine (CYMBALTA) 60 MG capsule 90 capsule 3     Sig: Take 1 capsule by mouth Daily.   • pregabalin (LYRICA) 50 MG capsule 20 capsule 0     Sig: Take 1 capsule by mouth 2 (Two) Times a Day.   • pregabalin (LYRICA) 100 MG capsule 60 capsule 6     Sig: Take 1 capsule by mouth 2 (Two) Times a Day.         Current Outpatient Prescriptions:   •  ACCU-CHEK FASTCLIX LANCETS misc, Use as directed for blood glucose testing, Disp: 200 each, Rfl: 5  •  acetaminophen (TYLENOL) 325 MG tablet, Take 2 tablets by mouth Every 6 (Six) Hours As Needed for mild pain (1-3) or fever., Disp: 100 tablet, Rfl: 0  •  allopurinol (ZYLOPRIM) 100 MG tablet, TAKE ONE TABLET BY MOUTH DAILY, Disp: 90 tablet, Rfl: 2  •  amLODIPine (NORVASC) 5 MG tablet, TAKE ONE TABLET BY MOUTH DAILY, Disp: 30 tablet, Rfl: 4  •  aspirin 81 MG tablet, Take 81 mg by mouth Daily., Disp: , Rfl:   •  atorvastatin (LIPITOR) 40 MG tablet, Take 1 tablet by mouth Daily., Disp: 90 tablet, Rfl: 3  •  calcium acetate (PHOS BINDER,) 667 MG capsule capsule, Take 667 mg by mouth Daily., Disp: , Rfl:   •  CloNIDine (CATAPRES) 0.1 MG tablet, TAKE ONE TABLET BY MOUTH TWICE A DAY, Disp: 180 tablet, Rfl: 2  •  donepezil (ARICEPT) 10 MG tablet, Take 10 mg by mouth Every Night., Disp: , Rfl:   •  DULoxetine (CYMBALTA) 60 MG capsule, Take 1 capsule by mouth Daily., Disp: 90 capsule, Rfl: 3  •  glucose blood (ACCU-CHEK GUIDE) test strip, Use as instructed to check 4 times daily, Disp: 150 each, Rfl: 5  •  HYDROcodone-acetaminophen (NORCO) 7.5-325 MG  per tablet, Take 1 tablet by mouth Every 6 (Six) Hours As Needed for Moderate Pain  for up to 40 doses., Disp: 40 tablet, Rfl: 0  •  insulin NPH (humuLIN N,novoLIN N) 100 UNIT/ML injection, Inject 30 Units under the skin 2 (Two) Times a Day Before Meals., Disp: 1 Units, Rfl: 12  •  Insulin Pen Needle 32G X 4 MM misc, 4 (Four) Times a Day As Needed., Disp: , Rfl:   •  insulin regular (humuLIN R,novoLIN R) 100 UNIT/ML injection, Inject 10 Units under the skin 3 (Three) Times a Day Before Meals., Disp: 10 mL, Rfl: 1  •  Lancets Misc. (ACCU-CHEK FASTCLIX LANCET) kit, Use as directed for blood glucose testing, Disp: 200 kit, Rfl: 5  •  metoprolol succinate XL (TOPROL-XL) 50 MG 24 hr tablet, Take 1 tablet by mouth Daily., Disp: 90 tablet, Rfl: 3  •  mupirocin (BACTROBAN) 2 % ointment, Apply  topically 3 (Three) Times a Day., Disp: 22 g, Rfl: 1  •  sodium hypochlorite (DAKIN'S 1/8 STRENGTH) 0.0625 % solution topical solution 0.0625%, Irrigate with 1,000 mL as directed Every 12 (Twelve) Hours., Disp: 1000 mL, Rfl: 6  •  tamsulosin (FLOMAX) 0.4 MG capsule 24 hr capsule, Take 1 capsule by mouth Daily., Disp: 90 capsule, Rfl: 3  •  torsemide (DEMADEX) 10 MG tablet, Take 1 tablet by mouth Daily., Disp: 150 tablet, Rfl: 2  •  torsemide (DEMADEX) 10 MG tablet, TAKE TWO TABLETS BY MOUTH DAILY, Disp: 60 tablet, Rfl: 1  •  warfarin (COUMADIN) 5 MG tablet, Take 1 tablet by mouth Daily., Disp: 90 tablet, Rfl: 3  •  glucose blood test strip, Use as instructed, Disp: 120 each, Rfl: 12  •  pregabalin (LYRICA) 100 MG capsule, Take 1 capsule by mouth 2 (Two) Times a Day., Disp: 60 capsule, Rfl: 6  •  pregabalin (LYRICA) 50 MG capsule, Take 1 capsule by mouth 2 (Two) Times a Day., Disp: 20 capsule, Rfl: 0    Procedures          Diagnoses and all orders for this visit:    Hypertriglyceridemia  -     atorvastatin (LIPITOR) 40 MG tablet; Take 1 tablet by mouth Daily.    Anticoagulation goal of INR 2 to 3    Neuropathy associated with endocrine  disorder  -     DULoxetine (CYMBALTA) 60 MG capsule; Take 1 capsule by mouth Daily.  -     pregabalin (LYRICA) 50 MG capsule; Take 1 capsule by mouth 2 (Two) Times a Day.  -     pregabalin (LYRICA) 100 MG capsule; Take 1 capsule by mouth 2 (Two) Times a Day.    DM (diabetes mellitus) with complications  -     ACCU-CHEK FASTCLIX LANCETS [facility]; Use as directed for blood glucose testing  -     glucose blood test strip; Use as instructed  -     glucose blood (ACCU-CHEK GUIDE) test strip; Use as instructed to check 4 times daily         Return in about 3 months (around 9/21/2018) for Recheck.    Doroteo Victoria M.D  06/21/18  12:00 PM

## 2018-06-27 DIAGNOSIS — E11.8 DM (DIABETES MELLITUS) WITH COMPLICATIONS (HCC): ICD-10-CM

## 2018-06-27 RX ORDER — LANCETS
EACH MISCELLANEOUS
Qty: 200 EACH | Refills: 5 | Status: SHIPPED | OUTPATIENT
Start: 2018-06-27 | End: 2019-01-01

## 2018-07-02 RX ORDER — AMLODIPINE BESYLATE 5 MG/1
TABLET ORAL
Qty: 30 TABLET | Refills: 3 | Status: SHIPPED | OUTPATIENT
Start: 2018-07-02 | End: 2019-01-01 | Stop reason: SDUPTHER

## 2018-07-03 ENCOUNTER — ANTICOAGULATION VISIT (OUTPATIENT)
Dept: INTERNAL MEDICINE | Facility: CLINIC | Age: 73
End: 2018-07-03

## 2018-07-03 DIAGNOSIS — Z86.73 HISTORY OF CARDIOEMBOLIC STROKE: ICD-10-CM

## 2018-07-03 PROBLEM — I48.91 AF (ATRIAL FIBRILLATION) (HCC): Status: ACTIVE | Noted: 2018-07-03

## 2018-07-03 LAB — INR PPP: 1.3 (ref 2–3)

## 2018-07-03 PROCEDURE — 36416 COLLJ CAPILLARY BLOOD SPEC: CPT | Performed by: FAMILY MEDICINE

## 2018-07-03 PROCEDURE — 85610 PROTHROMBIN TIME: CPT | Performed by: FAMILY MEDICINE

## 2018-07-10 ENCOUNTER — EPISODE CHANGES (OUTPATIENT)
Dept: CASE MANAGEMENT | Facility: OTHER | Age: 73
End: 2018-07-10

## 2018-08-02 RX ORDER — TORSEMIDE 10 MG/1
TABLET ORAL
Qty: 60 TABLET | Refills: 0 | Status: SHIPPED | OUTPATIENT
Start: 2018-08-02 | End: 2018-09-08 | Stop reason: SDUPTHER

## 2018-08-03 DIAGNOSIS — E55.9 VITAMIN D DEFICIENCY: ICD-10-CM

## 2018-08-03 DIAGNOSIS — E11.65 POORLY CONTROLLED TYPE 2 DIABETES MELLITUS (HCC): Primary | ICD-10-CM

## 2018-08-16 ENCOUNTER — ANTICOAGULATION VISIT (OUTPATIENT)
Dept: INTERNAL MEDICINE | Facility: CLINIC | Age: 73
End: 2018-08-16

## 2018-08-16 ENCOUNTER — LAB (OUTPATIENT)
Dept: ENDOCRINOLOGY | Age: 73
End: 2018-08-16

## 2018-08-16 DIAGNOSIS — Z86.73 HISTORY OF CARDIOEMBOLIC STROKE: ICD-10-CM

## 2018-08-16 DIAGNOSIS — E11.65 POORLY CONTROLLED TYPE 2 DIABETES MELLITUS (HCC): ICD-10-CM

## 2018-08-16 DIAGNOSIS — E55.9 VITAMIN D DEFICIENCY: ICD-10-CM

## 2018-08-16 LAB — INR PPP: 3.2 (ref 2–3)

## 2018-08-16 PROCEDURE — 36416 COLLJ CAPILLARY BLOOD SPEC: CPT | Performed by: FAMILY MEDICINE

## 2018-08-16 PROCEDURE — 85610 PROTHROMBIN TIME: CPT | Performed by: FAMILY MEDICINE

## 2018-08-17 LAB
25(OH)D3+25(OH)D2 SERPL-MCNC: 30.1 NG/ML (ref 30–100)
ALBUMIN SERPL-MCNC: 4.5 G/DL (ref 3.5–5.2)
ALBUMIN/GLOB SERPL: 1.7 G/DL
ALP SERPL-CCNC: 97 U/L (ref 39–117)
ALT SERPL-CCNC: 18 U/L (ref 1–41)
AST SERPL-CCNC: 12 U/L (ref 1–40)
BILIRUB SERPL-MCNC: 1.1 MG/DL (ref 0.1–1.2)
BUN SERPL-MCNC: 37 MG/DL (ref 8–23)
BUN/CREAT SERPL: 6.5 (ref 7–25)
C PEPTIDE SERPL-MCNC: 23.9 NG/ML (ref 1.1–4.4)
CALCIUM SERPL-MCNC: 9.1 MG/DL (ref 8.6–10.5)
CHLORIDE SERPL-SCNC: 91 MMOL/L (ref 98–107)
CHOLEST SERPL-MCNC: 129 MG/DL (ref 0–200)
CO2 SERPL-SCNC: 29.5 MMOL/L (ref 22–29)
CREAT SERPL-MCNC: 5.66 MG/DL (ref 0.76–1.27)
GLOBULIN SER CALC-MCNC: 2.6 GM/DL
GLUCOSE SERPL-MCNC: 291 MG/DL (ref 65–99)
HBA1C MFR BLD: 8.4 % (ref 4.8–5.6)
HDLC SERPL-MCNC: 22 MG/DL (ref 40–60)
INTERPRETATION: NORMAL
LDLC SERPL CALC-MCNC: ABNORMAL MG/DL
Lab: NORMAL
MICROALBUMIN UR-MCNC: 2760.1 UG/ML
POTASSIUM SERPL-SCNC: 4.3 MMOL/L (ref 3.5–5.2)
PROT SERPL-MCNC: 7.1 G/DL (ref 6–8.5)
SODIUM SERPL-SCNC: 137 MMOL/L (ref 136–145)
TRIGL SERPL-MCNC: 512 MG/DL (ref 0–150)
VLDLC SERPL CALC-MCNC: ABNORMAL MG/DL

## 2018-08-23 ENCOUNTER — ANTICOAGULATION VISIT (OUTPATIENT)
Dept: INTERNAL MEDICINE | Facility: CLINIC | Age: 73
End: 2018-08-23

## 2018-08-23 DIAGNOSIS — Z86.73 HISTORY OF CARDIOEMBOLIC STROKE: ICD-10-CM

## 2018-08-23 LAB — INR PPP: 2.3 (ref 2–3)

## 2018-08-23 PROCEDURE — 85610 PROTHROMBIN TIME: CPT | Performed by: FAMILY MEDICINE

## 2018-08-23 PROCEDURE — 36416 COLLJ CAPILLARY BLOOD SPEC: CPT | Performed by: FAMILY MEDICINE

## 2018-09-10 RX ORDER — TORSEMIDE 10 MG/1
TABLET ORAL
Qty: 60 TABLET | Refills: 0 | Status: SHIPPED | OUTPATIENT
Start: 2018-09-10 | End: 2018-10-13 | Stop reason: SDUPTHER

## 2018-09-25 ENCOUNTER — OFFICE VISIT (OUTPATIENT)
Dept: INTERNAL MEDICINE | Facility: CLINIC | Age: 73
End: 2018-09-25

## 2018-09-25 VITALS
HEIGHT: 70 IN | SYSTOLIC BLOOD PRESSURE: 117 MMHG | WEIGHT: 204.4 LBS | TEMPERATURE: 97.4 F | OXYGEN SATURATION: 96 % | DIASTOLIC BLOOD PRESSURE: 66 MMHG | HEART RATE: 82 BPM | BODY MASS INDEX: 29.26 KG/M2

## 2018-09-25 DIAGNOSIS — E34.9 NEUROPATHY ASSOCIATED WITH ENDOCRINE DISORDER (HCC): ICD-10-CM

## 2018-09-25 DIAGNOSIS — Z51.81 ANTICOAGULATION GOAL OF INR 2 TO 3: Primary | ICD-10-CM

## 2018-09-25 DIAGNOSIS — G63 NEUROPATHY ASSOCIATED WITH ENDOCRINE DISORDER (HCC): ICD-10-CM

## 2018-09-25 DIAGNOSIS — I10 ESSENTIAL HYPERTENSION: ICD-10-CM

## 2018-09-25 DIAGNOSIS — Z79.01 ANTICOAGULATION GOAL OF INR 2 TO 3: Primary | ICD-10-CM

## 2018-09-25 LAB — INR PPP: 2.2 (ref 2–3)

## 2018-09-25 PROCEDURE — 99213 OFFICE O/P EST LOW 20 MIN: CPT | Performed by: FAMILY MEDICINE

## 2018-09-25 PROCEDURE — 85610 PROTHROMBIN TIME: CPT | Performed by: FAMILY MEDICINE

## 2018-09-25 PROCEDURE — 36416 COLLJ CAPILLARY BLOOD SPEC: CPT | Performed by: FAMILY MEDICINE

## 2018-09-25 RX ORDER — GABAPENTIN 100 MG/1
100 CAPSULE ORAL 3 TIMES DAILY
Qty: 90 CAPSULE | Refills: 5 | Status: SHIPPED | OUTPATIENT
Start: 2018-09-25 | End: 2019-01-01

## 2018-09-25 NOTE — PROGRESS NOTES
CC:neuropathy,htn    Subjective.../HPI  Patient present today withneuropathy- f/u8 re gabapentin. See RX Anticoagulation see INR    I have reviewed the patient's medical history in detail and updated the computerized patient record.    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 ARM FISTULA REVISION/POSS. TUNNELED CATH;  Surgeon: Ar Muro MD;  Location: [facility] OR;  Service:    • COLONOSCOPY  2003   • INSERTION HEMODIALYSIS CATHETER N/A 11/25/2016    Procedure: TUNNEL CATHETER INSERTION;  Surgeon: Silvia Lim Jr., MD;  Location: [facility] OR;  Service:        Family History   Problem Relation Age of Onset   • Arthritis Mother    • Diabetes Father    • Heart disease Father    • Hyperlipidemia Father    • Hypertension Father    • Obesity Father    • Cancer Brother         esophagus   • Heart disease Maternal Grandfather        Social History     Social History   • Marital status:      Spouse name: N/A   • Number of children: N/A   • Years of education: N/A     Occupational History   • Not on file.     Social History Main Topics   • Smoking status: Former Smoker     Packs/day: 1.00     Years: 5.00     Types: Cigarettes     Quit date: 1966   • Smokeless tobacco: Never  "Used   • Alcohol use 0.6 oz/week     1 Cans of beer per week      Comment: occasionally    • Drug use: No   • Sexual activity: Defer     Other Topics Concern   • Not on file     Social History Narrative   • No narrative on file       Most Recent Immunizations   Administered Date(s) Administered   • Flu Mist 10/02/2017   • Flu Vaccine High Dose PF 65YR+ 10/21/2016   • Flu Vaccine Quad PF >18YRS 10/01/2014   • Influenza, Unspecified 12/02/2013   • Pneumococcal Conjugate 13-Valent (PCV13) 05/13/2015   • Pneumococcal Polysaccharide (PPSV23) 04/30/2014       Review of Systems:   Review of Systems   Constitutional: Negative.    HENT: Negative.    Eyes: Negative.    Respiratory: Negative.    Cardiovascular: Negative.    Gastrointestinal: Negative.    Endocrine: Negative.    Genitourinary: Negative.    Musculoskeletal: Negative.    Skin: Negative.    Allergic/Immunologic: Negative.    Neurological: Negative.    Hematological: Negative.    Psychiatric/Behavioral: Negative.          Physical Exam   Constitutional: He is oriented to person, place, and time. He appears well-developed and well-nourished.   Cardiovascular: Normal rate and normal heart sounds.    Pulmonary/Chest: Effort normal and breath sounds normal.   Neurological: He is alert and oriented to person, place, and time.   Psychiatric: He has a normal mood and affect. His behavior is normal.   Vitals reviewed.        Vital Signs     Vitals:    09/25/18 1442   BP: 117/66   BP Location: Left arm   Patient Position: Sitting   Cuff Size: Small Adult   Pulse: 82   Temp: 97.4 °F (36.3 °C)   TempSrc: Oral   SpO2: 96%   Weight: 92.7 kg (204 lb 6.4 oz)   Height: 177.8 cm (70\")          Results Review:      REVIEWED AND DISCUSSED CLINICAL RESULTS WITH PATIENT      Requested Prescriptions     Signed Prescriptions Disp Refills   • gabapentin (NEURONTIN) 100 MG capsule 90 capsule 5     Sig: Take 1 capsule by mouth 3 (Three) Times a Day.         Current Outpatient Prescriptions: "   •  ACCU-CHEK FASTCLIX LANCETS misc, Use as directed for blood glucose testing CHECK 4 TIMES A DAY, Disp: 200 each, Rfl: 5  •  acetaminophen (TYLENOL) 325 MG tablet, Take 2 tablets by mouth Every 6 (Six) Hours As Needed for mild pain (1-3) or fever., Disp: 100 tablet, Rfl: 0  •  allopurinol (ZYLOPRIM) 100 MG tablet, TAKE ONE TABLET BY MOUTH DAILY, Disp: 90 tablet, Rfl: 2  •  amLODIPine (NORVASC) 5 MG tablet, TAKE ONE TABLET BY MOUTH DAILY, Disp: 30 tablet, Rfl: 3  •  aspirin 81 MG tablet, Take 81 mg by mouth Daily., Disp: , Rfl:   •  atorvastatin (LIPITOR) 40 MG tablet, Take 1 tablet by mouth Daily., Disp: 90 tablet, Rfl: 3  •  calcium acetate (PHOS BINDER,) 667 MG capsule capsule, Take 667 mg by mouth Daily., Disp: , Rfl:   •  CloNIDine (CATAPRES) 0.1 MG tablet, TAKE ONE TABLET BY MOUTH TWICE A DAY, Disp: 180 tablet, Rfl: 2  •  donepezil (ARICEPT) 10 MG tablet, Take 10 mg by mouth Every Night., Disp: , Rfl:   •  DULoxetine (CYMBALTA) 60 MG capsule, Take 1 capsule by mouth Daily., Disp: 90 capsule, Rfl: 3  •  glucose blood (ACCU-CHEK GUIDE) test strip, Use as instructed to check 4 times daily, Disp: 150 each, Rfl: 5  •  glucose blood test strip, Use as instructed, Disp: 120 each, Rfl: 12  •  insulin NPH (humuLIN N,novoLIN N) 100 UNIT/ML injection, Inject 30 Units under the skin 2 (Two) Times a Day Before Meals., Disp: 1 Units, Rfl: 12  •  Insulin Pen Needle 32G X 4 MM misc, 4 (Four) Times a Day As Needed., Disp: , Rfl:   •  insulin regular (humuLIN R,novoLIN R) 100 UNIT/ML injection, Inject 10 Units under the skin 3 (Three) Times a Day Before Meals., Disp: 10 mL, Rfl: 1  •  Lancets Misc. (ACCU-CHEK FASTCLIX LANCET) kit, Use as directed for blood glucose testing, Disp: 200 kit, Rfl: 5  •  metoprolol succinate XL (TOPROL-XL) 50 MG 24 hr tablet, Take 1 tablet by mouth Daily., Disp: 90 tablet, Rfl: 3  •  tamsulosin (FLOMAX) 0.4 MG capsule 24 hr capsule, Take 1 capsule by mouth Daily., Disp: 90 capsule, Rfl: 3  •   torsemide (DEMADEX) 10 MG tablet, TAKE TWO TABLETS BY MOUTH DAILY, Disp: 60 tablet, Rfl: 0  •  warfarin (COUMADIN) 5 MG tablet, Take 1 tablet by mouth Daily., Disp: 90 tablet, Rfl: 3  •  gabapentin (NEURONTIN) 100 MG capsule, Take 1 capsule by mouth 3 (Three) Times a Day., Disp: 90 capsule, Rfl: 5    Procedures          Diagnoses and all orders for this visit:    Anticoagulation goal of INR 2 to 3  -     POCT INR    Neuropathy associated with endocrine disorder (CMS/HCC)  -     gabapentin (NEURONTIN) 100 MG capsule; Take 1 capsule by mouth 3 (Three) Times a Day.    Essential hypertension         Return in about 3 months (around 12/25/2018) for Recheck.    Doroteo Victoria M.D  10/09/18  10:02 PM

## 2018-10-15 RX ORDER — TORSEMIDE 10 MG/1
TABLET ORAL
Qty: 60 TABLET | Refills: 3 | Status: SHIPPED | OUTPATIENT
Start: 2018-10-15 | End: 2019-01-01 | Stop reason: SDUPTHER

## 2018-10-24 ENCOUNTER — APPOINTMENT (OUTPATIENT)
Dept: WOUND CARE | Facility: HOSPITAL | Age: 73
End: 2018-10-24
Attending: SURGERY

## 2018-11-21 PROBLEM — I10 HTN (HYPERTENSION): Status: ACTIVE | Noted: 2018-01-01

## 2018-11-21 PROBLEM — Z99.2 ESRD (END STAGE RENAL DISEASE) ON DIALYSIS (HCC): Status: ACTIVE | Noted: 2018-01-01

## 2018-11-21 PROBLEM — E11.9 TYPE 2 DIABETES MELLITUS (HCC): Status: ACTIVE | Noted: 2018-01-01

## 2018-11-21 PROBLEM — I48.91 ATRIAL FIBRILLATION (HCC): Status: ACTIVE | Noted: 2018-01-01

## 2018-11-21 PROBLEM — F01.50 VASCULAR DEMENTIA WITHOUT BEHAVIORAL DISTURBANCE (HCC): Status: ACTIVE | Noted: 2018-01-01

## 2018-11-21 PROBLEM — I50.33 ACUTE ON CHRONIC DIASTOLIC CHF (CONGESTIVE HEART FAILURE) (HCC): Status: ACTIVE | Noted: 2018-01-01

## 2018-11-21 PROBLEM — Z79.01 ANTICOAGULATED ON COUMADIN: Status: ACTIVE | Noted: 2018-01-01

## 2018-11-21 PROBLEM — N18.6 ESRD (END STAGE RENAL DISEASE) ON DIALYSIS (HCC): Status: ACTIVE | Noted: 2018-01-01

## 2018-11-21 NOTE — PROGRESS NOTES
Discharge Planning Assessment  [facility]     Patient Name: Deacon Martin  MRN: [id number]  Today's Date: 11/21/2018    Admit Date: 11/21/2018    Discharge Needs Assessment     Row Name 11/21/18 1208       Living Environment    Lives With  spouse    Name(s) of Who Lives With Patient  Adore Martin    Current Living Arrangements  home/apartment/condo    Duration at Residence  32 years    Primary Care Provided by  self    Provides Primary Care For  no one    Family Caregiver if Needed  none    Quality of Family Relationships  helpful;supportive;involved    Able to Return to Prior Arrangements  yes    Living Arrangement Comments  Patient plans to return home upon d.c       Resource/Environmental Concerns    Resource/Environmental Concerns  none    Transportation Concerns  car, none       Transition Planning    Patient/Family Anticipates Transition to  home with family    Patient/Family Anticipated Services at Transition  none    Transportation Anticipated  family or friend will provide       Discharge Needs Assessment    Concerns to be Addressed  no discharge needs identified    Concerns Comments  Patient has no concerns and no dc needs identified at this time    Equipment Currently Used at Home  none    Anticipated Changes Related to Illness  none    Equipment Needed After Discharge  none    Outpatient/Agency/Support Group Needs  outpatient hemodialysis    Offered/Gave Vendor List  no    Discharge Coordination/Progress  Patient plans to be d/c home via private vehicle w/family        Discharge Plan     Row Name 11/21/18 1217       Plan    Plan  No d.c needs identified at this tiime         Destination      No service coordination in this encounter.      Durable Medical Equipment      No service coordination in this encounter.      Dialysis/Infusion      No service coordination in this encounter.      Home Medical Care      No service coordination in this encounter.      Community Resources      No service coordination  in this encounter.          Demographic Summary     Row Name 11/21/18 1202       General Information    Admission Type  observation    Arrived From  home    Referral Source  interdisciplinary rounds    Reason for Consult  discharge planning    Preferred Language  English     Used During This Interaction  no       Contact Information    Permission Granted to Share Info With  ;family/designee    Contact Information Obtained for      Contact Information Comments  Adore Martin (spouse) [phone]        Functional Status     Row Name 11/21/18 1206       Functional Status    Usual Activity Tolerance  good    Current Activity Tolerance  moderate    Functional Status Comments  SOA onset during the night       Functional Status, IADL    Medications  independent    Meal Preparation  independent;assistive person    Housekeeping  assistive person    Laundry  assistive person    Shopping  independent;assistive person    IADL Comments  Patient is IADL's- doent drive anymore       Mental Status    General Appearance WDL  WDL       Mental Status Summary    Recent Changes in Mental Status/Cognitive Functioning  no changes;vision wears glasses       Employment/    Employment Status  retired        Psychosocial    No documentation.       Abuse/Neglect    No documentation.       Legal    No documentation.       Substance Abuse    No documentation.       Patient Forms    No documentation.           Lissa De La Cruz RN

## 2018-11-21 NOTE — ED NOTES
Dialysis RN called to say they are full will have to call [facility]. [phone]. Dr almanzar here to see patient, states wants to keep pt on cardene until dialyzed.      Loida Dinh RN  11/21/18 5213

## 2018-11-21 NOTE — ED NOTES
Pt to ED with c/o SOA, hard to take a deep breath, sats 88% on RA, placed on 2L nc, pt denies CP. Pt is a dialysis pt, does MWF. Completed on Monday, supposed to go at 12 noon today. States they were not able to take off enough fluid as normal on Monday d/t cramping. Pt has shunt to [facility], +bruit/thrill. Pt is hypertensive on arrival. Pt reports does still make urine.      Loida Dinh RN  11/21/18 1012       Loida Dinh RN  11/21/18 1013

## 2018-11-21 NOTE — ED NOTES
Spoke to wade at [facility], will meet patient in room 637. Serena MAYBERRY on 6N aware.      Loida Dinh RN  11/21/18 2471

## 2018-11-21 NOTE — ED PROVIDER NOTES
" EMERGENCY DEPARTMENT ENCOUNTER    CHIEF COMPLAINT  Chief Complaint: SOA  History given by: patient  History limited by: nothing  Room Number: 30/30  PMD: Doroteo Victoria MD      HPI:  Pt is a 73 y.o. male who presents complaining of SOA that began two days ago. Pt typically receives dialysis M/W/F and his nephrologist is Dr. Lawson. Pt had dialysis on Monday, two days ago, and they were unable to get him back to his dry weight and were going to \"take off a little more today\", however he came to the ER for SOA this morning. Pt complains of a productive cough with clear sputum but denies fever and CP. Pt has no other complaints at this time.    Duration: two days  Onset: gradual  Timing: constant  Location: n/a  Radiation: n/a  Quality: n/a  Intensity/Severity: moderate  Progression: worsening  Associated Symptoms: productive cough  Aggravating Factors: none  Alleviating Factors: none  Previous Episodes: none  Treatment before arrival: none    PAST MEDICAL HISTORY  Active Ambulatory Problems     Diagnosis Date Noted   • Cervical spinal stenosis 03/21/2016   • Cervical osteoarthritis 03/21/2016   • Cervical pain 03/21/2016   • Chronic venous insufficiency 03/21/2016   • Disturbance, visual, subjective 03/21/2016   • Poorly controlled type 2 diabetes mellitus (CMS/[facility]) 03/21/2016   • Essential hypertension 03/21/2016   • Hypertriglyceridemia 03/21/2016   • BPH (benign prostatic hypertrophy) with urinary obstruction 06/17/2016   • Atrial fibrillation (CMS/[facility]) 06/17/2016   • Anticoagulation goal of INR 2 to 3 07/29/2016   • Venous stasis ulcers of both lower extremities (CMS/[facility]) 10/07/2016   • Altered mental status 11/12/2016   • Gouty arthritis 11/14/2016   • Volume overload due to LUIS A 11/14/2016   • LUIS A (acute kidney injury) (CMS/[facility]) 11/14/2016   • CKD (chronic kidney disease) stage 4, GFR 15-29 ml/min (CMS/[facility]) 11/14/2016   • Urinary incontinence without sensory awareness 11/14/2016   • Hemianopia, homonymous, " right 11/14/2016   • History of cardioembolic stroke (Left occipital) 11/14/2016   • Hypersomnolence 11/14/2016   • Vascular dementia 11/14/2016   • Anemia of chronic renal failure, stage 4 (severe) (CMS/HCC) 11/14/2016   • Left ventricular hypertrophy 11/14/2016   • Nonrheumatic aortic valve stenosis 11/14/2016   • Patient left after triage 01/25/2017   • Diabetic peripheral neuropathy (CMS/HCC) 06/27/2017   • Shunt malfunction 12/05/2017   • Neuropathy associated with endocrine disorder (CMS/[facility]) 06/21/2018   • AF (atrial fibrillation) (CMS/HCC) 07/03/2018     Resolved Ambulatory Problems     Diagnosis Date Noted   • Controlled diabetes mellitus (CMS/HCC) 03/21/2016   • Diabetes mellitus type 2, uncontrolled (CMS/HCC) 03/21/2016   • Diabetes (CMS/HCC) 03/21/2016   • Unspecified visual disturbance 03/21/2016   • Hyperuricemia 03/21/2016     Past Medical History:   Diagnosis Date   • Atrial fibrillation (CMS/[facility])    • Edema    • Gout    • HBP (high blood pressure)    • HX: anticoagulation    • Hyperlipidemia    • Memory problem    • Other hemorrhagic disorder due to intrinsic circulating anticoagulants, antibodies, or inhibitors (CMS/[facility])    • Renal failure    • Stroke (CMS/HCC)    • Type 2 diabetes mellitus (CMS/HCC)    • Vitamin D deficiency        PAST SURGICAL HISTORY  Past Surgical History:   Procedure Laterality Date   • COLONOSCOPY  2003       FAMILY HISTORY  Family History   Problem Relation Age of Onset   • Arthritis Mother    • Diabetes Father    • Heart disease Father    • Hyperlipidemia Father    • Hypertension Father    • Obesity Father    • Cancer Brother         esophagus   • Heart disease Maternal Grandfather        SOCIAL HISTORY  Social History     Socioeconomic History   • Marital status:      Spouse name: Not on file   • Number of children: Not on file   • Years of education: Not on file   • Highest education level: Not on file   Social Needs   • Financial resource strain: Not on file   •  Food insecurity - worry: Not on file   • Food insecurity - inability: Not on file   • Transportation needs - medical: Not on file   • Transportation needs - non-medical: Not on file   Occupational History   • Not on file   Tobacco Use   • Smoking status: Former Smoker     Packs/day: 1.00     Years: 5.00     Pack years: 5.00     Types: Cigarettes     Last attempt to quit: 1966     Years since quittin.9   • Smokeless tobacco: Never Used   Substance and Sexual Activity   • Alcohol use: Yes     Alcohol/week: 0.6 oz     Types: 1 Cans of beer per week     Comment: occasionally    • Drug use: No   • Sexual activity: Defer   Other Topics Concern   • Not on file   Social History Narrative   • Not on file       ALLERGIES  Patient has no known allergies.    REVIEW OF SYSTEMS  Review of Systems   Constitutional: Negative for activity change, appetite change and fever.   HENT: Negative for congestion and sore throat.    Eyes: Negative.    Respiratory: Positive for cough (productive with clear sputum) and shortness of breath.    Cardiovascular: Negative for chest pain and leg swelling.   Gastrointestinal: Negative for abdominal pain, diarrhea and vomiting.   Endocrine: Negative.    Genitourinary: Negative for decreased urine volume and dysuria.   Musculoskeletal: Negative for neck pain.   Skin: Negative for rash and wound.   Allergic/Immunologic: Negative.    Neurological: Negative for weakness, numbness and headaches.   Hematological: Negative.    Psychiatric/Behavioral: Negative.    All other systems reviewed and are negative.      PHYSICAL EXAM  ED Triage Vitals [18 0936]   Temp Heart Rate Resp BP SpO2   97.3 °F (36.3 °C) 105 20 (!) 217/109 98 %      Temp src Heart Rate Source Patient Position BP Location FiO2 (%)   Tympanic Monitor Sitting Left arm --       Physical Exam   Constitutional: He is oriented to person, place, and time. No distress.   HENT:   Head: Normocephalic and atraumatic.   Eyes: EOM are normal.  Pupils are equal, round, and reactive to light.   Neck: Normal range of motion. Neck supple.   Cardiovascular: Normal rate, regular rhythm and normal heart sounds.   Pt has fistula on RUE with good thrill.   Pulmonary/Chest: He is in respiratory distress (mild). He has rales (diffuse).   Abdominal: Soft. He exhibits no distension. There is no tenderness. There is no rebound and no guarding.   Musculoskeletal: Normal range of motion. He exhibits no edema.   Neurological: He is alert and oriented to person, place, and time. He has normal sensation and normal strength.   Skin: Skin is warm and dry.   Psychiatric: Mood and affect normal.   Nursing note and vitals reviewed.      LAB RESULTS  Lab Results (last 24 hours)     Procedure Component Value Units Date/Time    CBC & Differential [[id number]] Collected:  11/21/18 0956    Specimen:  Blood Updated:  11/21/18 1028    Narrative:       The following orders were created for panel order CBC & Differential.  Procedure                               Abnormality         Status                     ---------                               -----------         ------                     CBC Auto Differential[[id number]]        Abnormal            Final result                 Please view results for these tests on the individual orders.    Comprehensive Metabolic Panel [[id number]]  (Abnormal) Collected:  11/21/18 0956    Specimen:  Blood Updated:  11/21/18 1042     Glucose 247 mg/dL      BUN 43 mg/dL      Creatinine 6.33 mg/dL      Sodium 137 mmol/L      Potassium 3.8 mmol/L      Chloride 95 mmol/L      CO2 24.5 mmol/L      Calcium 9.1 mg/dL      Total Protein 7.8 g/dL      Albumin 4.20 g/dL      ALT (SGPT) 12 U/L      AST (SGOT) 11 U/L      Alkaline Phosphatase 104 U/L      Total Bilirubin 0.9 mg/dL      eGFR Non African Amer 9 mL/min/1.73      Comment: <15 Indicative of kidney failure.        eGFR   Amer -- mL/min/1.73      Comment: <15 Indicative of kidney failure.         Globulin 3.6 gm/dL      A/G Ratio 1.2 g/dL      BUN/Creatinine Ratio 6.8     Anion Gap 17.5 mmol/L     Narrative:       The MDRD GFR formula is only valid for adults with stable renal function between ages 18 and 70.    Magnesium [[id number]]  (Normal) Collected:  11/21/18 0956    Specimen:  Blood Updated:  11/21/18 1042     Magnesium 1.9 mg/dL     Protime-INR [[id number]]  (Abnormal) Collected:  11/21/18 0956    Specimen:  Blood Updated:  11/21/18 1030     Protime 38.3 Seconds      INR 3.99    CBC Auto Differential [[id number]]  (Abnormal) Collected:  11/21/18 0956    Specimen:  Blood Updated:  11/21/18 1028     WBC 11.79 10*3/mm3      RBC 3.94 10*6/mm3      Hemoglobin 12.6 g/dL      Hematocrit 36.6 %      MCV 92.9 fL      MCH 32.0 pg      MCHC 34.4 g/dL      RDW 14.1 %      RDW-SD 48.1 fl      MPV 10.6 fL      Platelets 215 10*3/mm3      Neutrophil % 79.1 %      Lymphocyte % 12.2 %      Monocyte % 5.0 %      Eosinophil % 3.4 %      Basophil % 0.3 %      Immature Grans % 0.3 %      Neutrophils, Absolute 9.32 10*3/mm3      Lymphocytes, Absolute 1.44 10*3/mm3      Monocytes, Absolute 0.59 10*3/mm3      Eosinophils, Absolute 0.40 10*3/mm3      Basophils, Absolute 0.04 10*3/mm3      Immature Grans, Absolute 0.03 10*3/mm3     BNP [[id number]]  (Abnormal) Collected:  11/21/18 0956    Specimen:  Blood Updated:  11/21/18 1141     proBNP 27,706.0 pg/mL     Narrative:       Among patients with dyspnea, NT-proBNP is highly sensitive for the detection of acute congestive heart failure. In addition NT-proBNP of <300 pg/ml effectively rules out acute congestive heart failure with 99% negative predictive value.    Procalcitonin [[id number]]  (Normal) Collected:  11/21/18 0956    Specimen:  Blood Updated:  11/21/18 1141     Procalcitonin 0.19 ng/mL     Narrative:       As a Marker for Sepsis (Non-Neonates):   1. <0.5 ng/mL represents a low risk of severe sepsis and/or septic shock.  1. >2 ng/mL represents a high risk of severe sepsis  "and/or septic shock.    As a Marker for Lower Respiratory Tract Infections that require antibiotic therapy:  PCT on Admission     Antibiotic Therapy             6-12 Hrs later  > 0.5                Strongly Recommended            >0.25 - <0.5         Recommended  0.1 - 0.25           Discouraged                   Remeasure/reassess PCT  <0.1                 Strongly Discouraged          Remeasure/reassess PCT      As 28 day mortality risk marker: \"Change in Procalcitonin Result\" (> 80 % or <=80 %) if Day 0 (or Day 1) and Day 4 values are available. Refer to http://www.[facility]ina[facility]-Pharmapct-calculator.Newser/   Change in PCT <=80 %   A decrease of PCT levels below or equal to 80 % defines a positive change in PCT test result representing a higher risk for 28-day all-cause mortality of patients diagnosed with severe sepsis or septic shock.  Change in PCT > 80 %   A decrease of PCT levels of more than 80 % defines a negative change in PCT result representing a lower risk for 28-day all-cause mortality of patients diagnosed with severe sepsis or septic shock.                      I ordered the above labs and reviewed the results    RADIOLOGY  XR Chest 1 View            Xr Chest 1 View    Result Date: 11/21/2018  Narrative: XR CHEST 1 VIEW-  HISTORY: 73-year-old male with shortness of breath.  FINDINGS: There is increased density and markings in the right lower lobe. There is likely a right lower lobe pneumonia and there may be a smaller pneumonia at the left lung base. There is prominence of central pulmonary vascularity, but there is no evidence for interstitial edema. Follow-up is recommended.          I ordered the above noted radiological studies. Interpreted by radiologist.  Reviewed by me in PACS.       PROCEDURES  Procedures    EKG          EKG time: 1021  Rhythm/Rate: a-fib, 100  P waves and NM: absent  QRS, axis: normal, old inferior infarct  ST and T waves: normal     Interpreted Contemporaneously by me, independently " viewed  unchanged compared to prior 12/6/17    PROGRESS AND CONSULTS       1013  Ordered EKG for further evaluation.     1020  Ordered labs and CXR for further evaluation.     1029  Ordered bumex and cardene to treat.     1032  Discussed Pt's case with Dr. Lawson (nephrology) who agrees to set up Pt for dialysis. Pt will go to a telemetry bed.    1053  Placed call to [facility] for admission.     1119  Rechecked Pt who is resting comfortably. Informed Pt that I spoke with Dr. Lawson and he will be admitted for dialysis. I informed him that the CXR shows pneumonia but I do not think he has pneumonia clinically. However, we will check additional labs for further evaluation. Pt understands and agrees to all plans.     1148  Discussed Pt's case with Dr. Traore ([facility]) who agrees to admit Pt to telemetry for further evaluation and treatment.     MEDICAL DECISION MAKING  Results were reviewed/discussed with the patient and they were also made aware of online access. Pt also made aware that some labs, such as cultures, will not be resulted during ER visit and follow up with PMD is necessary.     MDM  Number of Diagnoses or Management Options     Amount and/or Complexity of Data Reviewed  Clinical lab tests: reviewed and ordered (WBC=11.79, creatinine=6.33, hjuQDI=52070.0)  Tests in the radiology section of CPT®: ordered and reviewed (CXR shows likely right lower lobe pneumonia and possible pneumonia in the left lung base. No evidence for edema.)  Tests in the medicine section of CPT®: ordered and reviewed (See EKG note.)  Decide to obtain previous medical records or to obtain history from someone other than the patient: yes           DIAGNOSIS  Final diagnoses:   ESRD (end stage renal disease) on dialysis (CMS/[facility])   Other hypervolemia   Hypertensive urgency   Shortness of breath       DISPOSITION  ADMISSION    Discussed treatment plan and reason for admission with pt/family and admitting physician.  Pt/family voiced  understanding of the plan for admission for further testing/treatment as needed.       Latest Documented Vital Signs:  As of 11:49 AM  BP- 170/98 HR- 101 Temp- 97.3 °F (36.3 °C) (Tympanic) O2 sat- 94%    --  Documentation assistance provided by rodríguez Cifuentes for Dr. Parker.  Information recorded by the scribe was done at my direction and has been verified and validated by me.       Rizwana Cifuentes  11/21/18 8022       Carlos Parker MD  11/21/18 9091

## 2018-11-22 NOTE — DISCHARGE INSTR - APPOINTMENTS
Have your INR checked tomorrow morning. If you are unable to get in contact with your primary care doctor, contact Dr. Traore.

## 2018-11-22 NOTE — DISCHARGE SUMMARY
PHYSICIAN DISCHARGE SUMMARY                                                                        [facility]    Patient Identification:  Name: Deacon Martin  Age: 73 y.o.  Sex: male  :  1945  MRN: [id number]  Primary Care Physician: Doroteo Victoria MD    Admit date: 2018  Discharge date and time: 2018     Discharged Condition: good    Discharge Diagnoses:    CHF secondary to fluid overload:  Hemodialysis underway.    ESRD (end stage renal disease) on dialysis     Type 2 diabetes mellitus:  Continue home meds.  Low-dose sliding scale.    HTN (hypertension):  Uncontrolled.  On Cardene drip.  Should improve w dialysis.     Vascular dementia without behavioral disturbance    Anticoagulated on Coumadin:  Hold Coumadin and monitor.    Atrial fibrillation           Hospital Course:  Very pleasant 73-year-old gentleman with end-stage renal disease hemodialysis dependent.  He presents with CHF secondary to fluid overload secondary to inability to adequately achieve his dry body weight during his last dialysis.  Please H&P for full details.  He was given IV loop diuretics in the emergency room and emergent dialysis was set up for the patient.  He successfully reached euvolemic state with this and is feeling markedly improved today.  No shortness of air and maintaining good room air O2 sats.  He does have atrial fibrillation and is on chronic Coumadin.  INR is bit on the high side on presentation at 3.99.  Coumadin was held this morning is still showing an upward trend at 5.36.  This should be trending back down on its own within the next 24 hours.  I discussed this with the patient.  We will continue to follow this as an inpatient and simply recheck this morning.  Would also be reasonable to allow him to go home and have this rechecked as an outpatient first thing in the morning.  He is to contact his primary care physician  after having this drawn for follow-up.  I also gave him and his wife my office phone number should they have issues with contacting his primary care provider.  This being Thanksgiving day they would much prefer the latter.        Consults:     Consults     Date and Time Order Name Status Description    11/21/2018 1053 [facility] (on-call MD unless specified) Completed     11/21/2018 1020 IP General Consult (Use specialty-specific consult if known) Completed             Discharge Exam:  Physical Exam   Afebrile vital signs stable.  Well-developed well-nourished male in no apparent distress.  Lungs clear to auscultation with good air movement.  Heart irregular but with good rate control.  Extremities with no clubbing cyanosis or edema.  Alert oriented conversant cooperative and pleasant.       Disposition:  Home    Patient Instructions:      Discharge Medications      Continue These Medications      Instructions Start Date   ACCU-CHEK FASTCLIX LANCET kit   Use as directed for blood glucose testing      ACCU-CHEK FASTCLIX LANCETS misc   Use as directed for blood glucose testing CHECK 4 TIMES A DAY      acetaminophen 325 MG tablet  Commonly known as:  TYLENOL   650 mg, Oral, Every 6 Hours PRN      allopurinol 100 MG tablet  Commonly known as:  ZYLOPRIM   TAKE ONE TABLET BY MOUTH DAILY      amLODIPine 5 MG tablet  Commonly known as:  NORVASC   TAKE ONE TABLET BY MOUTH DAILY      aspirin 81 MG tablet   81 mg, Oral, Daily      atorvastatin 40 MG tablet  Commonly known as:  LIPITOR   40 mg, Oral, Daily      calcium acetate 667 MG capsule capsule  Commonly known as:  PHOS BINDER)   667 mg, Oral, Daily      CloNIDine 0.1 MG tablet  Commonly known as:  CATAPRES   TAKE ONE TABLET BY MOUTH TWICE A DAY      donepezil 10 MG tablet  Commonly known as:  ARICEPT   10 mg, Oral, Nightly      DULoxetine 60 MG capsule  Commonly known as:  CYMBALTA   60 mg, Oral, Daily      gabapentin 100 MG capsule  Commonly known as:  NEURONTIN   100 mg, Oral,  3 Times Daily      glucose blood test strip   Use as instructed      glucose blood test strip  Commonly known as:  ACCU-CHEK GUIDE   Use as instructed to check 4 times daily      insulin  UNIT/ML injection  Commonly known as:  humuLIN N,novoLIN N   30 Units, Subcutaneous, 2 Times Daily Before Meals      Insulin Pen Needle 32G X 4 MM misc   Does not apply, 4 Times Daily PRN      insulin regular 100 UNIT/ML injection  Commonly known as:  humuLIN R,novoLIN R   10 Units, Subcutaneous, 3 Times Daily Before Meals      metoprolol succinate XL 50 MG 24 hr tablet  Commonly known as:  TOPROL-XL   50 mg, Oral, Every 24 Hours Scheduled      tamsulosin 0.4 MG capsule 24 hr capsule  Commonly known as:  FLOMAX   0.4 mg, Oral, Daily      torsemide 10 MG tablet  Commonly known as:  DEMADEX   TAKE TWO TABLETS BY MOUTH DAILY         Stop These Medications    warfarin 5 MG tablet  Commonly known as:  COUMADIN          Diet Instructions     Diet: Consistent Carbohydrate, Cardiac, Renal      Discharge Diet:   Consistent Carbohydrate  Cardiac  Renal           Future Appointments   Date Time Provider Department Center   1/3/2019  1:45 PM Doroteo Victoria MD [facility] None   1/3/2019  3:30 PM Doroteo Victoria MD [facility] None     Additional Instructions for the Follow-ups that You Need to Schedule     Discharge Follow-up with PCP   As directed       Currently Documented PCP:    Doroteo Victoria MD    PCP Phone Number:    [phone]     Follow Up Details:  1 week         Discharge Follow-up with Specialty: Nephrology   As directed      Specialty:  Nephrology    Follow Up Details:  As directed.         Protime-INR    Nov 23, 2018 (Approximate)      Results to PCP>    Order Comments:  Results to PCP>     Is Patient on anti-coag:  Yes           Follow-up Information     Doroteo Victoria MD .    Specialty:  Family Medicine  Why:  1 week  Contact information:  [address]  [address]  [address]  [phone]             Bryan Barajas MD .    Specialties:  Wound Care, General Surgery  Why:  1 week  Contact information:  [address]  [facility] [address]  [phone]                 Discharge Order (From admission, onward)    Start     Ordered    11/22/18 1416  Discharge patient  Once     Expected Discharge Date:  11/22/18    Discharge Disposition:  Home or Self Care    Physician of Record for Attribution - Please select from Treatment Team:  YURY TRAORE [[id number]]    Review needed by CMO to determine Physician of Record:  No       Question Answer Comment   Physician of Record for Attribution - Please select from Treatment Team YURY TRAORE    Review needed by CMO to determine Physician of Record No        11/22/18 1418            Total time spent discharging patient including evaluation,post hospitalization follow up,  medication and post hospitalization instructions and education total time exceeds 30 minutes.    Signed:  Yury Traore MD  11/22/2018  2:18 PM    EMR Dragon/Transcription disclaimer:   Much of this encounter note is an electronic transcription/translation of spoken language to printed text. The electronic translation of spoken language may permit erroneous, or at times, nonsensical words or phrases to be inadvertently transcribed; Although I have reviewed the note for such errors, some may still exist.

## 2018-11-22 NOTE — PROGRESS NOTES
"   LOS: 0 days    Patient Care Team:  Doroteo Victoria MD as PCP - General (Family Medicine)  Bryan Barajas MD as PCP - Claims Attributed    Chief Complaint:    Chief Complaint   Patient presents with   • Shortness of Breath     starting this morning around 0700.      Follow UP ESRD  Subjective     Interval History:   The patient had dialysis yesterday approximately 6 L were removed, he is feeling much better, is back to his estimated dry weight.  Denies any chest pain or shortness of air, no nausea or vomiting, no distress dysuria or gross hematuria, no lower extremity edema.      Review of Systems:   As noted above    Objective     Vital Signs  Temp:  [97.3 °F (36.3 °C)-98.6 °F (37 °C)] 97.5 °F (36.4 °C)  Heart Rate:  [] 88  Resp:  [18-20] 18  BP: (108-217)/() 139/84    Flowsheet Rows      First Filed Value   Admission Height  177.8 cm (70\") Documented at 11/21/2018 0936   Admission Weight  94.9 kg (209 lb 4.8 oz) Documented at 11/21/2018 0936          No intake/output data recorded.  No intake/output data recorded.  No intake or output data in the 24 hours ending 11/22/18 0753    Physical Exam:  General Appearance: Awake, alert, no acute distress, appears to be chronically ill  Skin: warm and dry  HEENT: Nonicteric sclerae, oral mucosa normal,   Neck: supple, no JVD, trachea midline  Lungs:   to auscultation, and labored breathing effort  Heart: Irregularly irregular, no rub  Abdomen: soft, non-tender, no palpable bladder, present bowel sounds to auscultation,   : No palpable bladder  Extremities:   no edema, he had small ulcer on the bottom of his the left foot, no cyanosis or clubbing, he has functional AV fistula in the right upper arm  Neuro: normal speech and mental status            Results Review:    Results from last 7 days   Lab Units  11/22/18   0451  11/21/18   0956   SODIUM mmol/L  137  137   POTASSIUM mmol/L  3.8  3.8   CHLORIDE mmol/L  97*  95*   CO2 mmol/L  26.9  24.5   BUN mg/dL  " 27*  43*   CREATININE mg/dL  4.98*  6.33*   CALCIUM mg/dL  9.3  9.1   BILIRUBIN mg/dL   --   0.9   ALK PHOS U/L   --   104   ALT (SGPT) U/L   --   12   AST (SGOT) U/L   --   11   GLUCOSE mg/dL  236*  247*       Estimated Creatinine Clearance: 13.4 mL/min (A) (by C-G formula based on SCr of 4.98 mg/dL (H)).    Results from last 7 days   Lab Units  11/22/18   0451  11/21/18   0956   MAGNESIUM mg/dL   --   1.9   PHOSPHORUS mg/dL  4.9*   --              Results from last 7 days   Lab Units  11/22/18   0451  11/21/18   0956   WBC 10*3/mm3  7.66  11.79*   HEMOGLOBIN g/dL  11.0*  12.6*   PLATELETS 10*3/mm3  214  215       Results from last 7 days   Lab Units  11/21/18   0956   INR   3.99*         Imaging Results (last 24 hours)     Procedure Component Value Units Date/Time    XR Chest 1 View [[id number]] Collected:  11/21/18 1100     Updated:  11/21/18 1539    Narrative:       XR CHEST 1 VIEW-     HISTORY: 73-year-old male with shortness of breath.     FINDINGS: There is increased density and markings in the right lower  lobe. There is likely a right lower lobe pneumonia and there may be a  smaller pneumonia at the left lung base. There is prominence of central  pulmonary vascularity, but there is no evidence for interstitial edema.  Follow-up is recommended.     This report was finalized on 11/21/2018 3:36 PM by Dr. Maria C Gordon M.D.             allopurinol 100 mg Oral Daily   amLODIPine 5 mg Oral Daily   aspirin 81 mg Oral Daily   atorvastatin 40 mg Oral Daily   calcium acetate 667 mg Oral Daily   CloNIDine 0.1 mg Oral BID   donepezil 10 mg Oral Nightly   DULoxetine 60 mg Oral Daily   gabapentin 100 mg Oral TID   insulin lispro 0-7 Units Subcutaneous 4x Daily With Meals & Nightly   insulin NPH 30 Units Subcutaneous BID AC   insulin regular 10 Units Subcutaneous TID AC   metoprolol succinate XL 50 mg Oral Q24H   sodium chloride 3 mL Intravenous Q12H   tamsulosin 0.4 mg Oral Daily   torsemide 20 mg Oral Daily        niCARdipine 5-15 mg/hr Last Rate: 7.5 mg/hr (11/21/18 1057)       Medication Review:   Current Facility-Administered Medications   Medication Dose Route Frequency Provider Last Rate Last Dose   • acetaminophen (TYLENOL) tablet 650 mg  650 mg Oral Q6H PRN Yury Traore MD       • albumin human 25 % IV SOLN 12.5 g  12.5 g Intravenous PRN Josi Watkins MD       • allopurinol (ZYLOPRIM) tablet 100 mg  100 mg Oral Daily Yury Traore MD       • amLODIPine (NORVASC) tablet 5 mg  5 mg Oral Daily Yury Traore MD       • aspirin EC tablet 81 mg  81 mg Oral Daily Yury Traore MD       • atorvastatin (LIPITOR) tablet 40 mg  40 mg Oral Daily Yury rTaore MD       • calcium acetate (PHOSLO) capsule 667 mg  667 mg Oral Daily Yury Traore MD       • CloNIDine (CATAPRES) tablet 0.1 mg  0.1 mg Oral BID Yury Traore MD   0.1 mg at 11/21/18 2043   • dextrose (D50W) 25 g/ 50mL Intravenous Solution 25 g  25 g Intravenous Q15 Min PRN Yury Traore MD       • dextrose (GLUTOSE) oral gel 15 g  15 g Oral Q15 Min PRN Yury Traore MD       • donepezil (ARICEPT) tablet 10 mg  10 mg Oral Nightly Yury Traore MD   10 mg at 11/21/18 2042   • DULoxetine (CYMBALTA) DR capsule 60 mg  60 mg Oral Daily Yury Traore MD       • gabapentin (NEURONTIN) capsule 100 mg  100 mg Oral TID Yury Traore MD   100 mg at 11/21/18 2043   • glucagon (human recombinant) (GLUCAGEN DIAGNOSTIC) injection 1 mg  1 mg Subcutaneous PRN Yury Traore MD       • insulin lispro (humaLOG) injection 0-7 Units  0-7 Units Subcutaneous 4x Daily With Meals & Nightly Yury Traore MD   4 Units at 11/21/18 2102   • insulin NPH (humuLIN N,novoLIN N) injection 30 Units  30 Units Subcutaneous BID Yury Sorensen MD       • insulin regular (humuLIN R,novoLIN R) injection 10 Units  10 Units Subcutaneous TID Yury Sorensen MD       • metoprolol  succinate XL (TOPROL-XL) 24 hr tablet 50 mg  50 mg Oral Q24H Yury Traore MD       • niCARdipine (CARDENE) 25 mg in sodium chloride 0.9 % 250 mL (0.1 mg/mL) infusion  5-15 mg/hr Intravenous Titrated Carlos Parker MD 75 mL/hr at 11/21/18 1057 7.5 mg/hr at 11/21/18 1057   • sodium chloride 0.9 % flush 10 mL  10 mL Intravenous PRN Carlos Parker MD   10 mL at 11/21/18 1128   • sodium chloride 0.9 % flush 3 mL  3 mL Intravenous Q12H Yury Traore MD   3 mL at 11/21/18 2059   • sodium chloride 0.9 % flush 3-10 mL  3-10 mL Intravenous PRN Yury Traore MD       • tamsulosin (FLOMAX) 24 hr capsule 0.4 mg  0.4 mg Oral Daily Yury Traore MD       • torsemide (DEMADEX) tablet 20 mg  20 mg Oral Daily Yury Traore MD           Assessment/Plan      1.  End-stage renal disease associated with diabetic and hypertensive glomerulosclerosis on maintenance hemodialysis every Monday, Wednesday and Friday.   his fluid excess resolved with dialysis 6 L were removed.  2.  Severe hypertension associated with fluid excess, improved, blood pressure is well controlled now  3.  Diabetes mellitus type II for at least 15 years with diabetic neuropathy and he has poor glycemic control.  4.  Prior history of CVA, he has had dementia  5.  Chronic A. fib  6.  Anemia of chronic kidney disease not requiring LARRY at the present      Plan:  1.  The patient could be discharged from the renal standpoint  2.  If not discharged will dialyze tomorrow in the hospital otherwise he will go to his dialysis clinic for his usual dialysis on Friday.        Segundo Lawson MD  11/22/18  7:53 AM

## 2018-11-22 NOTE — PLAN OF CARE
Problem: Patient Care Overview  Goal: Plan of Care Review  Outcome: Ongoing (interventions implemented as appropriate)   11/22/18 0411   Coping/Psychosocial   Plan of Care Reviewed With patient   Plan of Care Review   Progress improving   OTHER   Outcome Summary pt had dialysis today and states that he feels better. no complaints of pain or nausea. no sign of distress. will continue to monitor     Goal: Individualization and Mutuality  Outcome: Ongoing (interventions implemented as appropriate)    Goal: Discharge Needs Assessment  Outcome: Ongoing (interventions implemented as appropriate)    Goal: Interprofessional Rounds/Family Conf  Outcome: Ongoing (interventions implemented as appropriate)      Problem: Fluid Volume Excess (Adult)  Goal: Identify Related Risk Factors and Signs and Symptoms  Outcome: Ongoing (interventions implemented as appropriate)    Goal: Optimal Fluid Balance  Outcome: Ongoing (interventions implemented as appropriate)      Problem: Fall Risk (Adult)  Goal: Identify Related Risk Factors and Signs and Symptoms  Outcome: Ongoing (interventions implemented as appropriate)    Goal: Absence of Fall  Outcome: Ongoing (interventions implemented as appropriate)

## 2018-11-22 NOTE — H&P
HISTORY AND PHYSICAL   [facility]        Patient Identification:  Name: Deacon Martin  Age: 73 y.o.  Sex: male  :  1945  MRN: [id number]                     Primary Care Physician: Doroteo Victoria MD    Chief Complaint:  SOA    History of Present Illness:   Pleasant 73-year-old gentleman with end stage renal disease hemodialysis dependent.  He also has a history of vascular dementia.  He presents complaining of shortness of air.  It's reported that they were unable to achieve his dry weight at his last dialysis.  He states his shortness of breath started today.  No chest pain associated with this.  No palpitations.  No lightheadedness.  No fever sweats or chills.  Patient was given IV Bumex in the emergency room and is already been on dialysis.  He states he is breathing easier.  He is also noted be markedly hypertensive in the emergency room and a Cardene drip has been initiated.        Past Medical History:  Past Medical History:   Diagnosis Date   • Atrial fibrillation (CMS/HCC)    • Edema    • Gout    • HBP (high blood pressure)    • HX: anticoagulation    • Hyperlipidemia    • Memory problem    • Other hemorrhagic disorder due to intrinsic circulating anticoagulants, antibodies, or inhibitors (CMS/HCC)     OTH HEMOR DISORDER DUE INTRIN   • Renal failure    • Stroke (CMS/HCC)    • Type 2 diabetes mellitus (CMS/HCC)    • Vitamin D deficiency      Past Surgical History:  Past Surgical History:   Procedure Laterality Date   • COLONOSCOPY        Home Meds:  Medications Prior to Admission   Medication Sig Dispense Refill Last Dose   • ACCU-CHEK FASTCLIX LANCETS misc Use as directed for blood glucose testing CHECK 4 TIMES A  each 5 Taking   • acetaminophen (TYLENOL) 325 MG tablet Take 2 tablets by mouth Every 6 (Six) Hours As Needed for mild pain (1-3) or fever. 100 tablet 0 Taking   • allopurinol (ZYLOPRIM) 100 MG tablet TAKE ONE TABLET BY MOUTH DAILY 90 tablet 2 Taking   • amLODIPine  (NORVASC) 5 MG tablet TAKE ONE TABLET BY MOUTH DAILY 30 tablet 3 Taking   • aspirin 81 MG tablet Take 81 mg by mouth Daily.   Taking   • atorvastatin (LIPITOR) 40 MG tablet Take 1 tablet by mouth Daily. 90 tablet 3 Taking   • calcium acetate (PHOS BINDER,) 667 MG capsule capsule Take 667 mg by mouth Daily.   Taking   • CloNIDine (CATAPRES) 0.1 MG tablet TAKE ONE TABLET BY MOUTH TWICE A  tablet 2 Taking   • donepezil (ARICEPT) 10 MG tablet Take 10 mg by mouth Every Night.   Taking   • DULoxetine (CYMBALTA) 60 MG capsule Take 1 capsule by mouth Daily. 90 capsule 3 Taking   • gabapentin (NEURONTIN) 100 MG capsule Take 1 capsule by mouth 3 (Three) Times a Day. 90 capsule 5    • glucose blood (ACCU-CHEK GUIDE) test strip Use as instructed to check 4 times daily 150 each 5 Taking   • glucose blood test strip Use as instructed 120 each 12 Taking   • insulin NPH (humuLIN N,novoLIN N) 100 UNIT/ML injection Inject 30 Units under the skin 2 (Two) Times a Day Before Meals. 1 Units 12 Taking   • Insulin Pen Needle 32G X 4 MM misc 4 (Four) Times a Day As Needed.   Taking   • insulin regular (humuLIN R,novoLIN R) 100 UNIT/ML injection Inject 10 Units under the skin 3 (Three) Times a Day Before Meals. 10 mL 1 Taking   • Lancets Misc. (ACCU-CHEK FASTCLIX LANCET) kit Use as directed for blood glucose testing 200 kit 5 Taking   • metoprolol succinate XL (TOPROL-XL) 50 MG 24 hr tablet Take 1 tablet by mouth Daily. 90 tablet 3 Taking   • tamsulosin (FLOMAX) 0.4 MG capsule 24 hr capsule Take 1 capsule by mouth Daily. 90 capsule 3 Taking   • torsemide (DEMADEX) 10 MG tablet TAKE TWO TABLETS BY MOUTH DAILY 60 tablet 3    • warfarin (COUMADIN) 5 MG tablet Take 1 tablet by mouth Daily. (Patient taking differently: Take 5 mg by mouth Daily. 5mg every other day  2.5mg other day  Today is a 2.5 day) 90 tablet 3 Taking       Allergies:  No Known Allergies  Immunizations:  Immunization History   Administered Date(s) Administered   • Flu  Mist 10/02/2017   • Flu Vaccine High Dose PF 65YR+ 10/21/2016   • Flu Vaccine Quad PF >18YRS 10/01/2014   • Influenza, Unspecified 10/20/2010, 10/31/2011, 2012, 2013   • Pneumococcal Conjugate 13-Valent (PCV13) 2015   • Pneumococcal Polysaccharide (PPSV23) 2014     Social History:   Social History     Social History Narrative   • Not on file     Social History     Tobacco Use   • Smoking status: Former Smoker     Packs/day: 1.00     Years: 5.00     Pack years: 5.00     Types: Cigarettes     Last attempt to quit: 1966     Years since quittin.9   • Smokeless tobacco: Never Used   Substance Use Topics   • Alcohol use: Yes     Alcohol/week: 0.6 oz     Types: 1 Cans of beer per week     Comment: occasionally      Family History:  Family History   Problem Relation Age of Onset   • Arthritis Mother    • Diabetes Father    • Heart disease Father    • Hyperlipidemia Father    • Hypertension Father    • Obesity Father    • Cancer Brother         esophagus   • Heart disease Maternal Grandfather         Review of Systems  Review of Systems   Constitutional: Negative.    HENT: Negative.    Eyes: Negative.    Respiratory:        As per history of present illness.   Cardiovascular: Negative.    Gastrointestinal: Negative.    Endocrine: Negative.    Genitourinary: Negative.    Musculoskeletal: Negative.    Skin: Negative.    Allergic/Immunologic: Negative.    Neurological: Negative.    Hematological: Negative.    Psychiatric/Behavioral: Negative.        Objective:  tMax 24 hrs: Temp (24hrs), Av.5 °F (36.4 °C), Min:97.3 °F (36.3 °C), Max:97.7 °F (36.5 °C)    Vitals Ranges:   Temp:  [97.3 °F (36.3 °C)-97.7 °F (36.5 °C)] 97.7 °F (36.5 °C)  Heart Rate:  [] 104  Resp:  [18-20] 18  BP: (161-217)/() 161/79      Exam:  Physical Exam   Constitutional: He appears well-developed and well-nourished. No distress.   HENT:   Head: Normocephalic and atraumatic.   Right Ear: External ear normal.   Left  Ear: External ear normal.   Nose: Nose normal.   Mouth/Throat: Oropharynx is clear and moist.   Eyes: Conjunctivae and EOM are normal. Right eye exhibits no discharge. Left eye exhibits no discharge. No scleral icterus.   Neck: Neck supple. No tracheal deviation present. No thyromegaly present.   Cardiovascular: Normal rate, regular rhythm, normal heart sounds and intact distal pulses.   Pulmonary/Chest: Breath sounds normal. No respiratory distress. He exhibits no tenderness.   Abdominal: Soft. Bowel sounds are normal. He exhibits no distension and no mass. There is no tenderness. There is no rebound and no guarding.   Musculoskeletal: He exhibits edema.   Neurological: He is alert.   Oriented to person and place.   Skin: Skin is warm and dry. He is not diaphoretic.   Psychiatric: He has a normal mood and affect. His behavior is normal. Judgment and thought content normal.       Data Review:  All labs and radiology reviewed.    Assessment:    CHF secondary to fluid overload:  Hemodialysis underway.    ESRD (end stage renal disease) on dialysis     Type 2 diabetes mellitus:  Continue home meds.  Low-dose sliding scale.    HTN (hypertension):  Uncontrolled.  On Cardene drip.  Should improve w dialysis.     Vascular dementia without behavioral disturbance    Anticoagulated on Coumadin:  Hold Coumadin and monitor.    Atrial fibrillation       Plan:  Please see above.      Yury Traore MD  11/21/2018  7:08 PM    EMR Dragon/Transcription disclaimer:   Much of this encounter note is an electronic transcription/translation of spoken language to printed text. The electronic translation of spoken language may permit erroneous, or at times, nonsensical words or phrases to be inadvertently transcribed; Although I have reviewed the note for such errors, some may still exist.

## 2018-11-24 NOTE — OUTREACH NOTE
Prep Survey      Responses   Facility patient discharged from?  [facility]   Is patient eligible?  Yes   Discharge diagnosis  A/C CHF,  ESRD on HD, T2DM   Does the patient have one of the following disease processes/diagnoses(primary or secondary)?  CHF   Does the patient have Home health ordered?  No   Is there a DME ordered?  No   Medication alerts for this patient  coumadin stopped   General alerts for this patient  ESRD on HD   Prep survey completed?  Yes          Negar Hebert RN

## 2018-11-26 NOTE — OUTREACH NOTE
CHF Week 1 Survey      Responses   Facility patient discharged from?  [facility]   Does the patient have one of the following disease processes/diagnoses(primary or secondary)?  CHF   Is there a successful TCM telephone encounter documented?  No   CHF Week 1 attempt successful?  Yes   Call start time  1616   Call end time  1624   General alerts for this patient  ESRD on HD   Discharge diagnosis  A/C CHF,  ESRD on HD, T2DM   Is patient permission given to speak with other caregiver?  Yes   List who call center can speak with  Adore, spouse   Person spoke with today (if not patient) and relationship  Adore, spouse   Medication alerts for this patient  coumadin stopped   Meds reviewed with patient/caregiver?  Yes   Is the patient having any side effects they believe may be caused by any medication additions or changes?  No   Does the patient have all medications ordered at discharge?  Yes   Is the patient taking all medications as directed (includes completed medication regime)?  Yes   Medication comments  Wife states patient will have protime checked tomorrow.    Does the patient have a primary care provider?   Yes   Does the patient have an appointment with their PCP within 7 days of discharge?  Greater than 7 days   Comments regarding PCP  Doroteo Victoria MD. Thursday Quang 3, 2019 1:45 PM   What is preventing the patient from scheduling follow up appointments within 7 days of discharge?  Earlier appointment not available   Nursing Interventions  Verified appointment date/time/provider   Has the patient kept scheduled appointments due by today?  Yes   Has home health visited the patient within 72 hours of discharge?  N/A   Psychosocial issues?  No   Did the patient receive a copy of their discharge instructions?  Yes   Nursing interventions  Reviewed instructions with patient   What is the patient's perception of their health status since discharge?  Improving   Nursing interventions  Nurse provided patient  education   Is the patient weighing daily?  Yes   Does the patient have scales?  Yes   Daily weight interventions  Education provided on importance of daily weight   Is the patient able to teach back Heart Failure diet management?  Yes   Is the patient able to teach back Heart Failure Zones?  Yes   Is the patient able to teach back signs and symptoms of worsening condition? (i.e. weight gain, shortness of air, etc.)  Yes   Is the patient/caregiver able to teach back the hierarchy of who to call/visit for symptoms/problems? PCP, Specialist, Home health nurse, Urgent Care, ED, 911  Yes    CHF Week 1 call completed?  Yes          Lissa Mayes RN

## 2018-12-03 NOTE — OUTREACH NOTE
CHF Week 2 Survey      Responses   Facility patient discharged from?  [facility]   Does the patient have one of the following disease processes/diagnoses(primary or secondary)?  CHF   Week 2 attempt successful?  No   Unsuccessful attempts  Attempt 1          Syed Velasco RN

## 2018-12-05 NOTE — OUTREACH NOTE
CHF Week 2 Survey      Responses   Facility patient discharged from?  [facility]   Does the patient have one of the following disease processes/diagnoses(primary or secondary)?  CHF   Week 2 attempt successful?  No   Unsuccessful attempts  Attempt 2          Hanna Yarbrough RN

## 2019-01-01 ENCOUNTER — HOSPITAL ENCOUNTER (INPATIENT)
Facility: HOSPITAL | Age: 74
LOS: 5 days | Discharge: HOME | End: 2019-01-12
Attending: EMERGENCY MEDICINE | Admitting: INTERNAL MEDICINE

## 2019-01-01 ENCOUNTER — ANTICOAGULATION VISIT (OUTPATIENT)
Dept: PHARMACY | Facility: HOSPITAL | Age: 74
End: 2019-01-01

## 2019-01-01 ENCOUNTER — APPOINTMENT (OUTPATIENT)
Dept: GENERAL RADIOLOGY | Facility: HOSPITAL | Age: 74
End: 2019-01-01

## 2019-01-01 ENCOUNTER — OFFICE VISIT (OUTPATIENT)
Dept: CARDIOLOGY | Facility: CLINIC | Age: 74
End: 2019-01-01

## 2019-01-01 ENCOUNTER — TELEPHONE (OUTPATIENT)
Dept: PHARMACY | Facility: HOSPITAL | Age: 74
End: 2019-01-01

## 2019-01-01 ENCOUNTER — OFFICE VISIT (OUTPATIENT)
Dept: WOUND CARE | Facility: HOSPITAL | Age: 74
End: 2019-01-01

## 2019-01-01 ENCOUNTER — HOSPITAL ENCOUNTER (EMERGENCY)
Facility: HOSPITAL | Age: 74
Discharge: HOME | End: 2019-10-20
Attending: EMERGENCY MEDICINE | Admitting: EMERGENCY MEDICINE

## 2019-01-01 ENCOUNTER — APPOINTMENT (OUTPATIENT)
Dept: CARDIOLOGY | Facility: HOSPITAL | Age: 74
End: 2019-01-01

## 2019-01-01 ENCOUNTER — ANESTHESIA (OUTPATIENT)
Dept: PERIOP | Facility: HOSPITAL | Age: 74
End: 2019-01-01

## 2019-01-01 ENCOUNTER — APPOINTMENT (OUTPATIENT)
Dept: MRI IMAGING | Facility: HOSPITAL | Age: 74
End: 2019-01-01

## 2019-01-01 ENCOUNTER — OFFICE VISIT (OUTPATIENT)
Dept: INTERNAL MEDICINE | Facility: CLINIC | Age: 74
End: 2019-01-01

## 2019-01-01 ENCOUNTER — APPOINTMENT (OUTPATIENT)
Dept: WOUND CARE | Facility: HOSPITAL | Age: 74
End: 2019-01-01

## 2019-01-01 ENCOUNTER — READMISSION MANAGEMENT (OUTPATIENT)
Dept: CALL CENTER | Facility: HOSPITAL | Age: 74
End: 2019-01-01

## 2019-01-01 ENCOUNTER — ANESTHESIA EVENT (OUTPATIENT)
Dept: PERIOP | Facility: HOSPITAL | Age: 74
End: 2019-01-01

## 2019-01-01 ENCOUNTER — HOSPITAL ENCOUNTER (INPATIENT)
Facility: HOSPITAL | Age: 74
LOS: 3 days | Discharge: HOME HEALTH | End: 2019-06-27
Attending: EMERGENCY MEDICINE | Admitting: SURGERY

## 2019-01-01 ENCOUNTER — HOSPITAL ENCOUNTER (EMERGENCY)
Facility: HOSPITAL | Age: 74
Discharge: HOME | End: 2019-04-26
Attending: EMERGENCY MEDICINE | Admitting: EMERGENCY MEDICINE

## 2019-01-01 ENCOUNTER — RESULTS ENCOUNTER (OUTPATIENT)
Dept: INTERNAL MEDICINE | Facility: CLINIC | Age: 74
End: 2019-01-01

## 2019-01-01 ENCOUNTER — ANTICOAGULATION VISIT (OUTPATIENT)
Dept: INTERNAL MEDICINE | Facility: CLINIC | Age: 74
End: 2019-01-01

## 2019-01-01 ENCOUNTER — HOSPITAL ENCOUNTER (INPATIENT)
Facility: HOSPITAL | Age: 74
LOS: 1 days | End: 2019-10-31
Attending: EMERGENCY MEDICINE | Admitting: INTERNAL MEDICINE

## 2019-01-01 ENCOUNTER — TELEPHONE (OUTPATIENT)
Dept: INTERNAL MEDICINE | Facility: CLINIC | Age: 74
End: 2019-01-01

## 2019-01-01 ENCOUNTER — HOSPITAL ENCOUNTER (EMERGENCY)
Facility: HOSPITAL | Age: 74
Discharge: HOME | End: 2019-05-16
Attending: EMERGENCY MEDICINE | Admitting: EMERGENCY MEDICINE

## 2019-01-01 ENCOUNTER — TRANSCRIBE ORDERS (OUTPATIENT)
Dept: WOUND CARE | Facility: HOSPITAL | Age: 74
End: 2019-01-01

## 2019-01-01 ENCOUNTER — LAB (OUTPATIENT)
Dept: LAB | Facility: HOSPITAL | Age: 74
End: 2019-01-01

## 2019-01-01 ENCOUNTER — HOSPITAL ENCOUNTER (INPATIENT)
Facility: HOSPITAL | Age: 74
LOS: 6 days | Discharge: HOME HEALTH | End: 2019-05-30
Attending: EMERGENCY MEDICINE | Admitting: SURGERY

## 2019-01-01 ENCOUNTER — APPOINTMENT (OUTPATIENT)
Dept: CT IMAGING | Facility: HOSPITAL | Age: 74
End: 2019-01-01

## 2019-01-01 ENCOUNTER — HOSPITAL ENCOUNTER (INPATIENT)
Facility: HOSPITAL | Age: 74
LOS: 4 days | Discharge: NURSING, REHAB OR LONG-TERM CARE | End: 2019-09-05
Attending: EMERGENCY MEDICINE | Admitting: INTERNAL MEDICINE

## 2019-01-01 ENCOUNTER — HOSPITAL ENCOUNTER (OUTPATIENT)
Dept: GENERAL RADIOLOGY | Facility: HOSPITAL | Age: 74
Discharge: HOME | End: 2019-03-18
Admitting: INTERNAL MEDICINE

## 2019-01-01 ENCOUNTER — HOSPITAL ENCOUNTER (INPATIENT)
Facility: HOSPITAL | Age: 74
LOS: 4 days | Discharge: HOME HEALTH | End: 2019-09-27
Attending: EMERGENCY MEDICINE | Admitting: INTERNAL MEDICINE

## 2019-01-01 VITALS
RESPIRATION RATE: 16 BRPM | OXYGEN SATURATION: 99 % | HEIGHT: 69 IN | TEMPERATURE: 98.3 F | HEART RATE: 63 BPM | WEIGHT: 185 LBS | BODY MASS INDEX: 27.4 KG/M2 | DIASTOLIC BLOOD PRESSURE: 80 MMHG | SYSTOLIC BLOOD PRESSURE: 134 MMHG

## 2019-01-01 VITALS
BODY MASS INDEX: 26.73 KG/M2 | HEIGHT: 70 IN | DIASTOLIC BLOOD PRESSURE: 83 MMHG | TEMPERATURE: 97.6 F | RESPIRATION RATE: 16 BRPM | WEIGHT: 186.7 LBS | OXYGEN SATURATION: 95 % | HEART RATE: 74 BPM | SYSTOLIC BLOOD PRESSURE: 143 MMHG

## 2019-01-01 VITALS
BODY MASS INDEX: 25.47 KG/M2 | SYSTOLIC BLOOD PRESSURE: 121 MMHG | HEIGHT: 70 IN | DIASTOLIC BLOOD PRESSURE: 83 MMHG | HEART RATE: 86 BPM | WEIGHT: 177.9 LBS | RESPIRATION RATE: 16 BRPM | TEMPERATURE: 97.4 F | OXYGEN SATURATION: 98 %

## 2019-01-01 VITALS
HEART RATE: 94 BPM | WEIGHT: 190 LBS | SYSTOLIC BLOOD PRESSURE: 173 MMHG | HEIGHT: 70 IN | OXYGEN SATURATION: 95 % | TEMPERATURE: 98.6 F | BODY MASS INDEX: 27.2 KG/M2 | DIASTOLIC BLOOD PRESSURE: 87 MMHG

## 2019-01-01 VITALS
HEIGHT: 70 IN | DIASTOLIC BLOOD PRESSURE: 84 MMHG | SYSTOLIC BLOOD PRESSURE: 136 MMHG | OXYGEN SATURATION: 99 % | BODY MASS INDEX: 26.57 KG/M2 | WEIGHT: 185.6 LBS | HEART RATE: 77 BPM

## 2019-01-01 VITALS
BODY MASS INDEX: 26.87 KG/M2 | TEMPERATURE: 97.6 F | SYSTOLIC BLOOD PRESSURE: 77 MMHG | HEIGHT: 69 IN | DIASTOLIC BLOOD PRESSURE: 59 MMHG | WEIGHT: 181.4 LBS

## 2019-01-01 VITALS
TEMPERATURE: 98.3 F | DIASTOLIC BLOOD PRESSURE: 79 MMHG | HEIGHT: 70 IN | RESPIRATION RATE: 18 BRPM | BODY MASS INDEX: 26.86 KG/M2 | HEART RATE: 89 BPM | SYSTOLIC BLOOD PRESSURE: 145 MMHG | WEIGHT: 187.6 LBS | OXYGEN SATURATION: 93 %

## 2019-01-01 VITALS
HEART RATE: 79 BPM | BODY MASS INDEX: 27.37 KG/M2 | DIASTOLIC BLOOD PRESSURE: 89 MMHG | WEIGHT: 191.19 LBS | OXYGEN SATURATION: 99 % | HEIGHT: 70 IN | SYSTOLIC BLOOD PRESSURE: 164 MMHG | RESPIRATION RATE: 16 BRPM | TEMPERATURE: 97.7 F

## 2019-01-01 VITALS
BODY MASS INDEX: 28.63 KG/M2 | WEIGHT: 200 LBS | HEIGHT: 70 IN | RESPIRATION RATE: 18 BRPM | HEART RATE: 89 BPM | OXYGEN SATURATION: 98 % | DIASTOLIC BLOOD PRESSURE: 106 MMHG | SYSTOLIC BLOOD PRESSURE: 167 MMHG | TEMPERATURE: 98.7 F

## 2019-01-01 VITALS
DIASTOLIC BLOOD PRESSURE: 91 MMHG | OXYGEN SATURATION: 97 % | TEMPERATURE: 97.7 F | HEART RATE: 89 BPM | SYSTOLIC BLOOD PRESSURE: 147 MMHG | WEIGHT: 184 LBS | RESPIRATION RATE: 16 BRPM | HEIGHT: 70 IN | BODY MASS INDEX: 26.34 KG/M2

## 2019-01-01 VITALS
SYSTOLIC BLOOD PRESSURE: 125 MMHG | HEIGHT: 70 IN | TEMPERATURE: 99 F | OXYGEN SATURATION: 99 % | DIASTOLIC BLOOD PRESSURE: 81 MMHG | WEIGHT: 192 LBS | RESPIRATION RATE: 18 BRPM | HEART RATE: 95 BPM | BODY MASS INDEX: 27.49 KG/M2

## 2019-01-01 VITALS
DIASTOLIC BLOOD PRESSURE: 72 MMHG | HEIGHT: 70 IN | HEART RATE: 75 BPM | OXYGEN SATURATION: 100 % | BODY MASS INDEX: 28.4 KG/M2 | TEMPERATURE: 97.1 F | SYSTOLIC BLOOD PRESSURE: 164 MMHG | WEIGHT: 198.4 LBS

## 2019-01-01 VITALS
TEMPERATURE: 98.2 F | BODY MASS INDEX: 28.72 KG/M2 | SYSTOLIC BLOOD PRESSURE: 179 MMHG | WEIGHT: 200.6 LBS | HEART RATE: 84 BPM | OXYGEN SATURATION: 96 % | HEIGHT: 70 IN | DIASTOLIC BLOOD PRESSURE: 98 MMHG

## 2019-01-01 VITALS
BODY MASS INDEX: 27.29 KG/M2 | SYSTOLIC BLOOD PRESSURE: 144 MMHG | RESPIRATION RATE: 16 BRPM | WEIGHT: 190.6 LBS | TEMPERATURE: 97.7 F | OXYGEN SATURATION: 97 % | HEART RATE: 79 BPM | HEIGHT: 70 IN | DIASTOLIC BLOOD PRESSURE: 79 MMHG

## 2019-01-01 VITALS
DIASTOLIC BLOOD PRESSURE: 78 MMHG | WEIGHT: 187.39 LBS | BODY MASS INDEX: 27.76 KG/M2 | SYSTOLIC BLOOD PRESSURE: 125 MMHG | OXYGEN SATURATION: 96 % | RESPIRATION RATE: 16 BRPM | HEART RATE: 89 BPM | TEMPERATURE: 98.3 F | HEIGHT: 69 IN

## 2019-01-01 DIAGNOSIS — A41.9 SEPSIS, DUE TO UNSPECIFIED ORGANISM: ICD-10-CM

## 2019-01-01 DIAGNOSIS — E11.621 DIABETIC FOOT ULCER WITH OSTEOMYELITIS (HCC): Primary | ICD-10-CM

## 2019-01-01 DIAGNOSIS — N17.9 ACUTE RENAL FAILURE SUPERIMPOSED ON CHRONIC KIDNEY DISEASE, ON CHRONIC DIALYSIS, UNSPECIFIED ACUTE RENAL FAILURE TYPE (HCC): ICD-10-CM

## 2019-01-01 DIAGNOSIS — R65.21 SEPTIC SHOCK (HCC): Primary | ICD-10-CM

## 2019-01-01 DIAGNOSIS — M86.9 OSTEOMYELITIS OF RIGHT FOOT, UNSPECIFIED TYPE (HCC): ICD-10-CM

## 2019-01-01 DIAGNOSIS — Z86.73 HISTORY OF CARDIOEMBOLIC STROKE: ICD-10-CM

## 2019-01-01 DIAGNOSIS — I50.43 ACUTE ON CHRONIC COMBINED SYSTOLIC AND DIASTOLIC CHF (CONGESTIVE HEART FAILURE) (HCC): Primary | ICD-10-CM

## 2019-01-01 DIAGNOSIS — I48.20 CHRONIC ATRIAL FIBRILLATION (HCC): Primary | ICD-10-CM

## 2019-01-01 DIAGNOSIS — L97.509 DIABETIC FOOT ULCER WITH OSTEOMYELITIS (HCC): Primary | ICD-10-CM

## 2019-01-01 DIAGNOSIS — Z99.2 ESRD (END STAGE RENAL DISEASE) ON DIALYSIS (HCC): ICD-10-CM

## 2019-01-01 DIAGNOSIS — N18.6 ESRD (END STAGE RENAL DISEASE) ON DIALYSIS (HCC): ICD-10-CM

## 2019-01-01 DIAGNOSIS — E11.8 DM (DIABETES MELLITUS) WITH COMPLICATIONS (HCC): ICD-10-CM

## 2019-01-01 DIAGNOSIS — A04.71 RECURRENT CLOSTRIDIOIDES DIFFICILE DIARRHEA: Primary | ICD-10-CM

## 2019-01-01 DIAGNOSIS — I48.91 ATRIAL FIBRILLATION, UNSPECIFIED TYPE (HCC): ICD-10-CM

## 2019-01-01 DIAGNOSIS — F01.50 VASCULAR DEMENTIA WITHOUT BEHAVIORAL DISTURBANCE (HCC): ICD-10-CM

## 2019-01-01 DIAGNOSIS — L97.419 DIABETIC ULCER OF RIGHT HEEL ASSOCIATED WITH DIABETES MELLITUS OF OTHER TYPE, UNSPECIFIED ULCER STAGE (HCC): Primary | ICD-10-CM

## 2019-01-01 DIAGNOSIS — N18.9 ACUTE RENAL FAILURE SUPERIMPOSED ON CHRONIC KIDNEY DISEASE, ON CHRONIC DIALYSIS, UNSPECIFIED ACUTE RENAL FAILURE TYPE (HCC): ICD-10-CM

## 2019-01-01 DIAGNOSIS — L97.413 DIABETIC ULCER OF RIGHT HEEL ASSOCIATED WITH TYPE 2 DIABETES MELLITUS, WITH NECROSIS OF MUSCLE (HCC): ICD-10-CM

## 2019-01-01 DIAGNOSIS — M86.9 DIABETIC FOOT ULCER WITH OSTEOMYELITIS (HCC): Primary | ICD-10-CM

## 2019-01-01 DIAGNOSIS — E13.621 DIABETIC ULCER OF RIGHT HEEL ASSOCIATED WITH DIABETES MELLITUS OF OTHER TYPE, UNSPECIFIED ULCER STAGE (HCC): Primary | ICD-10-CM

## 2019-01-01 DIAGNOSIS — A49.8 CLOSTRIDIUM DIFFICILE INFECTION: ICD-10-CM

## 2019-01-01 DIAGNOSIS — Z79.01 ANTICOAGULATED ON COUMADIN: ICD-10-CM

## 2019-01-01 DIAGNOSIS — Z78.9 PROBLEM WITH VASCULAR ACCESS: Primary | ICD-10-CM

## 2019-01-01 DIAGNOSIS — I83.029 VENOUS STASIS ULCERS OF BOTH LOWER EXTREMITIES (HCC): ICD-10-CM

## 2019-01-01 DIAGNOSIS — I87.2 CHRONIC VENOUS INSUFFICIENCY: Primary | ICD-10-CM

## 2019-01-01 DIAGNOSIS — N18.6 ESRD (END STAGE RENAL DISEASE) (HCC): ICD-10-CM

## 2019-01-01 DIAGNOSIS — E11.621 DIABETIC ULCER OF RIGHT HEEL ASSOCIATED WITH TYPE 2 DIABETES MELLITUS, WITH NECROSIS OF MUSCLE (HCC): ICD-10-CM

## 2019-01-01 DIAGNOSIS — A04.71 RECURRENT CLOSTRIDIOIDES DIFFICILE DIARRHEA: ICD-10-CM

## 2019-01-01 DIAGNOSIS — L97.509 DIABETIC FOOT ULCER WITH OSTEOMYELITIS (HCC): ICD-10-CM

## 2019-01-01 DIAGNOSIS — M86.171 OTHER ACUTE OSTEOMYELITIS OF RIGHT FOOT (HCC): ICD-10-CM

## 2019-01-01 DIAGNOSIS — Z99.2 ESRD ON HEMODIALYSIS (HCC): ICD-10-CM

## 2019-01-01 DIAGNOSIS — I48.91 ATRIAL FIBRILLATION WITH RAPID VENTRICULAR RESPONSE (HCC): Primary | ICD-10-CM

## 2019-01-01 DIAGNOSIS — E16.2 HYPOGLYCEMIA: Primary | ICD-10-CM

## 2019-01-01 DIAGNOSIS — Z79.01 CHRONIC ANTICOAGULATION: Chronic | ICD-10-CM

## 2019-01-01 DIAGNOSIS — E87.79 OTHER HYPERVOLEMIA: ICD-10-CM

## 2019-01-01 DIAGNOSIS — E34.9 NEUROPATHY ASSOCIATED WITH ENDOCRINE DISORDER (HCC): ICD-10-CM

## 2019-01-01 DIAGNOSIS — Z09 HOSPITAL DISCHARGE FOLLOW-UP: Primary | ICD-10-CM

## 2019-01-01 DIAGNOSIS — E78.1 HYPERTRIGLYCERIDEMIA: ICD-10-CM

## 2019-01-01 DIAGNOSIS — I48.20 CHRONIC ATRIAL FIBRILLATION (HCC): ICD-10-CM

## 2019-01-01 DIAGNOSIS — M86.9 DIABETIC FOOT ULCER WITH OSTEOMYELITIS (HCC): ICD-10-CM

## 2019-01-01 DIAGNOSIS — A04.72 C. DIFFICILE DIARRHEA: Primary | ICD-10-CM

## 2019-01-01 DIAGNOSIS — I48.21 PERMANENT ATRIAL FIBRILLATION (HCC): ICD-10-CM

## 2019-01-01 DIAGNOSIS — L97.514 FOOT ULCER WITH NECROSIS OF BONE, RIGHT (HCC): ICD-10-CM

## 2019-01-01 DIAGNOSIS — R53.1 GENERALIZED WEAKNESS: ICD-10-CM

## 2019-01-01 DIAGNOSIS — I83.019 VENOUS STASIS ULCERS OF BOTH LOWER EXTREMITIES (HCC): ICD-10-CM

## 2019-01-01 DIAGNOSIS — N18.6 ESRD ON DIALYSIS (HCC): ICD-10-CM

## 2019-01-01 DIAGNOSIS — Z79.01 ANTICOAGULATION GOAL OF INR 2 TO 3: ICD-10-CM

## 2019-01-01 DIAGNOSIS — I35.0 NONRHEUMATIC AORTIC VALVE STENOSIS: ICD-10-CM

## 2019-01-01 DIAGNOSIS — R42 ORTHOSTATIC DIZZINESS: ICD-10-CM

## 2019-01-01 DIAGNOSIS — M79.643 INTERMITTENT PAIN AND SWELLING OF HAND: ICD-10-CM

## 2019-01-01 DIAGNOSIS — Z51.81 ANTICOAGULATION GOAL OF INR 2 TO 3: ICD-10-CM

## 2019-01-01 DIAGNOSIS — J81.0 ACUTE PULMONARY EDEMA (HCC): Primary | ICD-10-CM

## 2019-01-01 DIAGNOSIS — E86.9 VOLUME DEPLETION: ICD-10-CM

## 2019-01-01 DIAGNOSIS — E08.621 DIABETIC ULCER OF RIGHT HEEL ASSOCIATED WITH DIABETES MELLITUS DUE TO UNDERLYING CONDITION, LIMITED TO BREAKDOWN OF SKIN (HCC): Primary | ICD-10-CM

## 2019-01-01 DIAGNOSIS — I51.7 LEFT VENTRICULAR HYPERTROPHY: ICD-10-CM

## 2019-01-01 DIAGNOSIS — G63 NEUROPATHY ASSOCIATED WITH ENDOCRINE DISORDER (HCC): ICD-10-CM

## 2019-01-01 DIAGNOSIS — I50.33 ACUTE ON CHRONIC DIASTOLIC CHF (CONGESTIVE HEART FAILURE) (HCC): ICD-10-CM

## 2019-01-01 DIAGNOSIS — I15.9 SECONDARY HYPERTENSION: ICD-10-CM

## 2019-01-01 DIAGNOSIS — E11.69 DIABETIC FOOT ULCER WITH OSTEOMYELITIS (HCC): Primary | ICD-10-CM

## 2019-01-01 DIAGNOSIS — Z99.2 ESRD ON DIALYSIS (HCC): ICD-10-CM

## 2019-01-01 DIAGNOSIS — L97.411 DIABETIC ULCER OF RIGHT HEEL ASSOCIATED WITH DIABETES MELLITUS DUE TO UNDERLYING CONDITION, LIMITED TO BREAKDOWN OF SKIN (HCC): Primary | ICD-10-CM

## 2019-01-01 DIAGNOSIS — E11.69 DIABETIC FOOT ULCER WITH OSTEOMYELITIS (HCC): ICD-10-CM

## 2019-01-01 DIAGNOSIS — I10 ESSENTIAL HYPERTENSION: ICD-10-CM

## 2019-01-01 DIAGNOSIS — L97.929 VENOUS STASIS ULCERS OF BOTH LOWER EXTREMITIES (HCC): ICD-10-CM

## 2019-01-01 DIAGNOSIS — J81.0 ACUTE PULMONARY EDEMA (HCC): ICD-10-CM

## 2019-01-01 DIAGNOSIS — I96 GANGRENE OF LEFT FOOT (HCC): ICD-10-CM

## 2019-01-01 DIAGNOSIS — M79.89 INTERMITTENT PAIN AND SWELLING OF HAND: ICD-10-CM

## 2019-01-01 DIAGNOSIS — E08.621 DIABETIC ULCER OF HEEL ASSOCIATED WITH DIABETES MELLITUS DUE TO UNDERLYING CONDITION, LIMITED TO BREAKDOWN OF SKIN, UNSPECIFIED LATERALITY (HCC): ICD-10-CM

## 2019-01-01 DIAGNOSIS — I16.1 HYPERTENSIVE EMERGENCY: ICD-10-CM

## 2019-01-01 DIAGNOSIS — A41.9 SEPTIC SHOCK (HCC): Primary | ICD-10-CM

## 2019-01-01 DIAGNOSIS — Z99.2 ACUTE RENAL FAILURE SUPERIMPOSED ON CHRONIC KIDNEY DISEASE, ON CHRONIC DIALYSIS, UNSPECIFIED ACUTE RENAL FAILURE TYPE (HCC): ICD-10-CM

## 2019-01-01 DIAGNOSIS — L97.412 DIABETIC ULCER OF RIGHT HEEL ASSOCIATED WITH TYPE 2 DIABETES MELLITUS, WITH FAT LAYER EXPOSED (HCC): ICD-10-CM

## 2019-01-01 DIAGNOSIS — L97.401 DIABETIC ULCER OF HEEL ASSOCIATED WITH DIABETES MELLITUS DUE TO UNDERLYING CONDITION, LIMITED TO BREAKDOWN OF SKIN, UNSPECIFIED LATERALITY (HCC): ICD-10-CM

## 2019-01-01 DIAGNOSIS — G93.40 ACUTE ENCEPHALOPATHY: ICD-10-CM

## 2019-01-01 DIAGNOSIS — N18.6 ESRD ON HEMODIALYSIS (HCC): ICD-10-CM

## 2019-01-01 DIAGNOSIS — I10 ESSENTIAL HYPERTENSION: Primary | ICD-10-CM

## 2019-01-01 DIAGNOSIS — I48.91 ATRIAL FIBRILLATION WITH TACHYCARDIC VENTRICULAR RATE (HCC): Primary | ICD-10-CM

## 2019-01-01 DIAGNOSIS — E11.621 DIABETIC ULCER OF RIGHT HEEL ASSOCIATED WITH TYPE 2 DIABETES MELLITUS, WITH FAT LAYER EXPOSED (HCC): ICD-10-CM

## 2019-01-01 DIAGNOSIS — L97.919 VENOUS STASIS ULCERS OF BOTH LOWER EXTREMITIES (HCC): ICD-10-CM

## 2019-01-01 DIAGNOSIS — J18.9 PNEUMONIA OF LEFT LOWER LOBE DUE TO INFECTIOUS ORGANISM: ICD-10-CM

## 2019-01-01 DIAGNOSIS — J96.01 ACUTE RESPIRATORY FAILURE WITH HYPOXIA (HCC): ICD-10-CM

## 2019-01-01 DIAGNOSIS — E11.621 DIABETIC FOOT ULCER WITH OSTEOMYELITIS (HCC): ICD-10-CM

## 2019-01-01 LAB
ABO + RH BLD: NORMAL
ABO + RH BLD: NORMAL
ALBUMIN SERPL-MCNC: 2.4 G/DL (ref 3.5–5.2)
ALBUMIN SERPL-MCNC: 2.6 G/DL (ref 3.5–5.2)
ALBUMIN SERPL-MCNC: 2.7 G/DL (ref 3.5–5.2)
ALBUMIN SERPL-MCNC: 2.9 G/DL (ref 3.5–5.2)
ALBUMIN SERPL-MCNC: 3 G/DL (ref 3.5–5.2)
ALBUMIN SERPL-MCNC: 3 G/DL (ref 3.5–5.2)
ALBUMIN SERPL-MCNC: 3.1 G/DL (ref 3.5–5.2)
ALBUMIN SERPL-MCNC: 3.1 G/DL (ref 3.5–5.2)
ALBUMIN SERPL-MCNC: 3.2 G/DL (ref 3.5–5.2)
ALBUMIN SERPL-MCNC: 3.2 G/DL (ref 3.5–5.2)
ALBUMIN SERPL-MCNC: 3.3 G/DL (ref 3.5–5.2)
ALBUMIN SERPL-MCNC: 3.4 G/DL (ref 3.5–5.2)
ALBUMIN SERPL-MCNC: 3.4 G/DL (ref 3.5–5.2)
ALBUMIN SERPL-MCNC: 3.5 G/DL (ref 3.5–5.2)
ALBUMIN SERPL-MCNC: 3.7 G/DL (ref 3.5–5.2)
ALBUMIN SERPL-MCNC: 3.8 G/DL (ref 3.5–5.2)
ALBUMIN SERPL-MCNC: 3.8 G/DL (ref 3.5–5.2)
ALBUMIN SERPL-MCNC: 4.1 G/DL (ref 3.5–5.2)
ALBUMIN SERPL-MCNC: 4.1 G/DL (ref 3.5–5.2)
ALBUMIN/GLOB SERPL: 0.6 G/DL
ALBUMIN/GLOB SERPL: 0.6 G/DL
ALBUMIN/GLOB SERPL: 0.8 G/DL
ALBUMIN/GLOB SERPL: 0.9 G/DL
ALBUMIN/GLOB SERPL: 0.9 G/DL
ALBUMIN/GLOB SERPL: 1 G/DL
ALBUMIN/GLOB SERPL: 1.1 G/DL
ALBUMIN/GLOB SERPL: 1.2 G/DL
ALP SERPL-CCNC: 100 U/L (ref 39–117)
ALP SERPL-CCNC: 101 U/L (ref 39–117)
ALP SERPL-CCNC: 102 U/L (ref 39–117)
ALP SERPL-CCNC: 153 U/L (ref 39–117)
ALP SERPL-CCNC: 61 U/L (ref 39–117)
ALP SERPL-CCNC: 79 U/L (ref 39–117)
ALP SERPL-CCNC: 80 U/L (ref 39–117)
ALP SERPL-CCNC: 81 U/L (ref 39–117)
ALP SERPL-CCNC: 92 U/L (ref 39–117)
ALP SERPL-CCNC: 93 U/L (ref 39–117)
ALP SERPL-CCNC: 93 U/L (ref 39–117)
ALP SERPL-CCNC: 98 U/L (ref 39–117)
ALT SERPL W P-5'-P-CCNC: 10 U/L (ref 1–41)
ALT SERPL W P-5'-P-CCNC: 12 U/L (ref 1–41)
ALT SERPL W P-5'-P-CCNC: 12 U/L (ref 1–41)
ALT SERPL W P-5'-P-CCNC: 13 U/L (ref 1–41)
ALT SERPL W P-5'-P-CCNC: 15 U/L (ref 1–41)
ALT SERPL W P-5'-P-CCNC: 18 U/L (ref 1–41)
ALT SERPL W P-5'-P-CCNC: 21 U/L (ref 1–41)
ALT SERPL W P-5'-P-CCNC: 85 U/L (ref 1–41)
ALT SERPL W P-5'-P-CCNC: <5 U/L (ref 1–41)
ALT SERPL W P-5'-P-CCNC: <5 U/L (ref 1–41)
ANION GAP SERPL CALCULATED.3IONS-SCNC: 12.6 MMOL/L (ref 5–15)
ANION GAP SERPL CALCULATED.3IONS-SCNC: 12.8 MMOL/L
ANION GAP SERPL CALCULATED.3IONS-SCNC: 13.1 MMOL/L
ANION GAP SERPL CALCULATED.3IONS-SCNC: 13.5 MMOL/L
ANION GAP SERPL CALCULATED.3IONS-SCNC: 13.7 MMOL/L
ANION GAP SERPL CALCULATED.3IONS-SCNC: 14.2 MMOL/L (ref 5–15)
ANION GAP SERPL CALCULATED.3IONS-SCNC: 14.3 MMOL/L
ANION GAP SERPL CALCULATED.3IONS-SCNC: 14.4 MMOL/L (ref 5–15)
ANION GAP SERPL CALCULATED.3IONS-SCNC: 15 MMOL/L
ANION GAP SERPL CALCULATED.3IONS-SCNC: 15.1 MMOL/L
ANION GAP SERPL CALCULATED.3IONS-SCNC: 15.7 MMOL/L
ANION GAP SERPL CALCULATED.3IONS-SCNC: 15.7 MMOL/L (ref 5–15)
ANION GAP SERPL CALCULATED.3IONS-SCNC: 15.7 MMOL/L (ref 5–15)
ANION GAP SERPL CALCULATED.3IONS-SCNC: 15.8 MMOL/L
ANION GAP SERPL CALCULATED.3IONS-SCNC: 15.9 MMOL/L
ANION GAP SERPL CALCULATED.3IONS-SCNC: 16.8 MMOL/L
ANION GAP SERPL CALCULATED.3IONS-SCNC: 17 MMOL/L (ref 5–15)
ANION GAP SERPL CALCULATED.3IONS-SCNC: 17.2 MMOL/L (ref 5–15)
ANION GAP SERPL CALCULATED.3IONS-SCNC: 17.6 MMOL/L
ANION GAP SERPL CALCULATED.3IONS-SCNC: 17.9 MMOL/L
ANION GAP SERPL CALCULATED.3IONS-SCNC: 18 MMOL/L (ref 5–15)
ANION GAP SERPL CALCULATED.3IONS-SCNC: 18.6 MMOL/L
ANION GAP SERPL CALCULATED.3IONS-SCNC: 19.4 MMOL/L
ANION GAP SERPL CALCULATED.3IONS-SCNC: 20.4 MMOL/L
ANION GAP SERPL CALCULATED.3IONS-SCNC: 20.8 MMOL/L
ANION GAP SERPL CALCULATED.3IONS-SCNC: 22.1 MMOL/L (ref 5–15)
ANION GAP SERPL CALCULATED.3IONS-SCNC: 23.2 MMOL/L (ref 5–15)
ANION GAP SERPL CALCULATED.3IONS-SCNC: 23.5 MMOL/L (ref 5–15)
ANION GAP SERPL CALCULATED.3IONS-SCNC: 23.6 MMOL/L (ref 5–15)
AORTIC DIMENSIONLESS INDEX: 0.4 (DI)
APTT PPP: 104.1 SECONDS (ref 22.7–35.4)
ARTERIAL PATENCY WRIST A: ABNORMAL
ARTERIAL PATENCY WRIST A: POSITIVE
AST SERPL-CCNC: 10 U/L (ref 1–40)
AST SERPL-CCNC: 11 U/L (ref 1–40)
AST SERPL-CCNC: 12 U/L (ref 1–40)
AST SERPL-CCNC: 13 U/L (ref 1–40)
AST SERPL-CCNC: 18 U/L (ref 1–40)
AST SERPL-CCNC: 19 U/L (ref 1–40)
AST SERPL-CCNC: 19 U/L (ref 1–40)
AST SERPL-CCNC: 20 U/L (ref 1–40)
AST SERPL-CCNC: 67 U/L (ref 1–40)
AST SERPL-CCNC: 7 U/L (ref 1–40)
ATMOSPHERIC PRESS: 750.5 MMHG
ATMOSPHERIC PRESS: 754.7 MMHG
B PARAPERT DNA SPEC QL NAA+PROBE: NOT DETECTED
B PERT DNA SPEC QL NAA+PROBE: NOT DETECTED
BACTERIA BLD CULT: ABNORMAL
BACTERIA SPEC AEROBE CULT: ABNORMAL
BACTERIA SPEC AEROBE CULT: NORMAL
BACTERIA SPEC ANAEROBE CULT: NORMAL
BASE EXCESS BLDA CALC-SCNC: -2.3 MMOL/L (ref 0–2)
BASE EXCESS BLDA CALC-SCNC: 1.1 MMOL/L (ref 0–2)
BASOPHILS # BLD AUTO: 0.04 10*3/MM3 (ref 0–0.2)
BASOPHILS # BLD AUTO: 0.04 10*3/MM3 (ref 0–0.2)
BASOPHILS # BLD AUTO: 0.05 10*3/MM3 (ref 0–0.2)
BASOPHILS # BLD AUTO: 0.05 10*3/MM3 (ref 0–0.2)
BASOPHILS # BLD AUTO: 0.06 10*3/MM3 (ref 0–0.2)
BASOPHILS # BLD AUTO: 0.07 10*3/MM3 (ref 0–0.2)
BASOPHILS # BLD AUTO: 0.07 10*3/MM3 (ref 0–0.2)
BASOPHILS # BLD AUTO: 0.08 10*3/MM3 (ref 0–0.2)
BASOPHILS # BLD AUTO: 0.09 10*3/MM3 (ref 0–0.2)
BASOPHILS # BLD AUTO: 0.09 10*3/MM3 (ref 0–0.2)
BASOPHILS # BLD AUTO: 0.1 10*3/MM3 (ref 0–0.2)
BASOPHILS # BLD AUTO: 0.1 10*3/MM3 (ref 0–0.2)
BASOPHILS # BLD AUTO: 0.12 10*3/MM3 (ref 0–0.2)
BASOPHILS # BLD AUTO: 0.12 10*3/MM3 (ref 0–0.2)
BASOPHILS NFR BLD AUTO: 0.4 % (ref 0–1.5)
BASOPHILS NFR BLD AUTO: 0.5 % (ref 0–1.5)
BASOPHILS NFR BLD AUTO: 0.6 % (ref 0–1.5)
BASOPHILS NFR BLD AUTO: 0.7 % (ref 0–1.5)
BASOPHILS NFR BLD AUTO: 0.8 % (ref 0–1.5)
BASOPHILS NFR BLD AUTO: 0.9 % (ref 0–1.5)
BASOPHILS NFR BLD AUTO: 0.9 % (ref 0–1.5)
BASOPHILS NFR BLD AUTO: 1.1 % (ref 0–1.5)
BASOPHILS NFR BLD AUTO: 1.2 % (ref 0–1.5)
BASOPHILS NFR BLD AUTO: 1.6 % (ref 0–1.5)
BDY SITE: ABNORMAL
BDY SITE: ABNORMAL
BH BB BLOOD EXPIRATION DATE: NORMAL
BH BB BLOOD EXPIRATION DATE: NORMAL
BH BB BLOOD TYPE BARCODE: 5100
BH BB BLOOD TYPE BARCODE: 9500
BH BB DISPENSE STATUS: NORMAL
BH BB DISPENSE STATUS: NORMAL
BH BB PRODUCT CODE: NORMAL
BH BB PRODUCT CODE: NORMAL
BH BB UNIT NUMBER: NORMAL
BH BB UNIT NUMBER: NORMAL
BH CV DIALYSIS ACCESS ARTERIAL ANASTOMOSIS DIAMETER PROXIMAL: 0.41 CM
BH CV DIALYSIS ACCESS ARTERIAL ANASTOMOSIS VELOCITY PROXIMAL: 330 CM/SEC
BH CV DIALYSIS ACCESS GRAFT DIAMETER DISTAL: 0.75 CM
BH CV DIALYSIS ACCESS GRAFT DIAMETER MID DISTAL: 0.37 CM
BH CV DIALYSIS ACCESS GRAFT DIAMETER MID: 0.57 CM
BH CV DIALYSIS ACCESS GRAFT DIAMETER PROXIMAL MID: 1.35 CM
BH CV DIALYSIS ACCESS GRAFT DIAMETER PROXIMAL: 0.2 CM
BH CV DIALYSIS ACCESS GRAFT FLOW VOLUME DISTAL: 821 ML/MIN
BH CV DIALYSIS ACCESS GRAFT FLOW VOLUME MID DISTAL: 756 ML/MIN
BH CV DIALYSIS ACCESS GRAFT FLOW VOLUME MID: 693 ML/MIN
BH CV DIALYSIS ACCESS GRAFT FLOW VOLUME PROXIMAL MID: 1456 ML/MIN
BH CV DIALYSIS ACCESS GRAFT FLOW VOLUME PROXIMAL: 1032 ML/MIN
BH CV DIALYSIS ACCESS GRAFT VELOCITY DISTAL: 281 CM/SEC
BH CV DIALYSIS ACCESS GRAFT VELOCITY MID DISTAL: 182 CM/SEC
BH CV DIALYSIS ACCESS GRAFT VELOCITY MID: 176 CM/SEC
BH CV DIALYSIS ACCESS GRAFT VELOCITY PROXIMAL MID: 108 CM/SEC
BH CV DIALYSIS ACCESS GRAFT VELOCITY PROXIMAL: 932 CM/SEC
BH CV DIALYSIS ACCESS INFLOW DIAMETER DISTAL: 0.39 CM
BH CV DIALYSIS ACCESS INFLOW DIAMETER PRE INFLOW: 0.45 CM
BH CV DIALYSIS ACCESS INFLOW FLOW VOLUME DISTAL: 889 ML/MIN
BH CV DIALYSIS ACCESS INFLOW VELOCITY DISTAL: 303 CM/SEC
BH CV DIALYSIS ACCESS INFLOW VELOCITY PRE INFLOW: 65.7 CM/SEC
BH CV DIALYSIS ACCESS OUTFLOW DIAMETER POST OUTFLOW: 0.77 CM
BH CV DIALYSIS ACCESS OUTFLOW VELOCITY POST OUTFLOW: 78.6 CM/SEC
BH CV ECHO MEAS - AO MAX PG (FULL): 14.2 MMHG
BH CV ECHO MEAS - AO MAX PG: 16.3 MMHG
BH CV ECHO MEAS - AO MEAN PG (FULL): 8 MMHG
BH CV ECHO MEAS - AO MEAN PG: 9 MMHG
BH CV ECHO MEAS - AO ROOT AREA (BSA CORRECTED): 1.5
BH CV ECHO MEAS - AO ROOT AREA: 7.5 CM^2
BH CV ECHO MEAS - AO ROOT DIAM: 3.1 CM
BH CV ECHO MEAS - AO V2 MAX: 202 CM/SEC
BH CV ECHO MEAS - AO V2 MEAN: 147 CM/SEC
BH CV ECHO MEAS - AO V2 VTI: 39.4 CM
BH CV ECHO MEAS - AVA(I,A): 1.4 CM^2
BH CV ECHO MEAS - AVA(I,D): 1.4 CM^2
BH CV ECHO MEAS - AVA(V,A): 1.3 CM^2
BH CV ECHO MEAS - AVA(V,D): 1.3 CM^2
BH CV ECHO MEAS - BSA(HAYCOCK): 2.1 M^2
BH CV ECHO MEAS - BSA: 2 M^2
BH CV ECHO MEAS - BZI_BMI: 26.7 KILOGRAMS/M^2
BH CV ECHO MEAS - BZI_METRIC_HEIGHT: 177.8 CM
BH CV ECHO MEAS - BZI_METRIC_WEIGHT: 84.4 KG
BH CV ECHO MEAS - EDV(CUBED): 103.8 ML
BH CV ECHO MEAS - EDV(MOD-SP2): 117 ML
BH CV ECHO MEAS - EDV(MOD-SP4): 124 ML
BH CV ECHO MEAS - EDV(TEICH): 102.4 ML
BH CV ECHO MEAS - EF(CUBED): 58.7 %
BH CV ECHO MEAS - EF(MOD-BP): 48 %
BH CV ECHO MEAS - EF(MOD-SP2): 47 %
BH CV ECHO MEAS - EF(MOD-SP4): 49.2 %
BH CV ECHO MEAS - EF(TEICH): 50.3 %
BH CV ECHO MEAS - ESV(CUBED): 42.9 ML
BH CV ECHO MEAS - ESV(MOD-SP2): 62 ML
BH CV ECHO MEAS - ESV(MOD-SP4): 63 ML
BH CV ECHO MEAS - ESV(TEICH): 50.9 ML
BH CV ECHO MEAS - FS: 25.5 %
BH CV ECHO MEAS - IVS/LVPW: 1
BH CV ECHO MEAS - IVSD: 1.1 CM
BH CV ECHO MEAS - LAT PEAK E' VEL: 9 CM/SEC
BH CV ECHO MEAS - LV DIASTOLIC VOL/BSA (35-75): 61.3 ML/M^2
BH CV ECHO MEAS - LV MASS(C)D: 187.5 GRAMS
BH CV ECHO MEAS - LV MASS(C)DI: 92.7 GRAMS/M^2
BH CV ECHO MEAS - LV MAX PG: 2.1 MMHG
BH CV ECHO MEAS - LV MEAN PG: 1 MMHG
BH CV ECHO MEAS - LV SYSTOLIC VOL/BSA (12-30): 31.1 ML/M^2
BH CV ECHO MEAS - LV V1 MAX: 73 CM/SEC
BH CV ECHO MEAS - LV V1 MEAN: 52.7 CM/SEC
BH CV ECHO MEAS - LV V1 VTI: 15.6 CM
BH CV ECHO MEAS - LVIDD: 4.7 CM
BH CV ECHO MEAS - LVIDS: 3.5 CM
BH CV ECHO MEAS - LVLD AP2: 8.2 CM
BH CV ECHO MEAS - LVLD AP4: 8.3 CM
BH CV ECHO MEAS - LVLS AP2: 7.3 CM
BH CV ECHO MEAS - LVLS AP4: 7.4 CM
BH CV ECHO MEAS - LVOT AREA (M): 3.5 CM^2
BH CV ECHO MEAS - LVOT AREA: 3.5 CM^2
BH CV ECHO MEAS - LVOT DIAM: 2.1 CM
BH CV ECHO MEAS - LVPWD: 1.1 CM
BH CV ECHO MEAS - MED PEAK E' VEL: 5 CM/SEC
BH CV ECHO MEAS - MV DEC SLOPE: 937 CM/SEC^2
BH CV ECHO MEAS - MV DEC TIME: 145 SEC
BH CV ECHO MEAS - MV E MAX VEL: 149 CM/SEC
BH CV ECHO MEAS - MV MAX PG: 16.2 MMHG
BH CV ECHO MEAS - MV MEAN PG: 6 MMHG
BH CV ECHO MEAS - MV P1/2T MAX VEL: 195 CM/SEC
BH CV ECHO MEAS - MV P1/2T: 61 MSEC
BH CV ECHO MEAS - MV V2 MAX: 201 CM/SEC
BH CV ECHO MEAS - MV V2 MEAN: 107 CM/SEC
BH CV ECHO MEAS - MV V2 VTI: 33.4 CM
BH CV ECHO MEAS - MVA P1/2T LCG: 1.1 CM^2
BH CV ECHO MEAS - MVA(P1/2T): 3.6 CM^2
BH CV ECHO MEAS - MVA(VTI): 1.6 CM^2
BH CV ECHO MEAS - RAP SYSTOLE: 3 MMHG
BH CV ECHO MEAS - SI(AO): 146.9 ML/M^2
BH CV ECHO MEAS - SI(CUBED): 30.1 ML/M^2
BH CV ECHO MEAS - SI(LVOT): 26.7 ML/M^2
BH CV ECHO MEAS - SI(MOD-SP2): 27.2 ML/M^2
BH CV ECHO MEAS - SI(MOD-SP4): 30.1 ML/M^2
BH CV ECHO MEAS - SI(TEICH): 25.4 ML/M^2
BH CV ECHO MEAS - SV(AO): 297.4 ML
BH CV ECHO MEAS - SV(CUBED): 60.9 ML
BH CV ECHO MEAS - SV(LVOT): 54 ML
BH CV ECHO MEAS - SV(MOD-SP2): 55 ML
BH CV ECHO MEAS - SV(MOD-SP4): 61 ML
BH CV ECHO MEAS - SV(TEICH): 51.5 ML
BH CV ECHO MEAS - TAPSE (>1.6): 1.5 CM
BH CV ECHO MEASUREMENTS AVERAGE E/E' RATIO: 21.29
BH CV LOWER ARTERIAL LEFT GREAT TOE SYS MAX: 66 MMHG
BH CV LOWER ARTERIAL LEFT TBI RATIO: 0.36
BH CV LOWER ARTERIAL RIGHT GREAT TOE SYS MAX: 140 MMHG
BH CV LOWER ARTERIAL RIGHT POST TIBIAL SYS MAX: 113 MMHG
BH CV LOWER ARTERIAL RIGHT TBI RATIO: 0.77
BH CV XLRA - RV BASE: 3.4 CM
BH CV XLRA - TDI S': 7 CM/SEC
BILIRUB CONJ SERPL-MCNC: 0.3 MG/DL (ref 0.2–0.3)
BILIRUB INDIRECT SERPL-MCNC: 0.4 MG/DL
BILIRUB SERPL-MCNC: 0.5 MG/DL (ref 0.2–1.2)
BILIRUB SERPL-MCNC: 0.6 MG/DL (ref 0.1–1.2)
BILIRUB SERPL-MCNC: 0.7 MG/DL (ref 0.2–1.2)
BILIRUB SERPL-MCNC: 0.8 MG/DL (ref 0.2–1.2)
BILIRUB SERPL-MCNC: 1.1 MG/DL (ref 0.2–1.2)
BILIRUB SERPL-MCNC: 1.1 MG/DL (ref 0.2–1.2)
BILIRUB SERPL-MCNC: 1.2 MG/DL (ref 0.2–1.2)
BILIRUB SERPL-MCNC: 1.2 MG/DL (ref 0.2–1.2)
BUN BLD-MCNC: 19 MG/DL (ref 8–23)
BUN BLD-MCNC: 21 MG/DL (ref 8–23)
BUN BLD-MCNC: 21 MG/DL (ref 8–23)
BUN BLD-MCNC: 24 MG/DL (ref 8–23)
BUN BLD-MCNC: 28 MG/DL (ref 8–23)
BUN BLD-MCNC: 29 MG/DL (ref 8–23)
BUN BLD-MCNC: 31 MG/DL (ref 8–23)
BUN BLD-MCNC: 34 MG/DL (ref 8–23)
BUN BLD-MCNC: 34 MG/DL (ref 8–23)
BUN BLD-MCNC: 35 MG/DL (ref 8–23)
BUN BLD-MCNC: 35 MG/DL (ref 8–23)
BUN BLD-MCNC: 38 MG/DL (ref 8–23)
BUN BLD-MCNC: 41 MG/DL (ref 8–23)
BUN BLD-MCNC: 43 MG/DL (ref 8–23)
BUN BLD-MCNC: 47 MG/DL (ref 8–23)
BUN BLD-MCNC: 49 MG/DL (ref 8–23)
BUN BLD-MCNC: 49 MG/DL (ref 8–23)
BUN BLD-MCNC: 51 MG/DL (ref 8–23)
BUN BLD-MCNC: 51 MG/DL (ref 8–23)
BUN BLD-MCNC: 53 MG/DL (ref 8–23)
BUN BLD-MCNC: 57 MG/DL (ref 8–23)
BUN BLD-MCNC: 59 MG/DL (ref 8–23)
BUN BLD-MCNC: 61 MG/DL (ref 8–23)
BUN BLD-MCNC: 62 MG/DL (ref 8–23)
BUN BLD-MCNC: 69 MG/DL (ref 8–23)
BUN BLD-MCNC: 76 MG/DL (ref 8–23)
BUN BLD-MCNC: 92 MG/DL (ref 8–23)
BUN/CREAT SERPL: 4.3 (ref 7–25)
BUN/CREAT SERPL: 4.6 (ref 7–25)
BUN/CREAT SERPL: 4.9 (ref 7–25)
BUN/CREAT SERPL: 5.4 (ref 7–25)
BUN/CREAT SERPL: 5.5 (ref 7–25)
BUN/CREAT SERPL: 5.6 (ref 7–25)
BUN/CREAT SERPL: 5.9 (ref 7–25)
BUN/CREAT SERPL: 6.3 (ref 7–25)
BUN/CREAT SERPL: 6.5 (ref 7–25)
BUN/CREAT SERPL: 6.6 (ref 7–25)
BUN/CREAT SERPL: 6.7 (ref 7–25)
BUN/CREAT SERPL: 6.8 (ref 7–25)
BUN/CREAT SERPL: 6.9 (ref 7–25)
BUN/CREAT SERPL: 7.1 (ref 7–25)
BUN/CREAT SERPL: 7.1 (ref 7–25)
BUN/CREAT SERPL: 7.4 (ref 7–25)
BUN/CREAT SERPL: 7.7 (ref 7–25)
BUN/CREAT SERPL: 7.8 (ref 7–25)
BUN/CREAT SERPL: 8 (ref 7–25)
C DIFF TOX GENS STL QL NAA+PROBE: POSITIVE
C DIFF TOX GENS STL QL NAA+PROBE: POSITIVE
C PNEUM DNA NPH QL NAA+NON-PROBE: NOT DETECTED
CALCIUM SPEC-SCNC: 8.1 MG/DL (ref 8.6–10.5)
CALCIUM SPEC-SCNC: 8.2 MG/DL (ref 8.6–10.5)
CALCIUM SPEC-SCNC: 8.3 MG/DL (ref 8.6–10.5)
CALCIUM SPEC-SCNC: 8.3 MG/DL (ref 8.6–10.5)
CALCIUM SPEC-SCNC: 8.4 MG/DL (ref 8.6–10.5)
CALCIUM SPEC-SCNC: 8.4 MG/DL (ref 8.6–10.5)
CALCIUM SPEC-SCNC: 8.5 MG/DL (ref 8.6–10.5)
CALCIUM SPEC-SCNC: 8.6 MG/DL (ref 8.6–10.5)
CALCIUM SPEC-SCNC: 8.6 MG/DL (ref 8.6–10.5)
CALCIUM SPEC-SCNC: 8.7 MG/DL (ref 8.6–10.5)
CALCIUM SPEC-SCNC: 8.8 MG/DL (ref 8.6–10.5)
CALCIUM SPEC-SCNC: 8.9 MG/DL (ref 8.6–10.5)
CALCIUM SPEC-SCNC: 9 MG/DL (ref 8.6–10.5)
CALCIUM SPEC-SCNC: 9.1 MG/DL (ref 8.6–10.5)
CALCIUM SPEC-SCNC: 9.2 MG/DL (ref 8.6–10.5)
CALCIUM SPEC-SCNC: 9.4 MG/DL (ref 8.6–10.5)
CALCIUM SPEC-SCNC: 9.5 MG/DL (ref 8.6–10.5)
CALCIUM SPEC-SCNC: 9.6 MG/DL (ref 8.6–10.5)
CALCIUM SPEC-SCNC: 9.7 MG/DL (ref 8.6–10.5)
CALCIUM SPEC-SCNC: 9.9 MG/DL (ref 8.6–10.5)
CHLORIDE SERPL-SCNC: 100 MMOL/L (ref 98–107)
CHLORIDE SERPL-SCNC: 86 MMOL/L (ref 98–107)
CHLORIDE SERPL-SCNC: 87 MMOL/L (ref 98–107)
CHLORIDE SERPL-SCNC: 90 MMOL/L (ref 98–107)
CHLORIDE SERPL-SCNC: 91 MMOL/L (ref 98–107)
CHLORIDE SERPL-SCNC: 92 MMOL/L (ref 98–107)
CHLORIDE SERPL-SCNC: 93 MMOL/L (ref 98–107)
CHLORIDE SERPL-SCNC: 94 MMOL/L (ref 98–107)
CHLORIDE SERPL-SCNC: 95 MMOL/L (ref 98–107)
CHLORIDE SERPL-SCNC: 96 MMOL/L (ref 98–107)
CHLORIDE SERPL-SCNC: 97 MMOL/L (ref 98–107)
CHLORIDE SERPL-SCNC: 97 MMOL/L (ref 98–107)
CHLORIDE SERPL-SCNC: 98 MMOL/L (ref 98–107)
CO2 SERPL-SCNC: 19.4 MMOL/L (ref 22–29)
CO2 SERPL-SCNC: 21.2 MMOL/L (ref 22–29)
CO2 SERPL-SCNC: 21.5 MMOL/L (ref 22–29)
CO2 SERPL-SCNC: 21.9 MMOL/L (ref 22–29)
CO2 SERPL-SCNC: 22.2 MMOL/L (ref 22–29)
CO2 SERPL-SCNC: 22.6 MMOL/L (ref 22–29)
CO2 SERPL-SCNC: 22.8 MMOL/L (ref 22–29)
CO2 SERPL-SCNC: 22.8 MMOL/L (ref 22–29)
CO2 SERPL-SCNC: 23.1 MMOL/L (ref 22–29)
CO2 SERPL-SCNC: 24.6 MMOL/L (ref 22–29)
CO2 SERPL-SCNC: 24.8 MMOL/L (ref 22–29)
CO2 SERPL-SCNC: 25 MMOL/L (ref 22–29)
CO2 SERPL-SCNC: 25.1 MMOL/L (ref 22–29)
CO2 SERPL-SCNC: 25.2 MMOL/L (ref 22–29)
CO2 SERPL-SCNC: 25.3 MMOL/L (ref 22–29)
CO2 SERPL-SCNC: 25.3 MMOL/L (ref 22–29)
CO2 SERPL-SCNC: 25.4 MMOL/L (ref 22–29)
CO2 SERPL-SCNC: 25.4 MMOL/L (ref 22–29)
CO2 SERPL-SCNC: 25.7 MMOL/L (ref 22–29)
CO2 SERPL-SCNC: 25.9 MMOL/L (ref 22–29)
CO2 SERPL-SCNC: 27.3 MMOL/L (ref 22–29)
CO2 SERPL-SCNC: 27.5 MMOL/L (ref 22–29)
CO2 SERPL-SCNC: 27.6 MMOL/L (ref 22–29)
CO2 SERPL-SCNC: 28.4 MMOL/L (ref 22–29)
CO2 SERPL-SCNC: 28.9 MMOL/L (ref 22–29)
CO2 SERPL-SCNC: 29.3 MMOL/L (ref 22–29)
CO2 SERPL-SCNC: 31.2 MMOL/L (ref 22–29)
CREAT BLD-MCNC: 10.43 MG/DL (ref 0.76–1.27)
CREAT BLD-MCNC: 11.41 MG/DL (ref 0.76–1.27)
CREAT BLD-MCNC: 12.99 MG/DL (ref 0.76–1.27)
CREAT BLD-MCNC: 3.91 MG/DL (ref 0.76–1.27)
CREAT BLD-MCNC: 4.15 MG/DL (ref 0.76–1.27)
CREAT BLD-MCNC: 4.76 MG/DL (ref 0.76–1.27)
CREAT BLD-MCNC: 4.88 MG/DL (ref 0.76–1.27)
CREAT BLD-MCNC: 4.92 MG/DL (ref 0.76–1.27)
CREAT BLD-MCNC: 5.09 MG/DL (ref 0.76–1.27)
CREAT BLD-MCNC: 5.26 MG/DL (ref 0.76–1.27)
CREAT BLD-MCNC: 5.26 MG/DL (ref 0.76–1.27)
CREAT BLD-MCNC: 5.52 MG/DL (ref 0.76–1.27)
CREAT BLD-MCNC: 5.72 MG/DL (ref 0.76–1.27)
CREAT BLD-MCNC: 5.79 MG/DL (ref 0.76–1.27)
CREAT BLD-MCNC: 5.83 MG/DL (ref 0.76–1.27)
CREAT BLD-MCNC: 5.96 MG/DL (ref 0.76–1.27)
CREAT BLD-MCNC: 6.25 MG/DL (ref 0.76–1.27)
CREAT BLD-MCNC: 6.33 MG/DL (ref 0.76–1.27)
CREAT BLD-MCNC: 7.29 MG/DL (ref 0.76–1.27)
CREAT BLD-MCNC: 7.34 MG/DL (ref 0.76–1.27)
CREAT BLD-MCNC: 7.5 MG/DL (ref 0.76–1.27)
CREAT BLD-MCNC: 7.61 MG/DL (ref 0.76–1.27)
CREAT BLD-MCNC: 7.7 MG/DL (ref 0.76–1.27)
CREAT BLD-MCNC: 7.8 MG/DL (ref 0.76–1.27)
CREAT BLD-MCNC: 7.94 MG/DL (ref 0.76–1.27)
CREAT BLD-MCNC: 8.15 MG/DL (ref 0.76–1.27)
CREAT BLD-MCNC: 8.36 MG/DL (ref 0.76–1.27)
CREAT BLD-MCNC: 8.38 MG/DL (ref 0.76–1.27)
CREAT BLD-MCNC: 9.77 MG/DL (ref 0.76–1.27)
CRP SERPL-MCNC: 9.85 MG/DL (ref 0–0.5)
D-LACTATE SERPL-SCNC: 1.4 MMOL/L (ref 0.5–2)
D-LACTATE SERPL-SCNC: 1.7 MMOL/L (ref 0.5–2)
D-LACTATE SERPL-SCNC: 1.7 MMOL/L (ref 0.5–2)
D-LACTATE SERPL-SCNC: 1.9 MMOL/L (ref 0.5–2)
D-LACTATE SERPL-SCNC: 2.5 MMOL/L (ref 0.5–2)
D-LACTATE SERPL-SCNC: 2.7 MMOL/L (ref 0.5–2)
D-LACTATE SERPL-SCNC: 2.7 MMOL/L (ref 0.5–2)
D-LACTATE SERPL-SCNC: 5.2 MMOL/L (ref 0.5–2)
D-LACTATE SERPL-SCNC: 7.3 MMOL/L (ref 0.5–2)
DEPRECATED RDW RBC AUTO: 44.3 FL (ref 37–54)
DEPRECATED RDW RBC AUTO: 45.3 FL (ref 37–54)
DEPRECATED RDW RBC AUTO: 45.4 FL (ref 37–54)
DEPRECATED RDW RBC AUTO: 45.5 FL (ref 37–54)
DEPRECATED RDW RBC AUTO: 45.7 FL (ref 37–54)
DEPRECATED RDW RBC AUTO: 46.2 FL (ref 37–54)
DEPRECATED RDW RBC AUTO: 47.8 FL (ref 37–54)
DEPRECATED RDW RBC AUTO: 48 FL (ref 37–54)
DEPRECATED RDW RBC AUTO: 48.6 FL (ref 37–54)
DEPRECATED RDW RBC AUTO: 49.1 FL (ref 37–54)
DEPRECATED RDW RBC AUTO: 49.1 FL (ref 37–54)
DEPRECATED RDW RBC AUTO: 49.3 FL (ref 37–54)
DEPRECATED RDW RBC AUTO: 49.6 FL (ref 37–54)
DEPRECATED RDW RBC AUTO: 50.3 FL (ref 37–54)
DEPRECATED RDW RBC AUTO: 50.4 FL (ref 37–54)
DEPRECATED RDW RBC AUTO: 50.6 FL (ref 37–54)
DEPRECATED RDW RBC AUTO: 50.9 FL (ref 37–54)
DEPRECATED RDW RBC AUTO: 51.3 FL (ref 37–54)
DEPRECATED RDW RBC AUTO: 51.6 FL (ref 37–54)
DEPRECATED RDW RBC AUTO: 51.8 FL (ref 37–54)
DEPRECATED RDW RBC AUTO: 51.8 FL (ref 37–54)
DEPRECATED RDW RBC AUTO: 52.3 FL (ref 37–54)
DEPRECATED RDW RBC AUTO: 53 FL (ref 37–54)
DEPRECATED RDW RBC AUTO: 53.6 FL (ref 37–54)
DEPRECATED RDW RBC AUTO: 55.1 FL (ref 37–54)
DEPRECATED RDW RBC AUTO: 55.3 FL (ref 37–54)
DEPRECATED RDW RBC AUTO: 55.3 FL (ref 37–54)
DEPRECATED RDW RBC AUTO: 55.4 FL (ref 37–54)
DEPRECATED RDW RBC AUTO: 55.9 FL (ref 37–54)
EOSINOPHIL # BLD AUTO: 0.03 10*3/MM3 (ref 0–0.4)
EOSINOPHIL # BLD AUTO: 0.08 10*3/MM3 (ref 0–0.4)
EOSINOPHIL # BLD AUTO: 0.14 10*3/MM3 (ref 0–0.4)
EOSINOPHIL # BLD AUTO: 0.18 10*3/MM3 (ref 0–0.4)
EOSINOPHIL # BLD AUTO: 0.18 10*3/MM3 (ref 0–0.4)
EOSINOPHIL # BLD AUTO: 0.2 10*3/MM3 (ref 0–0.4)
EOSINOPHIL # BLD AUTO: 0.24 10*3/MM3 (ref 0–0.4)
EOSINOPHIL # BLD AUTO: 0.24 10*3/MM3 (ref 0–0.4)
EOSINOPHIL # BLD AUTO: 0.27 10*3/MM3 (ref 0–0.7)
EOSINOPHIL # BLD AUTO: 0.29 10*3/MM3 (ref 0–0.4)
EOSINOPHIL # BLD AUTO: 0.31 10*3/MM3 (ref 0–0.7)
EOSINOPHIL # BLD AUTO: 0.32 10*3/MM3 (ref 0–0.4)
EOSINOPHIL # BLD AUTO: 0.33 10*3/MM3 (ref 0–0.4)
EOSINOPHIL # BLD AUTO: 0.39 10*3/MM3 (ref 0–0.7)
EOSINOPHIL # BLD AUTO: 0.41 10*3/MM3 (ref 0–0.4)
EOSINOPHIL # BLD AUTO: 0.43 10*3/MM3 (ref 0–0.7)
EOSINOPHIL # BLD AUTO: 0.47 10*3/MM3 (ref 0–0.4)
EOSINOPHIL # BLD AUTO: 0.48 10*3/MM3 (ref 0–0.4)
EOSINOPHIL # BLD AUTO: 0.58 10*3/MM3 (ref 0–0.4)
EOSINOPHIL NFR BLD AUTO: 0.2 % (ref 0.3–6.2)
EOSINOPHIL NFR BLD AUTO: 0.5 % (ref 0.3–6.2)
EOSINOPHIL NFR BLD AUTO: 1.5 % (ref 0.3–6.2)
EOSINOPHIL NFR BLD AUTO: 1.7 % (ref 0.3–6.2)
EOSINOPHIL NFR BLD AUTO: 1.8 % (ref 0.3–6.2)
EOSINOPHIL NFR BLD AUTO: 2.6 % (ref 0.3–6.2)
EOSINOPHIL NFR BLD AUTO: 2.7 % (ref 0.3–6.2)
EOSINOPHIL NFR BLD AUTO: 2.8 % (ref 0.3–6.2)
EOSINOPHIL NFR BLD AUTO: 3.2 % (ref 0.3–6.2)
EOSINOPHIL NFR BLD AUTO: 3.3 % (ref 0.3–6.2)
EOSINOPHIL NFR BLD AUTO: 3.3 % (ref 0.3–6.2)
EOSINOPHIL NFR BLD AUTO: 3.4 % (ref 0.3–6.2)
EOSINOPHIL NFR BLD AUTO: 3.6 % (ref 0.3–6.2)
EOSINOPHIL NFR BLD AUTO: 3.7 % (ref 0.3–6.2)
EOSINOPHIL NFR BLD AUTO: 4 % (ref 0.3–6.2)
EOSINOPHIL NFR BLD AUTO: 4 % (ref 0.3–6.2)
EOSINOPHIL NFR BLD AUTO: 4.7 % (ref 0.3–6.2)
EOSINOPHIL NFR BLD AUTO: 5 % (ref 0.3–6.2)
EOSINOPHIL NFR BLD AUTO: 5.4 % (ref 0.3–6.2)
ERYTHROCYTE [DISTWIDTH] IN BLOOD BY AUTOMATED COUNT: 13.6 % (ref 11.5–14.5)
ERYTHROCYTE [DISTWIDTH] IN BLOOD BY AUTOMATED COUNT: 13.6 % (ref 12.3–15.4)
ERYTHROCYTE [DISTWIDTH] IN BLOOD BY AUTOMATED COUNT: 13.7 % (ref 11.5–14.5)
ERYTHROCYTE [DISTWIDTH] IN BLOOD BY AUTOMATED COUNT: 13.7 % (ref 12.3–15.4)
ERYTHROCYTE [DISTWIDTH] IN BLOOD BY AUTOMATED COUNT: 13.8 % (ref 12.3–15.4)
ERYTHROCYTE [DISTWIDTH] IN BLOOD BY AUTOMATED COUNT: 13.9 % (ref 11.5–14.5)
ERYTHROCYTE [DISTWIDTH] IN BLOOD BY AUTOMATED COUNT: 13.9 % (ref 12.3–15.4)
ERYTHROCYTE [DISTWIDTH] IN BLOOD BY AUTOMATED COUNT: 13.9 % (ref 12.3–15.4)
ERYTHROCYTE [DISTWIDTH] IN BLOOD BY AUTOMATED COUNT: 14 % (ref 12.3–15.4)
ERYTHROCYTE [DISTWIDTH] IN BLOOD BY AUTOMATED COUNT: 14 % (ref 12.3–15.4)
ERYTHROCYTE [DISTWIDTH] IN BLOOD BY AUTOMATED COUNT: 14.1 % (ref 11.5–14.5)
ERYTHROCYTE [DISTWIDTH] IN BLOOD BY AUTOMATED COUNT: 14.1 % (ref 12.3–15.4)
ERYTHROCYTE [DISTWIDTH] IN BLOOD BY AUTOMATED COUNT: 14.2 % (ref 12.3–15.4)
ERYTHROCYTE [DISTWIDTH] IN BLOOD BY AUTOMATED COUNT: 14.2 % (ref 12.3–15.4)
ERYTHROCYTE [DISTWIDTH] IN BLOOD BY AUTOMATED COUNT: 14.3 % (ref 12.3–15.4)
ERYTHROCYTE [DISTWIDTH] IN BLOOD BY AUTOMATED COUNT: 14.3 % (ref 12.3–15.4)
ERYTHROCYTE [DISTWIDTH] IN BLOOD BY AUTOMATED COUNT: 14.4 % (ref 12.3–15.4)
ERYTHROCYTE [DISTWIDTH] IN BLOOD BY AUTOMATED COUNT: 14.5 % (ref 12.3–15.4)
ERYTHROCYTE [DISTWIDTH] IN BLOOD BY AUTOMATED COUNT: 14.5 % (ref 12.3–15.4)
ERYTHROCYTE [DISTWIDTH] IN BLOOD BY AUTOMATED COUNT: 14.6 % (ref 12.3–15.4)
ERYTHROCYTE [DISTWIDTH] IN BLOOD BY AUTOMATED COUNT: 14.7 % (ref 12.3–15.4)
ERYTHROCYTE [DISTWIDTH] IN BLOOD BY AUTOMATED COUNT: 14.9 % (ref 12.3–15.4)
ERYTHROCYTE [DISTWIDTH] IN BLOOD BY AUTOMATED COUNT: 15.4 % (ref 12.3–15.4)
ERYTHROCYTE [DISTWIDTH] IN BLOOD BY AUTOMATED COUNT: 15.5 % (ref 12.3–15.4)
ERYTHROCYTE [DISTWIDTH] IN BLOOD BY AUTOMATED COUNT: 15.6 % (ref 12.3–15.4)
ERYTHROCYTE [DISTWIDTH] IN BLOOD BY AUTOMATED COUNT: 15.6 % (ref 12.3–15.4)
ERYTHROCYTE [DISTWIDTH] IN BLOOD BY AUTOMATED COUNT: 15.7 % (ref 12.3–15.4)
ERYTHROCYTE [SEDIMENTATION RATE] IN BLOOD: 93 MM/HR (ref 0–20)
FLUAV H1 2009 PAND RNA NPH QL NAA+PROBE: NOT DETECTED
FLUAV H1 HA GENE NPH QL NAA+PROBE: NOT DETECTED
FLUAV H3 RNA NPH QL NAA+PROBE: NOT DETECTED
FLUAV SUBTYP SPEC NAA+PROBE: NOT DETECTED
FLUBV RNA ISLT QL NAA+PROBE: NOT DETECTED
FOLATE SERPL-MCNC: 8.34 NG/ML (ref 4.78–24.2)
GAS FLOW AIRWAY: 2 LPM
GFR SERPL CREATININE-BSD FRML MDRD: 10 ML/MIN/1.73
GFR SERPL CREATININE-BSD FRML MDRD: 11 ML/MIN/1.73
GFR SERPL CREATININE-BSD FRML MDRD: 12 ML/MIN/1.73
GFR SERPL CREATININE-BSD FRML MDRD: 14 ML/MIN/1.73
GFR SERPL CREATININE-BSD FRML MDRD: 15 ML/MIN/1.73
GFR SERPL CREATININE-BSD FRML MDRD: 4 ML/MIN/1.73
GFR SERPL CREATININE-BSD FRML MDRD: 4 ML/MIN/1.73
GFR SERPL CREATININE-BSD FRML MDRD: 5 ML/MIN/1.73
GFR SERPL CREATININE-BSD FRML MDRD: 5 ML/MIN/1.73
GFR SERPL CREATININE-BSD FRML MDRD: 6 ML/MIN/1.73
GFR SERPL CREATININE-BSD FRML MDRD: 7 ML/MIN/1.73
GFR SERPL CREATININE-BSD FRML MDRD: 9 ML/MIN/1.73
GFR SERPL CREATININE-BSD FRML MDRD: ABNORMAL ML/MIN/1.73
GFR SERPL CREATININE-BSD FRML MDRD: ABNORMAL ML/MIN/{1.73_M2}
GLOBULIN UR ELPH-MCNC: 3.2 GM/DL
GLOBULIN UR ELPH-MCNC: 3.6 GM/DL
GLOBULIN UR ELPH-MCNC: 3.7 GM/DL
GLOBULIN UR ELPH-MCNC: 3.7 GM/DL
GLOBULIN UR ELPH-MCNC: 4 GM/DL
GLOBULIN UR ELPH-MCNC: 4.3 GM/DL
GLOBULIN UR ELPH-MCNC: 4.6 GM/DL
GLUCOSE BLD-MCNC: 100 MG/DL (ref 65–99)
GLUCOSE BLD-MCNC: 101 MG/DL (ref 65–99)
GLUCOSE BLD-MCNC: 108 MG/DL (ref 65–99)
GLUCOSE BLD-MCNC: 110 MG/DL (ref 65–99)
GLUCOSE BLD-MCNC: 112 MG/DL (ref 65–99)
GLUCOSE BLD-MCNC: 118 MG/DL (ref 65–99)
GLUCOSE BLD-MCNC: 127 MG/DL (ref 65–99)
GLUCOSE BLD-MCNC: 130 MG/DL (ref 65–99)
GLUCOSE BLD-MCNC: 137 MG/DL (ref 65–99)
GLUCOSE BLD-MCNC: 152 MG/DL (ref 65–99)
GLUCOSE BLD-MCNC: 166 MG/DL (ref 65–99)
GLUCOSE BLD-MCNC: 168 MG/DL (ref 65–99)
GLUCOSE BLD-MCNC: 192 MG/DL (ref 65–99)
GLUCOSE BLD-MCNC: 197 MG/DL (ref 65–99)
GLUCOSE BLD-MCNC: 197 MG/DL (ref 65–99)
GLUCOSE BLD-MCNC: 203 MG/DL (ref 65–99)
GLUCOSE BLD-MCNC: 217 MG/DL (ref 65–99)
GLUCOSE BLD-MCNC: 225 MG/DL (ref 65–99)
GLUCOSE BLD-MCNC: 226 MG/DL (ref 65–99)
GLUCOSE BLD-MCNC: 258 MG/DL (ref 65–99)
GLUCOSE BLD-MCNC: 268 MG/DL (ref 65–99)
GLUCOSE BLD-MCNC: 344 MG/DL (ref 65–99)
GLUCOSE BLD-MCNC: 349 MG/DL (ref 65–99)
GLUCOSE BLD-MCNC: 441 MG/DL (ref 65–99)
GLUCOSE BLD-MCNC: 61 MG/DL (ref 65–99)
GLUCOSE BLD-MCNC: 73 MG/DL (ref 65–99)
GLUCOSE BLD-MCNC: 82 MG/DL (ref 65–99)
GLUCOSE BLD-MCNC: 83 MG/DL (ref 65–99)
GLUCOSE BLD-MCNC: 97 MG/DL (ref 65–99)
GLUCOSE BLDC GLUCOMTR-MCNC: 100 MG/DL (ref 70–130)
GLUCOSE BLDC GLUCOMTR-MCNC: 100 MG/DL (ref 70–130)
GLUCOSE BLDC GLUCOMTR-MCNC: 105 MG/DL (ref 70–130)
GLUCOSE BLDC GLUCOMTR-MCNC: 106 MG/DL (ref 70–130)
GLUCOSE BLDC GLUCOMTR-MCNC: 106 MG/DL (ref 70–130)
GLUCOSE BLDC GLUCOMTR-MCNC: 108 MG/DL (ref 70–130)
GLUCOSE BLDC GLUCOMTR-MCNC: 110 MG/DL (ref 70–130)
GLUCOSE BLDC GLUCOMTR-MCNC: 111 MG/DL (ref 70–130)
GLUCOSE BLDC GLUCOMTR-MCNC: 114 MG/DL (ref 70–130)
GLUCOSE BLDC GLUCOMTR-MCNC: 115 MG/DL (ref 70–130)
GLUCOSE BLDC GLUCOMTR-MCNC: 115 MG/DL (ref 70–130)
GLUCOSE BLDC GLUCOMTR-MCNC: 116 MG/DL (ref 70–130)
GLUCOSE BLDC GLUCOMTR-MCNC: 116 MG/DL (ref 70–130)
GLUCOSE BLDC GLUCOMTR-MCNC: 117 MG/DL (ref 70–130)
GLUCOSE BLDC GLUCOMTR-MCNC: 118 MG/DL (ref 70–130)
GLUCOSE BLDC GLUCOMTR-MCNC: 118 MG/DL (ref 70–130)
GLUCOSE BLDC GLUCOMTR-MCNC: 126 MG/DL (ref 70–130)
GLUCOSE BLDC GLUCOMTR-MCNC: 129 MG/DL (ref 70–130)
GLUCOSE BLDC GLUCOMTR-MCNC: 130 MG/DL (ref 70–130)
GLUCOSE BLDC GLUCOMTR-MCNC: 132 MG/DL (ref 70–130)
GLUCOSE BLDC GLUCOMTR-MCNC: 136 MG/DL (ref 70–130)
GLUCOSE BLDC GLUCOMTR-MCNC: 137 MG/DL (ref 70–130)
GLUCOSE BLDC GLUCOMTR-MCNC: 140 MG/DL (ref 70–130)
GLUCOSE BLDC GLUCOMTR-MCNC: 141 MG/DL (ref 70–130)
GLUCOSE BLDC GLUCOMTR-MCNC: 141 MG/DL (ref 70–130)
GLUCOSE BLDC GLUCOMTR-MCNC: 143 MG/DL (ref 70–130)
GLUCOSE BLDC GLUCOMTR-MCNC: 143 MG/DL (ref 70–130)
GLUCOSE BLDC GLUCOMTR-MCNC: 144 MG/DL (ref 70–130)
GLUCOSE BLDC GLUCOMTR-MCNC: 145 MG/DL (ref 70–130)
GLUCOSE BLDC GLUCOMTR-MCNC: 146 MG/DL (ref 70–130)
GLUCOSE BLDC GLUCOMTR-MCNC: 146 MG/DL (ref 70–130)
GLUCOSE BLDC GLUCOMTR-MCNC: 147 MG/DL (ref 70–130)
GLUCOSE BLDC GLUCOMTR-MCNC: 152 MG/DL (ref 70–130)
GLUCOSE BLDC GLUCOMTR-MCNC: 154 MG/DL (ref 70–130)
GLUCOSE BLDC GLUCOMTR-MCNC: 155 MG/DL (ref 70–130)
GLUCOSE BLDC GLUCOMTR-MCNC: 163 MG/DL (ref 70–130)
GLUCOSE BLDC GLUCOMTR-MCNC: 165 MG/DL (ref 70–130)
GLUCOSE BLDC GLUCOMTR-MCNC: 172 MG/DL (ref 70–130)
GLUCOSE BLDC GLUCOMTR-MCNC: 173 MG/DL (ref 70–130)
GLUCOSE BLDC GLUCOMTR-MCNC: 174 MG/DL (ref 70–130)
GLUCOSE BLDC GLUCOMTR-MCNC: 177 MG/DL (ref 70–130)
GLUCOSE BLDC GLUCOMTR-MCNC: 178 MG/DL (ref 70–130)
GLUCOSE BLDC GLUCOMTR-MCNC: 178 MG/DL (ref 70–130)
GLUCOSE BLDC GLUCOMTR-MCNC: 182 MG/DL (ref 70–130)
GLUCOSE BLDC GLUCOMTR-MCNC: 182 MG/DL (ref 70–130)
GLUCOSE BLDC GLUCOMTR-MCNC: 186 MG/DL (ref 70–130)
GLUCOSE BLDC GLUCOMTR-MCNC: 187 MG/DL (ref 70–130)
GLUCOSE BLDC GLUCOMTR-MCNC: 190 MG/DL (ref 70–130)
GLUCOSE BLDC GLUCOMTR-MCNC: 196 MG/DL (ref 70–130)
GLUCOSE BLDC GLUCOMTR-MCNC: 198 MG/DL (ref 70–130)
GLUCOSE BLDC GLUCOMTR-MCNC: 202 MG/DL (ref 70–130)
GLUCOSE BLDC GLUCOMTR-MCNC: 202 MG/DL (ref 70–130)
GLUCOSE BLDC GLUCOMTR-MCNC: 203 MG/DL (ref 70–130)
GLUCOSE BLDC GLUCOMTR-MCNC: 205 MG/DL (ref 70–130)
GLUCOSE BLDC GLUCOMTR-MCNC: 206 MG/DL (ref 70–130)
GLUCOSE BLDC GLUCOMTR-MCNC: 212 MG/DL (ref 70–130)
GLUCOSE BLDC GLUCOMTR-MCNC: 214 MG/DL (ref 70–130)
GLUCOSE BLDC GLUCOMTR-MCNC: 214 MG/DL (ref 70–130)
GLUCOSE BLDC GLUCOMTR-MCNC: 217 MG/DL (ref 70–130)
GLUCOSE BLDC GLUCOMTR-MCNC: 222 MG/DL (ref 70–130)
GLUCOSE BLDC GLUCOMTR-MCNC: 223 MG/DL (ref 70–130)
GLUCOSE BLDC GLUCOMTR-MCNC: 224 MG/DL (ref 70–130)
GLUCOSE BLDC GLUCOMTR-MCNC: 225 MG/DL (ref 70–130)
GLUCOSE BLDC GLUCOMTR-MCNC: 228 MG/DL (ref 70–130)
GLUCOSE BLDC GLUCOMTR-MCNC: 232 MG/DL (ref 70–130)
GLUCOSE BLDC GLUCOMTR-MCNC: 232 MG/DL (ref 70–130)
GLUCOSE BLDC GLUCOMTR-MCNC: 237 MG/DL (ref 70–130)
GLUCOSE BLDC GLUCOMTR-MCNC: 248 MG/DL (ref 70–130)
GLUCOSE BLDC GLUCOMTR-MCNC: 267 MG/DL (ref 70–130)
GLUCOSE BLDC GLUCOMTR-MCNC: 270 MG/DL (ref 70–130)
GLUCOSE BLDC GLUCOMTR-MCNC: 273 MG/DL (ref 70–130)
GLUCOSE BLDC GLUCOMTR-MCNC: 278 MG/DL (ref 70–130)
GLUCOSE BLDC GLUCOMTR-MCNC: 283 MG/DL (ref 70–130)
GLUCOSE BLDC GLUCOMTR-MCNC: 337 MG/DL (ref 70–130)
GLUCOSE BLDC GLUCOMTR-MCNC: 346 MG/DL (ref 70–130)
GLUCOSE BLDC GLUCOMTR-MCNC: 380 MG/DL (ref 70–130)
GLUCOSE BLDC GLUCOMTR-MCNC: 404 MG/DL (ref 70–130)
GLUCOSE BLDC GLUCOMTR-MCNC: 433 MG/DL (ref 70–130)
GLUCOSE BLDC GLUCOMTR-MCNC: 70 MG/DL (ref 70–130)
GLUCOSE BLDC GLUCOMTR-MCNC: 76 MG/DL (ref 70–130)
GLUCOSE BLDC GLUCOMTR-MCNC: 76 MG/DL (ref 70–130)
GLUCOSE BLDC GLUCOMTR-MCNC: 78 MG/DL (ref 70–130)
GLUCOSE BLDC GLUCOMTR-MCNC: 80 MG/DL (ref 70–130)
GLUCOSE BLDC GLUCOMTR-MCNC: 82 MG/DL (ref 70–130)
GLUCOSE BLDC GLUCOMTR-MCNC: 84 MG/DL (ref 70–130)
GLUCOSE BLDC GLUCOMTR-MCNC: 85 MG/DL (ref 70–130)
GLUCOSE BLDC GLUCOMTR-MCNC: 87 MG/DL (ref 70–130)
GLUCOSE BLDC GLUCOMTR-MCNC: 87 MG/DL (ref 70–130)
GLUCOSE BLDC GLUCOMTR-MCNC: 92 MG/DL (ref 70–130)
GLUCOSE BLDC GLUCOMTR-MCNC: 93 MG/DL (ref 70–130)
GLUCOSE BLDC GLUCOMTR-MCNC: 95 MG/DL (ref 70–130)
GLUCOSE BLDC GLUCOMTR-MCNC: 97 MG/DL (ref 70–130)
GLUCOSE BLDC GLUCOMTR-MCNC: 97 MG/DL (ref 70–130)
GRAM STN SPEC: ABNORMAL
HADV DNA SPEC NAA+PROBE: NOT DETECTED
HBA1C MFR BLD: 7.3 % (ref 4.8–5.6)
HBA1C MFR BLD: 9.57 % (ref 4.8–5.6)
HBV SURFACE AG SERPL QL IA: NORMAL
HCO3 BLDA-SCNC: 22.9 MMOL/L (ref 22–28)
HCO3 BLDA-SCNC: 25.1 MMOL/L (ref 22–28)
HCOV 229E RNA SPEC QL NAA+PROBE: NOT DETECTED
HCOV HKU1 RNA SPEC QL NAA+PROBE: NOT DETECTED
HCOV NL63 RNA SPEC QL NAA+PROBE: NOT DETECTED
HCOV OC43 RNA SPEC QL NAA+PROBE: NOT DETECTED
HCT VFR BLD AUTO: 28.6 % (ref 37.5–51)
HCT VFR BLD AUTO: 28.8 % (ref 37.5–51)
HCT VFR BLD AUTO: 28.8 % (ref 37.5–51)
HCT VFR BLD AUTO: 29.3 % (ref 37.5–51)
HCT VFR BLD AUTO: 29.3 % (ref 37.5–51)
HCT VFR BLD AUTO: 29.4 % (ref 37.5–51)
HCT VFR BLD AUTO: 29.9 % (ref 37.5–51)
HCT VFR BLD AUTO: 30.3 % (ref 37.5–51)
HCT VFR BLD AUTO: 30.9 % (ref 37.5–51)
HCT VFR BLD AUTO: 31.7 % (ref 37.5–51)
HCT VFR BLD AUTO: 32.5 % (ref 37.5–51)
HCT VFR BLD AUTO: 33.3 % (ref 37.5–51)
HCT VFR BLD AUTO: 33.6 % (ref 40.4–52.2)
HCT VFR BLD AUTO: 34.2 % (ref 37.5–51)
HCT VFR BLD AUTO: 34.4 % (ref 37.5–51)
HCT VFR BLD AUTO: 34.4 % (ref 37.5–51)
HCT VFR BLD AUTO: 34.7 % (ref 37.5–51)
HCT VFR BLD AUTO: 35.2 % (ref 37.5–51)
HCT VFR BLD AUTO: 35.6 % (ref 37.5–51)
HCT VFR BLD AUTO: 36.4 % (ref 37.5–51)
HCT VFR BLD AUTO: 36.5 % (ref 37.5–51)
HCT VFR BLD AUTO: 36.7 % (ref 37.5–51)
HCT VFR BLD AUTO: 36.8 % (ref 37.5–51)
HCT VFR BLD AUTO: 37.1 % (ref 37.5–51)
HCT VFR BLD AUTO: 37.2 % (ref 37.5–51)
HCT VFR BLD AUTO: 38 % (ref 40.4–52.2)
HCT VFR BLD AUTO: 38.3 % (ref 40.4–52.2)
HCT VFR BLD AUTO: 38.7 % (ref 37.5–51)
HCT VFR BLD AUTO: 39.9 % (ref 40.4–52.2)
HGB BLD-MCNC: 10 G/DL (ref 13–17.7)
HGB BLD-MCNC: 10.4 G/DL (ref 13–17.7)
HGB BLD-MCNC: 10.7 G/DL (ref 13–17.7)
HGB BLD-MCNC: 10.7 G/DL (ref 13–17.7)
HGB BLD-MCNC: 10.9 G/DL (ref 13–17.7)
HGB BLD-MCNC: 11 G/DL (ref 13–17.7)
HGB BLD-MCNC: 11.1 G/DL (ref 13–17.7)
HGB BLD-MCNC: 11.2 G/DL (ref 13–17.7)
HGB BLD-MCNC: 11.3 G/DL (ref 13.7–17.6)
HGB BLD-MCNC: 11.3 G/DL (ref 13–17.7)
HGB BLD-MCNC: 11.3 G/DL (ref 13–17.7)
HGB BLD-MCNC: 11.4 G/DL (ref 13–17.7)
HGB BLD-MCNC: 11.4 G/DL (ref 13–17.7)
HGB BLD-MCNC: 11.6 G/DL (ref 13–17.7)
HGB BLD-MCNC: 11.9 G/DL (ref 13–17.7)
HGB BLD-MCNC: 12 G/DL (ref 13–17.7)
HGB BLD-MCNC: 12.1 G/DL (ref 13–17.7)
HGB BLD-MCNC: 12.2 G/DL (ref 13.7–17.6)
HGB BLD-MCNC: 13.1 G/DL (ref 13.7–17.6)
HGB BLD-MCNC: 13.1 G/DL (ref 13.7–17.6)
HGB BLD-MCNC: 8.9 G/DL (ref 13–17.7)
HGB BLD-MCNC: 9 G/DL (ref 13–17.7)
HGB BLD-MCNC: 9 G/DL (ref 13–17.7)
HGB BLD-MCNC: 9.2 G/DL (ref 13–17.7)
HGB BLD-MCNC: 9.3 G/DL (ref 13–17.7)
HGB BLD-MCNC: 9.5 G/DL (ref 13–17.7)
HGB BLD-MCNC: 9.7 G/DL (ref 13–17.7)
HGB BLD-MCNC: 9.8 G/DL (ref 13–17.7)
HGB BLD-MCNC: 9.9 G/DL (ref 13–17.7)
HMPV RNA NPH QL NAA+NON-PROBE: NOT DETECTED
HOLD SPECIMEN: NORMAL
HOROWITZ INDEX BLD+IHG-RTO: 30 %
HPIV1 RNA SPEC QL NAA+PROBE: NOT DETECTED
HPIV2 RNA SPEC QL NAA+PROBE: NOT DETECTED
HPIV3 RNA NPH QL NAA+PROBE: NOT DETECTED
HPIV4 P GENE NPH QL NAA+PROBE: NOT DETECTED
IMM GRANULOCYTES # BLD AUTO: 0 10*3/MM3 (ref 0–0.03)
IMM GRANULOCYTES # BLD AUTO: 0.02 10*3/MM3 (ref 0–0.03)
IMM GRANULOCYTES # BLD AUTO: 0.02 10*3/MM3 (ref 0–0.03)
IMM GRANULOCYTES # BLD AUTO: 0.03 10*3/MM3 (ref 0–0.03)
IMM GRANULOCYTES # BLD AUTO: 0.03 10*3/MM3 (ref 0–0.05)
IMM GRANULOCYTES # BLD AUTO: 0.03 10*3/MM3 (ref 0–0.05)
IMM GRANULOCYTES # BLD AUTO: 0.04 10*3/MM3 (ref 0–0.05)
IMM GRANULOCYTES # BLD AUTO: 0.05 10*3/MM3 (ref 0–0.05)
IMM GRANULOCYTES # BLD AUTO: 0.05 10*3/MM3 (ref 0–0.05)
IMM GRANULOCYTES # BLD AUTO: 0.07 10*3/MM3 (ref 0–0.05)
IMM GRANULOCYTES # BLD AUTO: 0.07 10*3/MM3 (ref 0–0.05)
IMM GRANULOCYTES # BLD AUTO: 0.08 10*3/MM3 (ref 0–0.05)
IMM GRANULOCYTES # BLD AUTO: 0.08 10*3/MM3 (ref 0–0.05)
IMM GRANULOCYTES # BLD AUTO: 0.17 10*3/MM3 (ref 0–0.05)
IMM GRANULOCYTES NFR BLD AUTO: 0 % (ref 0–0.5)
IMM GRANULOCYTES NFR BLD AUTO: 0.2 % (ref 0–0.5)
IMM GRANULOCYTES NFR BLD AUTO: 0.3 % (ref 0–0.5)
IMM GRANULOCYTES NFR BLD AUTO: 0.4 % (ref 0–0.5)
IMM GRANULOCYTES NFR BLD AUTO: 0.5 % (ref 0–0.5)
IMM GRANULOCYTES NFR BLD AUTO: 0.6 % (ref 0–0.5)
IMM GRANULOCYTES NFR BLD AUTO: 0.7 % (ref 0–0.5)
IMM GRANULOCYTES NFR BLD AUTO: 1.4 % (ref 0–0.5)
INR PPP: 1.1 (ref 2–3)
INR PPP: 1.4 (ref 2–3)
INR PPP: 1.43 (ref 0.9–1.1)
INR PPP: 1.45 (ref 0.9–1.1)
INR PPP: 1.49 (ref 0.9–1.1)
INR PPP: 1.5
INR PPP: 1.5
INR PPP: 1.5 (ref 2–3)
INR PPP: 1.6
INR PPP: 1.66 (ref 0.9–1.1)
INR PPP: 1.7 (ref 0.9–1.1)
INR PPP: 1.77 (ref 0.9–1.1)
INR PPP: 1.77 (ref 0.9–1.1)
INR PPP: 1.8
INR PPP: 1.8
INR PPP: 1.9
INR PPP: 1.93 (ref 0.9–1.1)
INR PPP: 2
INR PPP: 2
INR PPP: 2 (ref 2–3)
INR PPP: 2.02 (ref 0.9–1.1)
INR PPP: 2.02 (ref 0.9–1.1)
INR PPP: 2.07 (ref 0.9–1.1)
INR PPP: 2.1
INR PPP: 2.1 (ref 2–3)
INR PPP: 2.18 (ref 0.9–1.1)
INR PPP: 2.2
INR PPP: 2.3
INR PPP: 2.36 (ref 0.9–1.1)
INR PPP: 2.42 (ref 0.9–1.1)
INR PPP: 2.42 (ref 0.9–1.1)
INR PPP: 2.43 (ref 0.9–1.1)
INR PPP: 2.5
INR PPP: 2.56 (ref 0.9–1.1)
INR PPP: 2.58 (ref 0.9–1.1)
INR PPP: 2.6
INR PPP: 2.62 (ref 0.9–1.1)
INR PPP: 2.63 (ref 0.9–1.1)
INR PPP: 2.66 (ref 0.9–1.1)
INR PPP: 2.7 (ref 2–3)
INR PPP: 2.8
INR PPP: 2.8
INR PPP: 2.81 (ref 0.9–1.1)
INR PPP: 2.9
INR PPP: 2.94 (ref 0.9–1.1)
INR PPP: 3
INR PPP: 3.1
INR PPP: 3.2 (ref 0.9–1.1)
INR PPP: 3.22 (ref 0.9–1.1)
INR PPP: 3.26 (ref 0.9–1.1)
INR PPP: 3.28 (ref 0.9–1.1)
INR PPP: 3.39 (ref 0.9–1.1)
INR PPP: 3.4
INR PPP: 3.42 (ref 0.9–1.1)
INR PPP: 3.5
INR PPP: 3.6
INR PPP: 3.6
INR PPP: 3.7
INR PPP: 3.7
INR PPP: 3.76 (ref 0.9–1.1)
INR PPP: 3.78 (ref 0.9–1.1)
INR PPP: 3.9
INR PPP: 4.2
INR PPP: 4.2 (ref 2–3)
INR PPP: 4.4
INR PPP: 4.49 (ref 0.9–1.1)
INR PPP: 4.5
INR PPP: 4.66 (ref 0.9–1.1)
INR PPP: 5.32 (ref 0.9–1.1)
IRON 24H UR-MRATE: 36 MCG/DL (ref 59–158)
IRON SATN MFR SERPL: 20 % (ref 20–50)
ISOLATED FROM: ABNORMAL
LEFT ATRIUM VOLUME INDEX: 40 ML/M2
LIPASE SERPL-CCNC: 26 U/L (ref 13–60)
LYMPHOCYTES # BLD AUTO: 0.54 10*3/MM3 (ref 0.7–3.1)
LYMPHOCYTES # BLD AUTO: 0.6 10*3/MM3 (ref 0.7–3.1)
LYMPHOCYTES # BLD AUTO: 0.65 10*3/MM3 (ref 0.7–3.1)
LYMPHOCYTES # BLD AUTO: 0.86 10*3/MM3 (ref 0.7–3.1)
LYMPHOCYTES # BLD AUTO: 0.98 10*3/MM3 (ref 0.7–3.1)
LYMPHOCYTES # BLD AUTO: 0.99 10*3/MM3 (ref 0.7–3.1)
LYMPHOCYTES # BLD AUTO: 1.03 10*3/MM3 (ref 0.7–3.1)
LYMPHOCYTES # BLD AUTO: 1.07 10*3/MM3 (ref 0.7–3.1)
LYMPHOCYTES # BLD AUTO: 1.14 10*3/MM3 (ref 0.7–3.1)
LYMPHOCYTES # BLD AUTO: 1.32 10*3/MM3 (ref 0.7–3.1)
LYMPHOCYTES # BLD AUTO: 1.37 10*3/MM3 (ref 0.7–3.1)
LYMPHOCYTES # BLD AUTO: 1.38 10*3/MM3 (ref 0.7–3.1)
LYMPHOCYTES # BLD AUTO: 1.53 10*3/MM3 (ref 0.7–3.1)
LYMPHOCYTES # BLD AUTO: 1.57 10*3/MM3 (ref 0.7–3.1)
LYMPHOCYTES # BLD AUTO: 1.71 10*3/MM3 (ref 0.9–4.8)
LYMPHOCYTES # BLD AUTO: 2.04 10*3/MM3 (ref 0.9–4.8)
LYMPHOCYTES # BLD AUTO: 2.07 10*3/MM3 (ref 0.7–3.1)
LYMPHOCYTES # BLD AUTO: 2.44 10*3/MM3 (ref 0.9–4.8)
LYMPHOCYTES # BLD AUTO: 3.91 10*3/MM3 (ref 0.9–4.8)
LYMPHOCYTES # BLD MANUAL: 0.54 10*3/MM3 (ref 0.7–3.1)
LYMPHOCYTES # BLD MANUAL: 0.78 10*3/MM3 (ref 0.7–3.1)
LYMPHOCYTES NFR BLD AUTO: 10.2 % (ref 19.6–45.3)
LYMPHOCYTES NFR BLD AUTO: 11.1 % (ref 19.6–45.3)
LYMPHOCYTES NFR BLD AUTO: 15.3 % (ref 19.6–45.3)
LYMPHOCYTES NFR BLD AUTO: 16.8 % (ref 19.6–45.3)
LYMPHOCYTES NFR BLD AUTO: 17.2 % (ref 19.6–45.3)
LYMPHOCYTES NFR BLD AUTO: 18 % (ref 19.6–45.3)
LYMPHOCYTES NFR BLD AUTO: 19 % (ref 19.6–45.3)
LYMPHOCYTES NFR BLD AUTO: 23.1 % (ref 19.6–45.3)
LYMPHOCYTES NFR BLD AUTO: 23.2 % (ref 19.6–45.3)
LYMPHOCYTES NFR BLD AUTO: 24.2 % (ref 19.6–45.3)
LYMPHOCYTES NFR BLD AUTO: 25.2 % (ref 19.6–45.3)
LYMPHOCYTES NFR BLD AUTO: 3.4 % (ref 19.6–45.3)
LYMPHOCYTES NFR BLD AUTO: 31.2 % (ref 19.6–45.3)
LYMPHOCYTES NFR BLD AUTO: 4 % (ref 19.6–45.3)
LYMPHOCYTES NFR BLD AUTO: 6.5 % (ref 19.6–45.3)
LYMPHOCYTES NFR BLD AUTO: 8.1 % (ref 19.6–45.3)
LYMPHOCYTES NFR BLD AUTO: 8.6 % (ref 19.6–45.3)
LYMPHOCYTES NFR BLD AUTO: 9.2 % (ref 19.6–45.3)
LYMPHOCYTES NFR BLD AUTO: 9.2 % (ref 19.6–45.3)
LYMPHOCYTES NFR BLD MANUAL: 2 % (ref 19.6–45.3)
LYMPHOCYTES NFR BLD MANUAL: 4 % (ref 5–12)
LYMPHOCYTES NFR BLD MANUAL: 5.1 % (ref 19.6–45.3)
M PNEUMO IGG SER IA-ACNC: NOT DETECTED
MAGNESIUM SERPL-MCNC: 1.7 MG/DL (ref 1.6–2.4)
MAGNESIUM SERPL-MCNC: 1.9 MG/DL (ref 1.6–2.4)
MAGNESIUM SERPL-MCNC: 2 MG/DL (ref 1.6–2.4)
MAGNESIUM SERPL-MCNC: 2.2 MG/DL (ref 1.6–2.4)
MAGNESIUM SERPL-MCNC: 2.4 MG/DL (ref 1.6–2.4)
MCH RBC QN AUTO: 29.1 PG (ref 26.6–33)
MCH RBC QN AUTO: 29.4 PG (ref 26.6–33)
MCH RBC QN AUTO: 29.4 PG (ref 26.6–33)
MCH RBC QN AUTO: 29.5 PG (ref 26.6–33)
MCH RBC QN AUTO: 29.6 PG (ref 26.6–33)
MCH RBC QN AUTO: 29.6 PG (ref 26.6–33)
MCH RBC QN AUTO: 29.8 PG (ref 26.6–33)
MCH RBC QN AUTO: 29.8 PG (ref 26.6–33)
MCH RBC QN AUTO: 30 PG (ref 26.6–33)
MCH RBC QN AUTO: 30 PG (ref 26.6–33)
MCH RBC QN AUTO: 30.1 PG (ref 26.6–33)
MCH RBC QN AUTO: 30.5 PG (ref 26.6–33)
MCH RBC QN AUTO: 30.5 PG (ref 26.6–33)
MCH RBC QN AUTO: 30.6 PG (ref 26.6–33)
MCH RBC QN AUTO: 30.6 PG (ref 26.6–33)
MCH RBC QN AUTO: 30.7 PG (ref 26.6–33)
MCH RBC QN AUTO: 30.8 PG (ref 26.6–33)
MCH RBC QN AUTO: 30.8 PG (ref 26.6–33)
MCH RBC QN AUTO: 31 PG (ref 26.6–33)
MCH RBC QN AUTO: 31 PG (ref 26.6–33)
MCH RBC QN AUTO: 31.2 PG (ref 26.6–33)
MCH RBC QN AUTO: 31.2 PG (ref 27–32.7)
MCH RBC QN AUTO: 31.3 PG (ref 26.6–33)
MCH RBC QN AUTO: 31.3 PG (ref 27–32.7)
MCH RBC QN AUTO: 31.5 PG (ref 26.6–33)
MCH RBC QN AUTO: 31.6 PG (ref 27–32.7)
MCH RBC QN AUTO: 31.7 PG (ref 27–32.7)
MCHC RBC AUTO-ENTMCNC: 30.5 G/DL (ref 31.5–35.7)
MCHC RBC AUTO-ENTMCNC: 30.6 G/DL (ref 31.5–35.7)
MCHC RBC AUTO-ENTMCNC: 30.7 G/DL (ref 31.5–35.7)
MCHC RBC AUTO-ENTMCNC: 30.7 G/DL (ref 31.5–35.7)
MCHC RBC AUTO-ENTMCNC: 30.8 G/DL (ref 31.5–35.7)
MCHC RBC AUTO-ENTMCNC: 31 G/DL (ref 31.5–35.7)
MCHC RBC AUTO-ENTMCNC: 31 G/DL (ref 31.5–35.7)
MCHC RBC AUTO-ENTMCNC: 31.1 G/DL (ref 31.5–35.7)
MCHC RBC AUTO-ENTMCNC: 31.1 G/DL (ref 31.5–35.7)
MCHC RBC AUTO-ENTMCNC: 31.3 G/DL (ref 31.5–35.7)
MCHC RBC AUTO-ENTMCNC: 31.3 G/DL (ref 31.5–35.7)
MCHC RBC AUTO-ENTMCNC: 31.4 G/DL (ref 31.5–35.7)
MCHC RBC AUTO-ENTMCNC: 31.5 G/DL (ref 31.5–35.7)
MCHC RBC AUTO-ENTMCNC: 31.6 G/DL (ref 31.5–35.7)
MCHC RBC AUTO-ENTMCNC: 31.7 G/DL (ref 31.5–35.7)
MCHC RBC AUTO-ENTMCNC: 31.9 G/DL (ref 31.5–35.7)
MCHC RBC AUTO-ENTMCNC: 32 G/DL (ref 31.5–35.7)
MCHC RBC AUTO-ENTMCNC: 32.1 G/DL (ref 31.5–35.7)
MCHC RBC AUTO-ENTMCNC: 32.1 G/DL (ref 32.6–36.4)
MCHC RBC AUTO-ENTMCNC: 32.3 G/DL (ref 31.5–35.7)
MCHC RBC AUTO-ENTMCNC: 32.6 G/DL (ref 31.5–35.7)
MCHC RBC AUTO-ENTMCNC: 32.8 G/DL (ref 31.5–35.7)
MCHC RBC AUTO-ENTMCNC: 32.8 G/DL (ref 31.5–35.7)
MCHC RBC AUTO-ENTMCNC: 32.8 G/DL (ref 32.6–36.4)
MCHC RBC AUTO-ENTMCNC: 33.1 G/DL (ref 31.5–35.7)
MCHC RBC AUTO-ENTMCNC: 33.2 G/DL (ref 31.5–35.7)
MCHC RBC AUTO-ENTMCNC: 33.6 G/DL (ref 32.6–36.4)
MCHC RBC AUTO-ENTMCNC: 33.7 G/DL (ref 31.5–35.7)
MCHC RBC AUTO-ENTMCNC: 34.2 G/DL (ref 32.6–36.4)
MCV RBC AUTO: 100 FL (ref 79–97)
MCV RBC AUTO: 101.7 FL (ref 79–97)
MCV RBC AUTO: 88.8 FL (ref 79–97)
MCV RBC AUTO: 90.3 FL (ref 79–97)
MCV RBC AUTO: 90.6 FL (ref 79–97)
MCV RBC AUTO: 90.9 FL (ref 79–97)
MCV RBC AUTO: 91 FL (ref 79–97)
MCV RBC AUTO: 92.4 FL (ref 79–97)
MCV RBC AUTO: 92.5 FL (ref 79.8–96.2)
MCV RBC AUTO: 92.5 FL (ref 79–97)
MCV RBC AUTO: 92.8 FL (ref 79.8–96.2)
MCV RBC AUTO: 95.1 FL (ref 79–97)
MCV RBC AUTO: 95.6 FL (ref 79–97)
MCV RBC AUTO: 95.6 FL (ref 79–97)
MCV RBC AUTO: 95.7 FL (ref 79–97)
MCV RBC AUTO: 96.1 FL (ref 79–97)
MCV RBC AUTO: 96.4 FL (ref 79–97)
MCV RBC AUTO: 96.6 FL (ref 79.8–96.2)
MCV RBC AUTO: 96.7 FL (ref 79–97)
MCV RBC AUTO: 96.8 FL (ref 79–97)
MCV RBC AUTO: 97.2 FL (ref 79–97)
MCV RBC AUTO: 97.4 FL (ref 79.8–96.2)
MCV RBC AUTO: 97.6 FL (ref 79–97)
MCV RBC AUTO: 97.9 FL (ref 79–97)
MCV RBC AUTO: 98.6 FL (ref 79–97)
MCV RBC AUTO: 98.6 FL (ref 79–97)
MCV RBC AUTO: 98.7 FL (ref 79–97)
MCV RBC AUTO: 98.8 FL (ref 79–97)
MCV RBC AUTO: 99.7 FL (ref 79–97)
MODALITY: ABNORMAL
MODALITY: ABNORMAL
MONOCYTES # BLD AUTO: 0.46 10*3/MM3 (ref 0.1–0.9)
MONOCYTES # BLD AUTO: 0.6 10*3/MM3 (ref 0.2–1.2)
MONOCYTES # BLD AUTO: 0.64 10*3/MM3 (ref 0.1–0.9)
MONOCYTES # BLD AUTO: 0.66 10*3/MM3 (ref 0.1–0.9)
MONOCYTES # BLD AUTO: 0.72 10*3/MM3 (ref 0.2–1.2)
MONOCYTES # BLD AUTO: 0.73 10*3/MM3 (ref 0.1–0.9)
MONOCYTES # BLD AUTO: 0.73 10*3/MM3 (ref 0.2–1.2)
MONOCYTES # BLD AUTO: 0.76 10*3/MM3 (ref 0.1–0.9)
MONOCYTES # BLD AUTO: 0.76 10*3/MM3 (ref 0.2–1.2)
MONOCYTES # BLD AUTO: 0.77 10*3/MM3 (ref 0.1–0.9)
MONOCYTES # BLD AUTO: 0.77 10*3/MM3 (ref 0.1–0.9)
MONOCYTES # BLD AUTO: 0.8 10*3/MM3 (ref 0.1–0.9)
MONOCYTES # BLD AUTO: 0.81 10*3/MM3 (ref 0.1–0.9)
MONOCYTES # BLD AUTO: 0.83 10*3/MM3 (ref 0.1–0.9)
MONOCYTES # BLD AUTO: 0.88 10*3/MM3 (ref 0.1–0.9)
MONOCYTES # BLD AUTO: 0.97 10*3/MM3 (ref 0.1–0.9)
MONOCYTES # BLD AUTO: 1.12 10*3/MM3 (ref 0.1–0.9)
MONOCYTES # BLD AUTO: 1.16 10*3/MM3 (ref 0.1–0.9)
MONOCYTES # BLD AUTO: 1.21 10*3/MM3 (ref 0.1–0.9)
MONOCYTES # BLD AUTO: 1.56 10*3/MM3 (ref 0.1–0.9)
MONOCYTES NFR BLD AUTO: 10 % (ref 5–12)
MONOCYTES NFR BLD AUTO: 10.9 % (ref 5–12)
MONOCYTES NFR BLD AUTO: 11.4 % (ref 5–12)
MONOCYTES NFR BLD AUTO: 11.5 % (ref 5–12)
MONOCYTES NFR BLD AUTO: 12.4 % (ref 5–12)
MONOCYTES NFR BLD AUTO: 4.8 % (ref 5–12)
MONOCYTES NFR BLD AUTO: 5.3 % (ref 5–12)
MONOCYTES NFR BLD AUTO: 5.3 % (ref 5–12)
MONOCYTES NFR BLD AUTO: 5.8 % (ref 5–12)
MONOCYTES NFR BLD AUTO: 6.2 % (ref 5–12)
MONOCYTES NFR BLD AUTO: 6.3 % (ref 5–12)
MONOCYTES NFR BLD AUTO: 6.5 % (ref 5–12)
MONOCYTES NFR BLD AUTO: 7.6 % (ref 5–12)
MONOCYTES NFR BLD AUTO: 7.9 % (ref 5–12)
MONOCYTES NFR BLD AUTO: 8.6 % (ref 5–12)
MONOCYTES NFR BLD AUTO: 8.6 % (ref 5–12)
MONOCYTES NFR BLD AUTO: 8.7 % (ref 5–12)
MONOCYTES NFR BLD AUTO: 9 % (ref 5–12)
MONOCYTES NFR BLD AUTO: 9 % (ref 5–12)
MRSA SPEC QL CULT: ABNORMAL
NEUTROPHILS # BLD AUTO: 10.06 10*3/MM3 (ref 1.7–7)
NEUTROPHILS # BLD AUTO: 12.87 10*3/MM3 (ref 1.7–7)
NEUTROPHILS # BLD AUTO: 13.51 10*3/MM3 (ref 1.7–7)
NEUTROPHILS # BLD AUTO: 14.11 10*3/MM3 (ref 1.7–7)
NEUTROPHILS # BLD AUTO: 3.99 10*3/MM3 (ref 1.7–7)
NEUTROPHILS # BLD AUTO: 36.74 10*3/MM3 (ref 1.7–7)
NEUTROPHILS # BLD AUTO: 5.07 10*3/MM3 (ref 1.7–7)
NEUTROPHILS # BLD AUTO: 5.24 10*3/MM3 (ref 1.7–7)
NEUTROPHILS # BLD AUTO: 5.32 10*3/MM3 (ref 1.9–8.1)
NEUTROPHILS # BLD AUTO: 5.36 10*3/MM3 (ref 1.7–7)
NEUTROPHILS # BLD AUTO: 6.15 10*3/MM3 (ref 1.9–8.1)
NEUTROPHILS # BLD AUTO: 6.32 10*3/MM3 (ref 1.7–7)
NEUTROPHILS # BLD AUTO: 6.36 10*3/MM3 (ref 1.7–7)
NEUTROPHILS # BLD AUTO: 6.37 10*3/MM3 (ref 1.7–7)
NEUTROPHILS # BLD AUTO: 6.71 10*3/MM3 (ref 1.9–8.1)
NEUTROPHILS # BLD AUTO: 7.12 10*3/MM3 (ref 1.7–7)
NEUTROPHILS # BLD AUTO: 7.39 10*3/MM3 (ref 1.9–8.1)
NEUTROPHILS # BLD AUTO: 7.76 10*3/MM3 (ref 1.7–7)
NEUTROPHILS # BLD AUTO: 8.13 10*3/MM3 (ref 1.7–7)
NEUTROPHILS # BLD AUTO: 8.18 10*3/MM3 (ref 1.7–7)
NEUTROPHILS # BLD AUTO: 9.36 10*3/MM3 (ref 1.7–7)
NEUTROPHILS NFR BLD AUTO: 59 % (ref 42.7–76)
NEUTROPHILS NFR BLD AUTO: 59.9 % (ref 42.7–76)
NEUTROPHILS NFR BLD AUTO: 60.3 % (ref 42.7–76)
NEUTROPHILS NFR BLD AUTO: 63 % (ref 42.7–76)
NEUTROPHILS NFR BLD AUTO: 63.6 % (ref 42.7–76)
NEUTROPHILS NFR BLD AUTO: 68 % (ref 42.7–76)
NEUTROPHILS NFR BLD AUTO: 68.1 % (ref 42.7–76)
NEUTROPHILS NFR BLD AUTO: 69.9 % (ref 42.7–76)
NEUTROPHILS NFR BLD AUTO: 70.5 % (ref 42.7–76)
NEUTROPHILS NFR BLD AUTO: 70.9 % (ref 42.7–76)
NEUTROPHILS NFR BLD AUTO: 75.7 % (ref 42.7–76)
NEUTROPHILS NFR BLD AUTO: 75.8 % (ref 42.7–76)
NEUTROPHILS NFR BLD AUTO: 75.8 % (ref 42.7–76)
NEUTROPHILS NFR BLD AUTO: 76.6 % (ref 42.7–76)
NEUTROPHILS NFR BLD AUTO: 79 % (ref 42.7–76)
NEUTROPHILS NFR BLD AUTO: 80.1 % (ref 42.7–76)
NEUTROPHILS NFR BLD AUTO: 84.9 % (ref 42.7–76)
NEUTROPHILS NFR BLD AUTO: 89.6 % (ref 42.7–76)
NEUTROPHILS NFR BLD AUTO: 89.9 % (ref 42.7–76)
NEUTROPHILS NFR BLD MANUAL: 94 % (ref 42.7–76)
NEUTROPHILS NFR BLD MANUAL: 94.9 % (ref 42.7–76)
NRBC BLD AUTO-RTO: 0 /100 WBC (ref 0–0.2)
NRBC BLD AUTO-RTO: 0.1 /100 WBC (ref 0–0.2)
NRBC BLD AUTO-RTO: 0.1 /100 WBC (ref 0–0.2)
NT-PROBNP SERPL-MCNC: ABNORMAL PG/ML (ref 0–1800)
NT-PROBNP SERPL-MCNC: ABNORMAL PG/ML (ref 5–1800)
O2 A-A PPRESDIFF RESPIRATORY: 0.5 MMHG
PCO2 BLDA: 36.6 MM HG (ref 35–45)
PCO2 BLDA: 39.8 MM HG (ref 35–45)
PH BLDA: 7.37 PH UNITS (ref 7.35–7.45)
PH BLDA: 7.44 PH UNITS (ref 7.35–7.45)
PHOSPHATE SERPL-MCNC: 4 MG/DL (ref 2.5–4.5)
PHOSPHATE SERPL-MCNC: 4.2 MG/DL (ref 2.5–4.5)
PHOSPHATE SERPL-MCNC: 4.5 MG/DL (ref 2.5–4.5)
PHOSPHATE SERPL-MCNC: 4.5 MG/DL (ref 2.5–4.5)
PHOSPHATE SERPL-MCNC: 4.8 MG/DL (ref 2.5–4.5)
PHOSPHATE SERPL-MCNC: 4.9 MG/DL (ref 2.5–4.5)
PHOSPHATE SERPL-MCNC: 5.5 MG/DL (ref 2.5–4.5)
PHOSPHATE SERPL-MCNC: 5.8 MG/DL (ref 2.5–4.5)
PHOSPHATE SERPL-MCNC: 5.8 MG/DL (ref 2.5–4.5)
PHOSPHATE SERPL-MCNC: 6.4 MG/DL (ref 2.5–4.5)
PHOSPHATE SERPL-MCNC: 6.8 MG/DL (ref 2.5–4.5)
PHOSPHATE SERPL-MCNC: 6.8 MG/DL (ref 2.5–4.5)
PHOSPHATE SERPL-MCNC: 7 MG/DL (ref 2.5–4.5)
PHOSPHATE SERPL-MCNC: 7.4 MG/DL (ref 2.5–4.5)
PHOSPHATE SERPL-MCNC: 7.6 MG/DL (ref 2.5–4.5)
PHOSPHATE SERPL-MCNC: 8.5 MG/DL (ref 2.5–4.5)
PLAT MORPH BLD: NORMAL
PLAT MORPH BLD: NORMAL
PLATELET # BLD AUTO: 133 10*3/MM3 (ref 140–450)
PLATELET # BLD AUTO: 146 10*3/MM3 (ref 140–450)
PLATELET # BLD AUTO: 153 10*3/MM3 (ref 140–450)
PLATELET # BLD AUTO: 164 10*3/MM3 (ref 140–450)
PLATELET # BLD AUTO: 187 10*3/MM3 (ref 140–450)
PLATELET # BLD AUTO: 187 10*3/MM3 (ref 140–450)
PLATELET # BLD AUTO: 190 10*3/MM3 (ref 140–500)
PLATELET # BLD AUTO: 192 10*3/MM3 (ref 140–500)
PLATELET # BLD AUTO: 198 10*3/MM3 (ref 140–450)
PLATELET # BLD AUTO: 205 10*3/MM3 (ref 140–450)
PLATELET # BLD AUTO: 205 10*3/MM3 (ref 140–450)
PLATELET # BLD AUTO: 209 10*3/MM3 (ref 140–500)
PLATELET # BLD AUTO: 212 10*3/MM3 (ref 140–450)
PLATELET # BLD AUTO: 214 10*3/MM3 (ref 140–450)
PLATELET # BLD AUTO: 216 10*3/MM3 (ref 140–450)
PLATELET # BLD AUTO: 226 10*3/MM3 (ref 140–500)
PLATELET # BLD AUTO: 239 10*3/MM3 (ref 140–450)
PLATELET # BLD AUTO: 239 10*3/MM3 (ref 140–450)
PLATELET # BLD AUTO: 244 10*3/MM3 (ref 140–450)
PLATELET # BLD AUTO: 246 10*3/MM3 (ref 140–450)
PLATELET # BLD AUTO: 248 10*3/MM3 (ref 140–450)
PLATELET # BLD AUTO: 257 10*3/MM3 (ref 140–450)
PLATELET # BLD AUTO: 259 10*3/MM3 (ref 140–450)
PLATELET # BLD AUTO: 261 10*3/MM3 (ref 140–450)
PLATELET # BLD AUTO: 265 10*3/MM3 (ref 140–450)
PLATELET # BLD AUTO: 266 10*3/MM3 (ref 140–450)
PLATELET # BLD AUTO: 276 10*3/MM3 (ref 140–450)
PLATELET # BLD AUTO: 284 10*3/MM3 (ref 140–450)
PLATELET # BLD AUTO: 342 10*3/MM3 (ref 140–450)
PMV BLD AUTO: 10 FL (ref 6–12)
PMV BLD AUTO: 10.1 FL (ref 6–12)
PMV BLD AUTO: 10.1 FL (ref 6–12)
PMV BLD AUTO: 10.2 FL (ref 6–12)
PMV BLD AUTO: 10.3 FL (ref 6–12)
PMV BLD AUTO: 10.4 FL (ref 6–12)
PMV BLD AUTO: 10.4 FL (ref 6–12)
PMV BLD AUTO: 10.5 FL (ref 6–12)
PMV BLD AUTO: 10.7 FL (ref 6–12)
PMV BLD AUTO: 10.7 FL (ref 6–12)
PMV BLD AUTO: 11 FL (ref 6–12)
PMV BLD AUTO: 11 FL (ref 6–12)
PMV BLD AUTO: 9.6 FL (ref 6–12)
PMV BLD AUTO: 9.7 FL (ref 6–12)
PMV BLD AUTO: 9.8 FL (ref 6–12)
PMV BLD AUTO: 9.8 FL (ref 6–12)
PMV BLD AUTO: 9.9 FL (ref 6–12)
PO2 BLDA: 71.7 MM HG (ref 80–100)
PO2 BLDA: 92.3 MM HG (ref 80–100)
POTASSIUM BLD-SCNC: 3.3 MMOL/L (ref 3.5–5.2)
POTASSIUM BLD-SCNC: 3.4 MMOL/L (ref 3.5–5.2)
POTASSIUM BLD-SCNC: 3.5 MMOL/L (ref 3.5–5.2)
POTASSIUM BLD-SCNC: 3.6 MMOL/L (ref 3.5–5.2)
POTASSIUM BLD-SCNC: 3.7 MMOL/L (ref 3.5–5.2)
POTASSIUM BLD-SCNC: 3.8 MMOL/L (ref 3.5–5.2)
POTASSIUM BLD-SCNC: 3.9 MMOL/L (ref 3.5–5.2)
POTASSIUM BLD-SCNC: 4 MMOL/L (ref 3.5–5.2)
POTASSIUM BLD-SCNC: 4.1 MMOL/L (ref 3.5–5.2)
POTASSIUM BLD-SCNC: 4.2 MMOL/L (ref 3.5–5.2)
POTASSIUM BLD-SCNC: 4.4 MMOL/L (ref 3.5–5.2)
POTASSIUM BLD-SCNC: 4.6 MMOL/L (ref 3.5–5.2)
POTASSIUM BLD-SCNC: 4.7 MMOL/L (ref 3.5–5.2)
POTASSIUM BLD-SCNC: 4.7 MMOL/L (ref 3.5–5.2)
POTASSIUM BLD-SCNC: 4.8 MMOL/L (ref 3.5–5.2)
POTASSIUM BLD-SCNC: 4.9 MMOL/L (ref 3.5–5.2)
POTASSIUM BLD-SCNC: 5 MMOL/L (ref 3.5–5.2)
PREALB SERPL-MCNC: 22.8 MG/DL (ref 20–40)
PROCALCITONIN SERPL-MCNC: 0.35 NG/ML (ref 0.1–0.25)
PROCALCITONIN SERPL-MCNC: 1.33 NG/ML (ref 0.1–0.25)
PROT SERPL-MCNC: 6 G/DL (ref 6–8.5)
PROT SERPL-MCNC: 6.1 G/DL (ref 6–8.5)
PROT SERPL-MCNC: 6.2 G/DL (ref 6–8.5)
PROT SERPL-MCNC: 6.4 G/DL (ref 6–8.5)
PROT SERPL-MCNC: 6.9 G/DL (ref 6–8.5)
PROT SERPL-MCNC: 7.1 G/DL (ref 6–8.5)
PROT SERPL-MCNC: 7.5 G/DL (ref 6–8.5)
PROT SERPL-MCNC: 7.5 G/DL (ref 6–8.5)
PROT SERPL-MCNC: 7.7 G/DL (ref 6–8.5)
PROT SERPL-MCNC: 7.8 G/DL (ref 6–8.5)
PROT SERPL-MCNC: 7.8 G/DL (ref 6–8.5)
PROT SERPL-MCNC: 8.1 G/DL (ref 6–8.5)
PROTHROMBIN TIME: 17.2 SECONDS (ref 11.7–14.2)
PROTHROMBIN TIME: 17.4 SECONDS (ref 11.7–14.2)
PROTHROMBIN TIME: 17.8 SECONDS (ref 11.7–14.2)
PROTHROMBIN TIME: 19.3 SECONDS (ref 11.7–14.2)
PROTHROMBIN TIME: 19.6 SECONDS (ref 11.7–14.2)
PROTHROMBIN TIME: 20.3 SECONDS (ref 11.7–14.2)
PROTHROMBIN TIME: 20.3 SECONDS (ref 11.7–14.2)
PROTHROMBIN TIME: 21.8 SECONDS (ref 11.7–14.2)
PROTHROMBIN TIME: 22.5 SECONDS (ref 11.7–14.2)
PROTHROMBIN TIME: 22.5 SECONDS (ref 11.7–14.2)
PROTHROMBIN TIME: 23 SECONDS (ref 11.7–14.2)
PROTHROMBIN TIME: 24 SECONDS (ref 11.7–14.2)
PROTHROMBIN TIME: 25.5 SECONDS (ref 11.7–14.2)
PROTHROMBIN TIME: 26 SECONDS (ref 11.7–14.2)
PROTHROMBIN TIME: 26 SECONDS (ref 11.7–14.2)
PROTHROMBIN TIME: 26.1 SECONDS (ref 11.7–14.2)
PROTHROMBIN TIME: 27.2 SECONDS (ref 11.7–14.2)
PROTHROMBIN TIME: 27.4 SECONDS (ref 11.7–14.2)
PROTHROMBIN TIME: 27.7 SECONDS (ref 11.7–14.2)
PROTHROMBIN TIME: 27.8 SECONDS (ref 11.7–14.2)
PROTHROMBIN TIME: 28 SECONDS (ref 11.7–14.2)
PROTHROMBIN TIME: 29.3 SECONDS (ref 11.7–14.2)
PROTHROMBIN TIME: 30.3 SECONDS (ref 11.7–14.2)
PROTHROMBIN TIME: 32.5 SECONDS (ref 11.7–14.2)
PROTHROMBIN TIME: 32.7 SECONDS (ref 11.7–14.2)
PROTHROMBIN TIME: 32.9 SECONDS (ref 11.7–14.2)
PROTHROMBIN TIME: 33.1 SECONDS (ref 11.7–14.2)
PROTHROMBIN TIME: 34 SECONDS (ref 11.7–14.2)
PROTHROMBIN TIME: 34.3 SECONDS (ref 11.7–14.2)
PROTHROMBIN TIME: 36.9 SECONDS (ref 11.7–14.2)
PROTHROMBIN TIME: 37.1 SECONDS (ref 11.7–14.2)
PROTHROMBIN TIME: 42.5 SECONDS (ref 11.7–14.2)
PROTHROMBIN TIME: 43.7 SECONDS (ref 11.7–14.2)
PROTHROMBIN TIME: 48.6 SECONDS (ref 11.7–14.2)
RBC # BLD AUTO: 2.87 10*6/MM3 (ref 4.14–5.8)
RBC # BLD AUTO: 2.92 10*6/MM3 (ref 4.14–5.8)
RBC # BLD AUTO: 2.95 10*6/MM3 (ref 4.14–5.8)
RBC # BLD AUTO: 2.97 10*6/MM3 (ref 4.14–5.8)
RBC # BLD AUTO: 2.98 10*6/MM3 (ref 4.14–5.8)
RBC # BLD AUTO: 3.09 10*6/MM3 (ref 4.14–5.8)
RBC # BLD AUTO: 3.17 10*6/MM3 (ref 4.14–5.8)
RBC # BLD AUTO: 3.23 10*6/MM3 (ref 4.14–5.8)
RBC # BLD AUTO: 3.29 10*6/MM3 (ref 4.14–5.8)
RBC # BLD AUTO: 3.4 10*6/MM3 (ref 4.14–5.8)
RBC # BLD AUTO: 3.46 10*6/MM3 (ref 4.14–5.8)
RBC # BLD AUTO: 3.48 10*6/MM3 (ref 4.14–5.8)
RBC # BLD AUTO: 3.57 10*6/MM3 (ref 4.14–5.8)
RBC # BLD AUTO: 3.62 10*6/MM3 (ref 4.6–6)
RBC # BLD AUTO: 3.64 10*6/MM3 (ref 4.14–5.8)
RBC # BLD AUTO: 3.69 10*6/MM3 (ref 4.14–5.8)
RBC # BLD AUTO: 3.79 10*6/MM3 (ref 4.14–5.8)
RBC # BLD AUTO: 3.8 10*6/MM3 (ref 4.14–5.8)
RBC # BLD AUTO: 3.81 10*6/MM3 (ref 4.14–5.8)
RBC # BLD AUTO: 3.85 10*6/MM3 (ref 4.14–5.8)
RBC # BLD AUTO: 3.86 10*6/MM3 (ref 4.14–5.8)
RBC # BLD AUTO: 3.87 10*6/MM3 (ref 4.14–5.8)
RBC # BLD AUTO: 3.9 10*6/MM3 (ref 4.6–6)
RBC # BLD AUTO: 4.03 10*6/MM3 (ref 4.14–5.8)
RBC # BLD AUTO: 4.05 10*6/MM3 (ref 4.14–5.8)
RBC # BLD AUTO: 4.13 10*6/MM3 (ref 4.6–6)
RBC # BLD AUTO: 4.14 10*6/MM3 (ref 4.6–6)
RBC MORPH BLD: NORMAL
RBC MORPH BLD: NORMAL
RHINOVIRUS RNA SPEC NAA+PROBE: NOT DETECTED
RSV RNA NPH QL NAA+NON-PROBE: NOT DETECTED
SAO2 % BLDCOA: 94.9 % (ref 92–99)
SAO2 % BLDCOA: 96.9 % (ref 92–99)
SET MECH RESP RATE: 14
SODIUM BLD-SCNC: 130 MMOL/L (ref 136–145)
SODIUM BLD-SCNC: 131 MMOL/L (ref 136–145)
SODIUM BLD-SCNC: 132 MMOL/L (ref 136–145)
SODIUM BLD-SCNC: 133 MMOL/L (ref 136–145)
SODIUM BLD-SCNC: 134 MMOL/L (ref 136–145)
SODIUM BLD-SCNC: 134 MMOL/L (ref 136–145)
SODIUM BLD-SCNC: 135 MMOL/L (ref 136–145)
SODIUM BLD-SCNC: 136 MMOL/L (ref 136–145)
SODIUM BLD-SCNC: 137 MMOL/L (ref 136–145)
SODIUM BLD-SCNC: 138 MMOL/L (ref 136–145)
SODIUM BLD-SCNC: 138 MMOL/L (ref 136–145)
SODIUM BLD-SCNC: 139 MMOL/L (ref 136–145)
SODIUM BLD-SCNC: 141 MMOL/L (ref 136–145)
SODIUM BLD-SCNC: 141 MMOL/L (ref 136–145)
STREP GROUPING: ABNORMAL
TIBC SERPL-MCNC: 177 MCG/DL (ref 298–536)
TOTAL RATE: 15 BREATHS/MINUTE
TOTAL RATE: 27 BREATHS/MINUTE
TRANSFERRIN SERPL-MCNC: 119 MG/DL (ref 200–360)
TROPONIN T SERPL-MCNC: 0.06 NG/ML (ref 0–0.03)
TROPONIN T SERPL-MCNC: 0.07 NG/ML (ref 0–0.03)
TROPONIN T SERPL-MCNC: 0.09 NG/ML (ref 0–0.03)
TROPONIN T SERPL-MCNC: 0.23 NG/ML (ref 0–0.03)
TROPONIN T SERPL-MCNC: 0.36 NG/ML (ref 0–0.03)
UNIT  ABO: NORMAL
UNIT  ABO: NORMAL
UNIT  RH: NORMAL
UNIT  RH: NORMAL
UPPER ARTERIAL LEFT ARM BRACHIAL SYS MAX: 181 MMHG
VANCOMYCIN SERPL-MCNC: 20.2 MCG/ML (ref 5–40)
VANCOMYCIN SERPL-MCNC: 21.9 MCG/ML (ref 5–40)
VANCOMYCIN SERPL-MCNC: 23 MCG/ML (ref 5–40)
VANCOMYCIN SERPL-MCNC: 28.3 MCG/ML (ref 5–40)
VIT B12 BLD-MCNC: 604 PG/ML (ref 211–946)
VT ON VENT VENT: 618 ML
WBC MORPH BLD: NORMAL
WBC MORPH BLD: NORMAL
WBC NRBC COR # BLD: 10.14 10*3/MM3 (ref 3.4–10.8)
WBC NRBC COR # BLD: 10.24 10*3/MM3 (ref 3.4–10.8)
WBC NRBC COR # BLD: 10.6 10*3/MM3 (ref 3.4–10.8)
WBC NRBC COR # BLD: 10.66 10*3/MM3 (ref 3.4–10.8)
WBC NRBC COR # BLD: 12.38 10*3/MM3 (ref 3.4–10.8)
WBC NRBC COR # BLD: 12.53 10*3/MM3 (ref 3.4–10.8)
WBC NRBC COR # BLD: 12.53 10*3/MM3 (ref 4.5–10.7)
WBC NRBC COR # BLD: 13.35 10*3/MM3 (ref 3.4–10.8)
WBC NRBC COR # BLD: 15.07 10*3/MM3 (ref 3.4–10.8)
WBC NRBC COR # BLD: 15.17 10*3/MM3 (ref 3.4–10.8)
WBC NRBC COR # BLD: 15.7 10*3/MM3 (ref 3.4–10.8)
WBC NRBC COR # BLD: 39.09 10*3/MM3 (ref 3.4–10.8)
WBC NRBC COR # BLD: 6.31 10*3/MM3 (ref 3.4–10.8)
WBC NRBC COR # BLD: 6.62 10*3/MM3 (ref 3.4–10.8)
WBC NRBC COR # BLD: 7.26 10*3/MM3 (ref 3.4–10.8)
WBC NRBC COR # BLD: 7.69 10*3/MM3 (ref 3.4–10.8)
WBC NRBC COR # BLD: 8.06 10*3/MM3 (ref 3.4–10.8)
WBC NRBC COR # BLD: 8.07 10*3/MM3 (ref 3.4–10.8)
WBC NRBC COR # BLD: 8.12 10*3/MM3 (ref 3.4–10.8)
WBC NRBC COR # BLD: 8.21 10*3/MM3 (ref 3.4–10.8)
WBC NRBC COR # BLD: 8.44 10*3/MM3 (ref 4.5–10.7)
WBC NRBC COR # BLD: 8.62 10*3/MM3 (ref 3.4–10.8)
WBC NRBC COR # BLD: 8.93 10*3/MM3 (ref 3.4–10.8)
WBC NRBC COR # BLD: 8.93 10*3/MM3 (ref 3.4–10.8)
WBC NRBC COR # BLD: 9.35 10*3/MM3 (ref 3.4–10.8)
WBC NRBC COR # BLD: 9.39 10*3/MM3 (ref 3.4–10.8)
WBC NRBC COR # BLD: 9.51 10*3/MM3 (ref 4.5–10.7)
WBC NRBC COR # BLD: 9.68 10*3/MM3 (ref 3.4–10.8)
WBC NRBC COR # BLD: 9.68 10*3/MM3 (ref 4.5–10.7)
WHOLE BLOOD HOLD SPECIMEN: NORMAL

## 2019-01-01 PROCEDURE — 63710000001 INSULIN GLARGINE PER 5 UNITS: Performed by: INTERNAL MEDICINE

## 2019-01-01 PROCEDURE — 63710000001 INSULIN ISOPHANE HUMAN PER 5 UNITS: Performed by: HOSPITALIST

## 2019-01-01 PROCEDURE — 82962 GLUCOSE BLOOD TEST: CPT

## 2019-01-01 PROCEDURE — 93005 ELECTROCARDIOGRAM TRACING: CPT | Performed by: NURSE PRACTITIONER

## 2019-01-01 PROCEDURE — 94799 UNLISTED PULMONARY SVC/PX: CPT

## 2019-01-01 PROCEDURE — 87340 HEPATITIS B SURFACE AG IA: CPT | Performed by: INTERNAL MEDICINE

## 2019-01-01 PROCEDURE — 99285 EMERGENCY DEPT VISIT HI MDM: CPT

## 2019-01-01 PROCEDURE — 25010000002 PROPOFOL 10 MG/ML EMULSION: Performed by: NURSE ANESTHETIST, CERTIFIED REGISTERED

## 2019-01-01 PROCEDURE — 93005 ELECTROCARDIOGRAM TRACING: CPT | Performed by: EMERGENCY MEDICINE

## 2019-01-01 PROCEDURE — 85610 PROTHROMBIN TIME: CPT | Performed by: HOSPITALIST

## 2019-01-01 PROCEDURE — 36415 COLL VENOUS BLD VENIPUNCTURE: CPT

## 2019-01-01 PROCEDURE — 80069 RENAL FUNCTION PANEL: CPT | Performed by: INTERNAL MEDICINE

## 2019-01-01 PROCEDURE — 63710000001 INSULIN REGULAR HUMAN PER 5 UNITS: Performed by: INTERNAL MEDICINE

## 2019-01-01 PROCEDURE — 87205 SMEAR GRAM STAIN: CPT | Performed by: HOSPITALIST

## 2019-01-01 PROCEDURE — 63710000001 INSULIN REGULAR HUMAN PER 5 UNITS: Performed by: HOSPITALIST

## 2019-01-01 PROCEDURE — 80053 COMPREHEN METABOLIC PANEL: CPT | Performed by: PHYSICIAN ASSISTANT

## 2019-01-01 PROCEDURE — 83605 ASSAY OF LACTIC ACID: CPT | Performed by: PHYSICIAN ASSISTANT

## 2019-01-01 PROCEDURE — 85610 PROTHROMBIN TIME: CPT | Performed by: EMERGENCY MEDICINE

## 2019-01-01 PROCEDURE — 93306 TTE W/DOPPLER COMPLETE: CPT

## 2019-01-01 PROCEDURE — 80202 ASSAY OF VANCOMYCIN: CPT | Performed by: HOSPITALIST

## 2019-01-01 PROCEDURE — 83605 ASSAY OF LACTIC ACID: CPT | Performed by: NURSE PRACTITIONER

## 2019-01-01 PROCEDURE — 85610 PROTHROMBIN TIME: CPT | Performed by: FAMILY MEDICINE

## 2019-01-01 PROCEDURE — 80053 COMPREHEN METABOLIC PANEL: CPT | Performed by: EMERGENCY MEDICINE

## 2019-01-01 PROCEDURE — 85610 PROTHROMBIN TIME: CPT | Performed by: INTERNAL MEDICINE

## 2019-01-01 PROCEDURE — 82803 BLOOD GASES ANY COMBINATION: CPT

## 2019-01-01 PROCEDURE — 99291 CRITICAL CARE FIRST HOUR: CPT

## 2019-01-01 PROCEDURE — 87150 DNA/RNA AMPLIFIED PROBE: CPT | Performed by: EMERGENCY MEDICINE

## 2019-01-01 PROCEDURE — 87040 BLOOD CULTURE FOR BACTERIA: CPT | Performed by: NURSE PRACTITIONER

## 2019-01-01 PROCEDURE — 87205 SMEAR GRAM STAIN: CPT | Performed by: SURGERY

## 2019-01-01 PROCEDURE — 93000 ELECTROCARDIOGRAM COMPLETE: CPT | Performed by: INTERNAL MEDICINE

## 2019-01-01 PROCEDURE — 85027 COMPLETE CBC AUTOMATED: CPT | Performed by: INTERNAL MEDICINE

## 2019-01-01 PROCEDURE — 84134 ASSAY OF PREALBUMIN: CPT

## 2019-01-01 PROCEDURE — 36415 COLL VENOUS BLD VENIPUNCTURE: CPT | Performed by: INTERNAL MEDICINE

## 2019-01-01 PROCEDURE — 36416 COLLJ CAPILLARY BLOOD SPEC: CPT | Performed by: FAMILY MEDICINE

## 2019-01-01 PROCEDURE — 85025 COMPLETE CBC W/AUTO DIFF WBC: CPT | Performed by: INTERNAL MEDICINE

## 2019-01-01 PROCEDURE — 80053 COMPREHEN METABOLIC PANEL: CPT

## 2019-01-01 PROCEDURE — 83880 ASSAY OF NATRIURETIC PEPTIDE: CPT | Performed by: EMERGENCY MEDICINE

## 2019-01-01 PROCEDURE — 25010000002 PHENYLEPHRINE PER 1 ML: Performed by: NURSE ANESTHETIST, CERTIFIED REGISTERED

## 2019-01-01 PROCEDURE — 85025 COMPLETE CBC W/AUTO DIFF WBC: CPT | Performed by: PHYSICIAN ASSISTANT

## 2019-01-01 PROCEDURE — 87081 CULTURE SCREEN ONLY: CPT | Performed by: INTERNAL MEDICINE

## 2019-01-01 PROCEDURE — 83605 ASSAY OF LACTIC ACID: CPT | Performed by: EMERGENCY MEDICINE

## 2019-01-01 PROCEDURE — 25010000002 CEFTAZIDIME PER 500 MG: Performed by: INTERNAL MEDICINE

## 2019-01-01 PROCEDURE — 99232 SBSQ HOSP IP/OBS MODERATE 35: CPT | Performed by: INTERNAL MEDICINE

## 2019-01-01 PROCEDURE — 94660 CPAP INITIATION&MGMT: CPT

## 2019-01-01 PROCEDURE — 25010000003 CEFAZOLIN IN DEXTROSE 2-4 GM/100ML-% SOLUTION: Performed by: INTERNAL MEDICINE

## 2019-01-01 PROCEDURE — 83605 ASSAY OF LACTIC ACID: CPT

## 2019-01-01 PROCEDURE — 93005 ELECTROCARDIOGRAM TRACING: CPT | Performed by: INTERNAL MEDICINE

## 2019-01-01 PROCEDURE — 85025 COMPLETE CBC W/AUTO DIFF WBC: CPT | Performed by: NURSE PRACTITIONER

## 2019-01-01 PROCEDURE — 85610 PROTHROMBIN TIME: CPT | Performed by: NURSE PRACTITIONER

## 2019-01-01 PROCEDURE — 83735 ASSAY OF MAGNESIUM: CPT | Performed by: HOSPITALIST

## 2019-01-01 PROCEDURE — 94640 AIRWAY INHALATION TREATMENT: CPT

## 2019-01-01 PROCEDURE — 84100 ASSAY OF PHOSPHORUS: CPT | Performed by: EMERGENCY MEDICINE

## 2019-01-01 PROCEDURE — 63710000001 INSULIN ISOPHANE HUMAN PER 5 UNITS: Performed by: NURSE PRACTITIONER

## 2019-01-01 PROCEDURE — 85027 COMPLETE CBC AUTOMATED: CPT | Performed by: HOSPITALIST

## 2019-01-01 PROCEDURE — 83036 HEMOGLOBIN GLYCOSYLATED A1C: CPT | Performed by: INTERNAL MEDICINE

## 2019-01-01 PROCEDURE — 99214 OFFICE O/P EST MOD 30 MIN: CPT | Performed by: NURSE PRACTITIONER

## 2019-01-01 PROCEDURE — 25010000002 PIPERACILLIN SOD-TAZOBACTAM PER 1 G: Performed by: EMERGENCY MEDICINE

## 2019-01-01 PROCEDURE — 63710000001 INSULIN REGULAR HUMAN PER 5 UNITS: Performed by: NURSE PRACTITIONER

## 2019-01-01 PROCEDURE — 25010000002 VANCOMYCIN 10 G RECONSTITUTED SOLUTION: Performed by: EMERGENCY MEDICINE

## 2019-01-01 PROCEDURE — 73630 X-RAY EXAM OF FOOT: CPT

## 2019-01-01 PROCEDURE — 5A1D70Z PERFORMANCE OF URINARY FILTRATION, INTERMITTENT, LESS THAN 6 HOURS PER DAY: ICD-10-PCS | Performed by: INTERNAL MEDICINE

## 2019-01-01 PROCEDURE — 0LBV0ZZ EXCISION OF RIGHT FOOT TENDON, OPEN APPROACH: ICD-10-PCS | Performed by: SURGERY

## 2019-01-01 PROCEDURE — 87147 CULTURE TYPE IMMUNOLOGIC: CPT | Performed by: EMERGENCY MEDICINE

## 2019-01-01 PROCEDURE — 25010000002 PIPERACILLIN SOD-TAZOBACTAM PER 1 G: Performed by: NURSE PRACTITIONER

## 2019-01-01 PROCEDURE — 5A09357 ASSISTANCE WITH RESPIRATORY VENTILATION, LESS THAN 24 CONSECUTIVE HOURS, CONTINUOUS POSITIVE AIRWAY PRESSURE: ICD-10-PCS | Performed by: EMERGENCY MEDICINE

## 2019-01-01 PROCEDURE — 97162 PT EVAL MOD COMPLEX 30 MIN: CPT

## 2019-01-01 PROCEDURE — 93010 ELECTROCARDIOGRAM REPORT: CPT | Performed by: INTERNAL MEDICINE

## 2019-01-01 PROCEDURE — 25010000002 CEFTAZIDIME: Performed by: INTERNAL MEDICINE

## 2019-01-01 PROCEDURE — 80076 HEPATIC FUNCTION PANEL: CPT | Performed by: INTERNAL MEDICINE

## 2019-01-01 PROCEDURE — G0463 HOSPITAL OUTPT CLINIC VISIT: HCPCS

## 2019-01-01 PROCEDURE — 80202 ASSAY OF VANCOMYCIN: CPT | Performed by: INTERNAL MEDICINE

## 2019-01-01 PROCEDURE — 86140 C-REACTIVE PROTEIN: CPT | Performed by: PHYSICIAN ASSISTANT

## 2019-01-01 PROCEDURE — 85025 COMPLETE CBC W/AUTO DIFF WBC: CPT | Performed by: EMERGENCY MEDICINE

## 2019-01-01 PROCEDURE — 97110 THERAPEUTIC EXERCISES: CPT

## 2019-01-01 PROCEDURE — 99283 EMERGENCY DEPT VISIT LOW MDM: CPT

## 2019-01-01 PROCEDURE — 25010000002 MORPHINE PER 10 MG: Performed by: INTERNAL MEDICINE

## 2019-01-01 PROCEDURE — 25010000002 HEPARIN (PORCINE) PER 1000 UNITS: Performed by: INTERNAL MEDICINE

## 2019-01-01 PROCEDURE — 82607 VITAMIN B-12: CPT | Performed by: HOSPITALIST

## 2019-01-01 PROCEDURE — 36415 COLL VENOUS BLD VENIPUNCTURE: CPT | Performed by: EMERGENCY MEDICINE

## 2019-01-01 PROCEDURE — 83735 ASSAY OF MAGNESIUM: CPT | Performed by: NURSE PRACTITIONER

## 2019-01-01 PROCEDURE — 0JBN0ZZ EXCISION OF RIGHT LOWER LEG SUBCUTANEOUS TISSUE AND FASCIA, OPEN APPROACH: ICD-10-PCS | Performed by: SURGERY

## 2019-01-01 PROCEDURE — 71045 X-RAY EXAM CHEST 1 VIEW: CPT

## 2019-01-01 PROCEDURE — 99223 1ST HOSP IP/OBS HIGH 75: CPT | Performed by: INTERNAL MEDICINE

## 2019-01-01 PROCEDURE — 63710000001 INSULIN LISPRO (HUMAN) PER 5 UNITS: Performed by: NURSE PRACTITIONER

## 2019-01-01 PROCEDURE — 2W0SX6Z CHANGE PRESSURE DRESSING ON RIGHT FOOT: ICD-10-PCS | Performed by: INTERNAL MEDICINE

## 2019-01-01 PROCEDURE — 25010000002 ONDANSETRON PER 1 MG: Performed by: EMERGENCY MEDICINE

## 2019-01-01 PROCEDURE — 83690 ASSAY OF LIPASE: CPT

## 2019-01-01 PROCEDURE — 80053 COMPREHEN METABOLIC PANEL: CPT | Performed by: HOSPITALIST

## 2019-01-01 PROCEDURE — 83735 ASSAY OF MAGNESIUM: CPT | Performed by: EMERGENCY MEDICINE

## 2019-01-01 PROCEDURE — 25010000003 CEFAZOLIN PER 500 MG: Performed by: SURGERY

## 2019-01-01 PROCEDURE — 85025 COMPLETE CBC W/AUTO DIFF WBC: CPT | Performed by: HOSPITALIST

## 2019-01-01 PROCEDURE — 36600 WITHDRAWAL OF ARTERIAL BLOOD: CPT

## 2019-01-01 PROCEDURE — 84484 ASSAY OF TROPONIN QUANT: CPT | Performed by: EMERGENCY MEDICINE

## 2019-01-01 PROCEDURE — 85652 RBC SED RATE AUTOMATED: CPT | Performed by: PHYSICIAN ASSISTANT

## 2019-01-01 PROCEDURE — 80053 COMPREHEN METABOLIC PANEL: CPT | Performed by: INTERNAL MEDICINE

## 2019-01-01 PROCEDURE — 83036 HEMOGLOBIN GLYCOSYLATED A1C: CPT

## 2019-01-01 PROCEDURE — 99284 EMERGENCY DEPT VISIT MOD MDM: CPT

## 2019-01-01 PROCEDURE — 25010000002 HEPARIN (PORCINE) PER 1000 UNITS: Performed by: SURGERY

## 2019-01-01 PROCEDURE — 25010000002 ONDANSETRON PER 1 MG: Performed by: NURSE PRACTITIONER

## 2019-01-01 PROCEDURE — 87186 SC STD MICRODIL/AGAR DIL: CPT | Performed by: EMERGENCY MEDICINE

## 2019-01-01 PROCEDURE — 87040 BLOOD CULTURE FOR BACTERIA: CPT | Performed by: INTERNAL MEDICINE

## 2019-01-01 PROCEDURE — 80069 RENAL FUNCTION PANEL: CPT | Performed by: HOSPITALIST

## 2019-01-01 PROCEDURE — 5A1D70Z PERFORMANCE OF URINARY FILTRATION, INTERMITTENT, LESS THAN 6 HOURS PER DAY: ICD-10-PCS | Performed by: SURGERY

## 2019-01-01 PROCEDURE — 85610 PROTHROMBIN TIME: CPT | Performed by: PHYSICIAN ASSISTANT

## 2019-01-01 PROCEDURE — 63710000001 INSULIN LISPRO (HUMAN) PER 5 UNITS: Performed by: INTERNAL MEDICINE

## 2019-01-01 PROCEDURE — 99214 OFFICE O/P EST MOD 30 MIN: CPT | Performed by: INTERNAL MEDICINE

## 2019-01-01 PROCEDURE — 25010000002 HYDRALAZINE PER 20 MG: Performed by: NURSE PRACTITIONER

## 2019-01-01 PROCEDURE — 87040 BLOOD CULTURE FOR BACTERIA: CPT | Performed by: PHYSICIAN ASSISTANT

## 2019-01-01 PROCEDURE — 63410000001 EPOETIN ALFA PER 1000 UNITS: Performed by: INTERNAL MEDICINE

## 2019-01-01 PROCEDURE — 25010000002 ONDANSETRON PER 1 MG: Performed by: INTERNAL MEDICINE

## 2019-01-01 PROCEDURE — 85730 THROMBOPLASTIN TIME PARTIAL: CPT | Performed by: NURSE PRACTITIONER

## 2019-01-01 PROCEDURE — 25010000002 FENTANYL CITRATE (PF) 100 MCG/2ML SOLUTION: Performed by: NURSE ANESTHETIST, CERTIFIED REGISTERED

## 2019-01-01 PROCEDURE — 70450 CT HEAD/BRAIN W/O DYE: CPT

## 2019-01-01 PROCEDURE — 83880 ASSAY OF NATRIURETIC PEPTIDE: CPT | Performed by: NURSE PRACTITIONER

## 2019-01-01 PROCEDURE — 25010000002 HYDROMORPHONE PER 4 MG: Performed by: SURGERY

## 2019-01-01 PROCEDURE — 83540 ASSAY OF IRON: CPT | Performed by: HOSPITALIST

## 2019-01-01 PROCEDURE — 25010000002 FENTANYL CITRATE (PF) 100 MCG/2ML SOLUTION: Performed by: ANESTHESIOLOGY

## 2019-01-01 PROCEDURE — 73718 MRI LOWER EXTREMITY W/O DYE: CPT

## 2019-01-01 PROCEDURE — 87075 CULTR BACTERIA EXCEPT BLOOD: CPT | Performed by: SURGERY

## 2019-01-01 PROCEDURE — 84145 PROCALCITONIN (PCT): CPT | Performed by: NURSE PRACTITIONER

## 2019-01-01 PROCEDURE — 93990 DOPPLER FLOW TESTING: CPT

## 2019-01-01 PROCEDURE — 99213 OFFICE O/P EST LOW 20 MIN: CPT | Performed by: FAMILY MEDICINE

## 2019-01-01 PROCEDURE — 73130 X-RAY EXAM OF HAND: CPT

## 2019-01-01 PROCEDURE — 84484 ASSAY OF TROPONIN QUANT: CPT | Performed by: NURSE PRACTITIONER

## 2019-01-01 PROCEDURE — 87186 SC STD MICRODIL/AGAR DIL: CPT | Performed by: SURGERY

## 2019-01-01 PROCEDURE — 80053 COMPREHEN METABOLIC PANEL: CPT | Performed by: NURSE PRACTITIONER

## 2019-01-01 PROCEDURE — 87147 CULTURE TYPE IMMUNOLOGIC: CPT | Performed by: SURGERY

## 2019-01-01 PROCEDURE — 25010000002 PIPERACILLIN SOD-TAZOBACTAM PER 1 G: Performed by: INTERNAL MEDICINE

## 2019-01-01 PROCEDURE — 87147 CULTURE TYPE IMMUNOLOGIC: CPT | Performed by: HOSPITALIST

## 2019-01-01 PROCEDURE — 99282 EMERGENCY DEPT VISIT SF MDM: CPT

## 2019-01-01 PROCEDURE — 87070 CULTURE OTHR SPECIMN AEROBIC: CPT | Performed by: HOSPITALIST

## 2019-01-01 PROCEDURE — 0100U HC BIOFIRE FILMARRAY RESP PANEL 2: CPT | Performed by: EMERGENCY MEDICINE

## 2019-01-01 PROCEDURE — 25010000002 VANCOMYCIN 10 G RECONSTITUTED SOLUTION: Performed by: NURSE PRACTITIONER

## 2019-01-01 PROCEDURE — 87040 BLOOD CULTURE FOR BACTERIA: CPT | Performed by: EMERGENCY MEDICINE

## 2019-01-01 PROCEDURE — 80048 BASIC METABOLIC PNL TOTAL CA: CPT | Performed by: INTERNAL MEDICINE

## 2019-01-01 PROCEDURE — 87493 C DIFF AMPLIFIED PROBE: CPT | Performed by: EMERGENCY MEDICINE

## 2019-01-01 PROCEDURE — 99221 1ST HOSP IP/OBS SF/LOW 40: CPT | Performed by: INTERNAL MEDICINE

## 2019-01-01 PROCEDURE — 83735 ASSAY OF MAGNESIUM: CPT | Performed by: INTERNAL MEDICINE

## 2019-01-01 PROCEDURE — 25010000002 VANCOMYCIN 750 MG RECONSTITUTED SOLUTION: Performed by: HOSPITALIST

## 2019-01-01 PROCEDURE — 99222 1ST HOSP IP/OBS MODERATE 55: CPT | Performed by: INTERNAL MEDICINE

## 2019-01-01 PROCEDURE — 85007 BL SMEAR W/DIFF WBC COUNT: CPT | Performed by: NURSE PRACTITIONER

## 2019-01-01 PROCEDURE — 87186 SC STD MICRODIL/AGAR DIL: CPT | Performed by: HOSPITALIST

## 2019-01-01 PROCEDURE — 93306 TTE W/DOPPLER COMPLETE: CPT | Performed by: INTERNAL MEDICINE

## 2019-01-01 PROCEDURE — 84145 PROCALCITONIN (PCT): CPT | Performed by: EMERGENCY MEDICINE

## 2019-01-01 PROCEDURE — 25010000002 MIDAZOLAM PER 1 MG: Performed by: ANESTHESIOLOGY

## 2019-01-01 PROCEDURE — 25010000003 CEFAZOLIN IN DEXTROSE 2-4 GM/100ML-% SOLUTION: Performed by: SURGERY

## 2019-01-01 PROCEDURE — 83880 ASSAY OF NATRIURETIC PEPTIDE: CPT | Performed by: INTERNAL MEDICINE

## 2019-01-01 PROCEDURE — 87077 CULTURE AEROBIC IDENTIFY: CPT | Performed by: SURGERY

## 2019-01-01 PROCEDURE — 84100 ASSAY OF PHOSPHORUS: CPT | Performed by: INTERNAL MEDICINE

## 2019-01-01 PROCEDURE — 82746 ASSAY OF FOLIC ACID SERUM: CPT | Performed by: HOSPITALIST

## 2019-01-01 PROCEDURE — 84484 ASSAY OF TROPONIN QUANT: CPT | Performed by: INTERNAL MEDICINE

## 2019-01-01 PROCEDURE — 84466 ASSAY OF TRANSFERRIN: CPT | Performed by: HOSPITALIST

## 2019-01-01 PROCEDURE — 80048 BASIC METABOLIC PNL TOTAL CA: CPT | Performed by: HOSPITALIST

## 2019-01-01 PROCEDURE — 84100 ASSAY OF PHOSPHORUS: CPT | Performed by: NURSE PRACTITIONER

## 2019-01-01 PROCEDURE — 25010000002 PIPERACILLIN SOD-TAZOBACTAM PER 1 G: Performed by: PHYSICIAN ASSISTANT

## 2019-01-01 PROCEDURE — 36416 COLLJ CAPILLARY BLOOD SPEC: CPT | Performed by: NURSE PRACTITIONER

## 2019-01-01 PROCEDURE — 85007 BL SMEAR W/DIFF WBC COUNT: CPT | Performed by: INTERNAL MEDICINE

## 2019-01-01 PROCEDURE — 83605 ASSAY OF LACTIC ACID: CPT | Performed by: INTERNAL MEDICINE

## 2019-01-01 PROCEDURE — 85025 COMPLETE CBC W/AUTO DIFF WBC: CPT

## 2019-01-01 PROCEDURE — 25010000002 VANCOMYCIN 10 G RECONSTITUTED SOLUTION: Performed by: PHYSICIAN ASSISTANT

## 2019-01-01 PROCEDURE — 85610 PROTHROMBIN TIME: CPT

## 2019-01-01 PROCEDURE — 93923 UPR/LXTR ART STDY 3+ LVLS: CPT

## 2019-01-01 PROCEDURE — 87070 CULTURE OTHR SPECIMN AEROBIC: CPT | Performed by: SURGERY

## 2019-01-01 RX ORDER — WARFARIN SODIUM 2.5 MG/1
2.5 TABLET ORAL DAILY
Start: 2019-01-01 | End: 2019-01-01 | Stop reason: SDUPTHER

## 2019-01-01 RX ORDER — SODIUM CHLORIDE 0.9 % (FLUSH) 0.9 %
10 SYRINGE (ML) INJECTION AS NEEDED
Status: DISCONTINUED | OUTPATIENT
Start: 2019-01-01 | End: 2019-01-01 | Stop reason: HOSPADM

## 2019-01-01 RX ORDER — PROMETHAZINE HYDROCHLORIDE 25 MG/ML
6.25 INJECTION, SOLUTION INTRAMUSCULAR; INTRAVENOUS
Status: DISCONTINUED | OUTPATIENT
Start: 2019-01-01 | End: 2019-01-01 | Stop reason: HOSPADM

## 2019-01-01 RX ORDER — HALOPERIDOL 1 MG/1
2 TABLET ORAL EVERY 4 HOURS PRN
Status: DISCONTINUED | OUTPATIENT
Start: 2019-01-01 | End: 2019-01-01 | Stop reason: HOSPADM

## 2019-01-01 RX ORDER — LORAZEPAM 2 MG/ML
2 CONCENTRATE ORAL
Status: DISCONTINUED | OUTPATIENT
Start: 2019-01-01 | End: 2019-01-01 | Stop reason: HOSPADM

## 2019-01-01 RX ORDER — PANTOPRAZOLE SODIUM 40 MG/10ML
40 INJECTION, POWDER, LYOPHILIZED, FOR SOLUTION INTRAVENOUS
Status: DISCONTINUED | OUTPATIENT
Start: 2019-01-01 | End: 2019-01-01

## 2019-01-01 RX ORDER — ASPIRIN 81 MG/1
81 TABLET ORAL DAILY
Status: DISCONTINUED | OUTPATIENT
Start: 2019-01-01 | End: 2019-01-01 | Stop reason: HOSPADM

## 2019-01-01 RX ORDER — ATORVASTATIN CALCIUM 20 MG/1
40 TABLET, FILM COATED ORAL DAILY
Status: DISCONTINUED | OUTPATIENT
Start: 2019-01-01 | End: 2019-01-01 | Stop reason: HOSPADM

## 2019-01-01 RX ORDER — DULOXETIN HYDROCHLORIDE 30 MG/1
60 CAPSULE, DELAYED RELEASE ORAL DAILY
Status: DISCONTINUED | OUTPATIENT
Start: 2019-01-01 | End: 2019-01-01 | Stop reason: HOSPADM

## 2019-01-01 RX ORDER — BUMETANIDE 0.25 MG/ML
2 INJECTION INTRAMUSCULAR; INTRAVENOUS ONCE
Status: COMPLETED | OUTPATIENT
Start: 2019-01-01 | End: 2019-01-01

## 2019-01-01 RX ORDER — HYDROCODONE BITARTRATE AND ACETAMINOPHEN 7.5; 325 MG/1; MG/1
1 TABLET ORAL ONCE AS NEEDED
Status: DISCONTINUED | OUTPATIENT
Start: 2019-01-01 | End: 2019-01-01 | Stop reason: HOSPADM

## 2019-01-01 RX ORDER — ACETAMINOPHEN 325 MG/1
650 TABLET ORAL EVERY 6 HOURS PRN
Status: DISCONTINUED | OUTPATIENT
Start: 2019-01-01 | End: 2019-01-01 | Stop reason: HOSPADM

## 2019-01-01 RX ORDER — METOPROLOL SUCCINATE 50 MG/1
50 TABLET, EXTENDED RELEASE ORAL
Status: DISCONTINUED | OUTPATIENT
Start: 2019-01-01 | End: 2019-01-01

## 2019-01-01 RX ORDER — WARFARIN SODIUM 2 MG/1
2 TABLET ORAL
Status: DISCONTINUED | OUTPATIENT
Start: 2019-01-01 | End: 2019-01-01

## 2019-01-01 RX ORDER — GLYCOPYRROLATE 0.2 MG/ML
0.2 INJECTION INTRAMUSCULAR; INTRAVENOUS
Status: DISCONTINUED | OUTPATIENT
Start: 2019-01-01 | End: 2019-01-01 | Stop reason: HOSPADM

## 2019-01-01 RX ORDER — PROMETHAZINE HYDROCHLORIDE 25 MG/ML
12.5 INJECTION, SOLUTION INTRAMUSCULAR; INTRAVENOUS EVERY 6 HOURS PRN
Status: DISCONTINUED | OUTPATIENT
Start: 2019-01-01 | End: 2019-01-01 | Stop reason: HOSPADM

## 2019-01-01 RX ORDER — DONEPEZIL HYDROCHLORIDE 10 MG/1
10 TABLET, FILM COATED ORAL NIGHTLY
Status: DISCONTINUED | OUTPATIENT
Start: 2019-01-01 | End: 2019-01-01 | Stop reason: HOSPADM

## 2019-01-01 RX ORDER — TORSEMIDE 20 MG/1
20 TABLET ORAL DAILY
Status: DISCONTINUED | OUTPATIENT
Start: 2019-01-01 | End: 2019-01-01

## 2019-01-01 RX ORDER — NICOTINE POLACRILEX 4 MG
15 LOZENGE BUCCAL
Status: DISCONTINUED | OUTPATIENT
Start: 2019-01-01 | End: 2019-01-01

## 2019-01-01 RX ORDER — DIPHENHYDRAMINE HCL 25 MG
25 CAPSULE ORAL
Status: DISCONTINUED | OUTPATIENT
Start: 2019-01-01 | End: 2019-01-01 | Stop reason: HOSPADM

## 2019-01-01 RX ORDER — PROMETHAZINE HYDROCHLORIDE 25 MG/ML
12.5 INJECTION, SOLUTION INTRAMUSCULAR; INTRAVENOUS ONCE AS NEEDED
Status: DISCONTINUED | OUTPATIENT
Start: 2019-01-01 | End: 2019-01-01 | Stop reason: HOSPADM

## 2019-01-01 RX ORDER — WARFARIN SODIUM 5 MG/1
TABLET ORAL
COMMUNITY
Start: 2019-01-01 | End: 2019-01-01 | Stop reason: HOSPADM

## 2019-01-01 RX ORDER — ACETAMINOPHEN 650 MG/1
650 SUPPOSITORY RECTAL EVERY 4 HOURS PRN
Status: DISCONTINUED | OUTPATIENT
Start: 2019-01-01 | End: 2019-01-01 | Stop reason: HOSPADM

## 2019-01-01 RX ORDER — SODIUM CHLORIDE 0.9 % (FLUSH) 0.9 %
3-10 SYRINGE (ML) INJECTION AS NEEDED
Status: DISCONTINUED | OUTPATIENT
Start: 2019-01-01 | End: 2019-01-01 | Stop reason: HOSPADM

## 2019-01-01 RX ORDER — METRONIDAZOLE 500 MG/1
TABLET ORAL
Refills: 0 | COMMUNITY
Start: 2019-01-01 | End: 2019-01-01 | Stop reason: SDUPTHER

## 2019-01-01 RX ORDER — MIDAZOLAM HYDROCHLORIDE 1 MG/ML
2 INJECTION INTRAMUSCULAR; INTRAVENOUS
Status: DISCONTINUED | OUTPATIENT
Start: 2019-01-01 | End: 2019-01-01 | Stop reason: HOSPADM

## 2019-01-01 RX ORDER — TAMSULOSIN HYDROCHLORIDE 0.4 MG/1
0.4 CAPSULE ORAL DAILY
Status: DISCONTINUED | OUTPATIENT
Start: 2019-01-01 | End: 2019-01-01 | Stop reason: HOSPADM

## 2019-01-01 RX ORDER — ALBUMIN (HUMAN) 12.5 G/50ML
12.5 SOLUTION INTRAVENOUS AS NEEDED
Status: ACTIVE | OUTPATIENT
Start: 2019-01-01 | End: 2019-01-01

## 2019-01-01 RX ORDER — PROMETHAZINE HYDROCHLORIDE 25 MG/1
25 TABLET ORAL ONCE AS NEEDED
Status: DISCONTINUED | OUTPATIENT
Start: 2019-01-01 | End: 2019-01-01 | Stop reason: HOSPADM

## 2019-01-01 RX ORDER — WARFARIN SODIUM 5 MG/1
TABLET ORAL
Qty: 90 TABLET | Refills: 2 | Status: ON HOLD | OUTPATIENT
Start: 2019-01-01 | End: 2019-01-01

## 2019-01-01 RX ORDER — ACETAMINOPHEN 160 MG/5ML
650 SOLUTION ORAL EVERY 4 HOURS PRN
Status: DISCONTINUED | OUTPATIENT
Start: 2019-01-01 | End: 2019-01-01 | Stop reason: HOSPADM

## 2019-01-01 RX ORDER — MORPHINE SULFATE 20 MG/ML
5 SOLUTION ORAL
Status: DISCONTINUED | OUTPATIENT
Start: 2019-01-01 | End: 2019-01-01 | Stop reason: HOSPADM

## 2019-01-01 RX ORDER — ALLOPURINOL 100 MG/1
100 TABLET ORAL DAILY
Status: DISCONTINUED | OUTPATIENT
Start: 2019-01-01 | End: 2019-01-01 | Stop reason: HOSPADM

## 2019-01-01 RX ORDER — SODIUM CHLORIDE, SODIUM LACTATE, POTASSIUM CHLORIDE, CALCIUM CHLORIDE 600; 310; 30; 20 MG/100ML; MG/100ML; MG/100ML; MG/100ML
9 INJECTION, SOLUTION INTRAVENOUS CONTINUOUS
Status: DISCONTINUED | OUTPATIENT
Start: 2019-01-01 | End: 2019-01-01

## 2019-01-01 RX ORDER — ONDANSETRON 4 MG/1
4 TABLET, FILM COATED ORAL EVERY 6 HOURS PRN
Status: DISCONTINUED | OUTPATIENT
Start: 2019-01-01 | End: 2019-01-01 | Stop reason: HOSPADM

## 2019-01-01 RX ORDER — WARFARIN SODIUM 5 MG/1
5 TABLET ORAL DAILY
Status: DISCONTINUED | OUTPATIENT
Start: 2019-01-01 | End: 2019-01-01

## 2019-01-01 RX ORDER — CEFAZOLIN SODIUM 2 G/100ML
2 INJECTION, SOLUTION INTRAVENOUS ONCE
Status: DISCONTINUED | OUTPATIENT
Start: 2019-01-01 | End: 2019-01-01 | Stop reason: SDUPTHER

## 2019-01-01 RX ORDER — ONDANSETRON 2 MG/ML
4 INJECTION INTRAMUSCULAR; INTRAVENOUS EVERY 6 HOURS PRN
Status: DISCONTINUED | OUTPATIENT
Start: 2019-01-01 | End: 2019-01-01

## 2019-01-01 RX ORDER — DULOXETIN HYDROCHLORIDE 60 MG/1
60 CAPSULE, DELAYED RELEASE ORAL DAILY
COMMUNITY
End: 2019-01-01 | Stop reason: SDUPTHER

## 2019-01-01 RX ORDER — DULOXETIN HYDROCHLORIDE 60 MG/1
60 CAPSULE, DELAYED RELEASE ORAL DAILY
Status: DISCONTINUED | OUTPATIENT
Start: 2019-01-01 | End: 2019-01-01 | Stop reason: HOSPADM

## 2019-01-01 RX ORDER — ONDANSETRON 2 MG/ML
4 INJECTION INTRAMUSCULAR; INTRAVENOUS EVERY 6 HOURS PRN
Status: DISCONTINUED | OUTPATIENT
Start: 2019-01-01 | End: 2019-01-01 | Stop reason: HOSPADM

## 2019-01-01 RX ORDER — WARFARIN SODIUM 2.5 MG/1
2.5 TABLET ORAL
Status: DISCONTINUED | OUTPATIENT
Start: 2019-01-01 | End: 2019-01-01

## 2019-01-01 RX ORDER — NITROGLYCERIN 20 MG/100ML
5-200 INJECTION INTRAVENOUS
Status: DISCONTINUED | OUTPATIENT
Start: 2019-01-01 | End: 2019-01-01

## 2019-01-01 RX ORDER — TORSEMIDE 100 MG/1
100 TABLET ORAL DAILY
Status: DISCONTINUED | OUTPATIENT
Start: 2019-01-01 | End: 2019-01-01 | Stop reason: HOSPADM

## 2019-01-01 RX ORDER — ONDANSETRON 2 MG/ML
4 INJECTION INTRAMUSCULAR; INTRAVENOUS ONCE
Status: DISCONTINUED | OUTPATIENT
Start: 2019-01-01 | End: 2019-01-01 | Stop reason: HOSPADM

## 2019-01-01 RX ORDER — WARFARIN SODIUM 5 MG/1
5 TABLET ORAL
Status: COMPLETED | OUTPATIENT
Start: 2019-01-01 | End: 2019-01-01

## 2019-01-01 RX ORDER — FAMOTIDINE 10 MG/ML
20 INJECTION, SOLUTION INTRAVENOUS ONCE
Status: COMPLETED | OUTPATIENT
Start: 2019-01-01 | End: 2019-01-01

## 2019-01-01 RX ORDER — SODIUM CHLORIDE 0.9 % (FLUSH) 0.9 %
10 SYRINGE (ML) INJECTION AS NEEDED
Status: DISCONTINUED | OUTPATIENT
Start: 2019-01-01 | End: 2019-01-01

## 2019-01-01 RX ORDER — NICOTINE POLACRILEX 4 MG
15 LOZENGE BUCCAL
Status: DISCONTINUED | OUTPATIENT
Start: 2019-01-01 | End: 2019-01-01 | Stop reason: HOSPADM

## 2019-01-01 RX ORDER — OXYCODONE AND ACETAMINOPHEN 7.5; 325 MG/1; MG/1
1 TABLET ORAL ONCE AS NEEDED
Status: DISCONTINUED | OUTPATIENT
Start: 2019-01-01 | End: 2019-01-01 | Stop reason: HOSPADM

## 2019-01-01 RX ORDER — SODIUM CHLORIDE 9 MG/ML
9 INJECTION, SOLUTION INTRAVENOUS CONTINUOUS
Status: DISCONTINUED | OUTPATIENT
Start: 2019-01-01 | End: 2019-01-01

## 2019-01-01 RX ORDER — AMLODIPINE BESYLATE 10 MG/1
10 TABLET ORAL DAILY
Status: DISCONTINUED | OUTPATIENT
Start: 2019-01-01 | End: 2019-01-01

## 2019-01-01 RX ORDER — NITROGLYCERIN 0.4 MG/1
0.4 TABLET SUBLINGUAL
Status: DISCONTINUED | OUTPATIENT
Start: 2019-01-01 | End: 2019-01-01 | Stop reason: HOSPADM

## 2019-01-01 RX ORDER — IPRATROPIUM BROMIDE AND ALBUTEROL SULFATE 2.5; .5 MG/3ML; MG/3ML
3 SOLUTION RESPIRATORY (INHALATION)
Status: COMPLETED | OUTPATIENT
Start: 2019-01-01 | End: 2019-01-01

## 2019-01-01 RX ORDER — METOPROLOL SUCCINATE 50 MG/1
50 TABLET, EXTENDED RELEASE ORAL
Status: DISCONTINUED | OUTPATIENT
Start: 2019-01-01 | End: 2019-01-01 | Stop reason: HOSPADM

## 2019-01-01 RX ORDER — CEFAZOLIN SODIUM 2 G/100ML
2 INJECTION, SOLUTION INTRAVENOUS EVERY 24 HOURS
Status: DISCONTINUED | OUTPATIENT
Start: 2019-01-01 | End: 2019-01-01

## 2019-01-01 RX ORDER — ALLOPURINOL 100 MG/1
TABLET ORAL
Qty: 90 TABLET | Refills: 0 | Status: SHIPPED | OUTPATIENT
Start: 2019-01-01 | End: 2019-01-01

## 2019-01-01 RX ORDER — DONEPEZIL HYDROCHLORIDE 10 MG/1
10 TABLET, FILM COATED ORAL NIGHTLY
Qty: 90 TABLET | Refills: 2 | Status: SHIPPED | OUTPATIENT
Start: 2019-01-01

## 2019-01-01 RX ORDER — PROPOFOL 10 MG/ML
VIAL (ML) INTRAVENOUS AS NEEDED
Status: DISCONTINUED | OUTPATIENT
Start: 2019-01-01 | End: 2019-01-01 | Stop reason: SURG

## 2019-01-01 RX ORDER — PROMETHAZINE HYDROCHLORIDE 25 MG/1
25 SUPPOSITORY RECTAL EVERY 6 HOURS PRN
Status: DISCONTINUED | OUTPATIENT
Start: 2019-01-01 | End: 2019-01-01 | Stop reason: HOSPADM

## 2019-01-01 RX ORDER — TORSEMIDE 10 MG/1
10 TABLET ORAL DAILY
COMMUNITY
End: 2019-01-01 | Stop reason: HOSPADM

## 2019-01-01 RX ORDER — HALOPERIDOL 5 MG/ML
1 INJECTION INTRAMUSCULAR EVERY 4 HOURS PRN
Status: DISCONTINUED | OUTPATIENT
Start: 2019-01-01 | End: 2019-01-01 | Stop reason: HOSPADM

## 2019-01-01 RX ORDER — DIPHENHYDRAMINE HCL 25 MG
25 CAPSULE ORAL EVERY 6 HOURS PRN
Status: DISCONTINUED | OUTPATIENT
Start: 2019-01-01 | End: 2019-01-01 | Stop reason: HOSPADM

## 2019-01-01 RX ORDER — WARFARIN SODIUM 4 MG/1
4 TABLET ORAL
Status: DISCONTINUED | OUTPATIENT
Start: 2019-01-01 | End: 2019-01-01

## 2019-01-01 RX ORDER — METRONIDAZOLE 500 MG/1
500 TABLET ORAL 3 TIMES DAILY
Qty: 30 TABLET | Refills: 0 | Status: SHIPPED | OUTPATIENT
Start: 2019-01-01 | End: 2019-01-01 | Stop reason: ALTCHOICE

## 2019-01-01 RX ORDER — WARFARIN SODIUM 5 MG/1
5 TABLET ORAL
Status: DISCONTINUED | OUTPATIENT
Start: 2019-01-01 | End: 2019-01-01

## 2019-01-01 RX ORDER — MUPIROCIN CALCIUM 20 MG/G
CREAM TOPICAL
COMMUNITY
Start: 2019-01-01 | End: 2019-01-01

## 2019-01-01 RX ORDER — DEXTROSE MONOHYDRATE 25 G/50ML
25 INJECTION, SOLUTION INTRAVENOUS
Status: DISCONTINUED | OUTPATIENT
Start: 2019-01-01 | End: 2019-01-01 | Stop reason: HOSPADM

## 2019-01-01 RX ORDER — AMLODIPINE BESYLATE 5 MG/1
5 TABLET ORAL DAILY
Status: DISCONTINUED | OUTPATIENT
Start: 2019-01-01 | End: 2019-01-01 | Stop reason: HOSPADM

## 2019-01-01 RX ORDER — MORPHINE SULFATE 20 MG/ML
20 SOLUTION ORAL
Status: DISCONTINUED | OUTPATIENT
Start: 2019-01-01 | End: 2019-01-01 | Stop reason: HOSPADM

## 2019-01-01 RX ORDER — MIDAZOLAM HYDROCHLORIDE 1 MG/ML
1 INJECTION INTRAMUSCULAR; INTRAVENOUS
Status: DISCONTINUED | OUTPATIENT
Start: 2019-01-01 | End: 2019-01-01 | Stop reason: HOSPADM

## 2019-01-01 RX ORDER — SODIUM CHLORIDE 0.9 % (FLUSH) 0.9 %
10 SYRINGE (ML) INJECTION EVERY 12 HOURS SCHEDULED
Status: DISCONTINUED | OUTPATIENT
Start: 2019-01-01 | End: 2019-01-01

## 2019-01-01 RX ORDER — DULOXETIN HYDROCHLORIDE 60 MG/1
CAPSULE, DELAYED RELEASE ORAL
Qty: 90 CAPSULE | Refills: 2 | OUTPATIENT
Start: 2019-01-01

## 2019-01-01 RX ORDER — WARFARIN SODIUM 3 MG/1
3 TABLET ORAL NIGHTLY
Qty: 30 TABLET | Refills: 0 | Status: SHIPPED | OUTPATIENT
Start: 2019-01-01 | End: 2019-01-01 | Stop reason: HOSPADM

## 2019-01-01 RX ORDER — WARFARIN SODIUM 5 MG/1
5 TABLET ORAL SEE ADMIN INSTRUCTIONS
COMMUNITY
End: 2019-01-01 | Stop reason: HOSPADM

## 2019-01-01 RX ORDER — ATORVASTATIN CALCIUM 40 MG/1
TABLET, FILM COATED ORAL
Qty: 90 TABLET | Refills: 2 | Status: SHIPPED | OUTPATIENT
Start: 2019-01-01

## 2019-01-01 RX ORDER — ALLOPURINOL 100 MG/1
100 TABLET ORAL DAILY
Status: DISCONTINUED | OUTPATIENT
Start: 2019-01-01 | End: 2019-01-01

## 2019-01-01 RX ORDER — CLONIDINE HYDROCHLORIDE 0.1 MG/1
0.1 TABLET ORAL 2 TIMES DAILY
Status: DISCONTINUED | OUTPATIENT
Start: 2019-01-01 | End: 2019-01-01

## 2019-01-01 RX ORDER — METOPROLOL SUCCINATE 25 MG/1
75 TABLET, EXTENDED RELEASE ORAL
Qty: 90 TABLET | Refills: 5 | Status: SHIPPED | OUTPATIENT
Start: 2019-01-01

## 2019-01-01 RX ORDER — ONDANSETRON 4 MG/1
4 TABLET, FILM COATED ORAL EVERY 8 HOURS PRN
Qty: 20 TABLET | Refills: 0 | Status: SHIPPED | OUTPATIENT
Start: 2019-01-01

## 2019-01-01 RX ORDER — NALOXONE HCL 0.4 MG/ML
0.4 VIAL (ML) INJECTION
Status: DISCONTINUED | OUTPATIENT
Start: 2019-01-01 | End: 2019-01-01 | Stop reason: HOSPADM

## 2019-01-01 RX ORDER — NALOXONE HCL 0.4 MG/ML
0.2 VIAL (ML) INJECTION AS NEEDED
Status: DISCONTINUED | OUTPATIENT
Start: 2019-01-01 | End: 2019-01-01 | Stop reason: HOSPADM

## 2019-01-01 RX ORDER — WARFARIN SODIUM 2.5 MG/1
2.5 TABLET ORAL DAILY
Status: ON HOLD | COMMUNITY
End: 2019-01-01 | Stop reason: SDUPTHER

## 2019-01-01 RX ORDER — FENTANYL CITRATE 50 UG/ML
100 INJECTION, SOLUTION INTRAMUSCULAR; INTRAVENOUS
Status: DISCONTINUED | OUTPATIENT
Start: 2019-01-01 | End: 2019-01-01 | Stop reason: HOSPADM

## 2019-01-01 RX ORDER — ACETAMINOPHEN 325 MG/1
650 TABLET ORAL EVERY 4 HOURS PRN
Status: DISCONTINUED | OUTPATIENT
Start: 2019-01-01 | End: 2019-01-01 | Stop reason: HOSPADM

## 2019-01-01 RX ORDER — ACETAMINOPHEN 500 MG
1000 TABLET ORAL ONCE
Status: COMPLETED | OUTPATIENT
Start: 2019-01-01 | End: 2019-01-01

## 2019-01-01 RX ORDER — CEPHALEXIN 500 MG/1
500 CAPSULE ORAL 2 TIMES DAILY
COMMUNITY
End: 2019-01-01 | Stop reason: HOSPADM

## 2019-01-01 RX ORDER — CEFAZOLIN SODIUM 2 G/100ML
2 INJECTION, SOLUTION INTRAVENOUS ONCE
Status: COMPLETED | OUTPATIENT
Start: 2019-01-01 | End: 2019-01-01

## 2019-01-01 RX ORDER — PROMETHAZINE HYDROCHLORIDE 25 MG/1
25 SUPPOSITORY RECTAL ONCE AS NEEDED
Status: DISCONTINUED | OUTPATIENT
Start: 2019-01-01 | End: 2019-01-01 | Stop reason: HOSPADM

## 2019-01-01 RX ORDER — SODIUM CHLORIDE 0.9 % (FLUSH) 0.9 %
3 SYRINGE (ML) INJECTION EVERY 12 HOURS SCHEDULED
Status: DISCONTINUED | OUTPATIENT
Start: 2019-01-01 | End: 2019-01-01 | Stop reason: HOSPADM

## 2019-01-01 RX ORDER — PROMETHAZINE HYDROCHLORIDE 6.25 MG/5ML
6.25 SYRUP ORAL EVERY 4 HOURS PRN
Status: DISCONTINUED | OUTPATIENT
Start: 2019-01-01 | End: 2019-01-01 | Stop reason: HOSPADM

## 2019-01-01 RX ORDER — HYDROMORPHONE HYDROCHLORIDE 1 MG/ML
1.5 INJECTION, SOLUTION INTRAMUSCULAR; INTRAVENOUS; SUBCUTANEOUS
Status: DISCONTINUED | OUTPATIENT
Start: 2019-01-01 | End: 2019-01-01 | Stop reason: HOSPADM

## 2019-01-01 RX ORDER — WARFARIN SODIUM 2.5 MG/1
2.5 TABLET ORAL DAILY
Status: DISCONTINUED | OUTPATIENT
Start: 2019-01-01 | End: 2019-01-01

## 2019-01-01 RX ORDER — WARFARIN SODIUM 2.5 MG/1
2.5 TABLET ORAL
Status: COMPLETED | OUTPATIENT
Start: 2019-01-01 | End: 2019-01-01

## 2019-01-01 RX ORDER — WARFARIN SODIUM 2.5 MG/1
2.5 TABLET ORAL NIGHTLY
Qty: 30 TABLET | Refills: 0 | Status: SHIPPED | OUTPATIENT
Start: 2019-01-01 | End: 2019-01-01 | Stop reason: HOSPADM

## 2019-01-01 RX ORDER — METOPROLOL SUCCINATE 25 MG/1
75 TABLET, EXTENDED RELEASE ORAL
Qty: 30 TABLET | Refills: 0 | Status: SHIPPED | OUTPATIENT
Start: 2019-01-01 | End: 2019-01-01 | Stop reason: SDUPTHER

## 2019-01-01 RX ORDER — LIDOCAINE HYDROCHLORIDE 20 MG/ML
INJECTION, SOLUTION INFILTRATION; PERINEURAL AS NEEDED
Status: DISCONTINUED | OUTPATIENT
Start: 2019-01-01 | End: 2019-01-01 | Stop reason: SURG

## 2019-01-01 RX ORDER — NOREPINEPHRINE BIT/0.9 % NACL 8 MG/250ML
.02-.3 INFUSION BOTTLE (ML) INTRAVENOUS
Status: DISCONTINUED | OUTPATIENT
Start: 2019-01-01 | End: 2019-01-01

## 2019-01-01 RX ORDER — MORPHINE SULFATE 10 MG/ML
6 INJECTION INTRAMUSCULAR; INTRAVENOUS; SUBCUTANEOUS
Status: DISCONTINUED | OUTPATIENT
Start: 2019-01-01 | End: 2019-01-01 | Stop reason: HOSPADM

## 2019-01-01 RX ORDER — ALLOPURINOL 100 MG/1
100 TABLET ORAL DAILY
COMMUNITY

## 2019-01-01 RX ORDER — HYDRALAZINE HYDROCHLORIDE 20 MG/ML
10 INJECTION INTRAMUSCULAR; INTRAVENOUS EVERY 6 HOURS PRN
Status: DISCONTINUED | OUTPATIENT
Start: 2019-01-01 | End: 2019-01-01 | Stop reason: HOSPADM

## 2019-01-01 RX ORDER — ALBUMIN (HUMAN) 12.5 G/50ML
12.5 SOLUTION INTRAVENOUS AS NEEDED
Status: DISCONTINUED | OUTPATIENT
Start: 2019-01-01 | End: 2019-01-01 | Stop reason: HOSPADM

## 2019-01-01 RX ORDER — DEXTROSE MONOHYDRATE 25 G/50ML
25 INJECTION, SOLUTION INTRAVENOUS
Status: DISCONTINUED | OUTPATIENT
Start: 2019-01-01 | End: 2019-01-01

## 2019-01-01 RX ORDER — FENTANYL CITRATE 50 UG/ML
INJECTION, SOLUTION INTRAMUSCULAR; INTRAVENOUS AS NEEDED
Status: DISCONTINUED | OUTPATIENT
Start: 2019-01-01 | End: 2019-01-01 | Stop reason: SURG

## 2019-01-01 RX ORDER — EPHEDRINE SULFATE 50 MG/ML
5 INJECTION, SOLUTION INTRAVENOUS ONCE AS NEEDED
Status: DISCONTINUED | OUTPATIENT
Start: 2019-01-01 | End: 2019-01-01 | Stop reason: HOSPADM

## 2019-01-01 RX ORDER — VANCOMYCIN HYDROCHLORIDE 125 MG/1
125 CAPSULE ORAL 4 TIMES DAILY
Qty: 40 CAPSULE | Refills: 0 | Status: SHIPPED | OUTPATIENT
Start: 2019-01-01 | End: 2019-01-01

## 2019-01-01 RX ORDER — MANNITOL 250 MG/ML
12.5 INJECTION, SOLUTION INTRAVENOUS AS NEEDED
Status: ACTIVE | OUTPATIENT
Start: 2019-01-01 | End: 2019-01-01

## 2019-01-01 RX ORDER — GLYCOPYRROLATE 0.2 MG/ML
INJECTION INTRAMUSCULAR; INTRAVENOUS AS NEEDED
Status: DISCONTINUED | OUTPATIENT
Start: 2019-01-01 | End: 2019-01-01 | Stop reason: SURG

## 2019-01-01 RX ORDER — ASPIRIN 81 MG/1
81 TABLET ORAL DAILY
COMMUNITY

## 2019-01-01 RX ORDER — METRONIDAZOLE 500 MG/1
500 TABLET ORAL ONCE
Status: COMPLETED | OUTPATIENT
Start: 2019-01-01 | End: 2019-01-01

## 2019-01-01 RX ORDER — CLONIDINE HYDROCHLORIDE 0.1 MG/1
0.1 TABLET ORAL 2 TIMES DAILY
COMMUNITY
End: 2019-01-01 | Stop reason: HOSPADM

## 2019-01-01 RX ORDER — ONDANSETRON 4 MG/1
4 TABLET, FILM COATED ORAL EVERY 6 HOURS PRN
Status: DISCONTINUED | OUTPATIENT
Start: 2019-01-01 | End: 2019-01-01

## 2019-01-01 RX ORDER — SODIUM CHLORIDE 0.9 % (FLUSH) 0.9 %
10 SYRINGE (ML) INJECTION EVERY 12 HOURS SCHEDULED
Status: DISCONTINUED | OUTPATIENT
Start: 2019-01-01 | End: 2019-01-01 | Stop reason: HOSPADM

## 2019-01-01 RX ORDER — WARFARIN SODIUM 2.5 MG/1
TABLET ORAL
Qty: 30 TABLET | Refills: 0 | Status: SHIPPED | OUTPATIENT
Start: 2019-01-01 | End: 2019-01-01

## 2019-01-01 RX ORDER — INSULIN GLARGINE 100 [IU]/ML
30 INJECTION, SOLUTION SUBCUTANEOUS EVERY MORNING
Status: DISCONTINUED | OUTPATIENT
Start: 2019-01-01 | End: 2019-01-01 | Stop reason: HOSPADM

## 2019-01-01 RX ORDER — TAMSULOSIN HYDROCHLORIDE 0.4 MG/1
1 CAPSULE ORAL DAILY
COMMUNITY

## 2019-01-01 RX ORDER — AMLODIPINE BESYLATE 5 MG/1
TABLET ORAL
Qty: 30 TABLET | Refills: 1 | OUTPATIENT
Start: 2019-01-01 | End: 2019-01-01 | Stop reason: HOSPADM

## 2019-01-01 RX ORDER — INSULIN GLARGINE 100 [IU]/ML
30 INJECTION, SOLUTION SUBCUTANEOUS EVERY MORNING
Qty: 1 EACH | Refills: 12 | Status: SHIPPED | OUTPATIENT
Start: 2019-01-01 | End: 2019-01-01

## 2019-01-01 RX ORDER — PROMETHAZINE HYDROCHLORIDE 25 MG/ML
6.25 INJECTION, SOLUTION INTRAMUSCULAR; INTRAVENOUS EVERY 4 HOURS PRN
Status: DISCONTINUED | OUTPATIENT
Start: 2019-01-01 | End: 2019-01-01 | Stop reason: HOSPADM

## 2019-01-01 RX ORDER — LORAZEPAM 2 MG/ML
0.5 CONCENTRATE ORAL
Status: DISCONTINUED | OUTPATIENT
Start: 2019-01-01 | End: 2019-01-01 | Stop reason: HOSPADM

## 2019-01-01 RX ORDER — WARFARIN SODIUM 2.5 MG/1
2.5 TABLET ORAL 3 TIMES WEEKLY
COMMUNITY
End: 2019-01-01 | Stop reason: HOSPADM

## 2019-01-01 RX ORDER — LORAZEPAM 2 MG/ML
2 INJECTION INTRAMUSCULAR
Status: DISCONTINUED | OUTPATIENT
Start: 2019-01-01 | End: 2019-01-01 | Stop reason: HOSPADM

## 2019-01-01 RX ORDER — WARFARIN SODIUM 2.5 MG/1
TABLET ORAL
Qty: 30 TABLET | Refills: 1
Start: 2019-01-01

## 2019-01-01 RX ORDER — GABAPENTIN 100 MG/1
100 CAPSULE ORAL 3 TIMES DAILY
Status: DISCONTINUED | OUTPATIENT
Start: 2019-01-01 | End: 2019-01-01 | Stop reason: HOSPADM

## 2019-01-01 RX ORDER — INSULIN GLARGINE 100 [IU]/ML
15 INJECTION, SOLUTION SUBCUTANEOUS EVERY 12 HOURS SCHEDULED
Status: DISCONTINUED | OUTPATIENT
Start: 2019-01-01 | End: 2019-01-01

## 2019-01-01 RX ORDER — METRONIDAZOLE 500 MG/1
500 TABLET ORAL EVERY 8 HOURS SCHEDULED
Status: DISCONTINUED | OUTPATIENT
Start: 2019-01-01 | End: 2019-01-01

## 2019-01-01 RX ORDER — VANCOMYCIN HYDROCHLORIDE 125 MG/1
125 CAPSULE ORAL 4 TIMES DAILY
Qty: 40 CAPSULE | Refills: 0 | Status: SHIPPED | OUTPATIENT
Start: 2019-01-01 | End: 2019-01-01 | Stop reason: SDUPTHER

## 2019-01-01 RX ORDER — DONEPEZIL HYDROCHLORIDE 10 MG/1
10 TABLET, FILM COATED ORAL NIGHTLY
Status: DISCONTINUED | OUTPATIENT
Start: 2019-01-01 | End: 2019-01-01

## 2019-01-01 RX ORDER — PROMETHAZINE HYDROCHLORIDE 25 MG/1
6.25 TABLET ORAL EVERY 4 HOURS PRN
Status: DISCONTINUED | OUTPATIENT
Start: 2019-01-01 | End: 2019-01-01 | Stop reason: HOSPADM

## 2019-01-01 RX ORDER — FENOFIBRATE 48 MG/1
48 TABLET, COATED ORAL DAILY
Qty: 90 TABLET | Refills: 3 | Status: SHIPPED | OUTPATIENT
Start: 2019-01-01 | End: 2019-01-01

## 2019-01-01 RX ORDER — LIDOCAINE AND PRILOCAINE 25; 25 MG/G; MG/G
CREAM TOPICAL AS NEEDED
Status: DISCONTINUED | OUTPATIENT
Start: 2019-01-01 | End: 2019-01-01 | Stop reason: HOSPADM

## 2019-01-01 RX ORDER — DILTIAZEM HYDROCHLORIDE 5 MG/ML
10 INJECTION INTRAVENOUS ONCE
Status: COMPLETED | OUTPATIENT
Start: 2019-01-01 | End: 2019-01-01

## 2019-01-01 RX ORDER — WARFARIN SODIUM 7.5 MG/1
7.5 TABLET ORAL DAILY
Status: DISCONTINUED | OUTPATIENT
Start: 2019-01-01 | End: 2019-01-01 | Stop reason: HOSPADM

## 2019-01-01 RX ORDER — DIPHENHYDRAMINE HYDROCHLORIDE 50 MG/ML
25 INJECTION INTRAMUSCULAR; INTRAVENOUS EVERY 6 HOURS PRN
Status: DISCONTINUED | OUTPATIENT
Start: 2019-01-01 | End: 2019-01-01 | Stop reason: HOSPADM

## 2019-01-01 RX ORDER — INSULIN GLARGINE 100 [IU]/ML
20 INJECTION, SOLUTION SUBCUTANEOUS EVERY 12 HOURS SCHEDULED
Status: DISCONTINUED | OUTPATIENT
Start: 2019-01-01 | End: 2019-01-01

## 2019-01-01 RX ORDER — GLYCOPYRROLATE 0.2 MG/ML
0.4 INJECTION INTRAMUSCULAR; INTRAVENOUS
Status: DISCONTINUED | OUTPATIENT
Start: 2019-01-01 | End: 2019-01-01 | Stop reason: HOSPADM

## 2019-01-01 RX ORDER — INSULIN GLARGINE 100 [IU]/ML
30 INJECTION, SOLUTION SUBCUTANEOUS EVERY 12 HOURS SCHEDULED
Status: DISCONTINUED | OUTPATIENT
Start: 2019-01-01 | End: 2019-01-01 | Stop reason: HOSPADM

## 2019-01-01 RX ORDER — HALOPERIDOL 1 MG/1
1 TABLET ORAL EVERY 4 HOURS PRN
Status: DISCONTINUED | OUTPATIENT
Start: 2019-01-01 | End: 2019-01-01 | Stop reason: HOSPADM

## 2019-01-01 RX ORDER — LORAZEPAM 2 MG/ML
0.5 INJECTION INTRAMUSCULAR
Status: DISCONTINUED | OUTPATIENT
Start: 2019-01-01 | End: 2019-01-01 | Stop reason: HOSPADM

## 2019-01-01 RX ORDER — FLUMAZENIL 0.1 MG/ML
0.2 INJECTION INTRAVENOUS AS NEEDED
Status: DISCONTINUED | OUTPATIENT
Start: 2019-01-01 | End: 2019-01-01 | Stop reason: HOSPADM

## 2019-01-01 RX ORDER — HALOPERIDOL 5 MG/ML
2 INJECTION INTRAMUSCULAR EVERY 4 HOURS PRN
Status: DISCONTINUED | OUTPATIENT
Start: 2019-01-01 | End: 2019-01-01 | Stop reason: HOSPADM

## 2019-01-01 RX ORDER — AMLODIPINE BESYLATE 5 MG/1
5 TABLET ORAL DAILY
COMMUNITY
End: 2019-01-01 | Stop reason: HOSPADM

## 2019-01-01 RX ORDER — METOPROLOL SUCCINATE 50 MG/1
50 TABLET, EXTENDED RELEASE ORAL
Qty: 90 TABLET | Refills: 3 | Status: SHIPPED | OUTPATIENT
Start: 2019-01-01 | End: 2019-01-01 | Stop reason: HOSPADM

## 2019-01-01 RX ORDER — ONDANSETRON 2 MG/ML
4 INJECTION INTRAMUSCULAR; INTRAVENOUS ONCE
Status: COMPLETED | OUTPATIENT
Start: 2019-01-01 | End: 2019-01-01

## 2019-01-01 RX ORDER — METOPROLOL SUCCINATE 25 MG/1
75 TABLET, EXTENDED RELEASE ORAL
Qty: 90 TABLET | Refills: 0 | Status: SHIPPED | OUTPATIENT
Start: 2019-01-01 | End: 2019-01-01 | Stop reason: HOSPADM

## 2019-01-01 RX ORDER — ATORVASTATIN CALCIUM 20 MG/1
40 TABLET, FILM COATED ORAL DAILY
Status: DISCONTINUED | OUTPATIENT
Start: 2019-01-01 | End: 2019-01-01

## 2019-01-01 RX ORDER — HYDROMORPHONE HYDROCHLORIDE 1 MG/ML
0.5 INJECTION, SOLUTION INTRAMUSCULAR; INTRAVENOUS; SUBCUTANEOUS
Status: DISCONTINUED | OUTPATIENT
Start: 2019-01-01 | End: 2019-01-01 | Stop reason: HOSPADM

## 2019-01-01 RX ORDER — HEPARIN SODIUM 5000 [USP'U]/ML
5000 INJECTION, SOLUTION INTRAVENOUS; SUBCUTANEOUS EVERY 8 HOURS SCHEDULED
Status: DISCONTINUED | OUTPATIENT
Start: 2019-01-01 | End: 2019-01-01

## 2019-01-01 RX ORDER — HYDROCODONE BITARTRATE AND ACETAMINOPHEN 7.5; 325 MG/1; MG/1
1 TABLET ORAL EVERY 4 HOURS PRN
Status: DISCONTINUED | OUTPATIENT
Start: 2019-01-01 | End: 2019-01-01 | Stop reason: HOSPADM

## 2019-01-01 RX ORDER — LIDOCAINE HYDROCHLORIDE 20 MG/ML
5 SOLUTION OROPHARYNGEAL EVERY 4 HOURS PRN
Status: DISCONTINUED | OUTPATIENT
Start: 2019-01-01 | End: 2019-01-01 | Stop reason: HOSPADM

## 2019-01-01 RX ORDER — MORPHINE SULFATE 2 MG/ML
4 INJECTION, SOLUTION INTRAMUSCULAR; INTRAVENOUS
Status: DISCONTINUED | OUTPATIENT
Start: 2019-01-01 | End: 2019-01-01 | Stop reason: HOSPADM

## 2019-01-01 RX ORDER — ONDANSETRON 2 MG/ML
4 INJECTION INTRAMUSCULAR; INTRAVENOUS ONCE AS NEEDED
Status: DISCONTINUED | OUTPATIENT
Start: 2019-01-01 | End: 2019-01-01 | Stop reason: HOSPADM

## 2019-01-01 RX ORDER — WARFARIN SODIUM 3 MG/1
3 TABLET ORAL
Status: DISCONTINUED | OUTPATIENT
Start: 2019-01-01 | End: 2019-01-01 | Stop reason: HOSPADM

## 2019-01-01 RX ORDER — ACETAMINOPHEN 325 MG/1
650 TABLET ORAL EVERY 6 HOURS PRN
Status: DISCONTINUED | OUTPATIENT
Start: 2019-01-01 | End: 2019-01-01 | Stop reason: SDUPTHER

## 2019-01-01 RX ORDER — LIDOCAINE HYDROCHLORIDE 10 MG/ML
0.5 INJECTION, SOLUTION EPIDURAL; INFILTRATION; INTRACAUDAL; PERINEURAL ONCE AS NEEDED
Status: DISCONTINUED | OUTPATIENT
Start: 2019-01-01 | End: 2019-01-01 | Stop reason: HOSPADM

## 2019-01-01 RX ORDER — LORAZEPAM 2 MG/ML
1 INJECTION INTRAMUSCULAR
Status: DISCONTINUED | OUTPATIENT
Start: 2019-01-01 | End: 2019-01-01 | Stop reason: HOSPADM

## 2019-01-01 RX ORDER — LORAZEPAM 2 MG/ML
1 CONCENTRATE ORAL
Status: DISCONTINUED | OUTPATIENT
Start: 2019-01-01 | End: 2019-01-01 | Stop reason: HOSPADM

## 2019-01-01 RX ORDER — ASPIRIN 81 MG/1
81 TABLET ORAL DAILY
Status: DISCONTINUED | OUTPATIENT
Start: 2019-01-01 | End: 2019-01-01

## 2019-01-01 RX ORDER — ALBUTEROL SULFATE 2.5 MG/3ML
2.5 SOLUTION RESPIRATORY (INHALATION) ONCE AS NEEDED
Status: DISCONTINUED | OUTPATIENT
Start: 2019-01-01 | End: 2019-01-01 | Stop reason: HOSPADM

## 2019-01-01 RX ORDER — ALBUMIN (HUMAN) 12.5 G/50ML
12.5 SOLUTION INTRAVENOUS AS NEEDED
Status: CANCELLED | OUTPATIENT
Start: 2019-01-01 | End: 2019-01-01

## 2019-01-01 RX ORDER — WARFARIN SODIUM 3 MG/1
3 TABLET ORAL NIGHTLY
Status: DISCONTINUED | OUTPATIENT
Start: 2019-01-01 | End: 2019-01-01

## 2019-01-01 RX ORDER — SODIUM CHLORIDE 0.9 % (FLUSH) 0.9 %
1-10 SYRINGE (ML) INJECTION AS NEEDED
Status: DISCONTINUED | OUTPATIENT
Start: 2019-01-01 | End: 2019-01-01 | Stop reason: HOSPADM

## 2019-01-01 RX ORDER — DULOXETIN HYDROCHLORIDE 60 MG/1
60 CAPSULE, DELAYED RELEASE ORAL DAILY
Status: DISCONTINUED | OUTPATIENT
Start: 2019-01-01 | End: 2019-01-01

## 2019-01-01 RX ORDER — HYDROMORPHONE HYDROCHLORIDE 1 MG/ML
0.2 INJECTION, SOLUTION INTRAMUSCULAR; INTRAVENOUS; SUBCUTANEOUS
Status: DISCONTINUED | OUTPATIENT
Start: 2019-01-01 | End: 2019-01-01 | Stop reason: HOSPADM

## 2019-01-01 RX ORDER — PROMETHAZINE HYDROCHLORIDE 12.5 MG/1
12.5 TABLET ORAL EVERY 6 HOURS PRN
Status: DISCONTINUED | OUTPATIENT
Start: 2019-01-01 | End: 2019-01-01 | Stop reason: HOSPADM

## 2019-01-01 RX ORDER — HEPARIN SODIUM 5000 [USP'U]/ML
5000 INJECTION, SOLUTION INTRAVENOUS; SUBCUTANEOUS EVERY 12 HOURS SCHEDULED
Status: DISCONTINUED | OUTPATIENT
Start: 2019-01-01 | End: 2019-01-01 | Stop reason: HOSPADM

## 2019-01-01 RX ORDER — AMLODIPINE BESYLATE 5 MG/1
TABLET ORAL
Qty: 30 TABLET | Refills: 2 | Status: ON HOLD | OUTPATIENT
Start: 2019-01-01 | End: 2019-01-01 | Stop reason: SDUPTHER

## 2019-01-01 RX ORDER — TORSEMIDE 10 MG/1
TABLET ORAL
Qty: 60 TABLET | Refills: 2 | Status: SHIPPED | OUTPATIENT
Start: 2019-01-01 | End: 2019-01-01 | Stop reason: HOSPADM

## 2019-01-01 RX ORDER — CLONIDINE HYDROCHLORIDE 0.1 MG/1
0.1 TABLET ORAL 2 TIMES DAILY
Qty: 180 TABLET | Refills: 2 | Status: SHIPPED | OUTPATIENT
Start: 2019-01-01 | End: 2019-01-01 | Stop reason: HOSPADM

## 2019-01-01 RX ORDER — CEPHALEXIN 500 MG/1
500 CAPSULE ORAL 3 TIMES DAILY
Qty: 21 CAPSULE | Refills: 0 | Status: SHIPPED | OUTPATIENT
Start: 2019-01-01 | End: 2019-01-01

## 2019-01-01 RX ORDER — AMLODIPINE BESYLATE 5 MG/1
5 TABLET ORAL DAILY
Status: DISCONTINUED | OUTPATIENT
Start: 2019-01-01 | End: 2019-01-01

## 2019-01-01 RX ORDER — CEFAZOLIN SODIUM IN 0.9 % NACL 3 G/100 ML
3 INTRAVENOUS SOLUTION, PIGGYBACK (ML) INTRAVENOUS WEEKLY
Status: DISCONTINUED | OUTPATIENT
Start: 2019-01-01 | End: 2019-01-01 | Stop reason: HOSPADM

## 2019-01-01 RX ORDER — FENTANYL CITRATE 50 UG/ML
25 INJECTION, SOLUTION INTRAMUSCULAR; INTRAVENOUS
Status: DISCONTINUED | OUTPATIENT
Start: 2019-01-01 | End: 2019-01-01 | Stop reason: HOSPADM

## 2019-01-01 RX ORDER — CEFAZOLIN SODIUM 2 G/100ML
2 INJECTION, SOLUTION INTRAVENOUS 2 TIMES WEEKLY
Qty: 3000 ML | Refills: 0 | Status: SHIPPED | OUTPATIENT
Start: 2019-01-01 | End: 2019-01-01

## 2019-01-01 RX ORDER — DIPHENOXYLATE HYDROCHLORIDE AND ATROPINE SULFATE 2.5; .025 MG/1; MG/1
1 TABLET ORAL
Status: DISCONTINUED | OUTPATIENT
Start: 2019-01-01 | End: 2019-01-01 | Stop reason: HOSPADM

## 2019-01-01 RX ORDER — MORPHINE SULFATE 20 MG/ML
10 SOLUTION ORAL
Status: DISCONTINUED | OUTPATIENT
Start: 2019-01-01 | End: 2019-01-01 | Stop reason: HOSPADM

## 2019-01-01 RX ORDER — HYDRALAZINE HYDROCHLORIDE 20 MG/ML
5 INJECTION INTRAMUSCULAR; INTRAVENOUS
Status: DISCONTINUED | OUTPATIENT
Start: 2019-01-01 | End: 2019-01-01 | Stop reason: HOSPADM

## 2019-01-01 RX ORDER — WARFARIN SODIUM 2.5 MG/1
2.5 TABLET ORAL
Status: DISCONTINUED | OUTPATIENT
Start: 2019-01-01 | End: 2019-01-01 | Stop reason: HOSPADM

## 2019-01-01 RX ORDER — SCOLOPAMINE TRANSDERMAL SYSTEM 1 MG/1
1 PATCH, EXTENDED RELEASE TRANSDERMAL
Status: DISCONTINUED | OUTPATIENT
Start: 2019-01-01 | End: 2019-01-01 | Stop reason: HOSPADM

## 2019-01-01 RX ORDER — HYDROMORPHONE HYDROCHLORIDE 1 MG/ML
1 INJECTION, SOLUTION INTRAMUSCULAR; INTRAVENOUS; SUBCUTANEOUS
Status: DISCONTINUED | OUTPATIENT
Start: 2019-01-01 | End: 2019-01-01 | Stop reason: HOSPADM

## 2019-01-01 RX ORDER — DULOXETIN HYDROCHLORIDE 60 MG/1
CAPSULE, DELAYED RELEASE ORAL
Qty: 90 CAPSULE | Refills: 2 | Status: SHIPPED | OUTPATIENT
Start: 2019-01-01

## 2019-01-01 RX ORDER — CEFAZOLIN SODIUM 2 G/100ML
2 INJECTION, SOLUTION INTRAVENOUS
Status: DISCONTINUED | OUTPATIENT
Start: 2019-01-01 | End: 2019-01-01 | Stop reason: HOSPADM

## 2019-01-01 RX ORDER — SODIUM CHLORIDE 9 MG/ML
100 INJECTION, SOLUTION INTRAVENOUS CONTINUOUS
Status: DISCONTINUED | OUTPATIENT
Start: 2019-01-01 | End: 2019-01-01

## 2019-01-01 RX ORDER — PROMETHAZINE HYDROCHLORIDE 12.5 MG/1
6.25 SUPPOSITORY RECTAL EVERY 4 HOURS PRN
Status: DISCONTINUED | OUTPATIENT
Start: 2019-01-01 | End: 2019-01-01 | Stop reason: HOSPADM

## 2019-01-01 RX ORDER — HALOPERIDOL 2 MG/ML
1 SOLUTION ORAL EVERY 4 HOURS PRN
Status: DISCONTINUED | OUTPATIENT
Start: 2019-01-01 | End: 2019-01-01 | Stop reason: HOSPADM

## 2019-01-01 RX ORDER — MORPHINE SULFATE 2 MG/ML
2 INJECTION, SOLUTION INTRAMUSCULAR; INTRAVENOUS
Status: DISCONTINUED | OUTPATIENT
Start: 2019-01-01 | End: 2019-01-01 | Stop reason: HOSPADM

## 2019-01-01 RX ORDER — CEFAZOLIN SODIUM IN 0.9 % NACL 3 G/100 ML
3 INTRAVENOUS SOLUTION, PIGGYBACK (ML) INTRAVENOUS WEEKLY
Qty: 200 ML | Refills: 0 | Status: SHIPPED | OUTPATIENT
Start: 2019-01-01 | End: 2019-01-01

## 2019-01-01 RX ORDER — HALOPERIDOL 2 MG/ML
2 SOLUTION ORAL EVERY 4 HOURS PRN
Status: DISCONTINUED | OUTPATIENT
Start: 2019-01-01 | End: 2019-01-01 | Stop reason: HOSPADM

## 2019-01-01 RX ORDER — ASPIRIN 81 MG/1
81 TABLET, CHEWABLE ORAL DAILY
Status: DISCONTINUED | OUTPATIENT
Start: 2019-01-01 | End: 2019-01-01 | Stop reason: HOSPADM

## 2019-01-01 RX ORDER — ACETAMINOPHEN 325 MG/1
650 TABLET ORAL ONCE AS NEEDED
Status: DISCONTINUED | OUTPATIENT
Start: 2019-01-01 | End: 2019-01-01 | Stop reason: HOSPADM

## 2019-01-01 RX ORDER — CLINDAMYCIN PHOSPHATE 900 MG/50ML
900 INJECTION INTRAVENOUS EVERY 8 HOURS
Status: DISCONTINUED | OUTPATIENT
Start: 2019-01-01 | End: 2019-01-01

## 2019-01-01 RX ORDER — TAMSULOSIN HYDROCHLORIDE 0.4 MG/1
CAPSULE ORAL
Qty: 90 CAPSULE | Refills: 2 | Status: SHIPPED | OUTPATIENT
Start: 2019-01-01 | End: 2019-01-01

## 2019-01-01 RX ADMIN — SODIUM CHLORIDE, PRESERVATIVE FREE 3 ML: 5 INJECTION INTRAVENOUS at 21:26

## 2019-01-01 RX ADMIN — TAMSULOSIN HYDROCHLORIDE 0.4 MG: 0.4 CAPSULE ORAL at 08:44

## 2019-01-01 RX ADMIN — ALLOPURINOL 100 MG: 100 TABLET ORAL at 20:51

## 2019-01-01 RX ADMIN — INSULIN HUMAN 10 UNITS: 100 INJECTION, SOLUTION PARENTERAL at 08:23

## 2019-01-01 RX ADMIN — ACETAMINOPHEN 650 MG: 325 TABLET, FILM COATED ORAL at 20:12

## 2019-01-01 RX ADMIN — VANCOMYCIN 125 MG: KIT at 12:42

## 2019-01-01 RX ADMIN — FAMOTIDINE 20 MG: 10 INJECTION INTRAVENOUS at 11:47

## 2019-01-01 RX ADMIN — DONEPEZIL HYDROCHLORIDE 10 MG: 10 TABLET, FILM COATED ORAL at 21:22

## 2019-01-01 RX ADMIN — IPRATROPIUM BROMIDE AND ALBUTEROL SULFATE 3 ML: 2.5; .5 SOLUTION RESPIRATORY (INHALATION) at 07:18

## 2019-01-01 RX ADMIN — CALCIUM ACETATE 667 MG: 667 CAPSULE ORAL at 14:24

## 2019-01-01 RX ADMIN — VANCOMYCIN HYDROCHLORIDE 1750 MG: 10 INJECTION, POWDER, LYOPHILIZED, FOR SOLUTION INTRAVENOUS at 02:05

## 2019-01-01 RX ADMIN — CEFTAZIDIME 1 G: 1 INJECTION, POWDER, FOR SOLUTION INTRAMUSCULAR; INTRAVENOUS at 15:45

## 2019-01-01 RX ADMIN — DAKIN'S SOLUTION 0.125% (QUARTER STRENGTH) 946 ML: 0.12 SOLUTION at 20:16

## 2019-01-01 RX ADMIN — TAMSULOSIN HYDROCHLORIDE 0.4 MG: 0.4 CAPSULE ORAL at 09:23

## 2019-01-01 RX ADMIN — CLONIDINE HYDROCHLORIDE 0.1 MG: 0.1 TABLET ORAL at 08:45

## 2019-01-01 RX ADMIN — GABAPENTIN 100 MG: 100 CAPSULE ORAL at 15:45

## 2019-01-01 RX ADMIN — CALCIUM ACETATE 1334 MG: 667 CAPSULE ORAL at 21:42

## 2019-01-01 RX ADMIN — TAMSULOSIN HYDROCHLORIDE 0.4 MG: 0.4 CAPSULE ORAL at 08:33

## 2019-01-01 RX ADMIN — INSULIN GLARGINE 30 UNITS: 100 INJECTION, SOLUTION SUBCUTANEOUS at 08:22

## 2019-01-01 RX ADMIN — ALLOPURINOL 100 MG: 100 TABLET ORAL at 11:25

## 2019-01-01 RX ADMIN — ATORVASTATIN CALCIUM 40 MG: 20 TABLET, FILM COATED ORAL at 15:44

## 2019-01-01 RX ADMIN — INSULIN GLARGINE 15 UNITS: 100 INJECTION, SOLUTION SUBCUTANEOUS at 20:52

## 2019-01-01 RX ADMIN — ALLOPURINOL 100 MG: 100 TABLET ORAL at 16:25

## 2019-01-01 RX ADMIN — AMLODIPINE BESYLATE 5 MG: 5 TABLET ORAL at 08:45

## 2019-01-01 RX ADMIN — WARFARIN SODIUM 4 MG: 4 TABLET ORAL at 17:26

## 2019-01-01 RX ADMIN — SODIUM CHLORIDE, PRESERVATIVE FREE 3 ML: 5 INJECTION INTRAVENOUS at 22:30

## 2019-01-01 RX ADMIN — ATORVASTATIN CALCIUM 40 MG: 20 TABLET, FILM COATED ORAL at 10:02

## 2019-01-01 RX ADMIN — INSULIN HUMAN 10 UNITS: 100 INJECTION, SOLUTION PARENTERAL at 12:31

## 2019-01-01 RX ADMIN — INSULIN LISPRO 5 UNITS: 100 INJECTION, SOLUTION INTRAVENOUS; SUBCUTANEOUS at 09:33

## 2019-01-01 RX ADMIN — ALLOPURINOL 100 MG: 100 TABLET ORAL at 08:25

## 2019-01-01 RX ADMIN — VANCOMYCIN 125 MG: KIT at 00:33

## 2019-01-01 RX ADMIN — METOPROLOL SUCCINATE 50 MG: 50 TABLET, FILM COATED, EXTENDED RELEASE ORAL at 08:25

## 2019-01-01 RX ADMIN — DULOXETINE HYDROCHLORIDE 60 MG: 60 CAPSULE, DELAYED RELEASE ORAL at 14:23

## 2019-01-01 RX ADMIN — METOPROLOL SUCCINATE 75 MG: 50 TABLET, FILM COATED, EXTENDED RELEASE ORAL at 08:52

## 2019-01-01 RX ADMIN — SODIUM CHLORIDE, PRESERVATIVE FREE 3 ML: 5 INJECTION INTRAVENOUS at 08:46

## 2019-01-01 RX ADMIN — CLINDAMYCIN PHOSPHATE 900 MG: 900 INJECTION, SOLUTION INTRAVENOUS at 19:54

## 2019-01-01 RX ADMIN — TAMSULOSIN HYDROCHLORIDE 0.4 MG: 0.4 CAPSULE ORAL at 11:24

## 2019-01-01 RX ADMIN — INSULIN LISPRO 10 UNITS: 100 INJECTION, SOLUTION INTRAVENOUS; SUBCUTANEOUS at 21:56

## 2019-01-01 RX ADMIN — SODIUM CHLORIDE, PRESERVATIVE FREE 3 ML: 5 INJECTION INTRAVENOUS at 09:00

## 2019-01-01 RX ADMIN — AMLODIPINE BESYLATE 5 MG: 5 TABLET ORAL at 21:25

## 2019-01-01 RX ADMIN — TAZOBACTAM SODIUM AND PIPERACILLIN SODIUM 3.38 G: 375; 3 INJECTION, SOLUTION INTRAVENOUS at 09:33

## 2019-01-01 RX ADMIN — ALLOPURINOL 100 MG: 100 TABLET ORAL at 12:15

## 2019-01-01 RX ADMIN — HEPARIN SODIUM 5000 UNITS: 5000 INJECTION INTRAVENOUS; SUBCUTANEOUS at 21:22

## 2019-01-01 RX ADMIN — ASPIRIN 81 MG: 81 TABLET, CHEWABLE ORAL at 14:21

## 2019-01-01 RX ADMIN — HEPARIN SODIUM 5000 UNITS: 5000 INJECTION INTRAVENOUS; SUBCUTANEOUS at 14:24

## 2019-01-01 RX ADMIN — CEFTAZIDIME 1 G: 1 INJECTION, POWDER, FOR SOLUTION INTRAMUSCULAR; INTRAVENOUS at 16:22

## 2019-01-01 RX ADMIN — ALLOPURINOL 100 MG: 100 TABLET ORAL at 14:24

## 2019-01-01 RX ADMIN — DONEPEZIL HYDROCHLORIDE 10 MG: 10 TABLET, FILM COATED ORAL at 21:46

## 2019-01-01 RX ADMIN — METOPROLOL SUCCINATE 75 MG: 50 TABLET, FILM COATED, EXTENDED RELEASE ORAL at 09:31

## 2019-01-01 RX ADMIN — INSULIN LISPRO 3 UNITS: 100 INJECTION, SOLUTION INTRAVENOUS; SUBCUTANEOUS at 12:29

## 2019-01-01 RX ADMIN — DONEPEZIL HYDROCHLORIDE 10 MG: 10 TABLET, FILM COATED ORAL at 21:53

## 2019-01-01 RX ADMIN — INSULIN HUMAN 30 UNITS: 100 INJECTION, SUSPENSION SUBCUTANEOUS at 17:55

## 2019-01-01 RX ADMIN — METOPROLOL SUCCINATE 75 MG: 50 TABLET, FILM COATED, EXTENDED RELEASE ORAL at 11:07

## 2019-01-01 RX ADMIN — DULOXETINE HYDROCHLORIDE 60 MG: 60 CAPSULE, DELAYED RELEASE ORAL at 20:51

## 2019-01-01 RX ADMIN — INSULIN GLARGINE 30 UNITS: 100 INJECTION, SOLUTION SUBCUTANEOUS at 08:30

## 2019-01-01 RX ADMIN — INSULIN HUMAN 10 UNITS: 100 INJECTION, SOLUTION PARENTERAL at 09:52

## 2019-01-01 RX ADMIN — CALCIUM ACETATE 667 MG: 667 CAPSULE ORAL at 18:06

## 2019-01-01 RX ADMIN — ATORVASTATIN CALCIUM 40 MG: 20 TABLET, FILM COATED ORAL at 12:15

## 2019-01-01 RX ADMIN — AMLODIPINE BESYLATE 5 MG: 5 TABLET ORAL at 22:29

## 2019-01-01 RX ADMIN — ATORVASTATIN CALCIUM 40 MG: 20 TABLET, FILM COATED ORAL at 17:03

## 2019-01-01 RX ADMIN — METOPROLOL SUCCINATE 50 MG: 50 TABLET, FILM COATED, EXTENDED RELEASE ORAL at 14:23

## 2019-01-01 RX ADMIN — ATORVASTATIN CALCIUM 40 MG: 20 TABLET, FILM COATED ORAL at 14:24

## 2019-01-01 RX ADMIN — DULOXETINE HYDROCHLORIDE 60 MG: 60 CAPSULE, DELAYED RELEASE ORAL at 21:20

## 2019-01-01 RX ADMIN — DULOXETINE HYDROCHLORIDE 60 MG: 60 CAPSULE, DELAYED RELEASE ORAL at 08:44

## 2019-01-01 RX ADMIN — SODIUM CHLORIDE, PRESERVATIVE FREE 3 ML: 5 INJECTION INTRAVENOUS at 20:54

## 2019-01-01 RX ADMIN — CEFAZOLIN SODIUM 2 G: 2 INJECTION, SOLUTION INTRAVENOUS at 14:20

## 2019-01-01 RX ADMIN — TAMSULOSIN HYDROCHLORIDE 0.4 MG: 0.4 CAPSULE ORAL at 08:24

## 2019-01-01 RX ADMIN — VANCOMYCIN 125 MG: KIT at 18:01

## 2019-01-01 RX ADMIN — INSULIN LISPRO 3 UNITS: 100 INJECTION, SOLUTION INTRAVENOUS; SUBCUTANEOUS at 17:35

## 2019-01-01 RX ADMIN — TAMSULOSIN HYDROCHLORIDE 0.4 MG: 0.4 CAPSULE ORAL at 08:27

## 2019-01-01 RX ADMIN — TAZOBACTAM SODIUM AND PIPERACILLIN SODIUM 3.38 G: 375; 3 INJECTION, SOLUTION INTRAVENOUS at 22:50

## 2019-01-01 RX ADMIN — ACETAMINOPHEN 650 MG: 325 TABLET, FILM COATED ORAL at 19:34

## 2019-01-01 RX ADMIN — ASPIRIN 81 MG: 81 TABLET, COATED ORAL at 11:26

## 2019-01-01 RX ADMIN — ALLOPURINOL 100 MG: 100 TABLET ORAL at 15:44

## 2019-01-01 RX ADMIN — INSULIN HUMAN 10 UNITS: 100 INJECTION, SOLUTION PARENTERAL at 09:57

## 2019-01-01 RX ADMIN — SODIUM CHLORIDE, PRESERVATIVE FREE 3 ML: 5 INJECTION INTRAVENOUS at 21:55

## 2019-01-01 RX ADMIN — CALCIUM ACETATE 667 MG: 667 CAPSULE ORAL at 17:14

## 2019-01-01 RX ADMIN — HEPARIN SODIUM 5000 UNITS: 5000 INJECTION INTRAVENOUS; SUBCUTANEOUS at 09:49

## 2019-01-01 RX ADMIN — INSULIN LISPRO 3 UNITS: 100 INJECTION, SOLUTION INTRAVENOUS; SUBCUTANEOUS at 22:00

## 2019-01-01 RX ADMIN — DONEPEZIL HYDROCHLORIDE 10 MG: 10 TABLET, FILM COATED ORAL at 20:04

## 2019-01-01 RX ADMIN — ATORVASTATIN CALCIUM 40 MG: 20 TABLET, FILM COATED ORAL at 11:25

## 2019-01-01 RX ADMIN — SODIUM CHLORIDE 100 ML/HR: 9 INJECTION, SOLUTION INTRAVENOUS at 07:35

## 2019-01-01 RX ADMIN — METOPROLOL SUCCINATE 50 MG: 50 TABLET, EXTENDED RELEASE ORAL at 16:23

## 2019-01-01 RX ADMIN — INSULIN HUMAN 5 UNITS: 100 INJECTION, SOLUTION PARENTERAL at 08:36

## 2019-01-01 RX ADMIN — TORSEMIDE 100 MG: 100 TABLET ORAL at 08:42

## 2019-01-01 RX ADMIN — SODIUM CHLORIDE, PRESERVATIVE FREE 3 ML: 5 INJECTION INTRAVENOUS at 21:39

## 2019-01-01 RX ADMIN — GABAPENTIN 100 MG: 100 CAPSULE ORAL at 15:59

## 2019-01-01 RX ADMIN — ATORVASTATIN CALCIUM 40 MG: 20 TABLET, FILM COATED ORAL at 14:23

## 2019-01-01 RX ADMIN — INSULIN GLARGINE 20 UNITS: 100 INJECTION, SOLUTION SUBCUTANEOUS at 08:52

## 2019-01-01 RX ADMIN — HYDROCODONE BITARTRATE AND ACETAMINOPHEN 1 TABLET: 7.5; 325 TABLET ORAL at 21:43

## 2019-01-01 RX ADMIN — ALLOPURINOL 100 MG: 100 TABLET ORAL at 08:42

## 2019-01-01 RX ADMIN — INSULIN GLARGINE 30 UNITS: 100 INJECTION, SOLUTION SUBCUTANEOUS at 21:21

## 2019-01-01 RX ADMIN — PROPOFOL 100 MG: 10 INJECTION, EMULSION INTRAVENOUS at 13:39

## 2019-01-01 RX ADMIN — CLINDAMYCIN PHOSPHATE 900 MG: 900 INJECTION, SOLUTION INTRAVENOUS at 03:19

## 2019-01-01 RX ADMIN — METOPROLOL SUCCINATE 50 MG: 50 TABLET, FILM COATED, EXTENDED RELEASE ORAL at 21:25

## 2019-01-01 RX ADMIN — ALLOPURINOL 100 MG: 100 TABLET ORAL at 09:48

## 2019-01-01 RX ADMIN — HEPARIN SODIUM 5000 UNITS: 5000 INJECTION INTRAVENOUS; SUBCUTANEOUS at 22:00

## 2019-01-01 RX ADMIN — SODIUM CHLORIDE, PRESERVATIVE FREE 3 ML: 5 INJECTION INTRAVENOUS at 08:43

## 2019-01-01 RX ADMIN — SODIUM CHLORIDE, PRESERVATIVE FREE 10 ML: 5 INJECTION INTRAVENOUS at 08:24

## 2019-01-01 RX ADMIN — METOPROLOL SUCCINATE 75 MG: 50 TABLET, FILM COATED, EXTENDED RELEASE ORAL at 16:28

## 2019-01-01 RX ADMIN — ASPIRIN 81 MG: 81 TABLET, CHEWABLE ORAL at 08:24

## 2019-01-01 RX ADMIN — INSULIN GLARGINE 15 UNITS: 100 INJECTION, SOLUTION SUBCUTANEOUS at 09:10

## 2019-01-01 RX ADMIN — GABAPENTIN 100 MG: 100 CAPSULE ORAL at 21:46

## 2019-01-01 RX ADMIN — CEFTAZIDIME 1 G: 1 INJECTION, POWDER, FOR SOLUTION INTRAMUSCULAR; INTRAVENOUS at 16:56

## 2019-01-01 RX ADMIN — INSULIN HUMAN 30 UNITS: 100 INJECTION, SUSPENSION SUBCUTANEOUS at 17:49

## 2019-01-01 RX ADMIN — ASPIRIN 81 MG: 81 TABLET, COATED ORAL at 08:42

## 2019-01-01 RX ADMIN — TAMSULOSIN HYDROCHLORIDE 0.4 MG: 0.4 CAPSULE ORAL at 10:02

## 2019-01-01 RX ADMIN — HYDROCODONE BITARTRATE AND ACETAMINOPHEN 1 TABLET: 7.5; 325 TABLET ORAL at 12:38

## 2019-01-01 RX ADMIN — INSULIN HUMAN 30 UNITS: 100 INJECTION, SUSPENSION SUBCUTANEOUS at 08:36

## 2019-01-01 RX ADMIN — SODIUM CHLORIDE, PRESERVATIVE FREE 10 ML: 5 INJECTION INTRAVENOUS at 09:32

## 2019-01-01 RX ADMIN — WARFARIN SODIUM 2.5 MG: 2.5 TABLET ORAL at 18:23

## 2019-01-01 RX ADMIN — ONDANSETRON HYDROCHLORIDE 4 MG: 2 SOLUTION INTRAMUSCULAR; INTRAVENOUS at 01:30

## 2019-01-01 RX ADMIN — DULOXETINE HYDROCHLORIDE 60 MG: 60 CAPSULE, DELAYED RELEASE ORAL at 14:24

## 2019-01-01 RX ADMIN — TAMSULOSIN HYDROCHLORIDE 0.4 MG: 0.4 CAPSULE ORAL at 08:42

## 2019-01-01 RX ADMIN — ALLOPURINOL 100 MG: 100 TABLET ORAL at 21:20

## 2019-01-01 RX ADMIN — ALLOPURINOL 100 MG: 100 TABLET ORAL at 21:25

## 2019-01-01 RX ADMIN — METOPROLOL SUCCINATE 50 MG: 50 TABLET, FILM COATED, EXTENDED RELEASE ORAL at 08:44

## 2019-01-01 RX ADMIN — INSULIN GLARGINE 30 UNITS: 100 INJECTION, SOLUTION SUBCUTANEOUS at 09:52

## 2019-01-01 RX ADMIN — ATORVASTATIN CALCIUM 40 MG: 20 TABLET, FILM COATED ORAL at 08:25

## 2019-01-01 RX ADMIN — AMLODIPINE BESYLATE 10 MG: 10 TABLET ORAL at 08:25

## 2019-01-01 RX ADMIN — INSULIN HUMAN 5 UNITS: 100 INJECTION, SOLUTION PARENTERAL at 17:47

## 2019-01-01 RX ADMIN — INSULIN LISPRO 5 UNITS: 100 INJECTION, SOLUTION INTRAVENOUS; SUBCUTANEOUS at 21:19

## 2019-01-01 RX ADMIN — DONEPEZIL HYDROCHLORIDE 10 MG: 10 TABLET, FILM COATED ORAL at 21:56

## 2019-01-01 RX ADMIN — TAMSULOSIN HYDROCHLORIDE 0.4 MG: 0.4 CAPSULE ORAL at 09:52

## 2019-01-01 RX ADMIN — METOPROLOL SUCCINATE 75 MG: 50 TABLET, FILM COATED, EXTENDED RELEASE ORAL at 10:02

## 2019-01-01 RX ADMIN — INSULIN GLARGINE 30 UNITS: 100 INJECTION, SOLUTION SUBCUTANEOUS at 12:17

## 2019-01-01 RX ADMIN — CALCIUM ACETATE 667 MG: 667 CAPSULE ORAL at 17:26

## 2019-01-01 RX ADMIN — VANCOMYCIN 125 MG: KIT at 12:00

## 2019-01-01 RX ADMIN — ASPIRIN 81 MG: 81 TABLET, DELAYED RELEASE ORAL at 14:23

## 2019-01-01 RX ADMIN — TAMSULOSIN HYDROCHLORIDE 0.4 MG: 0.4 CAPSULE ORAL at 09:56

## 2019-01-01 RX ADMIN — WARFARIN SODIUM 5 MG: 5 TABLET ORAL at 09:09

## 2019-01-01 RX ADMIN — GABAPENTIN 100 MG: 100 CAPSULE ORAL at 18:06

## 2019-01-01 RX ADMIN — ERYTHROPOIETIN 5000 UNITS: 3000 INJECTION, SOLUTION INTRAVENOUS; SUBCUTANEOUS at 12:15

## 2019-01-01 RX ADMIN — SODIUM CHLORIDE, PRESERVATIVE FREE 10 ML: 5 INJECTION INTRAVENOUS at 21:43

## 2019-01-01 RX ADMIN — SODIUM CHLORIDE, PRESERVATIVE FREE 10 ML: 5 INJECTION INTRAVENOUS at 10:04

## 2019-01-01 RX ADMIN — VANCOMYCIN 125 MG: KIT at 06:31

## 2019-01-01 RX ADMIN — GABAPENTIN 100 MG: 100 CAPSULE ORAL at 20:54

## 2019-01-01 RX ADMIN — ATORVASTATIN CALCIUM 40 MG: 20 TABLET, FILM COATED ORAL at 08:45

## 2019-01-01 RX ADMIN — TAMSULOSIN HYDROCHLORIDE 0.4 MG: 0.4 CAPSULE ORAL at 15:44

## 2019-01-01 RX ADMIN — INSULIN GLARGINE 20 UNITS: 100 INJECTION, SOLUTION SUBCUTANEOUS at 22:27

## 2019-01-01 RX ADMIN — HEPARIN SODIUM 5000 UNITS: 5000 INJECTION INTRAVENOUS; SUBCUTANEOUS at 06:00

## 2019-01-01 RX ADMIN — INSULIN HUMAN 5 UNITS: 100 INJECTION, SOLUTION PARENTERAL at 12:46

## 2019-01-01 RX ADMIN — INSULIN HUMAN 30 UNITS: 100 INJECTION, SUSPENSION SUBCUTANEOUS at 07:58

## 2019-01-01 RX ADMIN — SODIUM CHLORIDE, PRESERVATIVE FREE 10 ML: 5 INJECTION INTRAVENOUS at 21:20

## 2019-01-01 RX ADMIN — ONDANSETRON 4 MG: 2 INJECTION INTRAMUSCULAR; INTRAVENOUS at 03:02

## 2019-01-01 RX ADMIN — CALCIUM ACETATE 667 MG: 667 CAPSULE ORAL at 08:46

## 2019-01-01 RX ADMIN — TAMSULOSIN HYDROCHLORIDE 0.4 MG: 0.4 CAPSULE ORAL at 12:46

## 2019-01-01 RX ADMIN — DAKIN'S SOLUTION 0.125% (QUARTER STRENGTH) 946 ML: 0.12 SOLUTION at 09:00

## 2019-01-01 RX ADMIN — SODIUM CHLORIDE 500 ML: 9 INJECTION, SOLUTION INTRAVENOUS at 09:36

## 2019-01-01 RX ADMIN — MORPHINE SULFATE 2 MG: 2 INJECTION, SOLUTION INTRAMUSCULAR; INTRAVENOUS at 20:36

## 2019-01-01 RX ADMIN — GABAPENTIN 100 MG: 100 CAPSULE ORAL at 17:34

## 2019-01-01 RX ADMIN — DONEPEZIL HYDROCHLORIDE 10 MG: 10 TABLET, FILM COATED ORAL at 20:51

## 2019-01-01 RX ADMIN — INSULIN HUMAN 10 UNITS: 100 INJECTION, SOLUTION PARENTERAL at 17:38

## 2019-01-01 RX ADMIN — TORSEMIDE 100 MG: 100 TABLET ORAL at 08:27

## 2019-01-01 RX ADMIN — INSULIN GLARGINE 30 UNITS: 100 INJECTION, SOLUTION SUBCUTANEOUS at 09:33

## 2019-01-01 RX ADMIN — TAMSULOSIN HYDROCHLORIDE 0.4 MG: 0.4 CAPSULE ORAL at 08:25

## 2019-01-01 RX ADMIN — WARFARIN SODIUM 2.5 MG: 2.5 TABLET ORAL at 16:59

## 2019-01-01 RX ADMIN — VANCOMYCIN HYDROCHLORIDE 1750 MG: 10 INJECTION, POWDER, LYOPHILIZED, FOR SOLUTION INTRAVENOUS at 09:01

## 2019-01-01 RX ADMIN — ASPIRIN 81 MG: 81 TABLET, DELAYED RELEASE ORAL at 08:25

## 2019-01-01 RX ADMIN — TORSEMIDE 100 MG: 100 TABLET ORAL at 09:25

## 2019-01-01 RX ADMIN — ATORVASTATIN CALCIUM 40 MG: 20 TABLET, FILM COATED ORAL at 09:48

## 2019-01-01 RX ADMIN — INSULIN HUMAN 10 UNITS: 100 INJECTION, SOLUTION PARENTERAL at 09:45

## 2019-01-01 RX ADMIN — DULOXETINE HYDROCHLORIDE 60 MG: 60 CAPSULE, DELAYED RELEASE ORAL at 21:25

## 2019-01-01 RX ADMIN — TAMSULOSIN HYDROCHLORIDE 0.4 MG: 0.4 CAPSULE ORAL at 17:03

## 2019-01-01 RX ADMIN — CALCIUM ACETATE 667 MG: 667 CAPSULE ORAL at 08:45

## 2019-01-01 RX ADMIN — CALCIUM ACETATE 1334 MG: 667 CAPSULE ORAL at 21:53

## 2019-01-01 RX ADMIN — SODIUM CHLORIDE, PRESERVATIVE FREE 3 ML: 5 INJECTION INTRAVENOUS at 21:46

## 2019-01-01 RX ADMIN — METRONIDAZOLE 500 MG: 500 TABLET ORAL at 05:34

## 2019-01-01 RX ADMIN — TAMSULOSIN HYDROCHLORIDE 0.4 MG: 0.4 CAPSULE ORAL at 14:23

## 2019-01-01 RX ADMIN — SODIUM CHLORIDE, PRESERVATIVE FREE 10 ML: 5 INJECTION INTRAVENOUS at 20:21

## 2019-01-01 RX ADMIN — GABAPENTIN 100 MG: 100 CAPSULE ORAL at 20:04

## 2019-01-01 RX ADMIN — COLLAGENASE SANTYL: 250 OINTMENT TOPICAL at 08:46

## 2019-01-01 RX ADMIN — INSULIN GLARGINE 20 UNITS: 100 INJECTION, SOLUTION SUBCUTANEOUS at 21:25

## 2019-01-01 RX ADMIN — SODIUM CHLORIDE, PRESERVATIVE FREE 10 ML: 5 INJECTION INTRAVENOUS at 21:44

## 2019-01-01 RX ADMIN — VANCOMYCIN 125 MG: KIT at 12:15

## 2019-01-01 RX ADMIN — COLLAGENASE SANTYL: 250 OINTMENT TOPICAL at 08:38

## 2019-01-01 RX ADMIN — MORPHINE SULFATE 2 MG: 2 INJECTION, SOLUTION INTRAMUSCULAR; INTRAVENOUS at 00:31

## 2019-01-01 RX ADMIN — SODIUM CHLORIDE, PRESERVATIVE FREE 3 ML: 5 INJECTION INTRAVENOUS at 08:39

## 2019-01-01 RX ADMIN — TAMSULOSIN HYDROCHLORIDE 0.4 MG: 0.4 CAPSULE ORAL at 12:15

## 2019-01-01 RX ADMIN — INSULIN HUMAN 5 UNITS: 100 INJECTION, SOLUTION PARENTERAL at 16:46

## 2019-01-01 RX ADMIN — GABAPENTIN 100 MG: 100 CAPSULE ORAL at 09:09

## 2019-01-01 RX ADMIN — METOPROLOL SUCCINATE 75 MG: 50 TABLET, FILM COATED, EXTENDED RELEASE ORAL at 08:44

## 2019-01-01 RX ADMIN — INSULIN HUMAN 10 UNITS: 100 INJECTION, SOLUTION PARENTERAL at 11:03

## 2019-01-01 RX ADMIN — INSULIN HUMAN 30 UNITS: 100 INJECTION, SUSPENSION SUBCUTANEOUS at 18:41

## 2019-01-01 RX ADMIN — SODIUM CHLORIDE 5 MG/HR: 900 INJECTION, SOLUTION INTRAVENOUS at 02:23

## 2019-01-01 RX ADMIN — SODIUM CHLORIDE, PRESERVATIVE FREE 10 ML: 5 INJECTION INTRAVENOUS at 08:43

## 2019-01-01 RX ADMIN — INSULIN GLARGINE 15 UNITS: 100 INJECTION, SOLUTION SUBCUTANEOUS at 11:02

## 2019-01-01 RX ADMIN — DONEPEZIL HYDROCHLORIDE 10 MG: 10 TABLET, FILM COATED ORAL at 20:16

## 2019-01-01 RX ADMIN — GABAPENTIN 100 MG: 100 CAPSULE ORAL at 09:52

## 2019-01-01 RX ADMIN — METOPROLOL SUCCINATE 75 MG: 50 TABLET, FILM COATED, EXTENDED RELEASE ORAL at 12:14

## 2019-01-01 RX ADMIN — SODIUM CHLORIDE, PRESERVATIVE FREE 3 ML: 5 INJECTION INTRAVENOUS at 14:26

## 2019-01-01 RX ADMIN — CALCIUM ACETATE 1334 MG: 667 CAPSULE ORAL at 11:24

## 2019-01-01 RX ADMIN — INSULIN HUMAN 15 UNITS: 100 INJECTION, SUSPENSION SUBCUTANEOUS at 17:26

## 2019-01-01 RX ADMIN — GABAPENTIN 100 MG: 100 CAPSULE ORAL at 16:30

## 2019-01-01 RX ADMIN — MIDAZOLAM 1 MG: 1 INJECTION INTRAMUSCULAR; INTRAVENOUS at 11:47

## 2019-01-01 RX ADMIN — GABAPENTIN 100 MG: 100 CAPSULE ORAL at 22:57

## 2019-01-01 RX ADMIN — DONEPEZIL HYDROCHLORIDE 10 MG: 10 TABLET, FILM COATED ORAL at 20:12

## 2019-01-01 RX ADMIN — ASPIRIN 81 MG: 81 TABLET, CHEWABLE ORAL at 12:45

## 2019-01-01 RX ADMIN — DONEPEZIL HYDROCHLORIDE 10 MG: 10 TABLET, FILM COATED ORAL at 21:37

## 2019-01-01 RX ADMIN — CLONIDINE HYDROCHLORIDE 0.1 MG: 0.1 TABLET ORAL at 08:25

## 2019-01-01 RX ADMIN — INSULIN LISPRO 3 UNITS: 100 INJECTION, SOLUTION INTRAVENOUS; SUBCUTANEOUS at 20:07

## 2019-01-01 RX ADMIN — ATORVASTATIN CALCIUM 40 MG: 20 TABLET, FILM COATED ORAL at 16:24

## 2019-01-01 RX ADMIN — DULOXETINE HYDROCHLORIDE 60 MG: 60 CAPSULE, DELAYED RELEASE ORAL at 09:31

## 2019-01-01 RX ADMIN — DONEPEZIL HYDROCHLORIDE 10 MG: 10 TABLET, FILM COATED ORAL at 21:20

## 2019-01-01 RX ADMIN — ALLOPURINOL 100 MG: 100 TABLET ORAL at 08:46

## 2019-01-01 RX ADMIN — INSULIN GLARGINE 30 UNITS: 100 INJECTION, SOLUTION SUBCUTANEOUS at 10:05

## 2019-01-01 RX ADMIN — INSULIN HUMAN 10 UNITS: 100 INJECTION, SOLUTION PARENTERAL at 13:14

## 2019-01-01 RX ADMIN — SODIUM CHLORIDE, PRESERVATIVE FREE 3 ML: 5 INJECTION INTRAVENOUS at 21:00

## 2019-01-01 RX ADMIN — GABAPENTIN 100 MG: 100 CAPSULE ORAL at 08:43

## 2019-01-01 RX ADMIN — HYDROMORPHONE HYDROCHLORIDE 0.5 MG: 1 INJECTION, SOLUTION INTRAMUSCULAR; INTRAVENOUS; SUBCUTANEOUS at 00:15

## 2019-01-01 RX ADMIN — INSULIN HUMAN 10 UNITS: 100 INJECTION, SOLUTION PARENTERAL at 11:59

## 2019-01-01 RX ADMIN — ASPIRIN 81 MG: 81 TABLET, COATED ORAL at 12:14

## 2019-01-01 RX ADMIN — SODIUM CHLORIDE, PRESERVATIVE FREE 3 ML: 5 INJECTION INTRAVENOUS at 09:55

## 2019-01-01 RX ADMIN — DONEPEZIL HYDROCHLORIDE 10 MG: 10 TABLET, FILM COATED ORAL at 21:42

## 2019-01-01 RX ADMIN — INSULIN HUMAN 30 UNITS: 100 INJECTION, SUSPENSION SUBCUTANEOUS at 17:57

## 2019-01-01 RX ADMIN — ASPIRIN 81 MG: 81 TABLET, DELAYED RELEASE ORAL at 15:44

## 2019-01-01 RX ADMIN — SODIUM CHLORIDE 100 ML/HR: 9 INJECTION, SOLUTION INTRAVENOUS at 21:54

## 2019-01-01 RX ADMIN — VANCOMYCIN 125 MG: KIT at 06:34

## 2019-01-01 RX ADMIN — GABAPENTIN 100 MG: 100 CAPSULE ORAL at 21:56

## 2019-01-01 RX ADMIN — INSULIN HUMAN 15 UNITS: 100 INJECTION, SUSPENSION SUBCUTANEOUS at 08:38

## 2019-01-01 RX ADMIN — ONDANSETRON 4 MG: 2 INJECTION INTRAMUSCULAR; INTRAVENOUS at 06:44

## 2019-01-01 RX ADMIN — DULOXETINE HYDROCHLORIDE 60 MG: 60 CAPSULE, DELAYED RELEASE ORAL at 15:44

## 2019-01-01 RX ADMIN — SODIUM CHLORIDE 250 ML: 9 INJECTION, SOLUTION INTRAVENOUS at 00:59

## 2019-01-01 RX ADMIN — INSULIN HUMAN 10 UNITS: 100 INJECTION, SOLUTION PARENTERAL at 18:41

## 2019-01-01 RX ADMIN — TORSEMIDE 20 MG: 20 TABLET ORAL at 08:26

## 2019-01-01 RX ADMIN — IPRATROPIUM BROMIDE AND ALBUTEROL SULFATE 3 ML: 2.5; .5 SOLUTION RESPIRATORY (INHALATION) at 07:43

## 2019-01-01 RX ADMIN — CALCIUM ACETATE 667 MG: 667 CAPSULE ORAL at 17:34

## 2019-01-01 RX ADMIN — INSULIN LISPRO 4 UNITS: 100 INJECTION, SOLUTION INTRAVENOUS; SUBCUTANEOUS at 12:10

## 2019-01-01 RX ADMIN — INSULIN LISPRO 5 UNITS: 100 INJECTION, SOLUTION INTRAVENOUS; SUBCUTANEOUS at 12:08

## 2019-01-01 RX ADMIN — SODIUM CHLORIDE, PRESERVATIVE FREE 3 ML: 5 INJECTION INTRAVENOUS at 10:00

## 2019-01-01 RX ADMIN — WARFARIN SODIUM 2.5 MG: 2.5 TABLET ORAL at 18:02

## 2019-01-01 RX ADMIN — DONEPEZIL HYDROCHLORIDE 10 MG: 10 TABLET, FILM COATED ORAL at 21:25

## 2019-01-01 RX ADMIN — SODIUM CHLORIDE, PRESERVATIVE FREE 3 ML: 5 INJECTION INTRAVENOUS at 20:07

## 2019-01-01 RX ADMIN — ASPIRIN 81 MG: 81 TABLET, COATED ORAL at 10:02

## 2019-01-01 RX ADMIN — ALLOPURINOL 100 MG: 100 TABLET ORAL at 14:23

## 2019-01-01 RX ADMIN — DULOXETINE HYDROCHLORIDE 60 MG: 60 CAPSULE, DELAYED RELEASE ORAL at 09:48

## 2019-01-01 RX ADMIN — DAKIN'S SOLUTION 0.125% (QUARTER STRENGTH) 946 ML: 0.12 SOLUTION at 14:55

## 2019-01-01 RX ADMIN — INSULIN HUMAN 10 UNITS: 100 INJECTION, SOLUTION PARENTERAL at 12:19

## 2019-01-01 RX ADMIN — INSULIN HUMAN 5 UNITS: 100 INJECTION, SOLUTION PARENTERAL at 12:53

## 2019-01-01 RX ADMIN — DONEPEZIL HYDROCHLORIDE 10 MG: 10 TABLET, FILM COATED ORAL at 21:48

## 2019-01-01 RX ADMIN — DULOXETINE HYDROCHLORIDE 60 MG: 60 CAPSULE, DELAYED RELEASE ORAL at 14:15

## 2019-01-01 RX ADMIN — TORSEMIDE 100 MG: 100 TABLET ORAL at 09:52

## 2019-01-01 RX ADMIN — TAMSULOSIN HYDROCHLORIDE 0.4 MG: 0.4 CAPSULE ORAL at 08:52

## 2019-01-01 RX ADMIN — WARFARIN SODIUM 5 MG: 5 TABLET ORAL at 09:56

## 2019-01-01 RX ADMIN — TORSEMIDE 100 MG: 100 TABLET ORAL at 09:56

## 2019-01-01 RX ADMIN — TAMSULOSIN HYDROCHLORIDE 0.4 MG: 0.4 CAPSULE ORAL at 16:25

## 2019-01-01 RX ADMIN — VANCOMYCIN HYDROCHLORIDE 1750 MG: 10 INJECTION, POWDER, LYOPHILIZED, FOR SOLUTION INTRAVENOUS at 23:29

## 2019-01-01 RX ADMIN — SODIUM CHLORIDE, PRESERVATIVE FREE 3 ML: 5 INJECTION INTRAVENOUS at 21:23

## 2019-01-01 RX ADMIN — INSULIN HUMAN 10 UNITS: 100 INJECTION, SOLUTION PARENTERAL at 09:09

## 2019-01-01 RX ADMIN — INSULIN GLARGINE 15 UNITS: 100 INJECTION, SOLUTION SUBCUTANEOUS at 09:50

## 2019-01-01 RX ADMIN — METOPROLOL SUCCINATE 50 MG: 50 TABLET, FILM COATED, EXTENDED RELEASE ORAL at 01:44

## 2019-01-01 RX ADMIN — INSULIN HUMAN 10 UNITS: 100 INJECTION, SOLUTION PARENTERAL at 08:46

## 2019-01-01 RX ADMIN — FENTANYL CITRATE 50 MCG: 50 INJECTION INTRAMUSCULAR; INTRAVENOUS at 13:38

## 2019-01-01 RX ADMIN — CLINDAMYCIN PHOSPHATE 900 MG: 900 INJECTION, SOLUTION INTRAVENOUS at 18:46

## 2019-01-01 RX ADMIN — INSULIN GLARGINE 30 UNITS: 100 INJECTION, SOLUTION SUBCUTANEOUS at 06:15

## 2019-01-01 RX ADMIN — NITROGLYCERIN 5 MCG/MIN: 20 INJECTION INTRAVENOUS at 05:24

## 2019-01-01 RX ADMIN — INSULIN GLARGINE 15 UNITS: 100 INJECTION, SOLUTION SUBCUTANEOUS at 20:23

## 2019-01-01 RX ADMIN — INSULIN LISPRO 3 UNITS: 100 INJECTION, SOLUTION INTRAVENOUS; SUBCUTANEOUS at 18:02

## 2019-01-01 RX ADMIN — METRONIDAZOLE 500 MG: 500 TABLET ORAL at 21:53

## 2019-01-01 RX ADMIN — INSULIN HUMAN 10 UNITS: 100 INJECTION, SOLUTION PARENTERAL at 17:24

## 2019-01-01 RX ADMIN — CLONIDINE HYDROCHLORIDE 0.1 MG: 0.1 TABLET ORAL at 14:24

## 2019-01-01 RX ADMIN — DULOXETINE HYDROCHLORIDE 60 MG: 60 CAPSULE, DELAYED RELEASE ORAL at 08:25

## 2019-01-01 RX ADMIN — VANCOMYCIN 125 MG: KIT at 19:17

## 2019-01-01 RX ADMIN — METRONIDAZOLE 500 MG: 500 TABLET, FILM COATED ORAL at 15:30

## 2019-01-01 RX ADMIN — INSULIN HUMAN 10 UNITS: 100 INJECTION, SOLUTION PARENTERAL at 08:27

## 2019-01-01 RX ADMIN — ATORVASTATIN CALCIUM 40 MG: 20 TABLET, FILM COATED ORAL at 08:33

## 2019-01-01 RX ADMIN — SODIUM CHLORIDE, PRESERVATIVE FREE 10 ML: 5 INJECTION INTRAVENOUS at 22:16

## 2019-01-01 RX ADMIN — VANCOMYCIN 125 MG: KIT at 06:04

## 2019-01-01 RX ADMIN — INSULIN LISPRO 5 UNITS: 100 INJECTION, SOLUTION INTRAVENOUS; SUBCUTANEOUS at 17:47

## 2019-01-01 RX ADMIN — INSULIN LISPRO 2 UNITS: 100 INJECTION, SOLUTION INTRAVENOUS; SUBCUTANEOUS at 22:15

## 2019-01-01 RX ADMIN — Medication: at 14:45

## 2019-01-01 RX ADMIN — GABAPENTIN 100 MG: 100 CAPSULE ORAL at 08:44

## 2019-01-01 RX ADMIN — INSULIN GLARGINE 30 UNITS: 100 INJECTION, SOLUTION SUBCUTANEOUS at 11:29

## 2019-01-01 RX ADMIN — METOPROLOL SUCCINATE 50 MG: 50 TABLET, FILM COATED, EXTENDED RELEASE ORAL at 20:51

## 2019-01-01 RX ADMIN — GABAPENTIN 100 MG: 100 CAPSULE ORAL at 20:51

## 2019-01-01 RX ADMIN — ASPIRIN 81 MG: 81 TABLET, CHEWABLE ORAL at 08:44

## 2019-01-01 RX ADMIN — METOPROLOL TARTRATE 5 MG: 5 INJECTION INTRAVENOUS at 08:14

## 2019-01-01 RX ADMIN — SODIUM CHLORIDE, PRESERVATIVE FREE 3 ML: 5 INJECTION INTRAVENOUS at 20:12

## 2019-01-01 RX ADMIN — INSULIN HUMAN 5 UNITS: 100 INJECTION, SOLUTION PARENTERAL at 07:58

## 2019-01-01 RX ADMIN — GABAPENTIN 100 MG: 100 CAPSULE ORAL at 08:46

## 2019-01-01 RX ADMIN — CLINDAMYCIN PHOSPHATE 900 MG: 900 INJECTION, SOLUTION INTRAVENOUS at 12:46

## 2019-01-01 RX ADMIN — INSULIN HUMAN 30 UNITS: 100 INJECTION, SUSPENSION SUBCUTANEOUS at 17:10

## 2019-01-01 RX ADMIN — METOPROLOL SUCCINATE 50 MG: 50 TABLET, FILM COATED, EXTENDED RELEASE ORAL at 09:48

## 2019-01-01 RX ADMIN — INSULIN GLARGINE 30 UNITS: 100 INJECTION, SOLUTION SUBCUTANEOUS at 08:47

## 2019-01-01 RX ADMIN — INSULIN GLARGINE 30 UNITS: 100 INJECTION, SOLUTION SUBCUTANEOUS at 07:03

## 2019-01-01 RX ADMIN — SODIUM CHLORIDE, PRESERVATIVE FREE 3 ML: 5 INJECTION INTRAVENOUS at 21:38

## 2019-01-01 RX ADMIN — CALCIUM ACETATE 667 MG: 667 CAPSULE ORAL at 17:03

## 2019-01-01 RX ADMIN — METOPROLOL SUCCINATE 75 MG: 50 TABLET, FILM COATED, EXTENDED RELEASE ORAL at 08:33

## 2019-01-01 RX ADMIN — GABAPENTIN 100 MG: 100 CAPSULE ORAL at 16:46

## 2019-01-01 RX ADMIN — GABAPENTIN 100 MG: 100 CAPSULE ORAL at 17:43

## 2019-01-01 RX ADMIN — ATORVASTATIN CALCIUM 40 MG: 20 TABLET, FILM COATED ORAL at 08:46

## 2019-01-01 RX ADMIN — DULOXETINE HYDROCHLORIDE 60 MG: 60 CAPSULE, DELAYED RELEASE ORAL at 08:24

## 2019-01-01 RX ADMIN — TAZOBACTAM SODIUM AND PIPERACILLIN SODIUM 3.38 G: 375; 3 INJECTION, SOLUTION INTRAVENOUS at 03:00

## 2019-01-01 RX ADMIN — IPRATROPIUM BROMIDE AND ALBUTEROL SULFATE 3 ML: 2.5; .5 SOLUTION RESPIRATORY (INHALATION) at 23:43

## 2019-01-01 RX ADMIN — VANCOMYCIN 125 MG: KIT at 00:02

## 2019-01-01 RX ADMIN — GABAPENTIN 100 MG: 100 CAPSULE ORAL at 08:27

## 2019-01-01 RX ADMIN — SODIUM CHLORIDE, PRESERVATIVE FREE 3 ML: 5 INJECTION INTRAVENOUS at 08:30

## 2019-01-01 RX ADMIN — METOPROLOL SUCCINATE 75 MG: 50 TABLET, FILM COATED, EXTENDED RELEASE ORAL at 08:42

## 2019-01-01 RX ADMIN — HEPARIN SODIUM 5000 UNITS: 5000 INJECTION INTRAVENOUS; SUBCUTANEOUS at 14:13

## 2019-01-01 RX ADMIN — ASPIRIN 81 MG: 81 TABLET, CHEWABLE ORAL at 08:52

## 2019-01-01 RX ADMIN — SODIUM CHLORIDE, PRESERVATIVE FREE 10 ML: 5 INJECTION INTRAVENOUS at 12:47

## 2019-01-01 RX ADMIN — CALCIUM ACETATE 1334 MG: 667 CAPSULE ORAL at 16:53

## 2019-01-01 RX ADMIN — GABAPENTIN 100 MG: 100 CAPSULE ORAL at 21:20

## 2019-01-01 RX ADMIN — HYDRALAZINE HYDROCHLORIDE 10 MG: 20 INJECTION INTRAMUSCULAR; INTRAVENOUS at 12:06

## 2019-01-01 RX ADMIN — INSULIN HUMAN 10 UNITS: 100 INJECTION, SOLUTION PARENTERAL at 17:42

## 2019-01-01 RX ADMIN — SODIUM CHLORIDE 1000 ML: 9 INJECTION, SOLUTION INTRAVENOUS at 08:55

## 2019-01-01 RX ADMIN — GABAPENTIN 100 MG: 100 CAPSULE ORAL at 22:29

## 2019-01-01 RX ADMIN — CALCIUM ACETATE 667 MG: 667 CAPSULE ORAL at 08:26

## 2019-01-01 RX ADMIN — INSULIN HUMAN 30 UNITS: 100 INJECTION, SUSPENSION SUBCUTANEOUS at 17:47

## 2019-01-01 RX ADMIN — CALCIUM ACETATE 1334 MG: 667 CAPSULE ORAL at 10:02

## 2019-01-01 RX ADMIN — TAZOBACTAM SODIUM AND PIPERACILLIN SODIUM 3.38 G: 375; 3 INJECTION, SOLUTION INTRAVENOUS at 06:38

## 2019-01-01 RX ADMIN — INSULIN LISPRO 3 UNITS: 100 INJECTION, SOLUTION INTRAVENOUS; SUBCUTANEOUS at 21:31

## 2019-01-01 RX ADMIN — CALCIUM ACETATE 667 MG: 667 CAPSULE ORAL at 16:25

## 2019-01-01 RX ADMIN — DAKIN'S SOLUTION 0.125% (QUARTER STRENGTH) 946 ML: 0.12 SOLUTION at 16:47

## 2019-01-01 RX ADMIN — DONEPEZIL HYDROCHLORIDE 10 MG: 10 TABLET, FILM COATED ORAL at 20:35

## 2019-01-01 RX ADMIN — SODIUM CHLORIDE, PRESERVATIVE FREE 3 ML: 5 INJECTION INTRAVENOUS at 09:48

## 2019-01-01 RX ADMIN — GABAPENTIN 100 MG: 100 CAPSULE ORAL at 09:48

## 2019-01-01 RX ADMIN — WARFARIN SODIUM 2.5 MG: 2.5 TABLET ORAL at 23:50

## 2019-01-01 RX ADMIN — HYDROCODONE BITARTRATE AND ACETAMINOPHEN 1 TABLET: 7.5; 325 TABLET ORAL at 05:00

## 2019-01-01 RX ADMIN — CLONIDINE HYDROCHLORIDE 0.1 MG: 0.1 TABLET ORAL at 20:12

## 2019-01-01 RX ADMIN — CLONIDINE HYDROCHLORIDE 0.1 MG: 0.1 TABLET ORAL at 09:23

## 2019-01-01 RX ADMIN — CALCIUM ACETATE 667 MG: 667 CAPSULE ORAL at 09:48

## 2019-01-01 RX ADMIN — INSULIN HUMAN 10 UNITS: 100 INJECTION, SOLUTION PARENTERAL at 10:02

## 2019-01-01 RX ADMIN — ALLOPURINOL 100 MG: 100 TABLET ORAL at 17:03

## 2019-01-01 RX ADMIN — AMLODIPINE BESYLATE 5 MG: 5 TABLET ORAL at 20:51

## 2019-01-01 RX ADMIN — INSULIN HUMAN 10 UNITS: 100 INJECTION, SOLUTION PARENTERAL at 17:58

## 2019-01-01 RX ADMIN — DILTIAZEM HYDROCHLORIDE 10 MG: 5 INJECTION INTRAVENOUS at 02:22

## 2019-01-01 RX ADMIN — DAKIN'S SOLUTION 0.125% (QUARTER STRENGTH) 946 ML: 0.12 SOLUTION at 22:57

## 2019-01-01 RX ADMIN — SODIUM CHLORIDE, PRESERVATIVE FREE 10 ML: 5 INJECTION INTRAVENOUS at 08:52

## 2019-01-01 RX ADMIN — CLONIDINE HYDROCHLORIDE 0.1 MG: 0.1 TABLET ORAL at 20:51

## 2019-01-01 RX ADMIN — TORSEMIDE 20 MG: 20 TABLET ORAL at 08:44

## 2019-01-01 RX ADMIN — GABAPENTIN 100 MG: 100 CAPSULE ORAL at 08:25

## 2019-01-01 RX ADMIN — CEFAZOLIN SODIUM 2 G: 2 INJECTION, SOLUTION INTRAVENOUS at 10:50

## 2019-01-01 RX ADMIN — INSULIN GLARGINE 30 UNITS: 100 INJECTION, SOLUTION SUBCUTANEOUS at 08:44

## 2019-01-01 RX ADMIN — METOPROLOL TARTRATE 5 MG: 5 INJECTION INTRAVENOUS at 07:29

## 2019-01-01 RX ADMIN — FENTANYL CITRATE 50 MCG: 50 INJECTION INTRAMUSCULAR; INTRAVENOUS at 13:41

## 2019-01-01 RX ADMIN — INSULIN HUMAN 30 UNITS: 100 INJECTION, SUSPENSION SUBCUTANEOUS at 08:47

## 2019-01-01 RX ADMIN — GABAPENTIN 100 MG: 100 CAPSULE ORAL at 16:08

## 2019-01-01 RX ADMIN — VANCOMYCIN 125 MG: KIT at 18:24

## 2019-01-01 RX ADMIN — INSULIN HUMAN 30 UNITS: 100 INJECTION, SUSPENSION SUBCUTANEOUS at 08:50

## 2019-01-01 RX ADMIN — WARFARIN SODIUM 5 MG: 5 TABLET ORAL at 08:43

## 2019-01-01 RX ADMIN — GABAPENTIN 100 MG: 100 CAPSULE ORAL at 14:23

## 2019-01-01 RX ADMIN — ALLOPURINOL 100 MG: 100 TABLET ORAL at 08:33

## 2019-01-01 RX ADMIN — CLONIDINE HYDROCHLORIDE 0.1 MG: 0.1 TABLET ORAL at 20:04

## 2019-01-01 RX ADMIN — DONEPEZIL HYDROCHLORIDE 10 MG: 10 TABLET, FILM COATED ORAL at 21:44

## 2019-01-01 RX ADMIN — METOPROLOL SUCCINATE 50 MG: 50 TABLET, FILM COATED, EXTENDED RELEASE ORAL at 09:53

## 2019-01-01 RX ADMIN — TAMSULOSIN HYDROCHLORIDE 0.4 MG: 0.4 CAPSULE ORAL at 08:45

## 2019-01-01 RX ADMIN — WARFARIN SODIUM 5 MG: 5 TABLET ORAL at 18:06

## 2019-01-01 RX ADMIN — CLINDAMYCIN PHOSPHATE 900 MG: 900 INJECTION, SOLUTION INTRAVENOUS at 03:30

## 2019-01-01 RX ADMIN — DAKIN'S SOLUTION 0.125% (QUARTER STRENGTH) 946 ML: 0.12 SOLUTION at 08:50

## 2019-01-01 RX ADMIN — ASPIRIN 81 MG: 81 TABLET, DELAYED RELEASE ORAL at 09:48

## 2019-01-01 RX ADMIN — SODIUM CHLORIDE, PRESERVATIVE FREE 10 ML: 5 INJECTION INTRAVENOUS at 12:15

## 2019-01-01 RX ADMIN — GABAPENTIN 100 MG: 100 CAPSULE ORAL at 20:12

## 2019-01-01 RX ADMIN — ONDANSETRON 4 MG: 2 INJECTION INTRAMUSCULAR; INTRAVENOUS at 01:30

## 2019-01-01 RX ADMIN — BUMETANIDE 2 MG: 0.25 INJECTION INTRAMUSCULAR; INTRAVENOUS at 05:23

## 2019-01-01 RX ADMIN — SODIUM CHLORIDE 9 ML/HR: 9 INJECTION, SOLUTION INTRAVENOUS at 11:46

## 2019-01-01 RX ADMIN — DONEPEZIL HYDROCHLORIDE 10 MG: 10 TABLET, FILM COATED ORAL at 21:00

## 2019-01-01 RX ADMIN — WARFARIN SODIUM 7.5 MG: 7.5 TABLET ORAL at 18:44

## 2019-01-01 RX ADMIN — ALLOPURINOL 100 MG: 100 TABLET ORAL at 10:02

## 2019-01-01 RX ADMIN — GABAPENTIN 100 MG: 100 CAPSULE ORAL at 21:25

## 2019-01-01 RX ADMIN — METOPROLOL SUCCINATE 50 MG: 50 TABLET, FILM COATED, EXTENDED RELEASE ORAL at 22:29

## 2019-01-01 RX ADMIN — GLYCOPYRROLATE 0.2 MG: 0.2 INJECTION INTRAMUSCULAR; INTRAVENOUS at 13:38

## 2019-01-01 RX ADMIN — LIDOCAINE HYDROCHLORIDE 60 MG: 20 INJECTION, SOLUTION INFILTRATION; PERINEURAL at 13:39

## 2019-01-01 RX ADMIN — GABAPENTIN 100 MG: 100 CAPSULE ORAL at 20:16

## 2019-01-01 RX ADMIN — GABAPENTIN 100 MG: 100 CAPSULE ORAL at 08:42

## 2019-01-01 RX ADMIN — PHENYLEPHRINE HYDROCHLORIDE 100 MCG: 10 INJECTION INTRAVENOUS at 14:09

## 2019-01-01 RX ADMIN — METOPROLOL SUCCINATE 75 MG: 50 TABLET, FILM COATED, EXTENDED RELEASE ORAL at 12:46

## 2019-01-01 RX ADMIN — INSULIN HUMAN 10 UNITS: 100 INJECTION, SOLUTION PARENTERAL at 17:10

## 2019-01-01 RX ADMIN — CALCIUM ACETATE 667 MG: 667 CAPSULE ORAL at 12:19

## 2019-01-01 RX ADMIN — INSULIN HUMAN 10 UNITS: 100 INJECTION, SOLUTION PARENTERAL at 12:09

## 2019-01-01 RX ADMIN — FENTANYL CITRATE 50 MCG: 50 INJECTION INTRAMUSCULAR; INTRAVENOUS at 11:46

## 2019-01-01 RX ADMIN — CEFAZOLIN SODIUM 2 G: 2 INJECTION, SOLUTION INTRAVENOUS at 18:19

## 2019-01-01 RX ADMIN — WARFARIN SODIUM 4 MG: 4 TABLET ORAL at 17:34

## 2019-01-01 RX ADMIN — WARFARIN SODIUM 5 MG: 5 TABLET ORAL at 17:35

## 2019-01-01 RX ADMIN — SODIUM CHLORIDE 750 MG: 900 INJECTION, SOLUTION INTRAVENOUS at 14:55

## 2019-01-01 RX ADMIN — SODIUM CHLORIDE, PRESERVATIVE FREE 10 ML: 5 INJECTION INTRAVENOUS at 21:48

## 2019-01-01 RX ADMIN — TAMSULOSIN HYDROCHLORIDE 0.4 MG: 0.4 CAPSULE ORAL at 08:46

## 2019-01-01 RX ADMIN — GABAPENTIN 100 MG: 100 CAPSULE ORAL at 16:32

## 2019-01-01 RX ADMIN — CALCIUM ACETATE 667 MG: 667 CAPSULE ORAL at 14:23

## 2019-01-01 RX ADMIN — METOPROLOL SUCCINATE 75 MG: 50 TABLET, FILM COATED, EXTENDED RELEASE ORAL at 08:24

## 2019-01-01 RX ADMIN — PHENYLEPHRINE HYDROCHLORIDE 100 MCG: 10 INJECTION INTRAVENOUS at 14:04

## 2019-01-01 RX ADMIN — ONDANSETRON 4 MG: 2 INJECTION INTRAMUSCULAR; INTRAVENOUS at 06:52

## 2019-01-01 RX ADMIN — DAKIN'S SOLUTION 0.125% (QUARTER STRENGTH) 946 ML: 0.12 SOLUTION at 21:00

## 2019-01-01 RX ADMIN — TAMSULOSIN HYDROCHLORIDE 0.4 MG: 0.4 CAPSULE ORAL at 09:48

## 2019-01-01 RX ADMIN — TAMSULOSIN HYDROCHLORIDE 0.4 MG: 0.4 CAPSULE ORAL at 09:31

## 2019-01-01 RX ADMIN — SODIUM CHLORIDE, PRESERVATIVE FREE 3 ML: 5 INJECTION INTRAVENOUS at 08:51

## 2019-01-01 RX ADMIN — INSULIN LISPRO 3 UNITS: 100 INJECTION, SOLUTION INTRAVENOUS; SUBCUTANEOUS at 18:41

## 2019-01-01 RX ADMIN — INSULIN HUMAN 30 UNITS: 100 INJECTION, SUSPENSION SUBCUTANEOUS at 17:43

## 2019-01-01 RX ADMIN — INSULIN HUMAN 30 UNITS: 100 INJECTION, SUSPENSION SUBCUTANEOUS at 08:23

## 2019-01-01 RX ADMIN — DULOXETINE HYDROCHLORIDE 60 MG: 60 CAPSULE, DELAYED RELEASE ORAL at 22:28

## 2019-01-01 RX ADMIN — INSULIN HUMAN 10 UNITS: 100 INJECTION, SOLUTION PARENTERAL at 12:44

## 2019-01-01 RX ADMIN — WARFARIN SODIUM 2.5 MG: 2.5 TABLET ORAL at 18:01

## 2019-01-01 RX ADMIN — INSULIN HUMAN 30 UNITS: 100 INJECTION, SUSPENSION SUBCUTANEOUS at 09:45

## 2019-01-01 RX ADMIN — ACETAMINOPHEN 1000 MG: 500 TABLET, FILM COATED ORAL at 00:59

## 2019-01-01 RX ADMIN — INSULIN HUMAN 10 UNITS: 100 INJECTION, SOLUTION PARENTERAL at 18:44

## 2019-01-01 RX ADMIN — CLONIDINE HYDROCHLORIDE 0.1 MG: 0.1 TABLET ORAL at 22:28

## 2019-01-01 RX ADMIN — COLLAGENASE SANTYL: 250 OINTMENT TOPICAL at 16:47

## 2019-01-01 RX ADMIN — ALLOPURINOL 100 MG: 100 TABLET ORAL at 08:44

## 2019-01-01 RX ADMIN — IPRATROPIUM BROMIDE AND ALBUTEROL SULFATE 3 ML: 2.5; .5 SOLUTION RESPIRATORY (INHALATION) at 16:28

## 2019-01-01 RX ADMIN — CEFAZOLIN SODIUM 2 G: 2 INJECTION, SOLUTION INTRAVENOUS at 13:38

## 2019-01-01 RX ADMIN — CALCIUM ACETATE 667 MG: 667 CAPSULE ORAL at 08:34

## 2019-01-01 RX ADMIN — DONEPEZIL HYDROCHLORIDE 10 MG: 10 TABLET, FILM COATED ORAL at 20:54

## 2019-01-01 RX ADMIN — VANCOMYCIN 125 MG: KIT at 18:23

## 2019-01-01 RX ADMIN — SODIUM CHLORIDE 1000 ML: 9 INJECTION, SOLUTION INTRAVENOUS at 09:28

## 2019-01-01 RX ADMIN — SODIUM CHLORIDE, PRESERVATIVE FREE 3 ML: 5 INJECTION INTRAVENOUS at 09:15

## 2019-01-01 RX ADMIN — VANCOMYCIN 125 MG: KIT at 00:35

## 2019-01-01 RX ADMIN — DONEPEZIL HYDROCHLORIDE 10 MG: 10 TABLET, FILM COATED ORAL at 22:29

## 2019-01-01 RX ADMIN — SODIUM CHLORIDE 750 MG: 900 INJECTION, SOLUTION INTRAVENOUS at 14:36

## 2019-01-01 RX ADMIN — WARFARIN SODIUM 5 MG: 5 TABLET ORAL at 17:38

## 2019-01-01 RX ADMIN — GABAPENTIN 100 MG: 100 CAPSULE ORAL at 08:35

## 2019-01-01 RX ADMIN — DONEPEZIL HYDROCHLORIDE 10 MG: 10 TABLET, FILM COATED ORAL at 20:07

## 2019-01-01 RX ADMIN — ASPIRIN 81 MG: 81 TABLET, DELAYED RELEASE ORAL at 14:24

## 2019-01-01 RX ADMIN — Medication 0.14 MCG/KG/MIN: at 09:45

## 2019-01-01 RX ADMIN — ACETAMINOPHEN 650 MG: 325 TABLET, FILM COATED ORAL at 11:24

## 2019-01-01 RX ADMIN — CALCIUM ACETATE 667 MG: 667 CAPSULE ORAL at 12:34

## 2019-01-01 RX ADMIN — DAKIN'S SOLUTION 0.125% (QUARTER STRENGTH) 946 ML: 0.12 SOLUTION at 08:39

## 2019-01-01 RX ADMIN — CLONIDINE HYDROCHLORIDE 0.1 MG: 0.1 TABLET ORAL at 20:54

## 2019-01-01 RX ADMIN — VANCOMYCIN 125 MG: KIT at 12:10

## 2019-01-01 RX ADMIN — HEPARIN SODIUM 5000 UNITS: 5000 INJECTION INTRAVENOUS; SUBCUTANEOUS at 09:31

## 2019-01-01 RX ADMIN — ALLOPURINOL 100 MG: 100 TABLET ORAL at 22:28

## 2019-01-01 RX ADMIN — GABAPENTIN 100 MG: 100 CAPSULE ORAL at 21:37

## 2019-01-01 RX ADMIN — CALCIUM ACETATE 1334 MG: 667 CAPSULE ORAL at 18:23

## 2019-01-01 RX ADMIN — SODIUM CHLORIDE, PRESERVATIVE FREE 10 ML: 5 INJECTION INTRAVENOUS at 21:00

## 2019-01-01 RX ADMIN — DULOXETINE HYDROCHLORIDE 60 MG: 60 CAPSULE, DELAYED RELEASE ORAL at 12:47

## 2019-01-01 RX ADMIN — ACETAMINOPHEN 650 MG: 325 TABLET, FILM COATED ORAL at 20:54

## 2019-01-01 RX ADMIN — ATORVASTATIN CALCIUM 40 MG: 20 TABLET, FILM COATED ORAL at 08:43

## 2019-01-01 RX ADMIN — SODIUM CHLORIDE, PRESERVATIVE FREE 3 ML: 5 INJECTION INTRAVENOUS at 08:26

## 2019-01-01 RX ADMIN — TAZOBACTAM SODIUM AND PIPERACILLIN SODIUM 3.38 G: 375; 3 INJECTION, SOLUTION INTRAVENOUS at 14:13

## 2019-01-01 RX ADMIN — SODIUM CHLORIDE, PRESERVATIVE FREE 10 ML: 5 INJECTION INTRAVENOUS at 20:35

## 2019-01-01 RX ADMIN — CLONIDINE HYDROCHLORIDE 0.1 MG: 0.1 TABLET ORAL at 09:56

## 2019-01-01 RX ADMIN — IPRATROPIUM BROMIDE AND ALBUTEROL SULFATE 3 ML: 2.5; .5 SOLUTION RESPIRATORY (INHALATION) at 14:56

## 2019-01-01 RX ADMIN — METOPROLOL SUCCINATE 50 MG: 50 TABLET, FILM COATED, EXTENDED RELEASE ORAL at 14:24

## 2019-01-01 RX ADMIN — Medication: at 09:55

## 2019-01-01 RX ADMIN — IPRATROPIUM BROMIDE AND ALBUTEROL SULFATE 3 ML: 2.5; .5 SOLUTION RESPIRATORY (INHALATION) at 23:21

## 2019-01-01 RX ADMIN — CLONIDINE HYDROCHLORIDE 0.1 MG: 0.1 TABLET ORAL at 21:45

## 2019-01-01 RX ADMIN — SODIUM CHLORIDE, PRESERVATIVE FREE 3 ML: 5 INJECTION INTRAVENOUS at 14:28

## 2019-01-01 RX ADMIN — ONDANSETRON HYDROCHLORIDE 4 MG: 2 SOLUTION INTRAMUSCULAR; INTRAVENOUS at 04:04

## 2019-01-01 RX ADMIN — ASPIRIN 81 MG: 81 TABLET, DELAYED RELEASE ORAL at 08:45

## 2019-01-01 RX ADMIN — METOPROLOL SUCCINATE 75 MG: 50 TABLET, FILM COATED, EXTENDED RELEASE ORAL at 20:36

## 2019-01-01 RX ADMIN — DULOXETINE HYDROCHLORIDE 60 MG: 60 CAPSULE, DELAYED RELEASE ORAL at 08:52

## 2019-01-03 NOTE — PROGRESS NOTES
CC:anticoagulation,HTN,lipids, DM    Subjective.../HPI  Patient present today  1) DM -Dr. Lehman  2)Deacon has a history of chronic hypertension and has been well controlled on current medications.  Patient reports has had hypertension for 30 years. He is tolerating medications without side effect. He reports no vision changes, headaches or lightheadedness. He is requesting refills of medications  3)Deacon has a history of chronic hyperlipidemia and has been well controlled on current medications.  Patient reports has had hyperlipidemia for 30 years. He is tolerating medications without side effect.  Hyperlipidemia labs will be reviewed today.      I have reviewed the patient's medical history in detail and updated the computerized patient record.    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 ARM FISTULA REVISION/POSS. TUNNELED CATH;  Surgeon: Ar Muro MD;  Location: [facility] OR;  Service:    • COLONOSCOPY  2003   • INSERTION HEMODIALYSIS CATHETER N/A 11/25/2016    Procedure: TUNNEL CATHETER INSERTION;  Surgeon: Silvia Lim Jr., MD;  Location: [facility] OR;  Service:        Family History   Problem Relation Age of Onset   • Arthritis Mother    • Diabetes Father    • Heart  disease Father    • Hyperlipidemia Father    • Hypertension Father    • Obesity Father    • Cancer Brother         esophagus   • Heart disease Maternal Grandfather        Social History     Socioeconomic History   • Marital status:      Spouse name: Not on file   • Number of children: Not on file   • Years of education: Not on file   • Highest education level: Not on file   Social Needs   • Financial resource strain: Not on file   • Food insecurity - worry: Not on file   • Food insecurity - inability: Not on file   • Transportation needs - medical: Not on file   • Transportation needs - non-medical: Not on file   Occupational History   • Not on file   Tobacco Use   • Smoking status: Former Smoker     Packs/day: 1.00     Years: 5.00     Pack years: 5.00     Types: Cigarettes     Last attempt to quit: 1966     Years since quittin.0   • Smokeless tobacco: Never Used   Substance and Sexual Activity   • Alcohol use: Yes     Alcohol/week: 0.6 oz     Types: 1 Cans of beer per week     Comment: occasionally    • Drug use: No   • Sexual activity: Defer   Other Topics Concern   • Not on file   Social History Narrative   • Not on file       Most Recent Immunizations   Administered Date(s) Administered   • Flu Mist 10/02/2017   • Flu Vaccine High Dose PF 65YR+ 10/21/2016   • Flu Vaccine Quad PF >18YRS 10/01/2014   • Influenza, Unspecified 2013   • Pneumococcal Conjugate 13-Valent (PCV13) 2015   • Pneumococcal Polysaccharide (PPSV23) 2014       Review of Systems:   Review of Systems   Constitutional: Negative.    HENT: Negative.    Eyes: Negative.    Respiratory: Negative.    Cardiovascular: Negative.    Gastrointestinal: Negative.    Endocrine: Negative.    Genitourinary: Negative.    Musculoskeletal: Negative.    Skin: Negative.    Allergic/Immunologic: Negative.    Neurological: Negative.    Hematological: Negative.    Psychiatric/Behavioral: Negative.          Physical Exam   Constitutional: He  "is oriented to person, place, and time. He appears well-developed and well-nourished.   Cardiovascular: Normal rate, regular rhythm and normal heart sounds.   Pulmonary/Chest: Effort normal and breath sounds normal.   Neurological: He is oriented to person, place, and time.   Psychiatric: He has a normal mood and affect. His behavior is normal.   Vitals reviewed.        Vital Signs     Vitals:    01/03/19 1421   BP: 164/72   BP Location: Left arm   Patient Position: Sitting   Cuff Size: Large Adult   Pulse: 75   Temp: 97.1 °F (36.2 °C)   TempSrc: Oral   SpO2: 100%   Weight: 90 kg (198 lb 6.4 oz)   Height: 177.8 cm (70\")          Results Review:      REVIEWED AND DISCUSSED CLINICAL RESULTS WITH PATIENT      Requested Prescriptions     Signed Prescriptions Disp Refills   • fenofibrate (TRICOR) 48 MG tablet 90 tablet 3     Sig: Take 1 tablet by mouth Daily.       No current facility-administered medications for this visit.   No current outpatient medications on file.    Facility-Administered Medications Ordered in Other Visits:   •  allopurinol (ZYLOPRIM) tablet 100 mg, 100 mg, Oral, Daily, Kaylene Patel MD, 100 mg at 01/08/19 2051  •  amLODIPine (NORVASC) tablet 5 mg, 5 mg, Oral, Daily, Kaylene Patel MD, 5 mg at 01/08/19 2051  •  CloNIDine (CATAPRES) tablet 0.1 mg, 0.1 mg, Oral, BID, Kaylene Patel MD, 0.1 mg at 01/08/19 2051  •  donepezil (ARICEPT) tablet 10 mg, 10 mg, Oral, Nightly, Kaylene Patel MD, 10 mg at 01/08/19 2051  •  DULoxetine (CYMBALTA) DR capsule 60 mg, 60 mg, Oral, Daily, Kaylene Patel MD, 60 mg at 01/08/19 2051  •  gabapentin (NEURONTIN) capsule 100 mg, 100 mg, Oral, TID, Kaylene Patel MD, 100 mg at 01/08/19 2051  •  insulin glargine (LANTUS) injection 15 Units, 15 Units, Subcutaneous, Q12H, Kaylene Patel MD, 15 Units at 01/08/19 2052  •  insulin regular (humuLIN R,novoLIN R) injection 10 Units, 10 Units, Subcutaneous, TID JESUS, Kaylene Patel MD, 10 Units at 01/08/19 1742  •  mannitol 25% (RENAL) " injection, 12.5 g, Intravenous, PRN, Segundo Lawson MD  •  metoprolol succinate XL (TOPROL-XL) 24 hr tablet 50 mg, 50 mg, Oral, Q24H, Kaylene Patel MD, 50 mg at 01/08/19 2051  •  nitroglycerin 50 mg/250 mL (0.2 mg/mL) infusion, 5-200 mcg/min, Intravenous, Titrated, Hammad Farris MD, Stopped at 01/07/19 2240  •  ondansetron (ZOFRAN) injection 4 mg, 4 mg, Intravenous, Q6H PRN, Isreal Rodriguez MD, 4 mg at 01/08/19 0302  •  sodium chloride 0.9 % flush 10 mL, 10 mL, Intravenous, PRN, Doroteo Soler MD  •  sodium chloride 0.9 % flush 3 mL, 3 mL, Intravenous, Q12H, Kaylene Patel MD, 3 mL at 01/08/19 2054  •  sodium chloride 0.9 % flush 3-10 mL, 3-10 mL, Intravenous, PRN, Kaylene Patel MD  •  tamsulosin (FLOMAX) 24 hr capsule 0.4 mg, 0.4 mg, Oral, Daily, Kaylene Patel MD, 0.4 mg at 01/08/19 0923  •  torsemide (DEMADEX) tablet 100 mg, 100 mg, Oral, Daily, Charisma Mejia MD, 100 mg at 01/08/19 0925  •  warfarin (COUMADIN) tablet 5 mg, 5 mg, Oral, Daily, Kaylene Patel MD, 5 mg at 01/08/19 0909    Procedures          Diagnoses and all orders for this visit:    Essential hypertension  -     POC INR    Hypertriglyceridemia  -     fenofibrate (TRICOR) 48 MG tablet; Take 1 tablet by mouth Daily.  -     Lipid Panel With LDL / HDL Ratio; Future  -     Comprehensive Metabolic Panel; Future  -     POC INR    Anticoagulation goal of INR 2 to 3  -     Cancel: POCT INR  -     POC INR    Neuropathy associated with endocrine disorder (CMS/HCC)  -     POC INR    History of cardioembolic stroke  -     POC INR         Return in about 3 months (around 4/3/2019) for Recheck.    Doroteo Victoria M.D  01/08/19  9:57 PM

## 2019-01-07 PROBLEM — J81.0 ACUTE PULMONARY EDEMA (HCC): Status: ACTIVE | Noted: 2019-01-01

## 2019-01-07 NOTE — PLAN OF CARE
Problem: Tissue Perfusion, Ineffective Peripheral (Adult)  Goal: Identify Related Risk Factors and Signs and Symptoms  Outcome: Outcome(s) achieved Date Met: 01/07/19

## 2019-01-07 NOTE — PLAN OF CARE
Problem: Breathing Pattern Ineffective (Adult)  Goal: Identify Related Risk Factors and Signs and Symptoms  Outcome: Outcome(s) achieved Date Met: 01/07/19    Goal: Effective Oxygenation/Ventilation  Outcome: Ongoing (interventions implemented as appropriate)

## 2019-01-07 NOTE — PROGRESS NOTES
Discharge Planning Assessment  [facility]     Patient Name: Deacon Martin  MRN: [id number]  Today's Date: 1/7/2019    Admit Date: 1/7/2019    Discharge Needs Assessment     Row Name 01/07/19 1624       Living Environment    Lives With  spouse    Current Living Arrangements  home/apartment/condo    Primary Care Provided by  self    Provides Primary Care For  no one    Family Caregiver if Needed  child(itz), adult;spouse    Family Caregiver Names  wife Adore Martin and son Moe Martin    Quality of Family Relationships  helpful;involved;supportive    Able to Return to Prior Arrangements  yes       Resource/Environmental Concerns    Resource/Environmental Concerns  none    Transportation Concerns  car, none       Transition Planning    Patient/Family Anticipates Transition to  home with family    Patient/Family Anticipated Services at Transition  none    Transportation Anticipated  family or friend will provide       Discharge Needs Assessment    Concerns to be Addressed  discharge planning    Equipment Currently Used at Home  none    Discharge Coordination/Progress  Home        Discharge Plan     Row Name 01/07/19 1625       Plan    Plan  Home with family    Patient/Family in Agreement with Plan  yes    Plan Comments  IMM letter checked. CCP contacted pt's wife (Adore Martin, [phone]) to discuss d/c planning. Facesheet verified. CCP role explained. Pt resides with his wife and uses walker only occasionally for mobility. Pt has used [facility] for past home health and denies past sub-acute rehab. Pt's pharmacy is [address] Pt's wife/son transport him to/from HD at [facility]. Wife uncertain of d/c needs at this time. CCP to follow to assist should d/c needs arise. Erika Antonio LCSW        Destination      No service coordination in this encounter.      Durable Medical Equipment      No service coordination in this encounter.      Dialysis/Infusion - Selection Complete      Service Provider  Request Status Selected Services Address Phone Number Fax Number    [facility] [address], [facility] [id number] [phone]      Home Medical Care      No service coordination in this encounter.      Community Resources      No service coordination in this encounter.          Demographic Summary     Row Name 01/07/19 1623       General Information    Admission Type  inpatient    Arrived From  home    Required Notices Provided  Important Message from Medicare    Referral Source  admission list    Reason for Consult  discharge planning    Preferred Language  English        Functional Status     Row Name 01/07/19 1623       Functional Status    Usual Activity Tolerance  good    Current Activity Tolerance  moderate       Functional Status, IADL    Medications  independent    Meal Preparation  independent    Housekeeping  independent    Laundry  independent    Shopping  independent       Mental Status Summary    Recent Changes in Mental Status/Cognitive Functioning  no changes        Psychosocial    No documentation.       Abuse/Neglect    No documentation.       Legal    No documentation.       Substance Abuse    No documentation.       Patient Forms    No documentation.           Tawny Antonio LCSW

## 2019-01-07 NOTE — CONSULTS
Referring Provider: Doroteo beltran MD  Reason for Consultation: Management of patient with end-stage renal disease    Subjective     Chief complaint   Chief Complaint   Patient presents with   • Shortness of Breath     started 2 hours PTA, pt 80% at triage, pt on dialysis and presents with wet lung sounds, pt's wife states pt may have had too much fluid over weekend, pt last dialysis day was Friday       History of present illness:    This is a 73-year-old  male presented with increasing shortness of air for the past 24 hours he has been having significant increase in his intra-dialytic weight gain.  He has significant dementia and has not been compliant with his fluid intake and salt intake.  He presented with significant shortness of air and severe hypertension was placed on the nitroglycerin drip and BiPAP facemask.    He has history of end-stage renal disease on maintenance hemodialysis under my care every Monday, Wednesday and Friday.  He had the hypertension for at least 35 years and been diabetic for about 15 years denies diabetic retinopathy, has prior history of CVA, dyslipidemia and gout also atrial fibrillation and he has dementia.  His glycemic control has been very poor.  At present he is having shortness of air with orthopnea and PND, no hemoptysis no cough, no chest pain, no nausea or vomiting, no constipation diarrhea, no dysuria or gross hematuria, he had minimal lower extremity edema, he has peripheral neuropathy, he had left foot ulcer followed by podiatry.  No lightheadedness, syncope or seizure activity.          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 ARM FISTULA REVISION/POSS. TUNNELED CATH;  Surgeon: Ar Muro MD;  Location: [facility] OR;  Service:    • COLONOSCOPY     • INSERTION HEMODIALYSIS CATHETER N/A 2016    Procedure: TUNNEL CATHETER INSERTION;  Surgeon: Silvia Lim Jr., MD;  Location: [facility] OR;  Service:      Family History   Problem Relation Age of Onset   • Arthritis Mother    • Diabetes Father    • Heart disease Father    • Hyperlipidemia Father    • Hypertension Father    • Obesity Father    • Cancer Brother         esophagus   • Heart disease Maternal Grandfather      Negative family history for kidney disease.        Social History     Tobacco Use   • Smoking status: Former Smoker     Packs/day: 1.00     Years: 5.00     Pack years: 5.00     Types: Cigarettes     Last attempt to quit: 1966     Years since quittin.0   • Smokeless tobacco: Never Used   Substance Use Topics   • Alcohol use: Yes     Alcohol/week: 0.6 oz     Types: 1 Cans of beer per week     Comment: occasionally    • Drug use: No     Medications Prior to Admission   Medication Sig Dispense Refill Last Dose   • ACCU-CHEK FASTCLIX LANCETS misc Use as directed for blood glucose testing CHECK 4 TIMES A  each 5 2019   • allopurinol (ZYLOPRIM) 100 MG tablet TAKE ONE TABLET BY MOUTH DAILY 90 tablet 1 2019 at 1900   • amLODIPine (NORVASC) 5 MG tablet TAKE ONE TABLET BY MOUTH DAILY 30 tablet 3 2019 at 1900   • aspirin 81 MG tablet Take 81 mg by mouth Daily.   2019 at 1900   • atorvastatin (LIPITOR) 40 MG tablet Take 1 tablet by mouth Daily.  90 tablet 3 1/6/2019 at 1900   • calcium acetate (PHOS BINDER,) 667 MG capsule capsule Take 667 mg by mouth Daily.   1/6/2019 at 1900   • CloNIDine (CATAPRES) 0.1 MG tablet TAKE ONE TABLET BY MOUTH TWICE A  tablet 2 1/6/2019 at 1900   • donepezil (ARICEPT) 10 MG tablet Take 10 mg by mouth Every Night.   1/6/2019 at 2100   • DULoxetine (CYMBALTA) 60 MG capsule Take 1 capsule by mouth Daily. 90 capsule 3 1/6/2019 at 1900   • fenofibrate (TRICOR) 48 MG tablet Take 1 tablet by mouth Daily. 90 tablet 3    • gabapentin (NEURONTIN) 100 MG capsule Take 1 capsule by mouth 3 (Three) Times a Day. 90 capsule 5 1/6/2019 at 2000   • glucose blood (ACCU-CHEK GUIDE) test strip Use as instructed to check 4 times daily 150 each 5 1/6/2019 at Unknown time   • glucose blood test strip Use as instructed 120 each 12 1/6/2019 at Unknown time   • insulin NPH (humuLIN N,novoLIN N) 100 UNIT/ML injection Inject 30 Units under the skin 2 (Two) Times a Day Before Meals. 1 Units 12 1/6/2019 at 1800   • Insulin Pen Needle 32G X 4 MM misc 4 (Four) Times a Day As Needed.   1/6/2019 at 1800   • insulin regular (humuLIN R,novoLIN R) 100 UNIT/ML injection Inject 10 Units under the skin 3 (Three) Times a Day Before Meals. 10 mL 1 1/6/2019 at Unknown time   • Lancets Misc. (ACCU-CHEK FASTCLIX LANCET) kit Use as directed for blood glucose testing 200 kit 5 1/6/2019 at Unknown time   • metoprolol succinate XL (TOPROL-XL) 50 MG 24 hr tablet Take 1 tablet by mouth Daily. 90 tablet 3 1/6/2019 at 1900   • tamsulosin (FLOMAX) 0.4 MG capsule 24 hr capsule Take 1 capsule by mouth Daily. 90 capsule 3 1/6/2019 at 1900   • torsemide (DEMADEX) 10 MG tablet TAKE TWO TABLETS BY MOUTH DAILY 60 tablet 3 1/6/2019 at 1900   • warfarin (COUMADIN) 5 MG tablet TAKE ONE TABLET BY MOUTH DAILY (Patient taking differently: TAKE ONE TABLET BY MOUTH DAILY mg changes with INR currently 5mg then 2.5 on 3rd night changed by MD on 1/3/19) 90 tablet 2 Patient Taking Differently  "at Unknown time   • acetaminophen (TYLENOL) 325 MG tablet Take 2 tablets by mouth Every 6 (Six) Hours As Needed for mild pain (1-3) or fever. 100 tablet 0 More than a month at Unknown time     Allergies:  Patient has no known allergies.    Review of Systems  14 points review of system was performed and was negative other than what noted above in the history of present illness    Objective     Vital Signs  Temp:  [96.7 °F (35.9 °C)] 96.7 °F (35.9 °C)  Heart Rate:  [] 118  Resp:  [20-25] 22  BP: (135-206)/() 151/94  FiO2 (%):  [30 %] 30 %    Flowsheet Rows      First Filed Value   Admission Height  177.8 cm (70\") Documented at 01/07/2019 0450   Admission Weight  90.7 kg (200 lb) Documented at 01/07/2019 0526           No intake/output data recorded.  No intake/output data recorded.  No intake or output data in the 24 hours ending 01/07/19 0811    Physical Exam:  General Appearance: alert, awake but having difficulty answering questions clearly., no acute distress, patient is obese  Skin: warm and dry  HEENT: Nonicteric sclerae, he is on BiPAP facemask  Neck: supple, increased JVD, trachea midline  Lungs:  decreased breath sounds in the bases, with the diffuse crackles and rhonchi bilaterally, breathing effort not labored  Heart: Irregularly irregular, no rub  Abdomen: soft, non-tender, no palpable bladder, present bowel sounds to auscultation,   : No palpable bladder  Lymphatics: No cervical or supraclavicular lymphadenopathy.  Joints: No deformities noted, no crepitation over the knees or the ankles.  Extremities:  trace ankle edema, he has supportive dose, he had small ulcer on the bottom of his the left foot, no cyanosis or clubbing, he has functional AV fistula in the right upper arm  Neuro: normal speech and somewhat confused          Results Review:  Results for orders placed or performed during the hospital encounter of 01/07/19   Protime-INR   Result Value Ref Range    Protime 17.4 (H) 11.7 - " 14.2 Seconds    INR 1.45 (H) 0.90 - 1.10   Comprehensive Metabolic Panel   Result Value Ref Range    Glucose 441 (C) 65 - 99 mg/dL    BUN 62 (H) 8 - 23 mg/dL    Creatinine 8.36 (H) 0.76 - 1.27 mg/dL    Sodium 136 136 - 145 mmol/L    Potassium 4.1 3.5 - 5.2 mmol/L    Chloride 94 (L) 98 - 107 mmol/L    CO2 21.2 (L) 22.0 - 29.0 mmol/L    Calcium 9.4 8.6 - 10.5 mg/dL    Total Protein 7.8 6.0 - 8.5 g/dL    Albumin 4.10 3.50 - 5.20 g/dL    ALT (SGPT) 13 1 - 41 U/L    AST (SGOT) 12 1 - 40 U/L    Alkaline Phosphatase 93 39 - 117 U/L    Total Bilirubin 0.6 0.1 - 1.2 mg/dL    eGFR Non African Amer 6 (L) >60 mL/min/1.73    eGFR  African Amer  >60 mL/min/1.73    Globulin 3.7 gm/dL    A/G Ratio 1.1 g/dL    BUN/Creatinine Ratio 7.4 7.0 - 25.0    Anion Gap 20.8 mmol/L   BNP   Result Value Ref Range    proBNP 26,473.0 (H) 0.0-1,800.0 pg/mL   Troponin   Result Value Ref Range    Troponin T 0.064 (H) 0.000 - 0.030 ng/mL   CBC Auto Differential   Result Value Ref Range    WBC 12.53 (H) 4.50 - 10.70 10*3/mm3    RBC 4.14 (L) 4.60 - 6.00 10*6/mm3    Hemoglobin 13.1 (L) 13.7 - 17.6 g/dL    Hematocrit 38.3 (L) 40.4 - 52.2 %    MCV 92.5 79.8 - 96.2 fL    MCH 31.6 27.0 - 32.7 pg    MCHC 34.2 32.6 - 36.4 g/dL    RDW 13.6 11.5 - 14.5 %    RDW-SD 45.7 37.0 - 54.0 fl    MPV 11.0 6.0 - 12.0 fL    Platelets 226 140 - 500 10*3/mm3    Neutrophil % 59.0 42.7 - 76.0 %    Lymphocyte % 31.2 19.6 - 45.3 %    Monocyte % 5.8 5.0 - 12.0 %    Eosinophil % 3.4 0.3 - 6.2 %    Basophil % 0.6 0.0 - 1.5 %    Immature Grans % 0.2 0.0 - 0.5 %    Neutrophils, Absolute 7.39 1.90 - 8.10 10*3/mm3    Lymphocytes, Absolute 3.91 0.90 - 4.80 10*3/mm3    Monocytes, Absolute 0.73 0.20 - 1.20 10*3/mm3    Eosinophils, Absolute 0.43 0.00 - 0.70 10*3/mm3    Basophils, Absolute 0.07 0.00 - 0.20 10*3/mm3    Immature Grans, Absolute 0.03 0.00 - 0.03 10*3/mm3   Blood Gas, Arterial   Result Value Ref Range    Site Arterial: left brachial     Jermaine's Test N/A     pH, Arterial 7.367  7.350 - 7.450 pH units    pCO2, Arterial 39.8 35.0 - 45.0 mm Hg    pO2, Arterial 92.3 80.0 - 100.0 mm Hg    HCO3, Arterial 22.9 22.0 - 28.0 mmol/L    Base Excess, Arterial -2.3 (L) 0.0 - 2.0 mmol/L    O2 Saturation Calculated 96.9 92.0 - 99.0 %    A-a Gradiant 0.5 mmHg    Barometric Pressure for Blood Gas 750.5 mmHg    Modality BiPap     FIO2 30 %    Set Tidal Volume 618     Set Mech Resp Rate 14     Rate 27 Breaths/minute   POC Glucose Once   Result Value Ref Range    Glucose 404 (H) 70 - 130 mg/dL   Light Blue Top   Result Value Ref Range    Extra Tube hold for add-on    Green Top (Gel)   Result Value Ref Range    Extra Tube Hold for add-ons.    Lavender Top   Result Value Ref Range    Extra Tube hold for add-on    Gold Top - SST   Result Value Ref Range    Extra Tube Hold for add-ons.      Imaging Results (last 72 hours)     Procedure Component Value Units Date/Time    XR Chest 1 View [[id number]] Collected:  01/07/19 0530     Updated:  01/07/19 0535    Narrative:       PORTABLE CHEST X-RAY     CLINICAL HISTORY: CHF/COPD Protocol     COMPARISON: 11/21/2018.     FINDINGS: Portable AP view of the chest was obtained with overlying  monitor leads in place. Lungs are well inflated. There are diffuse,  mostly groundglass opacities bilaterally most consistent with pulmonary  edema. A component of superimposed pneumonia not excluded. There are  calcified residua of granulomatous disease present. No significant  pleural fluid, at least by portable imaging. Stable cardiomegaly.  Thoracic dextroscoliosis noted.             Impression:       Diffuse groundglass opacities likely related to edema/CHF        This report was finalized on 1/7/2019 5:32 AM by Miguelangel Alvarez M.D.                    nitroglycerin 5-200 mcg/min Last Rate: 30 mcg/min (01/07/19 0636)       Assessment/Plan   1.  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  He presented today with significant shortness of air and severe hypertension and he had significant to the interval weight gain between his dialysis treatments  2.  Severe hypertension associated with fluid excess  3.  Diabetes mellitus type II for at least 15 years with diabetic neuropathy and he has poor glycemic control.  4.  Prior history of CVA, he has had dementia  5.  Chronic A. fib  6.  Anemia of chronic kidney disease not requiring LARRY at the present      Plan;  1.  Emergent hemodialysis, will attempt 5 L fluid removal  2.  When he is starting to his oral intake will make sure he is having 2 g sodium restriction  3.  I agree with the present treatment  4.  Surveillance labs      Thank you for asking me to see this patient in consultation      I discussed the patients findings and my recommendations with the patient's wife at the bedside    Segundo Lawson MD  01/07/19  8:11 AM

## 2019-01-07 NOTE — PLAN OF CARE
Problem: Fall Risk (Adult)  Goal: Identify Related Risk Factors and Signs and Symptoms  Outcome: Outcome(s) achieved Date Met: 01/07/19

## 2019-01-07 NOTE — ED NOTES
Pt complains of severe SOA and has appearance of severe respiratory distress. Spouse at bedside. Pt denies pain but SOA started getting worse about 2 hours prior to arrival. Afib noted on the monitor with . Crackles noted bilaterally. Will continue to monitor.     Tawny Graham RN  01/07/19 0510

## 2019-01-07 NOTE — ED NOTES
Pt has not produced any urine after the Bumex IV was administered. Pt does not normally produce urine due to being a Dialysis pt. Shunt in his right arm.     Tawny Graham RN  01/07/19 0709

## 2019-01-07 NOTE — PLAN OF CARE
Problem: Patient Care Overview  Goal: Plan of Care Review  Outcome: Ongoing (interventions implemented as appropriate)  Patient remains in CCU has been sleeping on Bipap most of this shift. Nitro gtt continues,. HD done today once completed 4.25L off patient tolerate well, for most of the procedure but started to experience cramps and nausea upon completion. Patient is currently stable resting on 2L NC with family at bedside   Goal: Discharge Needs Assessment  Outcome: Ongoing (interventions implemented as appropriate)    Goal: Interprofessional Rounds/Family Conf  Outcome: Ongoing (interventions implemented as appropriate)      Problem: Fall Risk (Adult)  Goal: Absence of Fall  Outcome: Ongoing (interventions implemented as appropriate)      Problem: Tissue Perfusion, Ineffective Peripheral (Adult)  Goal: Adequate Tissue Perfusion  Outcome: Ongoing (interventions implemented as appropriate)      Problem: Fluid Volume Excess (Adult)  Goal: Identify Related Risk Factors and Signs and Symptoms  Outcome: Ongoing (interventions implemented as appropriate)    Goal: Optimal Fluid Balance  Outcome: Ongoing (interventions implemented as appropriate)      Problem: Breathing Pattern Ineffective (Adult)  Goal: Effective Oxygenation/Ventilation  Outcome: Ongoing (interventions implemented as appropriate)    Goal: Anxiety/Fear Reduction  Outcome: Ongoing (interventions implemented as appropriate)      Problem: Skin Injury Risk (Adult)  Goal: Identify Related Risk Factors and Signs and Symptoms  Outcome: Ongoing (interventions implemented as appropriate)    Goal: Skin Health and Integrity  Outcome: Ongoing (interventions implemented as appropriate)

## 2019-01-07 NOTE — CONSULTS
"Adult Nutrition  Assessment/PES    Patient Name:  Deacon Martin  YOB: 1945  MRN: [id number]  Admit Date:  1/7/2019    Assessment Date:  1/7/2019    Comments:  Nutrition screen completed.     Reason for Assessment     Row Name 01/07/19 1337          Reason for Assessment    Reason For Assessment  diagnosis/disease state     Diagnosis  -- lung edema, DM, AFIB, ESRD, stroke, HTN, dementis, sleep apnea,           Anthropometrics     Row Name 01/07/19 1338 01/07/19 0811       Anthropometrics    Height  --  177.8 cm (70\")    Weight  --  92.1 kg (203 lb 0.7 oz)       Ideal Body Weight (IBW)    Ideal Body Weight (IBW) (kg)  --  76.48    % Ideal Body Weight  --  120.42       Usual Body Weight (UBW)    Usual Body Weight  90.7 kg (200 lb) post dialysis  --       Body Mass Index (BMI)    BMI (kg/m2)  --  29.19    BMI Assessment  BMI 25-29.9: overweight  --       IBW Adjustment, Para/Tetraplegia    5% Adjustment, Para (IBW)  --  72.66    10% Adjustment, Para (IBW)  --  68.83    10% Adjustment, Tetra (IBW)  --  68.83    15% Adjustment, Tetra (IBW)  --  65.01        Labs/Tests/Procedures/Meds     Row Name 01/07/19 1338          Labs/Procedures/Meds    Lab Results Reviewed  reviewed        Diagnostic Tests/Procedures    Diagnostic Test/Procedure Reviewed  reviewed        Medications    Pertinent Medications Reviewed  reviewed     Pertinent Medications Comments  insulin, coumadin         Physical Findings     Row Name 01/07/19 1339          Physical Findings    Overall Physical Appearance  overweight     Skin  -- DN ulcers to feet         Estimated/Assessed Needs     Row Name 01/07/19 0811          Calculation Measurements    Height  177.8 cm (70\")         Nutrition Prescription Ordered     Row Name 01/07/19 1339          Nutrition Prescription PO    Current PO Diet  NPO         Evaluation of Received Nutrient/Fluid Intake     Row Name 01/07/19 0811          Calculation Measurements    Height  177.8 cm (70\")     " "    Evaluation of Prescribed Nutrient/Fluid Intake     Row Name 01/07/19 0811          Calculation Measurements    Height  177.8 cm (70\")             Problem/Interventions:  Problem 1     Row Name 01/07/19 1340          Nutrition Diagnoses Problem 1    Problem 1  Nutrition Appropriate for Condition at this Time     Etiology (related to)  MNT for Treatment/Condition                 Intervention Goal     Row Name 01/07/19 1340          Intervention Goal    General  Maintain nutrition     PO  Initiate feeding     Weight  No significant weight loss         Nutrition Intervention     Row Name 01/07/19 1340          Nutrition Intervention    RD/Tech Action  Follow Tx progress;Care plan reviewd           Education/Evaluation     Row Name 01/07/19 1340          Education    Education  Will Instruct as appropriate        Monitor/Evaluation    Monitor  Per protocol           Electronically signed by:  Adelaide Smith RD  01/07/19 1:43 PM  "

## 2019-01-08 NOTE — NURSING NOTE
WOCN: Consult bilateral feet wounds. Right foot 3cmx3.5cm dry callous non open wound. Left foot  2 wounds .5cmx.5cm: 1fpe9rd dry callous non open wounds.No drainage. Call placed to wife regarding current wound care.  Patient is seen by podiatrist and plans to follow up next week. Wife states he is obtaining his special diabetic shoes. Do not recommend any wound treatment at this time as patient is currently see and will follow up next week.

## 2019-01-08 NOTE — PLAN OF CARE
Problem: Tissue Perfusion, Ineffective Peripheral (Adult)  Goal: Adequate Tissue Perfusion  Outcome: Ongoing (interventions implemented as appropriate)

## 2019-01-08 NOTE — PROGRESS NOTES
"                                              LOS: 1 day   Patient Care Team:  Doroteo Victoria MD as PCP - General (Family Medicine)    Chief Complaint:  F/up pulm edema, hypoxia, medical management in ICU    Interval History:   He denies shortness of breath.  He feels much better since dialysis yesterday.  He has not had to use the BiPAP at night.  He denies associated cough.  However, his still requiring oxygen 2 L/m.     REVIEW OF SYSTEMS:   CARDIOVASCULAR: No chest pain, chest pressure or chest discomfort. No palpitations or edema.   GASTROINTESTINAL: He got nauseous yesterday but denies vomiting.  No abdominal pain or diarrhea    Ventilator/Non-Invasive Ventilation Settings (From admission, onward)    Start     Ordered    01/07/19 0505  NIPPV (CPAP or BIPAP)  Until Discontinued     Question Answer Comment   Type: BIPAP    NIPPV Mask Interface Per Patient Preference        01/07/19 0504            Vital Signs  Temp:  [97.4 °F (36.3 °C)-98.3 °F (36.8 °C)] 97.4 °F (36.3 °C)  Heart Rate:  [] 89  Resp:  [18-24] 18  BP: (104-192)/() 153/82  FiO2 (%):  [30 %] 30 %  No intake or output data in the 24 hours ending 01/08/19 1057  Flowsheet Rows      First Filed Value   Admission Height  177.8 cm (70\") Documented at 01/07/2019 0450   Admission Weight  90.7 kg (200 lb) Documented at 01/07/2019 0526          Physical Exam:   General Appearance:    Alert, cooperative, in no acute distress   HEENT:  [place] 3. No oral thrush. Tonsils grade 1   Neck:   Large. Trachea midline. No thyromegaly.   Lungs:     Right crackles at the bases.  Nonlabored breathing.      Heart:    Regular rhythm and normal rate, normal S1 and S2, no            murmur   Skin:    No abnormalities observed   Abdomen:     Obese. Soft. No tenderness. No HSM.   Neuro:   Conscious, alert, oriented x3   Extremities:   Moves all extremities well, pitting edema, no cyanosis, no             Redness          Results Review:        Results from last 7 " days   Lab Units  01/08/19   0434  01/07/19   0517   SODIUM mmol/L  135*  136   POTASSIUM mmol/L  3.7  4.1   CHLORIDE mmol/L  96*  94*   CO2 mmol/L  22.2  21.2*   BUN mg/dL  35*  62*   CREATININE mg/dL  6.25*  8.36*   GLUCOSE mg/dL  203*  441*   CALCIUM mg/dL  9.5  9.4     Results from last 7 days   Lab Units  01/07/19   0517   TROPONIN T ng/mL  0.064*     Results from last 7 days   Lab Units  01/08/19   0434  01/07/19   0517   WBC 10*3/mm3  9.51  12.53*   HEMOGLOBIN g/dL  11.3*  13.1*   HEMATOCRIT %  33.6*  38.3*   PLATELETS 10*3/mm3  190  226     Results from last 7 days   Lab Units  01/08/19   0434  01/07/19   0517  01/03/19   1522   INR   1.43*  1.45*  1.10*     Results from last 7 days   Lab Units  01/07/19   0517   PROBNP pg/mL  26,473.0*       I reviewed the patient's new clinical results.  I personally viewed and interpreted the patient's CXR        Medication Review:     allopurinol 100 mg Oral Daily   amLODIPine 5 mg Oral Daily   CloNIDine 0.1 mg Oral BID   donepezil 10 mg Oral Nightly   DULoxetine 60 mg Oral Daily   gabapentin 100 mg Oral TID   insulin glargine 15 Units Subcutaneous Q12H   insulin regular 10 Units Subcutaneous TID AC   metoprolol succinate XL 50 mg Oral Q24H   sodium chloride 3 mL Intravenous Q12H   tamsulosin 0.4 mg Oral Daily   torsemide 100 mg Oral Daily   warfarin 5 mg Oral Daily         nitroglycerin 5-200 mcg/min Last Rate: Stopped (01/07/19 2240)     Assessment:  1. Pulmonary edema   2. Acute hypoxic respiratory failure, secondary #1  3. A. fib with RVR  4. ESRD on HD   5. Hypertensive emergency  6. D M II with uncontrolled Hyperglycemia   7. Mild leukocytosis, likely stress-induced doubt infection  8. Elevated troponin, likely secondary to ESRD  9. Subtherapeutic INR     Plan:    · Diuresis/hemodialysis by nephrology.  · Titrate to wean off oxygen.  · Consult physical therapy  · Continue Coumadin same dose.  I'm not quite sure if was taken out regularly at home.  · Increase NPH to  30 units twice a day.  blood sugar remains uncontrolled.    Transfer to telemetry since patient remains slightly tachycardic.      Kaylene Patel MD  01/08/19  10:57 AM          This note was dictated utilizing [employer] dictation

## 2019-01-08 NOTE — PLAN OF CARE
Problem: Patient Care Overview  Goal: Plan of Care Review  Outcome: Ongoing (interventions implemented as appropriate)   01/08/19 0531   Coping/Psychosocial   Plan of Care Reviewed With patient   Plan of Care Review   Progress no change   OTHER   Outcome Summary Remains in CCU overnight. A/Ox4. Currently on 2L NC. Afib with frequent PVCs. Off nitro gtt since 2240. SBP anywhere from 120-140. x1 episode of nausea, zofran given. Wound to see about BLE diabetic ulcers. See AM labs. CTM.        Problem: Fall Risk (Adult)  Goal: Absence of Fall  Outcome: Ongoing (interventions implemented as appropriate)      Problem: Tissue Perfusion, Ineffective Peripheral (Adult)  Goal: Adequate Tissue Perfusion  Outcome: Ongoing (interventions implemented as appropriate)      Problem: Fluid Volume Excess (Adult)  Goal: Identify Related Risk Factors and Signs and Symptoms  Outcome: Ongoing (interventions implemented as appropriate)    Goal: Optimal Fluid Balance  Outcome: Ongoing (interventions implemented as appropriate)      Problem: Breathing Pattern Ineffective (Adult)  Goal: Effective Oxygenation/Ventilation  Outcome: Ongoing (interventions implemented as appropriate)    Goal: Anxiety/Fear Reduction  Outcome: Ongoing (interventions implemented as appropriate)      Problem: Skin Injury Risk (Adult)  Goal: Identify Related Risk Factors and Signs and Symptoms  Outcome: Ongoing (interventions implemented as appropriate)    Goal: Skin Health and Integrity  Outcome: Ongoing (interventions implemented as appropriate)

## 2019-01-08 NOTE — PROGRESS NOTES
"   LOS: 1 day    Patient Care Team:  Doroteo Victoria MD as PCP - General (Family Medicine)    Chief Complaint:    Chief Complaint   Patient presents with   • Shortness of Breath     started 2 hours PTA, pt 80% at triage, pt on dialysis and presents with wet lung sounds, pt's wife states pt may have had too much fluid over weekend, pt last dialysis day was Friday     Follow UP ESRD  Subjective     Interval History:     Feels better.  Breathing ok.  On nasal cannula o2 2L. No bm for 2 days. Still makes urine. A little hungry. Stiff from lying in bed.     Review of Systems:   See above.     Objective     Vital Signs  Temp:  [97.4 °F (36.3 °C)-98.3 °F (36.8 °C)] 97.4 °F (36.3 °C)  Heart Rate:  [] 79  Resp:  [18-24] 18  BP: (105-196)/() 132/80  FiO2 (%):  [30 %] 30 %    Flowsheet Rows      First Filed Value   Admission Height  177.8 cm (70\") Documented at 01/07/2019 0450   Admission Weight  90.7 kg (200 lb) Documented at 01/07/2019 0526          No intake/output data recorded.  No intake/output data recorded.  No intake or output data in the 24 hours ending 01/08/19 0826    Physical Exam:  Elderly wm. Lying in bed. Nasal 02.  Calm cooperative. Oral mucosa dry.   Heart Irreg, irreg.  Lungs few rales left base, otherwise clear  Abd +bs soft, nontender.  Not distended  Ext no edema. Feet with dry callous on right foot 1st MTP joint, 1st and 5th MTP joint callous left foot. No purulence.   Skin dry      Results Review:    Results from last 7 days   Lab Units  01/08/19   0434  01/07/19   0517   SODIUM mmol/L  135*  136   POTASSIUM mmol/L  3.7  4.1   CHLORIDE mmol/L  96*  94*   CO2 mmol/L  22.2  21.2*   BUN mg/dL  35*  62*   CREATININE mg/dL  6.25*  8.36*   CALCIUM mg/dL  9.5  9.4   BILIRUBIN mg/dL   --   0.6   ALK PHOS U/L   --   93   ALT (SGPT) U/L   --   13   AST (SGOT) U/L   --   12   GLUCOSE mg/dL  203*  441*       Estimated Creatinine Clearance: 11.7 mL/min (A) (by C-G formula based on SCr of 6.25 mg/dL " (H)).    Results from last 7 days   Lab Units  01/08/19   0434   PHOSPHORUS mg/dL  5.8*             Results from last 7 days   Lab Units  01/08/19   0434  01/07/19   0517   WBC 10*3/mm3  9.51  12.53*   HEMOGLOBIN g/dL  11.3*  13.1*   PLATELETS 10*3/mm3  190  226       Results from last 7 days   Lab Units  01/08/19   0434  01/07/19   0517  01/03/19   1522   INR   1.43*  1.45*  1.10*         Imaging Results (last 24 hours)     ** No results found for the last 24 hours. **          allopurinol 100 mg Oral Daily   amLODIPine 5 mg Oral Daily   CloNIDine 0.1 mg Oral BID   donepezil 10 mg Oral Nightly   DULoxetine 60 mg Oral Daily   gabapentin 100 mg Oral TID   insulin glargine 15 Units Subcutaneous Q12H   insulin regular 10 Units Subcutaneous TID AC   metoprolol succinate XL 50 mg Oral Q24H   sodium chloride 3 mL Intravenous Q12H   tamsulosin 0.4 mg Oral Daily   torsemide 20 mg Oral Daily   warfarin 5 mg Oral Daily       nitroglycerin 5-200 mcg/min Last Rate: Stopped (01/07/19 2240)       Medication Review:   Current Facility-Administered Medications   Medication Dose Route Frequency Provider Last Rate Last Dose   • allopurinol (ZYLOPRIM) tablet 100 mg  100 mg Oral Daily Kaylene Patel MD       • amLODIPine (NORVASC) tablet 5 mg  5 mg Oral Daily Kaylene Patel MD       • CloNIDine (CATAPRES) tablet 0.1 mg  0.1 mg Oral BID Kaylene Patel MD   0.1 mg at 01/07/19 2004   • donepezil (ARICEPT) tablet 10 mg  10 mg Oral Nightly Kaylene Patel MD   10 mg at 01/07/19 2004   • DULoxetine (CYMBALTA) DR capsule 60 mg  60 mg Oral Daily Kaylene Patel MD       • gabapentin (NEURONTIN) capsule 100 mg  100 mg Oral TID Kaylene Patel MD   100 mg at 01/07/19 2004   • insulin glargine (LANTUS) injection 15 Units  15 Units Subcutaneous Q12H Kaylene Patel MD   15 Units at 01/07/19 2023   • insulin regular (humuLIN R,novoLIN R) injection 10 Units  10 Units Subcutaneous TID AC Kaylene Patel MD   10 Units at 01/07/19 1103   • mannitol 25%  (RENAL) injection  12.5 g Intravenous PRN Segundo Lawson MD       • metoprolol succinate XL (TOPROL-XL) 24 hr tablet 50 mg  50 mg Oral Q24H Kaylene Patel MD       • nitroglycerin 50 mg/250 mL (0.2 mg/mL) infusion  5-200 mcg/min Intravenous Titrated Hammad Farris MD   Stopped at 01/07/19 2240   • ondansetron (ZOFRAN) injection 4 mg  4 mg Intravenous Q6H PRN Isreal Rodriguez MD   4 mg at 01/08/19 0302   • sodium chloride 0.9 % bolus 1,000 mL  1,000 mL Intravenous PRN Segundo Lawson MD       • sodium chloride 0.9 % flush 10 mL  10 mL Intravenous PRN Doroteo Soler MD       • sodium chloride 0.9 % flush 3 mL  3 mL Intravenous Q12H Kaylene Patel MD   3 mL at 01/07/19 2007   • sodium chloride 0.9 % flush 3-10 mL  3-10 mL Intravenous PRN Kaylene Patel MD       • tamsulosin (FLOMAX) 24 hr capsule 0.4 mg  0.4 mg Oral Daily Kaylene Patel MD       • torsemide (DEMADEX) tablet 20 mg  20 mg Oral Daily Kaylene Patel MD       • warfarin (COUMADIN) tablet 5 mg  5 mg Oral Daily Kaylene Patel MD           Assessment/Plan   1. ESRD. Dialysis yesterday with 4.2 Kg off.  Volume better by exam.  On nasal cannula O2.   2. Acute pulmonary edema improved with volume off on dialysis.   3. HTN better controlled with volume off. COntinue to challenge dry weight  4. Dementia  5. DM2 poorly controlled.  6. Chronic afib  7. Anemia CKD. At goal.   8. Diabetic foot ulcers, do not look infected.     Plan:  1. Dialysis tomorrow  2. Diet CC  3. Po fluid restrict  4. Ok for out of CCU today  5. DW RN regarding BP meds staggering as  currently.     Charisma Mejia MD  01/08/19  8:26 AM

## 2019-01-08 NOTE — PLAN OF CARE
Problem: Patient Care Overview  Goal: Plan of Care Review  Outcome: Ongoing (interventions implemented as appropriate)   01/08/19 1800   Coping/Psychosocial   Plan of Care Reviewed With patient;spouse   Plan of Care Review   Progress improving   OTHER   Outcome Summary Pt A&O x4, transitioned to RA today at noon -( tolerating well sats %). Up with assist x1, in chair majority of shift. Tolerating diet well, Total intake today 915cc. BP fluctuates but WNL. No other concerns at this time, will continue to monitor. CCRN 1/8/19

## 2019-01-09 NOTE — PLAN OF CARE
Problem: Patient Care Overview  Goal: Plan of Care Review  Outcome: Ongoing (interventions implemented as appropriate)   01/09/19 0388   Coping/Psychosocial   Plan of Care Reviewed With patient   OTHER   Outcome Summary Pt presents with impaired functional mobility and gait secondary to generalized weakness, impaired balance, and decreased activity tolerance s/p hospitalization for pulmonary edema. Pt may benefit from skilled PT to address strength, mobiilty, and gait.

## 2019-01-09 NOTE — THERAPY EVALUATION
Acute Care - Physical Therapy Initial Evaluation  [facility]     Patient Name: Deacon Martin  : 1945  MRN: [id number]  Today's Date: 2019   Onset of Illness/Injury or Date of Surgery: 19            Admit Date: 2019    Visit Dx:     ICD-10-CM ICD-9-CM   1. Acute pulmonary edema (CMS/[facility]) J81.0 518.4   2. Acute respiratory failure with hypoxia (CMS/[facility]) J96.01 518.81   3. ESRD on dialysis (CMS/[facility]) N18.6 585.6    Z99.2 V45.11   4. Hypertensive emergency I16.1 401.9   5. Generalized weakness R53.1 780.79     Patient Active Problem List   Diagnosis   • Cervical spinal stenosis   • Cervical osteoarthritis   • Cervical pain   • Chronic venous insufficiency   • Disturbance, visual, subjective   • Poorly controlled type 2 diabetes mellitus (CMS/[facility])   • Essential hypertension   • Hypertriglyceridemia   • BPH (benign prostatic hypertrophy) with urinary obstruction   • Atrial fibrillation (CMS/[facility])   • Anticoagulation goal of INR 2 to 3   • Venous stasis ulcers of both lower extremities (CMS/[facility])   • Altered mental status   • Gouty arthritis   • Volume overload due to LUIS A   • LUIS A (acute kidney injury) (CMS/[facility])   • CKD (chronic kidney disease) stage 4, GFR 15-29 ml/min (CMS/[facility])   • Urinary incontinence without sensory awareness   • Hemianopia, homonymous, right   • History of cardioembolic stroke (Left occipital)   • Hypersomnolence   • Vascular dementia   • Anemia of chronic renal failure, stage 4 (severe) (CMS/[facility])   • Left ventricular hypertrophy   • Nonrheumatic aortic valve stenosis   • Patient left after triage   • Diabetic peripheral neuropathy (CMS/[facility])   • Shunt malfunction   • Neuropathy associated with endocrine disorder (CMS/[facility])   • AF (atrial fibrillation) (CMS/[facility])   • ESRD (end stage renal disease) on dialysis (CMS/[facility])   • Acute on chronic diastolic CHF (congestive heart failure) (CMS/[facility])   • Type 2 diabetes mellitus (CMS/[facility])   • HTN (hypertension)   • Vascular dementia without  behavioral disturbance   • Anticoagulated on Coumadin   • Atrial fibrillation (CMS/HCC)   • Acute pulmonary edema (CMS/HCC)     Past Medical History:   Diagnosis Date   • Atrial fibrillation (CMS/HCC)    • Edema    • Gout    • HBP (high blood pressure)    • HX: anticoagulation    • Hyperlipidemia    • Memory problem    • Other hemorrhagic disorder due to intrinsic circulating anticoagulants, antibodies, or inhibitors (CMS/HCC)     OTH HEMOR DISORDER DUE INTRIN   • Renal failure    • Stroke (CMS/HCC)    • Type 2 diabetes mellitus (CMS/HCC)    • Vitamin D deficiency      Past Surgical History:   Procedure Laterality Date   • ARTERIOVENOUS FISTULA/SHUNT SURGERY Right 11/21/2016    Procedure: RT BRACHIAL CEPHALIC FISTULA;  Surgeon: Ar Muro MD;  Location: [facility] OR;  Service:    • ARTERIOVENOUS FISTULA/SHUNT SURGERY W/ HEMODIALYSIS CATHETER INSERTION Right 12/6/2017    Procedure: RT. ARM FISTULA REVISION/POSS. TUNNELED CATH;  Surgeon: Ar Muro MD;  Location: [facility];  Service:    • COLONOSCOPY  2003   • INSERTION HEMODIALYSIS CATHETER N/A 11/25/2016    Procedure: TUNNEL CATHETER INSERTION;  Surgeon: Silvia Lim Jr., MD;  Location: [facility] OR;  Service:         PT ASSESSMENT (last 12 hours)      Physical Therapy Evaluation     Row Name 01/09/19 1415          PT Evaluation Time/Intention    Subjective Information  no complaints  -     Document Type  evaluation  -     Mode of Treatment  physical therapy  -     Patient Effort  good  -     Symptoms Noted During/After Treatment  none  -     Row Name 01/09/19 1415          General Information    Onset of Illness/Injury or Date of Surgery  01/07/19  -     Patient Observations  alert;cooperative;agree to therapy  -     Patient/Family Observations  pt supine in bed, no acute distress noted at rest  -     Prior Level of Function  independent:;gait;transfer;bed mobility;ADL's  -     Equipment Currently Used at Home  none   -     Pertinent History of Current Functional Problem  pt admitted with SOA, Pulmonary Edema, and ESRD, pt with h/o dementia and HD  -     Existing Precautions/Restrictions  fall  -     Barriers to Rehab  medically complex  -     Row Name 01/09/19 1415          Relationship/Environment    Lives With  spouse  -     Row Name 01/09/19 1415          Cognitive Assessment/Interventions    Additional Documentation  Cognitive Assessment/Intervention (Group)  -     Row Name 01/09/19 1415          Cognitive Assessment/Intervention- PT/OT    Orientation Status (Cognition)  oriented x 4;oriented to;person;place  -     Follows Commands (Cognition)  follows one step commands  -     Personal Safety Interventions  fall prevention program maintained;nonskid shoes/slippers when out of bed  -     Row Name 01/09/19 1415          Bed Mobility Assessment/Treatment    Bed Mobility Assessment/Treatment  supine-sit;sit-supine  -     Supine-Sit [place] (Bed Mobility)  verbal cues;nonverbal cues (demo/gesture);minimum assist (75% patient effort);2 person assist  -     Sit-Supine [place] (Bed Mobility)  verbal cues;nonverbal cues (demo/gesture);minimum assist (75% patient effort);2 person assist  -     Row Name 01/09/19 1415          Transfer Assessment/Treatment    Transfer Assessment/Treatment  sit-stand transfer;stand-sit transfer  -     Sit-Stand [place] (Transfers)  verbal cues;nonverbal cues (demo/gesture);moderate assist (50% patient effort);2 person assist  -     Stand-Sit [place] (Transfers)  verbal cues;nonverbal cues (demo/gesture);moderate assist (50% patient effort);2 person assist  -     Row Name 01/09/19 1415          Sit-Stand Transfer    Assistive Device (Sit-Stand Transfers)  -- A  -     Row Name 01/09/19 1415          Stand-Sit Transfer    Assistive Device (Stand-Sit Transfers)  -- [facility]     Row Name 01/09/19 1415          Gait/Stairs Assessment/Training     [place] Level (Gait)  verbal cues;nonverbal cues (demo/gesture);moderate assist (50% patient effort);2 person assist  -     Assistive Device (Gait)  -- HHA  -     Distance in Feet (Gait)  12  -     Deviations/Abnormal Patterns (Gait)  [place] decreased;gait speed decreased;stride length decreased  -     Bilateral Gait Deviations  forward flexed posture  -     Comment (Gait/Stairs)  pt with L lean during ambulation, 3 LOB requring mod2 to correct  -CH     Row Name 01/09/19 1415          General ROM    GENERAL ROM COMMENTS  AROM WFL for age  -CH     Row Name 01/09/19 1415          MMT (Manual Muscle Testing)    General MMT Comments  generalized weakness noted with functional mobility  -CH     Row Name 01/09/19 1415          Motor Assessment/Intervention    Additional Documentation  Balance (Group)  -CH     Row Name 01/09/19 1415          Balance    Balance  static standing balance;dynamic standing balance  -CH     Row Name 01/09/19 1415          Static Standing Balance    Level of [place] (Supported Standing, Static Balance)  minimal assist, 75% patient effort  -CH     Row Name 01/09/19 1415          Dynamic Standing Balance    Level of [place], Reaches Outside Midline (Standing, Dynamic Balance)  moderate assist, 50 to 74% patient effort  -CH     Row Name 01/09/19 1415          Pain Assessment    Additional Documentation  Pain Scale: Numbers Pre/Post-Treatment (Group)  -CH     Row Name 01/09/19 1415          Pain Scale: Numbers Pre/Post-Treatment    Pain Scale: Numbers, Pretreatment  0/10 - no pain  -CH     Row Name             Wound 01/07/19 0846 Bilateral foot ulceration, arterial;ulceration, venous    Wound - Properties Group Date first assessed: 01/07/19  -KB Time first assessed: 0846  -KB Present On Admission : yes  -KB Side: Bilateral  -KB Location: foot  -KB Type: ulceration, arterial;ulceration, venous  -[facility] Name 01/09/19 1415          Plan of Care Review    Plan of Care  Reviewed With  patient  -     Row Name 01/09/19 1415          Physical Therapy Clinical Impression    Patient/Family Goals Statement (PT Clinical Impression)  to return to [facility]  -     Criteria for Skilled Interventions Met (PT Clinical Impression)  treatment indicated  -     Impairments Found (describe specific impairments)  gait, locomotion, and balance;muscle performance  -     Rehab Potential (PT Clinical Summary)  good, to achieve stated therapy goals  -     Row Name 01/09/19 1415          Physical Therapy Goals    Bed Mobility Goal Selection (PT)  bed mobility, PT goal 1  -CH     Transfer Goal Selection (PT)  transfer, PT goal 1  -CH     Gait Training Goal Selection (PT)  gait training, PT goal 1  -     Row Name 01/09/19 1415          Bed Mobility Goal 1 (PT)    Activity/Assistive Device (Bed Mobility Goal 1, PT)  bed mobility activities, all  -CH     [place] Level/Cues Needed (Bed Mobility Goal 1, PT)  supervision required  -CH     Time Frame (Bed Mobility Goal 1, PT)  1 week  -     Row Name 01/09/19 1415          Transfer Goal 1 (PT)    Activity/Assistive Device (Transfer Goal 1, PT)  transfers, all;walker, rolling  -CH     [place] Level/Cues Needed (Transfer Goal 1, PT)  contact guard assist  -CH     Time Frame (Transfer Goal 1, PT)  1 week  -     Row Name 01/09/19 1415          Gait Training Goal 1 (PT)    Activity/Assistive Device (Gait Training Goal 1, PT)  gait (walking locomotion);walker, rolling  -CH     [place] Level (Gait Training Goal 1, PT)  contact guard assist  -CH     Distance (Gait Goal 1, PT)  150  -CH     Time Frame (Gait Training Goal 1, PT)  1 week  -     Row Name 01/09/19 1415          Positioning and Restraints    Pre-Treatment Position  in bed  -     Post Treatment Position  bed  -CH     In Bed  supine;call light within reach;encouraged to call for assist;exit alarm on  -       User Key  (r) = Recorded By, (t) = Taken By, (c) = Cosigned By    Initials  Name Provider Type     Swetha Gross, PT Physical Therapist    Radha Gonzalez RN Registered Nurse        Physical Therapy Education     Title: PT OT SLP Therapies (In Progress)     Topic: Physical Therapy (In Progress)     Point: Mobility training (Done)     Learning Progress Summary           Patient Acceptance, E,TB,D, VU,NR by  at 1/9/2019  4:37 PM                   Point: Body mechanics (Done)     Learning Progress Summary           Patient Acceptance, E,TB,D, VU,NR by  at 1/9/2019  4:37 PM                   Point: Precautions (Done)     Learning Progress Summary           Patient Acceptance, E,TB,D, VU,NR by  at 1/9/2019  4:37 PM                               User Key     Initials Effective Dates Name Provider Type Discipline     04/03/18 -  Swetha Gross, PT Physical Therapist PT              PT Recommendation and Plan  Anticipated Discharge Disposition (PT): skilled nursing facility  Planned Therapy Interventions (PT Eval): balance training, bed mobility training, gait training, home exercise program, patient/family education, transfer training  Therapy Frequency (PT Clinical Impression): daily  Outcome Summary/Treatment Plan (PT)  Anticipated Discharge Disposition (PT): skilled nursing facility  Plan of Care Reviewed With: patient  Outcome Summary: Pt presents with impaired functional mobility and gait secondary to generalized weakness, impaired balance, and decreased activity tolerance s/p hospitalization for pulmonary edema. Pt may benefit from skilled PT to address strength, mobiilty, and gait.  Outcome Measures     Row Name 01/09/19 1600             How much help from another person do you currently need...    Turning from your back to your side while in flat bed without using bedrails?  3  -CH      Moving from lying on back to sitting on the side of a flat bed without bedrails?  3  -CH      Moving to and from a bed to a chair (including a wheelchair)?  2  -CH       Standing up from a chair using your arms (e.g., wheelchair, bedside chair)?  2  -CH      Climbing 3-5 steps with a railing?  1  -CH      To walk in hospital room?  2  -CH      AM-PAC 6 Clicks Score  13  -         Functional Assessment    Outcome Measure Options  AM-PAC 6 Clicks Basic Mobility (PT)  -        User Key  (r) = Recorded By, (t) = Taken By, (c) = Cosigned By    Initials Name Provider Type     Swetha Gross PT Physical Therapist         Time Calculation:   PT Charges     Row Name 01/09/19 1426             Time Calculation    Start Time  1403  -      Stop Time  1415  -      Time Calculation (min)  12 min  -      PT Received On  01/09/19  -      PT - Next Appointment  01/10/19  -      PT Goal Re-Cert Due Date  01/16/19  -         Time Calculation- PT    Total Timed Code Minutes- PT  8 minute(s)  -        User Key  (r) = Recorded By, (t) = Taken By, (c) = Cosigned By    Initials Name Provider Type     Swetha Gross, PT Physical Therapist        Therapy Suggested Charges     Code   Minutes Charges    None           Therapy Charges for Today     Code Description Service Date Service Provider Modifiers Qty    24639389381 HC PT EVAL MOD COMPLEXITY 2 1/9/2019 Swetha Gross, PT GP 1    65894325056 HC PT THER PROC EA 15 MIN 1/9/2019 Swetha Gross, PT GP 1    93300903903 HC PT THER SUPP EA 15 MIN 1/9/2019 Swetha Gross, PT GP 1          PT G-Codes  Outcome Measure Options: AM-PAC 6 Clicks Basic Mobility (PT)  AM-PAC 6 Clicks Score: 13      Swetha Gross PT  1/9/2019

## 2019-01-09 NOTE — PROGRESS NOTES
"   LOS: 2 days    Patient Care Team:  Doroteo Victoria MD as PCP - General (Family Medicine)    Chief Complaint:    Chief Complaint   Patient presents with   • Shortness of Breath     started 2 hours PTA, pt 80% at triage, pt on dialysis and presents with wet lung sounds, pt's wife states pt may have had too much fluid over weekend, pt last dialysis day was Friday     Follow UP ESRD  Subjective     Interval History:   The patient is feeling much better, denies any chest pain or shortness x-ray, no nausea or vomiting, no dysuria or gross hematuria, he has minimal urine output.  There is no lower extremity edema.  Overall feeling better after his fluid removal with dialysis..     Review of Systems:   As noted above     Objective     Vital Signs  Temp:  [97.1 °F (36.2 °C)-98.1 °F (36.7 °C)] 97.1 °F (36.2 °C)  Heart Rate:  [] 85  BP: (103-177)/(62-95) 108/65    Flowsheet Rows      First Filed Value   Admission Height  177.8 cm (70\") Documented at 01/07/2019 0450   Admission Weight  90.7 kg (200 lb) Documented at 01/07/2019 0526          No intake/output data recorded.  I/O last 3 completed shifts:  In: 1178 [P.O.:1178]  Out: 250 [Urine:250]    Intake/Output Summary (Last 24 hours) at 1/9/2019 0834  Last data filed at 1/9/2019 0647  Gross per 24 hour   Intake 1178 ml   Output 250 ml   Net 928 ml       Physical Exam:  General Appearance: alert, oriented x 3, no acute distress, appears to be chronically ill  Skin: warm and dry  HEENT: pupils round and reactive to light, oral mucosa normal,   Neck: supple, no JVD, trachea midline  Lungs: Bilateral rhonchi, unlabored breathing effort  Heart: Irregularly irregular, no rub  Abdomen: soft, non-tender,  present bowel sounds to auscultation  : no palpable bladder,  Extremities: no edema, cyanosis or clubbing, Feet with dry callous on right foot 1st MTP joint, 1st and 5th MTP joint callous left foot. No purulence, functional AV fistula in the right upper arm.   Neuro: " normal speech and mental status        Results Review:    Results from last 7 days   Lab Units  01/09/19   0506  01/08/19   0434  01/07/19   0517   SODIUM mmol/L  137  135*  136   POTASSIUM mmol/L  3.9  3.7  4.1   CHLORIDE mmol/L  97*  96*  94*   CO2 mmol/L  25.7  22.2  21.2*   BUN mg/dL  59*  35*  62*   CREATININE mg/dL  7.94*  6.25*  8.36*   CALCIUM mg/dL  9.2  9.5  9.4   BILIRUBIN mg/dL   --    --   0.6   ALK PHOS U/L   --    --   93   ALT (SGPT) U/L   --    --   13   AST (SGOT) U/L   --    --   12   GLUCOSE mg/dL  197*  203*  441*       Estimated Creatinine Clearance: 9.2 mL/min (A) (by C-G formula based on SCr of 7.94 mg/dL (H)).    Results from last 7 days   Lab Units  01/09/19   0506  01/08/19   0434   PHOSPHORUS mg/dL  6.8*  5.8*             Results from last 7 days   Lab Units  01/08/19   0434  01/07/19   0517   WBC 10*3/mm3  9.51  12.53*   HEMOGLOBIN g/dL  11.3*  13.1*   PLATELETS 10*3/mm3  190  226       Results from last 7 days   Lab Units  01/09/19   0506  01/08/19   0434  01/07/19   0517  01/03/19   1522   INR   1.49*  1.43*  1.45*  1.10*         Imaging Results (last 24 hours)     ** No results found for the last 24 hours. **          allopurinol 100 mg Oral Daily   amLODIPine 5 mg Oral Daily   CloNIDine 0.1 mg Oral BID   donepezil 10 mg Oral Nightly   DULoxetine 60 mg Oral Daily   gabapentin 100 mg Oral TID   insulin glargine 15 Units Subcutaneous Q12H   insulin regular 10 Units Subcutaneous TID AC   metoprolol succinate XL 50 mg Oral Q24H   sodium chloride 3 mL Intravenous Q12H   tamsulosin 0.4 mg Oral Daily   torsemide 100 mg Oral Daily   warfarin 5 mg Oral Daily       nitroglycerin 5-200 mcg/min Last Rate: Stopped (01/07/19 2240)       Medication Review:   Current Facility-Administered Medications   Medication Dose Route Frequency Provider Last Rate Last Dose   • allopurinol (ZYLOPRIM) tablet 100 mg  100 mg Oral Daily Kaylene Patel MD   100 mg at 01/08/19 2051   • amLODIPine (NORVASC) tablet 5 mg   5 mg Oral Daily Kaylene Patel MD   5 mg at 01/08/19 2051   • CloNIDine (CATAPRES) tablet 0.1 mg  0.1 mg Oral BID Kaylene Patel MD   0.1 mg at 01/08/19 2051   • donepezil (ARICEPT) tablet 10 mg  10 mg Oral Nightly Kaylene Patel MD   10 mg at 01/08/19 2051   • DULoxetine (CYMBALTA) DR capsule 60 mg  60 mg Oral Daily Kaylene Patel MD   60 mg at 01/08/19 2051   • gabapentin (NEURONTIN) capsule 100 mg  100 mg Oral TID Kaylene Patel MD   100 mg at 01/08/19 2051   • insulin glargine (LANTUS) injection 15 Units  15 Units Subcutaneous Q12H Kaylene Patel MD   15 Units at 01/08/19 2052   • insulin regular (humuLIN R,novoLIN R) injection 10 Units  10 Units Subcutaneous TID AC Kaylene Patel MD   10 Units at 01/08/19 1742   • metoprolol succinate XL (TOPROL-XL) 24 hr tablet 50 mg  50 mg Oral Q24H Kaylene Patel MD   50 mg at 01/08/19 2051   • nitroglycerin 50 mg/250 mL (0.2 mg/mL) infusion  5-200 mcg/min Intravenous Titrated Hammad Farris MD   Stopped at 01/07/19 2240   • ondansetron (ZOFRAN) injection 4 mg  4 mg Intravenous Q6H PRN Isreal Rodriguez MD   4 mg at 01/09/19 0644   • sodium chloride 0.9 % flush 10 mL  10 mL Intravenous PRN Doroteo Soler MD       • sodium chloride 0.9 % flush 3 mL  3 mL Intravenous Q12H Kaylene Patel MD   3 mL at 01/08/19 2054   • sodium chloride 0.9 % flush 3-10 mL  3-10 mL Intravenous PRN Kaylene Patel MD       • tamsulosin (FLOMAX) 24 hr capsule 0.4 mg  0.4 mg Oral Daily Kaylene Patel MD   0.4 mg at 01/08/19 0923   • torsemide (DEMADEX) tablet 100 mg  100 mg Oral Daily Charisma Mejia MD   100 mg at 01/08/19 0925   • warfarin (COUMADIN) tablet 5 mg  5 mg Oral Daily Kaylene Patel MD   5 mg at 01/08/19 0909       Assessment/Plan   1. ESRD.  He had fluid excess improved with dialysis 4.2 L were removed on Monday.  His volume excess almost resolved.  2. Acute pulmonary edema improved with volume off on dialysis.   3. HTN better controlled with volume off.  Continue to challenge  fluid removal  4. Dementia  5. DM2 poorly controlled.  6. Chronic A-Fib  7.  Anemia of chronic kidney disease, treated with long acting LARRY as an outpatient.  8. Diabetic foot ulcers, do not look infected.     Plan:  1. Dialysis today  2.  Restrict sodium and potassium in his diet to 2 g daily  3.  Counseled about fluid restriction and salt restriction  4.  Surveillance labs      Segundo Lawson MD  01/09/19  8:34 AM

## 2019-01-09 NOTE — PROGRESS NOTES
"                                              LOS: 2 days   Patient Care Team:  Doroteo Victoria MD as PCP - General (Family Medicine)    Chief Complaint:  F/up pulm edema, hypoxia, medical management in ICU    Interval History:   On RA. Denies dyspnea. HE had an episode of nausea when he stood up and got dizzy     REVIEW OF SYSTEMS:   CARDIOVASCULAR: No chest pain, chest pressure or chest discomfort. No palpitations or edema.   GASTROINTESTINAL: He got nauseous again today after he stood up.  No abdominal pain or diarrhea    Ventilator/Non-Invasive Ventilation Settings (From admission, onward)    Start     Ordered    01/07/19 0505  NIPPV (CPAP or BIPAP)  Until Discontinued     Question Answer Comment   Type: BIPAP    NIPPV Mask Interface Per Patient Preference        01/07/19 0504            Vital Signs  Temp:  [97.1 °F (36.2 °C)-98.1 °F (36.7 °C)] 97.1 °F (36.2 °C)  Heart Rate:  [74-96] 76  BP: (104-158)/() 158/89    Intake/Output Summary (Last 24 hours) at 1/9/2019 1223  Last data filed at 1/9/2019 0647  Gross per 24 hour   Intake 1060 ml   Output 250 ml   Net 810 ml     Flowsheet Rows      First Filed Value   Admission Height  177.8 cm (70\") Documented at 01/07/2019 0450   Admission Weight  90.7 kg (200 lb) Documented at 01/07/2019 0526          Physical Exam:   General Appearance:    Alert, cooperative, in no acute distress   HEENT:  [place] 3. No oral thrush. Tonsils grade 1   Neck:   Large. Trachea midline. No thyromegaly.   Lungs:     Right crackles at the bases.  Nonlabored breathing.      Heart:    Regular rhythm and normal rate, normal S1 and S2, no            murmur   Skin:    No abnormalities observed   Abdomen:     Obese. Soft. No tenderness. No HSM.   Neuro:   Conscious, alert, oriented x3   Extremities:   Moves all extremities well, pitting edema, no cyanosis, no             Redness          Results Review:        Results from last 7 days   Lab Units  01/09/19   0506  01/08/19   0434  " 01/07/19   0517   SODIUM mmol/L  137  135*  136   POTASSIUM mmol/L  3.9  3.7  4.1   CHLORIDE mmol/L  97*  96*  94*   CO2 mmol/L  25.7  22.2  21.2*   BUN mg/dL  59*  35*  62*   CREATININE mg/dL  7.94*  6.25*  8.36*   GLUCOSE mg/dL  197*  203*  441*   CALCIUM mg/dL  9.2  9.5  9.4     Results from last 7 days   Lab Units  01/07/19   0517   TROPONIN T ng/mL  0.064*     Results from last 7 days   Lab Units  01/08/19   0434  01/07/19   0517   WBC 10*3/mm3  9.51  12.53*   HEMOGLOBIN g/dL  11.3*  13.1*   HEMATOCRIT %  33.6*  38.3*   PLATELETS 10*3/mm3  190  226     Results from last 7 days   Lab Units  01/09/19   0506  01/08/19   0434  01/07/19   0517   INR   1.49*  1.43*  1.45*     Results from last 7 days   Lab Units  01/07/19   0517   PROBNP pg/mL  26,473.0*       I reviewed the patient's new clinical results.  I personally viewed and interpreted the patient's CXR        Medication Review:     allopurinol 100 mg Oral Daily   amLODIPine 5 mg Oral Daily   CloNIDine 0.1 mg Oral BID   donepezil 10 mg Oral Nightly   DULoxetine 60 mg Oral Daily   gabapentin 100 mg Oral TID   insulin glargine 15 Units Subcutaneous Q12H   insulin regular 10 Units Subcutaneous TID AC   metoprolol succinate XL 50 mg Oral Q24H   sodium chloride 3 mL Intravenous Q12H   tamsulosin 0.4 mg Oral Daily   torsemide 100 mg Oral Daily   warfarin 5 mg Oral Daily         nitroglycerin 5-200 mcg/min Last Rate: Stopped (01/07/19 2240)     Assessment:  1. Pulmonary edema   2. Acute hypoxic respiratory failure, secondary #1  3. A. fib with RVR  4. ESRD on HD   5. Hypertensive emergency  6. D M II with uncontrolled Hyperglycemia   7. Mild leukocytosis, likely stress-induced doubt infection  8. Elevated troponin, likely secondary to ESRD  9. Subtherapeutic INR  10. Nausea     Plan:    · Diuresis/hemodialysis by nephrology.  · Check orthostatic due to nausea/dizziness  · Titrate to wean off oxygen.  · Consult physical therapy  · Continue Coumadin 5 mg day 3.  I'm  not quite sure if was taken out regularly at home.  · Increase NPH to 20 units twice a day.  blood sugar remains uncontrolled.    Transfer to floor      Kaylene Patel MD  01/09/19  12:23 PM          This note was dictated utilizing Dragon dictation

## 2019-01-09 NOTE — PLAN OF CARE
Problem: Fluid Volume Excess (Adult)  Goal: Identify Related Risk Factors and Signs and Symptoms  Outcome: Ongoing (interventions implemented as appropriate)   01/09/19 0539   Fluid Volume Excess (Adult)   Related Risk Factors (Fluid Volume Excess) fluid intake excessive   Signs and Symptoms (Fluid Volume Excess) edema       Problem: Breathing Pattern Ineffective (Adult)  Goal: Effective Oxygenation/Ventilation  Outcome: Ongoing (interventions implemented as appropriate)      Problem: Skin Injury Risk (Adult)  Goal: Identify Related Risk Factors and Signs and Symptoms  Outcome: Ongoing (interventions implemented as appropriate)

## 2019-01-10 NOTE — PLAN OF CARE
Problem: Patient Care Overview  Goal: Plan of Care Review  Outcome: Ongoing (interventions implemented as appropriate)   01/10/19 1539   Coping/Psychosocial   Plan of Care Reviewed With patient   Plan of Care Review   Progress no change   OTHER   Outcome Summary Appetite remains poor. Skin intact, lower ext. with venous stasis ulcers. Continue to monitor BS. Dialysis M-W-F. Pt. is weak. Will continue to monitor. 01/10/19       Problem: Fall Risk (Adult)  Goal: Absence of Fall  Outcome: Ongoing (interventions implemented as appropriate)      Problem: Tissue Perfusion, Ineffective Peripheral (Adult)  Goal: Adequate Tissue Perfusion  Outcome: Ongoing (interventions implemented as appropriate)      Problem: Fluid Volume Excess (Adult)  Goal: Identify Related Risk Factors and Signs and Symptoms  Outcome: Ongoing (interventions implemented as appropriate)    Goal: Optimal Fluid Balance  Outcome: Ongoing (interventions implemented as appropriate)      Problem: Breathing Pattern Ineffective (Adult)  Goal: Effective Oxygenation/Ventilation  Outcome: Ongoing (interventions implemented as appropriate)      Problem: Skin Injury Risk (Adult)  Goal: Identify Related Risk Factors and Signs and Symptoms  Outcome: Ongoing (interventions implemented as appropriate)    Goal: Skin Health and Integrity  Outcome: Ongoing (interventions implemented as appropriate)

## 2019-01-10 NOTE — PROGRESS NOTES
"   LOS: 3 days    Patient Care Team:  Doroteo Victoria MD as PCP - General (Family Medicine)    Chief Complaint:    Chief Complaint   Patient presents with   • Shortness of Breath     started 2 hours PTA, pt 80% at triage, pt on dialysis and presents with wet lung sounds, pt's wife states pt may have had too much fluid over weekend, pt last dialysis day was Friday     Follow UP ESRD  Subjective     Interval History:   Patient woke up this morning having nausea vomiting and lightheadedness his blood pressure noted to be about 101 systolic.  Denies any chest pain, no abdominal pain, had some lightheadedness.  He had dialysis yesterday without any difficulties    Review of Systems:   As noted above     Objective     Vital Signs  Temp:  [97 °F (36.1 °C)-97.5 °F (36.4 °C)] 97 °F (36.1 °C)  Heart Rate:  [74-98] 98  Resp:  [18] 18  BP: (101-158)/(64-89) 101/64    Flowsheet Rows      First Filed Value   Admission Height  177.8 cm (70\") Documented at 01/07/2019 0450   Admission Weight  90.7 kg (200 lb) Documented at 01/07/2019 0526          No intake/output data recorded.  I/O last 3 completed shifts:  In: 820 [P.O.:820]  Out: 3350 [Urine:250; Other:3100]    Intake/Output Summary (Last 24 hours) at 1/10/2019 0808  Last data filed at 1/10/2019 0134  Gross per 24 hour   Intake 560 ml   Output 3100 ml   Net -2540 ml       Physical Exam:  General Appearance: alert, oriented x 3, no acute distress, appears to be chronically ill  Skin: warm and dry  HEENT: pupils round and reactive to light, oral mucosa normal,   Neck: supple, no JVD, trachea midline  Lungs: Bilateral rhonchi, unlabored breathing effort  Heart: Irregularly irregular, no rub  Abdomen: soft, non-tender,  present bowel sounds to auscultation  : no palpable bladder,  Extremities: no edema, cyanosis or clubbing, Feet with dry callous on right foot 1st MTP joint, 1st and 5th MTP joint callous left foot. No purulence, functional AV fistula in the right upper arm. "   Neuro: normal speech and mental status        Results Review:    Results from last 7 days   Lab Units  01/10/19   0557  01/09/19   0506  01/08/19   0434  01/07/19   0517   SODIUM mmol/L  137  137  135*  136   POTASSIUM mmol/L  4.1  3.9  3.7  4.1   CHLORIDE mmol/L  96*  97*  96*  94*   CO2 mmol/L  25.9  25.7  22.2  21.2*   BUN mg/dL  34*  59*  35*  62*   CREATININE mg/dL  5.72*  7.94*  6.25*  8.36*   CALCIUM mg/dL  9.2  9.2  9.5  9.4   BILIRUBIN mg/dL   --    --    --   0.6   ALK PHOS U/L   --    --    --   93   ALT (SGPT) U/L   --    --    --   13   AST (SGOT) U/L   --    --    --   12   GLUCOSE mg/dL  258*  197*  203*  441*       Estimated Creatinine Clearance: 13.4 mL/min (A) (by C-G formula based on SCr of 5.72 mg/dL (H)).    Results from last 7 days   Lab Units  01/10/19   0557  01/09/19   0506  01/08/19   0434   PHOSPHORUS mg/dL  5.8*  6.8*  5.8*             Results from last 7 days   Lab Units  01/10/19   0557  01/08/19   0434  01/07/19   0517   WBC 10*3/mm3  8.44  9.51  12.53*   HEMOGLOBIN g/dL  12.2*  11.3*  13.1*   PLATELETS 10*3/mm3  192  190  226       Results from last 7 days   Lab Units  01/10/19   0557  01/09/19   0506  01/08/19   0434  01/07/19   0517  01/03/19   1522   INR   1.66*  1.49*  1.43*  1.45*  1.10*         Imaging Results (last 24 hours)     ** No results found for the last 24 hours. **          allopurinol 100 mg Oral Daily   amLODIPine 5 mg Oral Daily   donepezil 10 mg Oral Nightly   DULoxetine 60 mg Oral Daily   gabapentin 100 mg Oral TID   insulin glargine 20 Units Subcutaneous Q12H   insulin regular 10 Units Subcutaneous TID AC   metoprolol succinate XL 50 mg Oral Q24H   sodium chloride 3 mL Intravenous Q12H   tamsulosin 0.4 mg Oral Daily   torsemide 100 mg Oral Daily   warfarin 5 mg Oral Daily          Medication Review:   Current Facility-Administered Medications   Medication Dose Route Frequency Provider Last Rate Last Dose   • allopurinol (ZYLOPRIM) tablet 100 mg  100 mg Oral  Daily Kaylene Patel MD   100 mg at 01/09/19 2228   • amLODIPine (NORVASC) tablet 5 mg  5 mg Oral Daily Kaylene Patel MD   5 mg at 01/09/19 2229   • donepezil (ARICEPT) tablet 10 mg  10 mg Oral Nightly Kaylene Patel MD   10 mg at 01/09/19 2229   • DULoxetine (CYMBALTA) DR capsule 60 mg  60 mg Oral Daily Kaylene Patel MD   60 mg at 01/09/19 2228   • gabapentin (NEURONTIN) capsule 100 mg  100 mg Oral TID Kaylene Patel MD   100 mg at 01/09/19 2229   • insulin glargine (LANTUS) injection 20 Units  20 Units Subcutaneous Q12H Kaylene Patel MD   20 Units at 01/09/19 2227   • insulin regular (humuLIN R,novoLIN R) injection 10 Units  10 Units Subcutaneous TID AC Kaylene Patel MD   10 Units at 01/09/19 0957   • metoprolol succinate XL (TOPROL-XL) 24 hr tablet 50 mg  50 mg Oral Q24H Kaylene Patel MD   50 mg at 01/09/19 2229   • ondansetron (ZOFRAN) injection 4 mg  4 mg Intravenous Q6H PRN Isreal Rodriguez MD   4 mg at 01/10/19 0652   • sodium chloride 0.9 % flush 10 mL  10 mL Intravenous PRN Doroteo Soler MD       • sodium chloride 0.9 % flush 3 mL  3 mL Intravenous Q12H Kaylene Patel MD   3 mL at 01/09/19 2230   • sodium chloride 0.9 % flush 3-10 mL  3-10 mL Intravenous PRN Kaylene Patel MD       • tamsulosin (FLOMAX) 24 hr capsule 0.4 mg  0.4 mg Oral Daily Kaylene Patel MD   0.4 mg at 01/09/19 0956   • torsemide (DEMADEX) tablet 100 mg  100 mg Oral Daily Charisma Mejia MD   100 mg at 01/09/19 0956   • warfarin (COUMADIN) tablet 5 mg  5 mg Oral Daily Kaylene Patel MD   5 mg at 01/09/19 0956       Assessment/Plan   1. ESRD.  He had dialysis yesterday without any difficulties.  2. Acute pulmonary edema improved with volume off on dialysis.   3. HTN, pressure is over treated, the patient has lightheadedness and symptomatic low blood pressure.  4. Dementia  5. DM2 poorly controlled.  6. Chronic A-Fib  7.  Anemia of chronic kidney disease, treated with long acting LARRY as an outpatient.  8. Diabetic foot  ulcers, do not look infected.     Plan:  1. Dialysis tomorrow  2.  's continue clonidine  3.  Surveillance labs  4.  Check orthostatic blood pressure    Segundo Lawson MD  01/10/19  8:08 AM

## 2019-01-10 NOTE — PROGRESS NOTES
Adult Nutrition  Assessment/PES    Patient Name:  Deacon Martin  YOB: 1945  MRN: [id number]  Admit Date:  1/7/2019    Assessment Date:  1/10/2019    Comments:  Nutrition follow up. Pt eating well. States appetite is good. No need for supplements at this time, Encouraged po intake to support healing. Will follow as needed.    Reason for Assessment     Row Name 01/10/19 1456          Reason for Assessment    Reason For Assessment  follow-up protocol         Nutrition/Diet History     Row Name 01/10/19 1456          Nutrition/Diet History    Typical Food/Fluid Intake  appetite good. didnt' eat this am because felt sick tostomach         Anthropometrics     Row Name 01/10/19 0711          Anthropometrics    Weight  84.8 kg (187 lb)         Labs/Tests/Procedures/Meds     Row Name 01/10/19 1459          Labs/Procedures/Meds    Lab Results Reviewed  reviewed, pertinent     Lab Results Comments  Glu, BUN, Cr, Phos, HgbA1c        Diagnostic Tests/Procedures    Diagnostic Test/Procedure Reviewed  reviewed, pertinent        Medications    Pertinent Medications Reviewed  reviewed, pertinent     Pertinent Medications Comments  insulin, coumadin         Physical Findings     Row Name 01/10/19 1500          Physical Findings    Overall Physical Appearance  overweight     Skin  non-healing wound(s) ulcers to feet         Estimated/Assessed Needs     Row Name 01/10/19 1500          Estimated/Assessed Needs    Additional Documentation  -- 5648-1958 kcals (20-25/kg), 85-1.2g pro (1-1.2/kg)         Nutrition Prescription Ordered     Row Name 01/10/19 1501          Nutrition Prescription PO    Common Modifiers  Consistent Carbohydrate;Fluid Restriction;Low Potassium;Low Sodium     Fluid Restriction mL per Day  Other (comment) 1200     Low Sodium Details  2,000 mg Sodium         Evaluation of Received Nutrient/Fluid Intake     Row Name 01/10/19 1502          PO Evaluation    Number of Meals  5     % PO Intake  % per pt  most meals         Problem/Interventions:    Intervention Goal     Row Name 01/10/19 1502          Intervention Goal    General  Maintain nutrition;Reduce/improve symptoms;Meet nutritional needs for age/condition     PO  Tolerate PO;Maintain intake;PO intake (%)     PO Intake %  80 %     Weight  No significant weight loss         Nutrition Intervention     Row Name 01/10/19 1502          Nutrition Intervention    RD/Tech Action  Follow Tx progress;Care plan reviewd;Encourage intake;Interview for preference           Education/Evaluation     Row Name 01/10/19 1504          Education    Education  Will Instruct as appropriate        Monitor/Evaluation    Monitor  Per protocol;Weight;I&O;Skin status;PO intake;Symptoms;Pertinent labs           Electronically signed by:  Serena Abad RD  01/10/19 3:03 PM

## 2019-01-10 NOTE — CONSULTS
"Diabetes Education  Assessment/Teaching    Patient Name:  Deacon Martin  YOB: 1945  MRN: [id number]  Admit Date:  1/7/2019      Assessment Date:  1/10/2019    Most Recent Value   General Information    Height  177.8 cm (70\")   Height Method  Stated   Weight  84.8 kg (187 lb)   Weight Method  Standing scale   Pregnancy Assessment   Diabetes History   What type of diabetes do you have?  Type 2   Length of Diabetes Diagnosis  6 - 10 years   Current DM knowledge  fair   Have you had diabetes education/teaching in the past?  no   Do you test your blood sugar at home?  yes   Frequency of checks  4 times a day but states that he is currently out of strips   Who performs the test?  self   Do you have any diabetes complications?  nephropathy, circulation problems, foot ulcers   Education Preferences   Nutrition Information   Assessment Topics   Healthy Eating - Assessment  Needs education   Being Active - Assessment  Needs education   Taking Medication - Assessment  Competent   Problem Solving - Assessment  Needs education   Reducing Risk - Assessment  Needs education   Healthy Coping - Assessment  Competent   Monitoring - Assessment  Competent   DM Goals            Most Recent Value   DM Education Needs   Meter  Has own   Meter Type  Accuchek [Accu Chek Guide]   Frequency of Testing  4 times a day   Medication  Insulin, Pen   Reducing Risks  A1C testing   Physical Activity Frequency  Discussed exercise importance   Healthy Coping  Appropriate   Discharge Plan  Home   Motivation  Moderate   Teaching Method  Explanation, Discussion   Patient Response  Verbalized understanding            Other Comments:          Electronically signed by:  Olinda Jimenez RN  01/10/19 11:10 AM  "

## 2019-01-10 NOTE — PROGRESS NOTES
Continued Stay Note  [facility]     Patient Name: Deacon Martin  MRN: [id number]  Today's Date: 1/10/2019    Admit Date: 1/7/2019    Discharge Plan     Row Name 01/10/19 1343       Plan    Plan  Return home with family.    Patient/Family in Agreement with Plan  yes    Plan Comments  Spoke with patient and son Moe at bedside.  Patient admits that he is very weak, but hopes to return home at [place].  Talked about SNF if needed - given [facility] and list of SNF by area.  Also talked about HH and given list of HH agencies.  CCP will continue to follow.  [place] RN Becky S. [place], RN

## 2019-01-11 NOTE — PLAN OF CARE
Problem: Patient Care Overview  Goal: Plan of Care Review  Outcome: Ongoing (interventions implemented as appropriate)   01/11/19 0522   Coping/Psychosocial   Plan of Care Reviewed With patient   Plan of Care Review   Progress improving   OTHER   Outcome Summary No complaints overnight. Othostatic BPs obtained and documented per order. Plan for HD tomorrow. VSS, will continue to monitor.      Goal: Individualization and Mutuality  Outcome: Ongoing (interventions implemented as appropriate)    Goal: Discharge Needs Assessment  Outcome: Ongoing (interventions implemented as appropriate)    Goal: Interprofessional Rounds/Family Conf  Outcome: Ongoing (interventions implemented as appropriate)      Problem: Fall Risk (Adult)  Goal: Absence of Fall  Outcome: Ongoing (interventions implemented as appropriate)      Problem: Tissue Perfusion, Ineffective Peripheral (Adult)  Goal: Adequate Tissue Perfusion  Outcome: Ongoing (interventions implemented as appropriate)      Problem: Fluid Volume Excess (Adult)  Goal: Optimal Fluid Balance  Outcome: Ongoing (interventions implemented as appropriate)      Problem: Breathing Pattern Ineffective (Adult)  Goal: Effective Oxygenation/Ventilation  Outcome: Ongoing (interventions implemented as appropriate)    Goal: Anxiety/Fear Reduction  Outcome: Ongoing (interventions implemented as appropriate)      Problem: Skin Injury Risk (Adult)  Goal: Identify Related Risk Factors and Signs and Symptoms  Outcome: Ongoing (interventions implemented as appropriate)    Goal: Skin Health and Integrity  Outcome: Ongoing (interventions implemented as appropriate)

## 2019-01-11 NOTE — PROGRESS NOTES
"   LOS: 4 days    Patient Care Team:  Doroteo Victoria MD as PCP - General (Family Medicine)    Chief Complaint:    Chief Complaint   Patient presents with   • Shortness of Breath     started 2 hours PTA, pt 80% at triage, pt on dialysis and presents with wet lung sounds, pt's wife states pt may have had too much fluid over weekend, pt last dialysis day was Friday     Follow UP ESRD  Subjective     Interval History:   The patient is feeling much better today, no further nausea or vomiting, eating breakfast, no chest pain or shortness of air, no orthopnea or PND, no nausea or vomiting.  No abdominal pain, blood pressure is stable.  He has no lightheadedness.      Review of Systems:   As noted above     Objective     Vital Signs  Temp:  [97.4 °F (36.3 °C)-98 °F (36.7 °C)] 98 °F (36.7 °C)  Heart Rate:  [74-98] 74  Resp:  [16-18] 18  BP: ()/() 129/82    Flowsheet Rows      First Filed Value   Admission Height  177.8 cm (70\") Documented at 01/07/2019 0450   Admission Weight  90.7 kg (200 lb) Documented at 01/07/2019 0526          No intake/output data recorded.  I/O last 3 completed shifts:  In: 1120 [P.O.:1120]  Out: 3200 [Urine:100; Other:3100]    Intake/Output Summary (Last 24 hours) at 1/11/2019 0918  Last data filed at 1/10/2019 1800  Gross per 24 hour   Intake 880 ml   Output 100 ml   Net 780 ml       Physical Exam:  General Appearance: alert, oriented x 3, no acute distress, appears to be chronically ill  Skin: warm and dry  HEENT: pupils round and reactive to light, oral mucosa normal,   Neck: supple, no JVD, trachea midline  Lungs: Bilateral rhonchi, unlabored breathing effort  Heart: Irregularly irregular, no rub  Abdomen: soft, non-tender,  present bowel sounds to auscultation  : no palpable bladder,  Extremities: no edema, cyanosis or clubbing, Feet with dry callous on right foot 1st MTP joint, 1st and 5th MTP joint callous left foot. No purulence, functional AV fistula in the right upper arm. "   Neuro: normal speech and mental status        Results Review:    Results from last 7 days   Lab Units  01/11/19   0443  01/10/19   0557  01/09/19   0506   01/07/19   0517   SODIUM mmol/L  137  137  137   < >  136   POTASSIUM mmol/L  3.9  4.1  3.9   < >  4.1   CHLORIDE mmol/L  94*  96*  97*   < >  94*   CO2 mmol/L  22.6  25.9  25.7   < >  21.2*   BUN mg/dL  49*  34*  59*   < >  62*   CREATININE mg/dL  7.34*  5.72*  7.94*   < >  8.36*   CALCIUM mg/dL  9.5  9.2  9.2   < >  9.4   BILIRUBIN mg/dL   --    --    --    --   0.6   ALK PHOS U/L   --    --    --    --   93   ALT (SGPT) U/L   --    --    --    --   13   AST (SGOT) U/L   --    --    --    --   12   GLUCOSE mg/dL  127*  258*  197*   < >  441*    < > = values in this interval not displayed.       Estimated Creatinine Clearance: 10.4 mL/min (A) (by C-G formula based on SCr of 7.34 mg/dL (H)).    Results from last 7 days   Lab Units  01/11/19   0443  01/10/19   0557  01/09/19   0506   PHOSPHORUS mg/dL  7.6*  5.8*  6.8*             Results from last 7 days   Lab Units  01/11/19   0443  01/10/19   0557  01/08/19   0434 01/07/19   0517   WBC 10*3/mm3  9.68  8.44  9.51  12.53*   HEMOGLOBIN g/dL  13.1*  12.2*  11.3*  13.1*   PLATELETS 10*3/mm3  209  192  190  226       Results from last 7 days   Lab Units  01/11/19   0443  01/10/19   0557  01/09/19   0506  01/08/19   0434 01/07/19   0517   INR   2.02*  1.66*  1.49*  1.43*  1.45*         Imaging Results (last 24 hours)     ** No results found for the last 24 hours. **          allopurinol 100 mg Oral Daily   amLODIPine 5 mg Oral Daily   donepezil 10 mg Oral Nightly   DULoxetine 60 mg Oral Daily   gabapentin 100 mg Oral TID   insulin glargine 30 Units Subcutaneous Q12H   insulin regular 10 Units Subcutaneous TID AC   metoprolol succinate XL 50 mg Oral Q24H   sodium chloride 3 mL Intravenous Q12H   tamsulosin 0.4 mg Oral Daily   torsemide 100 mg Oral Daily   warfarin 7.5 mg Oral Daily          Medication Review:    Current Facility-Administered Medications   Medication Dose Route Frequency Provider Last Rate Last Dose   • allopurinol (ZYLOPRIM) tablet 100 mg  100 mg Oral Daily Kaylene Patel MD   100 mg at 01/10/19 2125   • amLODIPine (NORVASC) tablet 5 mg  5 mg Oral Daily Kaylene Patel MD   5 mg at 01/10/19 2125   • donepezil (ARICEPT) tablet 10 mg  10 mg Oral Nightly Kaylene Patel MD   10 mg at 01/10/19 2125   • DULoxetine (CYMBALTA) DR capsule 60 mg  60 mg Oral Daily Kaylene Patel MD   60 mg at 01/10/19 2125   • gabapentin (NEURONTIN) capsule 100 mg  100 mg Oral TID Kaylene Patel MD   100 mg at 01/11/19 0827   • insulin glargine (LANTUS) injection 30 Units  30 Units Subcutaneous Q12H Kaylene Patel MD   30 Units at 01/11/19 0830   • insulin regular (humuLIN R,novoLIN R) injection 10 Units  10 Units Subcutaneous TID AC Kaylene Patel MD   10 Units at 01/11/19 0827   • metoprolol succinate XL (TOPROL-XL) 24 hr tablet 50 mg  50 mg Oral Q24H Kaylene Patel MD   50 mg at 01/10/19 2125   • ondansetron (ZOFRAN) injection 4 mg  4 mg Intravenous Q6H PRN Isreal Rodriguez MD   4 mg at 01/10/19 0652   • sodium chloride 0.9 % flush 10 mL  10 mL Intravenous PRN Doroteo Soler MD       • sodium chloride 0.9 % flush 3 mL  3 mL Intravenous Q12H Kaylene Patel MD   3 mL at 01/11/19 0830   • sodium chloride 0.9 % flush 3-10 mL  3-10 mL Intravenous PRN Kaylene Patel MD       • tamsulosin (FLOMAX) 24 hr capsule 0.4 mg  0.4 mg Oral Daily Kaylene Patel MD   0.4 mg at 01/11/19 0827   • torsemide (DEMADEX) tablet 100 mg  100 mg Oral Daily Charisma Mejia MD   100 mg at 01/11/19 0827   • warfarin (COUMADIN) tablet 7.5 mg  7.5 mg Oral Daily Kaylene Patel MD           Assessment/Plan   1. ESRD.  On dialysis every Monday, Wednesday and Friday  2. Acute pulmonary edema resolved with volume off on dialysis.   3. HTN, improved, off clonidine, orthostatic blood pressures resolved  4. Dementia  5. DM2 poorly controlled.  6. Chronic  A-Fib  7.  Anemia of chronic kidney disease, treated with long acting LARRY as an outpatient.  8. Diabetic foot ulcers, do not look infected.     Plan:  1. Dialysis today  3.  Surveillance labs      Segundo Lawson MD  01/11/19  9:18 AM

## 2019-01-11 NOTE — PLAN OF CARE
Problem: Patient Care Overview  Goal: Plan of Care Review  Outcome: Ongoing (interventions implemented as appropriate)   01/11/19 1046   Coping/Psychosocial   Plan of Care Reviewed With patient   Plan of Care Review   Progress improving   OTHER   Outcome Summary Pt tolerated treatment well this date. Increased gait distance to 150ft w/ Rw and min A. Had ~2-3 LOBs, requiring min A to correct. PT will continue to address functional mobility deficits as tolerated.

## 2019-01-11 NOTE — NURSING NOTE
Orthostatic BP - 1/10/2018    Day Shift 1535    Lying - 105/75  Standing - 70/52  Standing - 105/66    Night Shift 2348    Lying - 198/97  Standing - 148/84  Standing - 137/91

## 2019-01-11 NOTE — THERAPY TREATMENT NOTE
Acute Care - Physical Therapy Treatment Note  [facility]     Patient Name: Deacon Martin  : 1945  MRN: [id number]  Today's Date: 2019  Onset of Illness/Injury or Date of Surgery: 19          Admit Date: 2019    Visit Dx:    ICD-10-CM ICD-9-CM   1. Acute pulmonary edema (CMS/[facility]) J81.0 518.4   2. Acute respiratory failure with hypoxia (CMS/[facility]) J96.01 518.81   3. ESRD on dialysis (CMS/[facility]) N18.6 585.6    Z99.2 V45.11   4. Hypertensive emergency I16.1 401.9   5. Generalized weakness R53.1 780.79     Patient Active Problem List   Diagnosis   • Cervical spinal stenosis   • Cervical osteoarthritis   • Cervical pain   • Chronic venous insufficiency   • Disturbance, visual, subjective   • Poorly controlled type 2 diabetes mellitus (CMS/[facility])   • Essential hypertension   • Hypertriglyceridemia   • BPH (benign prostatic hypertrophy) with urinary obstruction   • Atrial fibrillation (CMS/[facility])   • Anticoagulation goal of INR 2 to 3   • Venous stasis ulcers of both lower extremities (CMS/[facility])   • Altered mental status   • Gouty arthritis   • Volume overload due to LUIS A   • LUIS A (acute kidney injury) (CMS/[facility])   • CKD (chronic kidney disease) stage 4, GFR 15-29 ml/min (CMS/[facility])   • Urinary incontinence without sensory awareness   • Hemianopia, homonymous, right   • History of cardioembolic stroke (Left occipital)   • Hypersomnolence   • Vascular dementia   • Anemia of chronic renal failure, stage 4 (severe) (CMS/[facility])   • Left ventricular hypertrophy   • Nonrheumatic aortic valve stenosis   • Patient left after triage   • Diabetic peripheral neuropathy (CMS/[facility])   • Shunt malfunction   • Neuropathy associated with endocrine disorder (CMS/[facility])   • AF (atrial fibrillation) (CMS/[facility])   • ESRD (end stage renal disease) on dialysis (CMS/[facility])   • Acute on chronic diastolic CHF (congestive heart failure) (CMS/[facility])   • Type 2 diabetes mellitus (CMS/[facility])   • HTN (hypertension)   • Vascular dementia without behavioral  disturbance   • Anticoagulated on Coumadin   • Atrial fibrillation (CMS/HCC)   • Acute pulmonary edema (CMS/HCC)       Therapy Treatment    Rehabilitation Treatment Summary     Row Name 01/11/19 1130             Treatment Time/Intention    Discipline  physical therapy assistant  -      Document Type  therapy note (daily note)  -      Subjective Information  complains of;dizziness when completing ambulation  -SM      Mode of Treatment  physical therapy  -SM      Patient Effort  good  -      Existing Precautions/Restrictions  fall  -SM      Recorded by [] Tonia Graham, CASH 01/11/19 1322      Row Name 01/11/19 1130             Cognitive Assessment/Intervention- PT/OT    Orientation Status (Cognition)  oriented x 3 initially stated he was at home, then later stated hospital  -      Follows Commands (Cognition)  follows one step commands  -      Personal Safety Interventions  fall prevention program maintained;gait belt;nonskid shoes/slippers when out of bed  -SM      Recorded by [SM] Tonia Graham PTA 01/11/19 1322      Row Name 01/11/19 1130             Bed Mobility Assessment/Treatment    Bed Mobility Assessment/Treatment  supine-sit;sit-supine  -      Supine-Sit [place] (Bed Mobility)  supervision  -      Sit-Supine [place] (Bed Mobility)  supervision  -      Assistive Device (Bed Mobility)  bed rails;head of bed elevated  -      Recorded by [] Tonia Graham PTA 01/11/19 1325      Row Name 01/11/19 1130             Transfer Assessment/Treatment    Transfer Assessment/Treatment  sit-stand transfer;stand-sit transfer;toilet transfer  -      Recorded by [SM] Tonia Graham PTA 01/11/19 1325      Row Name 01/11/19 1130             Sit-Stand Transfer    Sit-Stand [place] (Transfers)  contact guard  -      Assistive Device (Sit-Stand Transfers)  walker, front-wheeled  -      Recorded by [SM] Tonia Graham PTA 01/11/19 1325      Row Name 01/11/19  1130             Stand-Sit Transfer    Stand-Sit [place] (Transfers)  contact guard  -      Assistive Device (Stand-Sit Transfers)  walker, front-wheeled  -SM      Recorded by [SM] Santos Tonia Michelle, [facility] 01/11/19 1325      Row Name 01/11/19 1130             Toilet Transfer    Type (Toilet Transfer)  sit-stand;stand-sit  -SM      [place] Level (Toilet Transfer)  contact guard  -SM      Assistive Device (Toilet Transfer)  commode;grab bars/safety frame;walker, front-wheeled  -SM      Recorded by [SM] Wale Grahamah Michelle, [facility] 01/11/19 1325      Row Name 01/11/19 1130             Gait/Stairs Assessment/Training    [place] Level (Gait)  minimum assist (75% patient effort)  -      Assistive Device (Gait)  walker, front-wheeled  -SM      Distance in Feet (Gait)  150  -SM      Pattern (Gait)  step-through  -SM      Deviations/Abnormal Patterns (Gait)  montserrat decreased;stride length decreased  -SM      Bilateral Gait Deviations  forward flexed posture  -SM      Comment (Gait/Stairs)  slightly unsteady, requiring min A to correct LOBs  -SM      Recorded by [SM] Wale Grahamah Michelle, [facility] 01/11/19 1325      Row Name 01/11/19 1130             Positioning and Restraints    Pre-Treatment Position  in bed  -SM      Post Treatment Position  bed  -SM      In Bed  supine;call light within reach;encouraged to call for assist;exit alarm on  -SM      Recorded by [SM] Tonia Graham, [facility] 01/11/19 1325      Row Name 01/11/19 1130             Pain Scale: Numbers Pre/Post-Treatment    Pain Scale: Numbers, Pretreatment  0/10 - no pain  -SM      Recorded by [SM] Santos Tonia Michelle, [facility] 01/11/19 1325      Row Name                Wound 01/07/19 0846 Bilateral foot ulceration, arterial;ulceration, venous    Wound - Properties Group Date first assessed: 01/07/19 [KB] Time first assessed: 0846 [KB] Present On Admission : yes [KB] Side: Bilateral [KB] Location: foot [KB] Type: ulceration, arterial;ulceration, venous [KB]  Recorded by:  [KB] Radha Dillon RN 01/07/19 0846      User Key  (r) = Recorded By, (t) = Taken By, (c) = Cosigned By    Initials Name Effective Dates Discipline    Radha Gonzalez, RN 06/16/16 -  Nurse     Tonia Graham PTA 03/07/18 -  PT          Wound 01/07/19 0846 Bilateral foot ulceration, arterial;ulceration, venous (Active)   Dressing Appearance open to air 1/11/2019 12:05 AM   Closure None 1/11/2019 12:05 AM   Base dry;scab 1/11/2019 12:05 AM   Dressing Care, Wound open to air 1/11/2019 12:05 AM           Physical Therapy Education     Title: PT OT SLP Therapies (Done)     Topic: Physical Therapy (Done)     Point: Mobility training (Done)     Learning Progress Summary           Patient Acceptance, E,D, VU,NR by  at 1/11/2019  1:25 PM    Acceptance, E, VU by  at 1/10/2019  4:10 PM    Acceptance, E,TB,D, VU,NR by  at 1/9/2019  4:37 PM                   Point: Home exercise program (Done)     Learning Progress Summary           Patient Acceptance, E,D, VU,NR by  at 1/11/2019  1:25 PM    Acceptance, E, VU by  at 1/10/2019  4:10 PM                   Point: Body mechanics (Done)     Learning Progress Summary           Patient Acceptance, E,D, VU,NR by  at 1/11/2019  1:25 PM    Acceptance, E,TB,D, VU,NR by  at 1/9/2019  4:37 PM                   Point: Precautions (Done)     Learning Progress Summary           Patient Acceptance, E,D, VU,NR by  at 1/11/2019  1:25 PM    Acceptance, E,TB,D, VU,NR by  at 1/9/2019  4:37 PM                               User Key     Initials Effective Dates Name Provider Type Discipline     04/03/18 -  Swetha Gross, PT Physical Therapist PT    EM 04/03/18 -  Hansa Carty PT Physical Therapist PT     03/07/18 -  Tonia Graham PTA Physical Therapy Assistant PT                PT Recommendation and Plan     Plan of Care Reviewed With: patient  Progress: improving  Outcome Summary: Pt tolerated treatment well this date.  Increased gait distance to 150ft w/ Rw and min A. Had ~2-3 LOBs, requiring min A to correct. PT will continue to address functional mobility deficits as tolerated.  Outcome Measures     Row Name 01/11/19 1300 01/09/19 1600          How much help from another person do you currently need...    Turning from your back to your side while in flat bed without using bedrails?  3  -SM  3  -CH     Moving from lying on back to sitting on the side of a flat bed without bedrails?  3  -SM  3  -CH     Moving to and from a bed to a chair (including a wheelchair)?  3  -SM  2  -CH     Standing up from a chair using your arms (e.g., wheelchair, bedside chair)?  3  -SM  2  -CH     Climbing 3-5 steps with a railing?  2  -SM  1  -CH     To walk in hospital room?  2  -SM  2  -CH     AM-PAC 6 Clicks Score  16  -  13  -CH        Functional Assessment    Outcome Measure Options  AM-PAC 6 Clicks Basic Mobility (PT)  -  AM-PAC 6 Clicks Basic Mobility (PT)  -       User Key  (r) = Recorded By, (t) = Taken By, (c) = Cosigned By    Initials Name Provider Type     Swetha Gross, PT Physical Therapist     Tonia Graham PTA Physical Therapy Assistant         Time Calculation:   PT Charges     Row Name 01/11/19 1328             Time Calculation    Start Time  1130  -      Stop Time  1148  -      Time Calculation (min)  18 min  -      PT Received On  01/11/19  -      PT - Next Appointment  01/12/19  -        User Key  (r) = Recorded By, (t) = Taken By, (c) = Cosigned By    Initials Name Provider Type     Tonia Graham PTA Physical Therapy Assistant        Therapy Suggested Charges     Code   Minutes Charges    None           Therapy Charges for Today     Code Description Service Date Service Provider Modifiers Qty    99209160898 HC PT THER PROC EA 15 MIN 1/11/2019 Tonia Graham PTA GP 1    65196506411 HC PT THER SUPP EA 15 MIN 1/11/2019 Tonia Graham PTA GP 1          PT G-Codes  Outcome Measure  Options: AM-PAC 6 Clicks Basic Mobility (PT)  AM-PAC 6 Clicks Score: 16    Tonia Graham, PTA  1/11/2019

## 2019-01-11 NOTE — PROGRESS NOTES
"                                              LOS: 3 days   Patient Care Team:  Doroteo Victoria MD as PCP - General (Family Medicine)    Chief Complaint:  F/up pulm edema, hypoxia, medical management in ICU    Interval History:   On RA. Denies dyspnea. HE had an episode of nausea this morning right at breakfast.     REVIEW OF SYSTEMS:   GASTROINTESTINAL: He got nauseous again today in the morning. No vomiting. This occurred when he was in bed in the morning..      Ventilator/Non-Invasive Ventilation Settings (From admission, onward)    Start     Ordered    01/07/19 0505  NIPPV (CPAP or BIPAP)  Until Discontinued     Question Answer Comment   Type: BIPAP    NIPPV Mask Interface Per Patient Preference        01/07/19 0504            Vital Signs  Temp:  [97 °F (36.1 °C)-97.7 °F (36.5 °C)] 97.7 °F (36.5 °C)  Heart Rate:  [85-98] 98  Resp:  [16-18] 16  BP: ()/() 166/106    Intake/Output Summary (Last 24 hours) at 1/10/2019 2317  Last data filed at 1/10/2019 1800  Gross per 24 hour   Intake 1120 ml   Output 100 ml   Net 1020 ml     Flowsheet Rows      First Filed Value   Admission Height  177.8 cm (70\") Documented at 01/07/2019 0450   Admission Weight  90.7 kg (200 lb) Documented at 01/07/2019 0526          Physical Exam:   General Appearance:    Alert, cooperative, in no acute distress   HEENT:  [place] 3. No oral thrush. Tonsils grade 1   Neck:   Large. Trachea midline. No thyromegaly.   Lungs:     Right crackles at the bases.  Nonlabored breathing.      Heart:    Regular rhythm and normal rate, normal S1 and S2, no            murmur   Skin:    No abnormalities observed   Abdomen:     Obese. Soft. No tenderness. No HSM.   Neuro:   Conscious, alert, oriented x3   Extremities:   Moves all extremities well, pitting edema, no cyanosis, no             Redness          Results Review:        Results from last 7 days   Lab Units  01/10/19   0557  01/09/19   0506  01/08/19   0434   SODIUM mmol/L  137  137  135* "   POTASSIUM mmol/L  4.1  3.9  3.7   CHLORIDE mmol/L  96*  97*  96*   CO2 mmol/L  25.9  25.7  22.2   BUN mg/dL  34*  59*  35*   CREATININE mg/dL  5.72*  7.94*  6.25*   GLUCOSE mg/dL  258*  197*  203*   CALCIUM mg/dL  9.2  9.2  9.5     Results from last 7 days   Lab Units  01/07/19   0517   TROPONIN T ng/mL  0.064*     Results from last 7 days   Lab Units  01/10/19   0557  01/08/19   0434  01/07/19   0517   WBC 10*3/mm3  8.44  9.51  12.53*   HEMOGLOBIN g/dL  12.2*  11.3*  13.1*   HEMATOCRIT %  38.0*  33.6*  38.3*   PLATELETS 10*3/mm3  192  190  226     Results from last 7 days   Lab Units  01/10/19   0557  01/09/19   0506  01/08/19   0434   INR   1.66*  1.49*  1.43*     Results from last 7 days   Lab Units  01/07/19   0517   PROBNP pg/mL  26,473.0*       I reviewed the patient's new clinical results.  I personally viewed and interpreted the patient's CXR        Medication Review:     allopurinol 100 mg Oral Daily   amLODIPine 5 mg Oral Daily   donepezil 10 mg Oral Nightly   DULoxetine 60 mg Oral Daily   gabapentin 100 mg Oral TID   insulin glargine 20 Units Subcutaneous Q12H   insulin regular 10 Units Subcutaneous TID AC   metoprolol succinate XL 50 mg Oral Q24H   sodium chloride 3 mL Intravenous Q12H   tamsulosin 0.4 mg Oral Daily   torsemide 100 mg Oral Daily   warfarin 5 mg Oral Daily          Assessment:  1. Pulmonary edema   2. Acute hypoxic respiratory failure, secondary #1  3. A. fib with RVR  4. ESRD on HD   5. Hypertensive emergency  6. D M II with uncontrolled Hyperglycemia   7. Mild leukocytosis, likely stress-induced doubt infection  8. Elevated troponin, likely secondary to ESRD  9. Subtherapeutic INR  10. Nausea     Plan:    · Diuresis/hemodialysis by nephrology.  · Check orthostatic due to nausea/dizziness (not charted by nurse- Will clarify that tomorrow)  · Increase Coumadin to 7.5 mg. Today was day 4 of the 5 mg.  · Increase NPH to 30 units twice a day.  blood sugar remains uncontrolled.    Dispo:  Home tomorrow if BP stable.      Kaylene Patel MD  01/10/19  11:17 PM          This note was dictated utilizing [employer] dictation

## 2019-01-11 NOTE — PROGRESS NOTES
Continued Stay Note  [facility]     Patient Name: Deacon Martin  MRN: [id number]  Today's Date: 1/11/2019    Admit Date: 1/7/2019    Discharge Plan     Row Name 01/11/19 1235       Plan    Plan  return home with family    Patient/Family in Agreement with Plan  yes    Plan Comments  Spoke with patient in room.  Discussed HH and SNF, but patient does not think he will need either.  He states that he is going to walk more to build up his strength.  I did explain to him that if he gets home and decides HH would be benficial, to contact his PCP.  Patient verbalized understanding and denies any additonal needs.  CCP will follow. Suze Pollard RN        Discharge Codes    No documentation.       Expected Discharge Date and Time     Expected Discharge Date Expected Discharge Time    Jan 11, 2019             Suze Pollard RN

## 2019-01-12 NOTE — PLAN OF CARE
Problem: Patient Care Overview  Goal: Plan of Care Review  Outcome: Ongoing (interventions implemented as appropriate)   01/12/19 1327   Coping/Psychosocial   Plan of Care Reviewed With patient   Plan of Care Review   Progress improving   OTHER   Outcome Summary Patient is alert and oriented x 3. Able to make needs known. Takes meds as ordered. Cooperative with care. Patient had hemodialysis yesterday. Next appt is on Monday 01/14/2019. Fistula has good thrill and bruit. Room air. Patient is to be discharged home today. VSS will continue to [facility]. 01/14/2019 1309     Goal: Individualization and Mutuality  Outcome: Ongoing (interventions implemented as appropriate)    Goal: Discharge Needs Assessment  Outcome: Ongoing (interventions implemented as appropriate)    Goal: Interprofessional Rounds/Family Conf  Outcome: Ongoing (interventions implemented as appropriate)      Problem: Fall Risk (Adult)  Goal: Absence of Fall  Outcome: Ongoing (interventions implemented as appropriate)      Problem: Tissue Perfusion, Ineffective Peripheral (Adult)  Goal: Adequate Tissue Perfusion  Outcome: Ongoing (interventions implemented as appropriate)      Problem: Fluid Volume Excess (Adult)  Goal: Optimal Fluid Balance  Outcome: Ongoing (interventions implemented as appropriate)      Problem: Breathing Pattern Ineffective (Adult)  Goal: Effective Oxygenation/Ventilation  Outcome: Ongoing (interventions implemented as appropriate)    Goal: Anxiety/Fear Reduction  Outcome: Ongoing (interventions implemented as appropriate)      Problem: Skin Injury Risk (Adult)  Goal: Identify Related Risk Factors and Signs and Symptoms  Outcome: Ongoing (interventions implemented as appropriate)    Goal: Skin Health and Integrity  Outcome: Ongoing (interventions implemented as appropriate)

## 2019-01-12 NOTE — DISCHARGE INSTR - APPOINTMENTS
Please keep all of your health care provider appointments.   Please keep all of your hemodialysis treatments.

## 2019-01-12 NOTE — DISCHARGE SUMMARY
PHYSICIAN DISCHARGE SUMMARY                                                                        [facility]    Date of admit: 1/7/2019  Date of Discharge:  1/12/2019    PCP: Doroteo Victoria MD    Discharge Diagnosis:     Acute pulmonary edema (CMS/HCC)  1. Pulmonary edema   2. Acute hypoxic respiratory failure, secondary #1  3. A. fib with RVR  4. ESRD on HD   5. Hypertensive emergency  6. D M II with uncontrolled Hyperglycemia   7. Mild leukocytosis, likely stress-induced doubt infection  8. Elevated troponin, likely secondary to ESRD  9. Subtherapeutic INR  10. 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 male presented with per H&P:    Chief complaint:  Shortness of breath     History of present illness:  This is a 73-year-old male patient with history of hypertension and ESRD on HD, MWF.  He follows with Dr. Lawson for dialysis.  According to the wife, his postdialysis weight is around 200 pounds.  He has not missed any dialysis session.  Patient has dementia and history was somewhat limited and mainly provided by the wife.     Patient does not use oxygen at home.  He woke up this morning around 4 AM complaining of difficulty breathing.  This was associated with cough productive of clear phlegm.  No reports of chest pain, fever, chills or flulike illness.     On arrival to the ED, patient was in respiratory  distress.  Blood pressure was 206/138 with a heart rate of 115.  He was placed on BiPAP for desaturation and increased work of breathing.     Concerning diabetes, patient reportedly takes his medications and insulin regularly.  His wife who manages his medications in a box for her.  She stated that sometimes she does have to remind him to take the pills.     Patient has obstructive sleep apnea.  He was previously treated with CPAP.  His wife stated that after his stroke about 3 years ago, he started sleeping in a recliner and stopped using his machine.     Labs reviewed:  ABG 7.36/39/92 on BiPAP; glucose 441; bicarbonate 21; creatinine 8.3; WBC 12.5; hemoglobin 13; troponin 0.06     Data:    Patient was admitted with: 1/7/19:  Assessment:  1. Pulmonary edema   2. Acute hypoxic respiratory failure, secondary #1  3. A. fib with RVR  4. ESRD on HD   5. Hypertensive emergency  6. D M II with uncontrolled Hyperglycemia   7. Mild leukocytosis, likely stress-induced doubt infection  8. Elevated troponin, likely secondary to ESRD  9. Subtherapeutic INR      Admitted with pulmonary edema and respiratory failure.  Was seen by nephrology.  On dialysis.  Blood pressure controlled.  Glucose controlled.  Follows with nephrology as an outpatient for end-stage renal disease on dialysis Monday, Wednesday and Friday.  Ready for discharge home today.        Condition on Discharge:  Stable.      Vital Signs  Temp:  [97 °F (36.1 °C)-98.1 °F (36.7 °C)] 97.6 °F (36.4 °C)  Heart Rate:  [74-87] 74  Resp:  [16] 16  BP: (110-179)/() 143/83    Physical Exam:  General Appearance: no acute distress, appears comfortable  Heart: regular rate and rhythm  Lungs: clear to auscultation bilaterally, respirations unlabored  Abdomen: soft, non-tender, non-distended, no guarding/rebound, nl BS  Extremeties: no edema, no cyanosis  Neurology: speech normal, mental status normal, moving all four extremeties  Mental Status: alert,  oriented        --  Consults:   IP CONSULT TO NEPHROLOGY  IP CONSULT TO PULMONOLOGY  IP CONSULT TO DIABETES EDUCATOR  IP CONSULT TO CASE MANAGEMENT     Significant Discharge Diagnostics   Procedures Performed:         Pertinent Lab Results:  Results from last 7 days   Lab Units  01/11/19   0443  01/10/19   0557  01/09/19   0506  01/08/19   0434  01/07/19   0517   SODIUM mmol/L  137  137  137  135*  136   POTASSIUM mmol/L  3.9  4.1  3.9  3.7  4.1   CHLORIDE mmol/L  94*  96*  97*  96*  94*   CO2 mmol/L  22.6  25.9  25.7  22.2  21.2*   BUN mg/dL  49*  34*  59*  35*  62*   CREATININE mg/dL  7.34*  5.72*  7.94*  6.25*  8.36*   GLUCOSE mg/dL  127*  258*  197*  203*  441*   CALCIUM mg/dL  9.5  9.2  9.2  9.5  9.4   AST (SGOT) U/L   --    --    --    --   12   ALT (SGPT) U/L   --    --    --    --   13     Results from last 7 days   Lab Units  01/07/19   0517   TROPONIN T ng/mL  0.064*     Results from last 7 days   Lab Units  01/11/19   0443  01/10/19   0557  01/08/19   0434  01/07/19   0517   WBC 10*3/mm3  9.68  8.44  9.51  12.53*   HEMOGLOBIN g/dL  13.1*  12.2*  11.3*  13.1*   HEMATOCRIT %  39.9*  38.0*  33.6*  38.3*   PLATELETS 10*3/mm3  209  192  190  226   MCV fL  96.6*  97.4*  92.8  92.5   MCH pg  31.7  31.3  31.2  31.6   MCHC g/dL  32.8  32.1*  33.6  34.2   RDW %  13.7  13.9  14.1  13.6   RDW-SD fl  48.6  49.1  47.8  45.7   MPV fL  10.1  10.2  10.7  11.0   NEUTROPHIL % %  63.6  63.0  70.5  59.0   LYMPHOCYTE % %  25.2  24.2  18.0*  31.2   MONOCYTES % %  6.2  9.0  7.6  5.8   EOSINOPHIL % %  4.0  3.2  3.3  3.4   BASOPHIL % %  0.8  0.6  0.6  0.6   IMM GRAN % %  0.2  0.0  0.2  0.2   NEUTROS ABS 10*3/mm3  6.15  5.32  6.71  7.39   LYMPHS ABS 10*3/mm3  2.44  2.04  1.71  3.91   MONOS ABS 10*3/mm3  0.60  0.76  0.72  0.73   EOS ABS 10*3/mm3  0.39  0.27  0.31  0.43   BASOS ABS 10*3/mm3  0.08  0.05  0.06  0.07   IMMATURE GRANS (ABS) 10*3/mm3  0.02  0.00  0.02  0.03     Results from last 7 days   Lab Units   01/12/19   0650  01/11/19   0443  01/10/19   0557  01/09/19   0506  01/08/19   0434  01/07/19   0517   INR   2.42*  2.02*  1.66*  1.49*  1.43*  1.45*               Invalid input(s): LDLCALC  Results from last 7 days   Lab Units  01/07/19   0517   PROBNP pg/mL  26,473.0*         Results from last 7 days   Lab Units  01/07/19   0603   PH, ARTERIAL pH units  7.367   PO2 ART mm Hg  92.3   PCO2, ARTERIAL mm Hg  39.8   HCO3 ART mmol/L  22.9                                   Imaging Results:  Imaging Results (all)     Procedure Component Value Units Date/Time    XR Chest 1 View [[id number]] Collected:  01/07/19 0530     Updated:  01/07/19 0535    Narrative:       PORTABLE CHEST X-RAY     CLINICAL HISTORY: CHF/COPD Protocol     COMPARISON: 11/21/2018.     FINDINGS: Portable AP view of the chest was obtained with overlying  monitor leads in place. Lungs are well inflated. There are diffuse,  mostly groundglass opacities bilaterally most consistent with pulmonary  edema. A component of superimposed pneumonia not excluded. There are  calcified residua of granulomatous disease present. No significant  pleural fluid, at least by portable imaging. Stable cardiomegaly.  Thoracic dextroscoliosis noted.             Impression:       Diffuse groundglass opacities likely related to edema/CHF        This report was finalized on 1/7/2019 5:32 AM by Miguelangel Alvarez M.D.           --    Discharge Disposition  Home or Self Care    Discharge Medications     Discharge Medications      Changes to Medications      Instructions Start Date   warfarin 5 MG tablet  Commonly known as:  COUMADIN  What changed:    · how much to take  · how to take this  · when to take this   TAKE ONE TABLET BY MOUTH DAILY         Continue These Medications      Instructions Start Date   ACCU-CHEK FASTCLIX LANCET kit   Use as directed for blood glucose testing      ACCU-CHEK FASTCLIX LANCETS misc   Use as directed for blood glucose testing CHECK 4 TIMES A DAY       acetaminophen 325 MG tablet  Commonly known as:  TYLENOL   650 mg, Oral, Every 6 Hours PRN      allopurinol 100 MG tablet  Commonly known as:  ZYLOPRIM   TAKE ONE TABLET BY MOUTH DAILY      amLODIPine 5 MG tablet  Commonly known as:  NORVASC   TAKE ONE TABLET BY MOUTH DAILY      aspirin 81 MG tablet   81 mg, Oral, Daily      atorvastatin 40 MG tablet  Commonly known as:  LIPITOR   40 mg, Oral, Daily      calcium acetate 667 MG capsule capsule  Commonly known as:  PHOS BINDER)   667 mg, Oral, Daily      donepezil 10 MG tablet  Commonly known as:  ARICEPT   10 mg, Oral, Nightly      DULoxetine 60 MG capsule  Commonly known as:  CYMBALTA   60 mg, Oral, Daily      fenofibrate 48 MG tablet  Commonly known as:  TRICOR   48 mg, Oral, Daily      gabapentin 100 MG capsule  Commonly known as:  NEURONTIN   100 mg, Oral, 3 Times Daily      glucose blood test strip   Use as instructed      glucose blood test strip  Commonly known as:  ACCU-CHEK GUIDE   Use as instructed to check 4 times daily      insulin  UNIT/ML injection  Commonly known as:  humuLIN N,novoLIN N   30 Units, Subcutaneous, 2 Times Daily Before Meals      Insulin Pen Needle 32G X 4 MM misc   Does not apply, 4 Times Daily PRN      insulin regular 100 UNIT/ML injection  Commonly known as:  humuLIN R,novoLIN R   10 Units, Subcutaneous, 3 Times Daily Before Meals      metoprolol succinate XL 50 MG 24 hr tablet  Commonly known as:  TOPROL-XL   50 mg, Oral, Every 24 Hours Scheduled      tamsulosin 0.4 MG capsule 24 hr capsule  Commonly known as:  FLOMAX   0.4 mg, Oral, Daily      torsemide 10 MG tablet  Commonly known as:  DEMADEX   TAKE TWO TABLETS BY MOUTH DAILY         Stop These Medications    CloNIDine 0.1 MG tablet  Commonly known as:  CATAPRES            Discharge Diet: regular diet    Activity at Discharge:      Contact information for follow-up providers     Carlos Schafer III NP-C Follow up in 5 day(s).    Specialty:  Family Medicine  Why:   Hospital follow up on January 16th @ 10:00 AM  Contact information:  [address]  [address]  [place] [address]  [phone]             Doroteo Victoria MD .    Specialty:  Family Medicine  Contact information:  [address]  [facility] [address]  [phone]                   Contact information for after-discharge care     Dialysis/Infusion     [facility] - [facility] .    Service:  Dialysis  Contact information:  [address]  [address]  [facility] [id number] [phone]                           See primary care provider, Doroteo Victoria MD, in 1-2 weeks        Test Results Pending at Discharge       Steve Bolivar MD  [facility], [facility]  Pulmonary and Critical Care Medicine  01/12/19  1:24 PM    Time: greater than 30 minutes.        Total time spent discharging patient including evaluation,post hospitalization follow up,  medication and post hospitalization instructions and education total time exceeds 30 minutes.

## 2019-01-13 NOTE — OUTREACH NOTE
Prep Survey      Responses   Facility patient discharged from?  [facility]   Is patient eligible?  Yes   Discharge diagnosis  pulmonary edema, hypoxic resp failure 2nd pulm edema, afib, ESRD on HD, HTN, DM2   Does the patient have one of the following disease processes/diagnoses(primary or secondary)?  Other   Does the patient have Home health ordered?  No   Is there a DME ordered?  No   Prep survey completed?  Yes          Stephanie Doe RN

## 2019-01-14 NOTE — PROGRESS NOTES
Case Management Discharge Note    Final Note: DC'd home via private auto. Suze Pollard, JEFE    Destination      No service has been selected for the patient.      Durable Medical Equipment      No service has been selected for the patient.      Dialysis/Infusion - Selection Complete      Service Provider Request Status Selected Services Address Phone Number Fax Number    [facility] Selected Dialysis [address]  [address], [facility] [id number] [phone]      Home Medical Care      No service has been selected for the patient.      Community Resources      No service has been selected for the patient.        Other: Other(private auto)    Final Discharge Disposition Code: 01 - home or self-care

## 2019-01-14 NOTE — OUTREACH NOTE
Medical Week 1 Survey      Responses   Facility patient discharged from?  [facility]   Does the patient have one of the following disease processes/diagnoses(primary or secondary)?  Other   Is there a successful TCM telephone encounter documented?  No   Week 1 attempt successful?  Yes   Call start time  1645   Rescheduled  Rescheduled-pt requested   Call end time  1646   Discharge diagnosis  pulmonary edema, hypoxic resp failure 2nd pulm edema, afib, ESRD on HD, HTN, DM2   Is patient permission given to speak with other caregiver?  Yes   Person spoke with today (if not patient) and relationship  Adore, wife          Joann Anthony, RN

## 2019-01-15 NOTE — OUTREACH NOTE
Medical Week 1 Survey      Responses   Facility patient discharged from?  [facility]   Does the patient have one of the following disease processes/diagnoses(primary or secondary)?  Other   Is there a successful TCM telephone encounter documented?  No   Week 1 attempt successful?  Yes   Call start time  1048   Call end time  1049   Discharge diagnosis  pulmonary edema, hypoxic resp failure 2nd pulm edema, afib, ESRD on HD, HTN, DM2   Is patient permission given to speak with other caregiver?  Yes   Person spoke with today (if not patient) and relationship  Adore, wife   Meds reviewed with patient/caregiver?  Yes   Is the patient having any side effects they believe may be caused by any medication additions or changes?  No   Does the patient have all medications ordered at discharge?  Yes   Is the patient taking all medications as directed (includes completed medication regime)?  Yes   Does the patient have a primary care provider?   Yes   Does the patient have an appointment with their PCP within 7 days of discharge?  Yes   Has the patient kept scheduled appointments due by today?  Yes   Comments  PCP appt tomorrow.   Has home health visited the patient within 72 hours of discharge?  N/A   Psychosocial issues?  No   Did the patient receive a copy of their discharge instructions?  Yes   Nursing interventions  Reviewed instructions with patient   What is the patient's perception of their health status since discharge?  Improving   Is the patient/caregiver able to teach back signs and symptoms related to disease process for when to call PCP?  Yes   Is the patient/caregiver able to teach back signs and symptoms related to disease process for when to call 911?  Yes   Is the patient/caregiver able to teach back the hierarchy of who to call/visit for symptoms/problems? PCP, Specialist, Home health nurse, Urgent Care, ED, 911  Yes   Week 1 call completed?  Yes          Fawad Talavera RN

## 2019-01-16 NOTE — PROGRESS NOTES
[facility]    Deacon Martin is a 73 y.o. male patient of Doroteo Victoria MD with multiple chronic medical conditions AF (Chronic and present on EKG 1/7/19), hypertension, cardioembolic stroke (Left occipital) 2015, DMII on insulin and ESRD on HD since 11/2016, MWF at [facility] going today at Noon.  He follows with Dr. Lawson for dialysis. He is here for hospital follow up. Patient presented to [facility] on 1/7/19 for SOA that occurred at 4 AM that awoke him from sleep. At triage, patient’s oxygen saturation was 80% on RA. He does not use home oxygen.  He had not missed any dialysis session.  Blood pressure was 206/138 with a heart rate of 115.  He was admitted to the ICU for acute pulmonary edema. He was placed on BiPAP for desaturation and increased work of breathing. Patient has obstructive sleep apnea.  He was previously treated with CPAP but has not worn that since his stroke about 3 years ago, he started sleeping in a recliner and stopped using his machine. He improved with HD and was discharged home.   He was also at [facility] 11/21-11/22/18 for fluid overload.     States HD went well on Monday.  He doesn't have to come off for low BP.        Clonidine was discontinued in hospital. Wife states Bps elevated at home and today is 179/98.   She states that it only dropped at hospital because of all the fluid that was removed.  No CP, HA, SOA        He states feeling well after discharge from hospital.  Wife does not have any new concerns. Watching his fluid intake.       Patient is on chronic anticoagulation.   Indication: atrial fibrillation  Bleeding signs/symptoms: None  Thromboembolic signs/symptoms: None    Missed Coumadin doses: None  Medication changes: yes - 5 mg daily at hospital    (previously alternated with 2.5 and 5 mg)   Dietary changes: no  Other concerns: no        I have reviewed the patient's medical history in detail and updated the computerized  patient record.    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 ARM FISTULA REVISION/POSS. TUNNELED CATH;  Surgeon: Ar Muro MD;  Location: [facility] OR;  Service:    • COLONOSCOPY  2003   • INSERTION HEMODIALYSIS CATHETER N/A 11/25/2016    Procedure: TUNNEL CATHETER INSERTION;  Surgeon: Silvia Lim Jr., MD;  Location: [facility] OR;  Service:        Family History   Problem Relation Age of Onset   • Arthritis Mother    • Diabetes Father    • Heart disease Father    • Hyperlipidemia Father    • Hypertension Father    • Obesity Father    • Cancer Brother         esophagus   • Heart disease Maternal Grandfather        Social History     Socioeconomic History   • Marital status:      Spouse name: Not on file   • Number of children: Not on file   • Years of education: Not on file   • Highest education level: Not on file   Social Needs   • Financial resource strain: Not on file   • Food insecurity - worry: Not on file   • Food insecurity - inability: Not on file   • Transportation needs - medical: Not on file   • Transportation needs - non-medical: Not on file   Occupational History   • Not on file   Tobacco Use   •  Smoking status: Former Smoker     Packs/day: 1.00     Years: 5.00     Pack years: 5.00     Types: Cigarettes     Last attempt to quit: 1966     Years since quittin.0   • Smokeless tobacco: Never Used   Substance and Sexual Activity   • Alcohol use: Yes     Alcohol/week: 0.6 oz     Types: 1 Cans of beer per week     Comment: occasionally    • Drug use: No   • Sexual activity: Defer   Other Topics Concern   • Not on file   Social History Narrative   • Not on file       Most Recent Immunizations   Administered Date(s) Administered   • Flu Mist 10/02/2017   • Flu Vaccine High Dose PF 65YR+ 10/21/2016   • Flu Vaccine Quad PF >18YRS 10/01/2014   • Influenza, Unspecified 2013   • Pneumococcal Conjugate 13-Valent (PCV13) 2015   • Pneumococcal Polysaccharide (PPSV23) 2014       Review of Systems:   Review of Systems   Respiratory: Negative for shortness of breath.    Cardiovascular: Negative for chest pain.   Hematological: Bruises/bleeds easily.       Physical Exam:   Physical Exam   Constitutional: He is oriented to person, place, and time. Vital signs are normal. He appears well-developed. He is cooperative.   Cardiovascular: Normal heart sounds and normal pulses. An irregularly irregular rhythm present. Exam reveals no gallop and no friction rub.   No murmur heard.  NO LE Edema   Pulmonary/Chest: Effort normal and breath sounds normal. He exhibits no deformity.   Equal, Unlabored, No Crackles, Wheeze    Abdominal: Soft. Bowel sounds are normal. There is no tenderness.   Neurological: He is alert and oriented to person, place, and time.   Skin: Capillary refill takes less than 2 seconds.   Psychiatric: He has a normal mood and affect. His speech is normal.   Vitals reviewed.      R UE AV fistula +bruit and thrill      Vital Signs     Vitals:    19 1000   BP: 179/98   BP Location: Left arm   Patient Position: Sitting   Cuff Size: Small Adult   Pulse: 84   Temp: 98.2 °F (36.8 °C)   TempSrc: Oral  "  SpO2: 96%   Weight: 91 kg (200 lb 9.6 oz)   Height: 177.8 cm (70\")          Results Review:      REVIEWED AND DISCUSSED LAB RESULTS WITH PATIENT     Today:  INR= 4.2    Results from last 7 days   Lab Units 01/12/19  0650 01/11/19  0443 01/10/19  0557   INR  2.42* 2.02* 1.66*       Requested Prescriptions     Pending Prescriptions Disp Refills   • glucose blood (ACCU-CHEK GUIDE) test strip 150 each 12     Sig: Use as instructed to check 4 times daily   • CloNIDine (CATAPRES) 0.1 MG tablet 180 tablet 2     Sig: Take 1 tablet by mouth 2 (Two) Times a Day.         Current Outpatient Medications:   •  ACCU-CHEK FASTCLIX LANCETS [facility], Use as directed for blood glucose testing CHECK 4 TIMES A DAY, Disp: 200 each, Rfl: 5  •  acetaminophen (TYLENOL) 325 MG tablet, Take 2 tablets by mouth Every 6 (Six) Hours As Needed for mild pain (1-3) or fever., Disp: 100 tablet, Rfl: 0  •  allopurinol (ZYLOPRIM) 100 MG tablet, TAKE ONE TABLET BY MOUTH DAILY, Disp: 90 tablet, Rfl: 1  •  amLODIPine (NORVASC) 5 MG tablet, TAKE ONE TABLET BY MOUTH DAILY, Disp: 30 tablet, Rfl: 2  •  aspirin 81 MG tablet, Take 81 mg by mouth Daily., Disp: , Rfl:   •  atorvastatin (LIPITOR) 40 MG tablet, Take 1 tablet by mouth Daily., Disp: 90 tablet, Rfl: 3  •  calcium acetate (PHOS BINDER,) 667 MG capsule capsule, Take 667 mg by mouth Daily., Disp: , Rfl:   •  donepezil (ARICEPT) 10 MG tablet, Take 10 mg by mouth Every Night., Disp: , Rfl:   •  DULoxetine (CYMBALTA) 60 MG capsule, Take 1 capsule by mouth Daily., Disp: 90 capsule, Rfl: 3  •  fenofibrate (TRICOR) 48 MG tablet, Take 1 tablet by mouth Daily., Disp: 90 tablet, Rfl: 3  •  gabapentin (NEURONTIN) 100 MG capsule, Take 1 capsule by mouth 3 (Three) Times a Day., Disp: 90 capsule, Rfl: 5  •  glucose blood (ACCU-CHEK GUIDE) test strip, Use as instructed to check 4 times daily, Disp: 150 each, Rfl: 5  •  glucose blood test strip, Use as instructed, Disp: 120 each, Rfl: 12  •  insulin NPH (humuLIN " N,novoLIN N) 100 UNIT/ML injection, Inject 30 Units under the skin 2 (Two) Times a Day Before Meals., Disp: 1 Units, Rfl: 12  •  Insulin Pen Needle 32G X 4 MM misc, 4 (Four) Times a Day As Needed., Disp: , Rfl:   •  insulin regular (humuLIN R,novoLIN R) 100 UNIT/ML injection, Inject 10 Units under the skin 3 (Three) Times a Day Before Meals., Disp: 10 mL, Rfl: 1  •  Lancets Misc. (ACCU-CHEK FASTCLIX LANCET) kit, Use as directed for blood glucose testing, Disp: 200 kit, Rfl: 5  •  metoprolol succinate XL (TOPROL-XL) 50 MG 24 hr tablet, Take 1 tablet by mouth Daily., Disp: 90 tablet, Rfl: 3  •  tamsulosin (FLOMAX) 0.4 MG capsule 24 hr capsule, Take 1 capsule by mouth Daily., Disp: 90 capsule, Rfl: 3  •  torsemide (DEMADEX) 10 MG tablet, TAKE TWO TABLETS BY MOUTH DAILY, Disp: 60 tablet, Rfl: 3  •  warfarin (COUMADIN) 5 MG tablet, TAKE ONE TABLET BY MOUTH DAILY (Patient taking differently: TAKE ONE TABLET BY MOUTH DAILY mg changes with INR currently 5mg then 2.5 on 3rd night changed by MD on 1/3/19), Disp: 90 tablet, Rfl: 2          Assessment / Plan:   Diagnoses and all orders for this visit:    Chronic atrial fibrillation (CMS/HCC)    DM (diabetes mellitus) with complications (CMS/HCC)    Essential hypertension    Anticoagulated on Coumadin    Other hypervolemia        Problem List Items Addressed This Visit        Cardiovascular and Mediastinum    Essential hypertension    Atrial fibrillation (CMS/HCC) - Primary       Hematopoietic and Hemostatic    Anticoagulated on Coumadin       Other    Volume overload due to LUIS A      Other Visit Diagnoses     DM (diabetes mellitus) with complications (CMS/HCC)              Supratherapeutic INR for goal of 2-3    1. New dose: Discontinue coumadin. Monitor for s/sx bleeding.      2. Next INR: On Friday before HD here in office.      HTN: restart Clonidine as before. Notify office if has dizziness or BP too low or not able to complete HD or if Bps not improved.     Discussed any  "change in Rx and discussed visit with patient.  All questions answered.        You are on an anticoagulant.   Seek immediate medical care if:  · You have bleeding that will not stop.  · You have sudden and severe headache or belly pain.  · You vomit or you cough up bright red blood.  · You have a severe blow to your head.    Return for Next scheduled follow up.         Carlos \"Pablo\" INÉS Schafer   01/16/19  11:26 AM  "

## 2019-01-16 NOTE — PATIENT INSTRUCTIONS
You are on an anticoagulant.   Seek immediate medical care if:  · You have bleeding that will not stop.  · You have sudden and severe headache or belly pain.  · You vomit or you cough up bright red blood.  · You have a severe blow to your head.

## 2019-01-23 NOTE — OUTREACH NOTE
Medical Week 2 Survey      Responses   Facility patient discharged from?  [place]   Does the patient have one of the following disease processes/diagnoses(primary or secondary)?  Other   Week 2 attempt successful?  No   Unsuccessful attempts  Attempt 1          Rosalee Washington RN

## 2019-01-24 NOTE — OUTREACH NOTE
Medical Week 2 Survey      Responses   Facility patient discharged from?  [place]   Does the patient have one of the following disease processes/diagnoses(primary or secondary)?  Other   Week 2 attempt successful?  Yes   Call start time  1250   Discharge diagnosis  pulmonary edema, hypoxic resp failure 2nd pulm edema, afib, ESRD on HD, HTN, DM2   Call end time  1252   Is patient permission given to speak with other caregiver?  Yes   Person spoke with today (if not patient) and relationship  Adore, wife   Meds reviewed with patient/caregiver?  Yes   Is the patient having any side effects they believe may be caused by any medication additions or changes?  No   Does the patient have all medications ordered at discharge?  Yes   Is the patient taking all medications as directed (includes completed medication regime)?  Yes   Does the patient have a primary care provider?   Yes   Does the patient have an appointment with their PCP within 7 days of discharge?  Yes   Comments regarding PCP  Doroteo Victoria MD. Thursday Quang 3, 2019 1:45 PM   Has home health visited the patient within 72 hours of discharge?  N/A   Psychosocial issues?  No   Did the patient receive a copy of their discharge instructions?  Yes   Nursing interventions  Reviewed instructions with patient   Is the patient/caregiver able to teach back signs and symptoms related to disease process for when to call PCP?  Yes   Is the patient/caregiver able to teach back signs and symptoms related to disease process for when to call 911?  Yes   Is the patient/caregiver able to teach back the hierarchy of who to call/visit for symptoms/problems? PCP, Specialist, Home health nurse, Urgent Care, ED, 911  Yes   Week 2 Call Completed?  Yes          Syed Velasco RN

## 2019-01-31 NOTE — OUTREACH NOTE
"Medical Week 3 Survey      Responses   Facility patient discharged from?  [place]   Does the patient have one of the following disease processes/diagnoses(primary or secondary)?  Other   Week 3 attempt successful?  Yes   Call start time  1609   Discharge diagnosis  pulmonary edema, hypoxic resp failure 2nd pulm edema, afib, ESRD on HD, HTN, DM2   Person spoke with today (if not patient) and relationship  Adore, wife   Meds reviewed with patient/caregiver?  Yes   Is the patient taking all medications as directed (includes completed medication regime)?  Yes   Has the patient kept scheduled appointments due by today?  Yes   What is the patient's perception of their health status since discharge?  Improving   Additional teach back comments  wife says pt is \"doing better\"  breathing Ok. Blood Sugard good for him. Taking dialysis 4x QW   Week 3 Call Completed?  Yes   Revoked  No further contact(revokes)-requires comment          Hanna Yarbrough RN  "

## 2019-05-24 PROBLEM — E11.621 ULCER OF HEEL DUE TO DIABETES MELLITUS (HCC): Status: ACTIVE | Noted: 2019-01-01

## 2019-05-24 PROBLEM — L97.409 ULCER OF HEEL DUE TO DIABETES MELLITUS (HCC): Status: ACTIVE | Noted: 2019-01-01

## 2019-05-25 PROBLEM — M86.9 OSTEOMYELITIS OF RIGHT FOOT (HCC): Status: ACTIVE | Noted: 2019-01-01

## 2019-05-25 PROBLEM — A41.9 SEPSIS (HCC): Status: ACTIVE | Noted: 2019-01-01

## 2019-05-28 NOTE — ANESTHESIA POSTPROCEDURE EVALUATION
Patient: Deacon Martin    Procedure Summary     Date:  05/28/19 Room / Location:  [facility] OR [address] [place] [address] MAIN OR    Anesthesia Start:  1336 Anesthesia Stop:  1421    Procedure:  RIGHT FOOT DEBRIDEMENT (Right ) Diagnosis:      Surgeon:  Miguelangel Wynne MD Provider:  Mitesh Hebert MD    Anesthesia Type:  regional, general ASA Status:  4          Anesthesia Type: regional, general  Last vitals  BP   112/82 (05/28/19 1445)   Temp   36.4 °C (97.6 °F) (05/28/19 1420)   Pulse   77 (05/28/19 1445)   Resp   16 (05/28/19 1445)     SpO2   94 % (05/28/19 1445)     Post Anesthesia Care and Evaluation    Patient location during evaluation: PACU  Anesthetic complications: No anesthetic complications

## 2019-05-28 NOTE — ANESTHESIA PROCEDURE NOTES
Airway  Urgency: elective    Airway not difficult    General Information and Staff    Patient location during procedure: OR  Anesthesiologist: Mitesh Hebert MD  CRNA: Tristen Schwab CRNA    Indications and Patient Condition  Indications for airway management: airway protection    Preoxygenated: yes  MILS maintained throughout  Mask difficulty assessment: 1 - vent by mask    Final Airway Details  Final airway type: supraglottic airway      Successful airway: unique  Size 5  Cuff Pressure (cm H2O): 15  Airway Seal Pressure (cm H2O): 22    Number of attempts at approach: 1

## 2019-05-28 NOTE — ANESTHESIA PREPROCEDURE EVALUATION
Anesthesia Evaluation     Patient summary reviewed and Nursing notes reviewed   NPO Solid Status: > 8 hours             Airway   Mallampati: II  no difficulty expected  Dental    (+) upper dentures and poor dentition        Pulmonary    (+) a smoker Former,   Cardiovascular     ECG reviewed  Rhythm: irregular    (+) hypertension, dysrhythmias Atrial Fib, CHF, PVD,       Neuro/Psych  (+) CVA residual symptoms,     GI/Hepatic/Renal/Endo    (+)   renal disease ESRD and dialysis, diabetes mellitus type 2 using insulin,     ROS Comment: Dialysis MWF    Musculoskeletal (-) negative ROS    Abdominal    Substance History - negative use     OB/GYN negative ob/gyn ROS         Other                          Anesthesia Plan    ASA 4     regional and general     intravenous induction   Anesthetic plan, all risks, benefits, and alternatives have been provided, discussed and informed consent has been obtained with: patient.    Plan discussed with CRNA.

## 2019-06-03 NOTE — PROGRESS NOTES
Anticoagulation Clinic Progress Note    Anticoagulation Summary  As of 6/3/2019    INR goal:   2.0-3.0   TTR:   46.7 % (2.6 y)   INR used for dosing:   3.10! (6/3/2019)   Warfarin maintenance plan:   2.5 mg every Sun, Tue, Thu; 5 mg all other days   Weekly warfarin total:   27.5 mg   Plan last modified:   Suad Ross MA (2/11/2019)   Next INR check:   6/6/2019   Target end date:       Indications    History of cardioembolic stroke (Left occipital) [Z86.73]             Anticoagulation Episode Summary     INR check location:   Clinic Lab    Preferred lab:       Send INR reminders to:   EMBER ASH[facility] 5306 CLINICAL POOL    Comments:         Anticoagulation Care Providers     Provider Role Specialty Phone number    Doroteo Victoria MD [facility] [phone]            Clinic Interview:  Patient Findings     Positives:   Change in health, Change in medications, Hospital admission    Negatives:   Signs/symptoms of thrombosis, Signs/symptoms of bleeding,   Laboratory test error suspected, Change in alcohol use, Change in   activity, Upcoming invasive procedure, Emergency department visit,   Upcoming dental procedure, Missed doses, Extra doses, Change in   diet/appetite, Bruising, Other complaints    Comments:   [facility]. Recently discharged 5/30 from   hospitalization for diabetic foot ulcer. Discharged on ceftaz and   vancomycin. On dialysis. Reports warfarin 2.5 mg daily;   pre-hospitalization dose appears to have been 2.5 mg Sun/Tues/Thurs, 5 mg   rest of wk. Upcoming appt with Dr. Hyde 6/13.      Clinical Outcomes     Negatives:   Major bleeding event, Thromboembolic event,   Anticoagulation-related hospital admission, Anticoagulation-related ED   visit, Anticoagulation-related fatality    Comments:   [facility]. Recently discharged 5/30 from   hospitalization for diabetic foot ulcer. Discharged on ceftaz and   vancomycin. On dialysis. Reports warfarin 2.5 mg daily;    pre-hospitalization dose appears to have been 2.5 mg Sun/Tues/Thurs, 5 mg   rest of wk. Upcoming appt with Dr. Hyde 6/13.        INR History:  Anticoagulation Monitoring 3/21/2019 5/20/2019 6/3/2019   INR 2.00 2.10 3.10   INR Date 3/21/2019 5/20/2019 6/3/2019   INR Goal 2.0-3.0 2.0-3.0 2.0-3.0   Trend Same Same Same   Last Week Total 27.5 mg 27.5 mg 18 mg   Next Week Total 27.5 mg 27.5 mg 22.5 mg   Sun 2.5 mg 2.5 mg -   Mon 5 mg 5 mg 2.5 mg (6/3)   Tue 2.5 mg 2.5 mg 2.5 mg   Wed 5 mg 5 mg 2.5 mg (6/5)   Thu 2.5 mg 2.5 mg -   Fri 5 mg 5 mg -   Sat 5 mg 5 mg -   Visit Report - - -   Some recent data might be hidden       Plan:  1. INR is Supratherapeutic today- see above in Anticoagulation Summary.   Spoke with Paula with [facility] - Will instruct Deacon Martin to Change their warfarin regimen to continue 2.5 mg daily - see above in Anticoagulation Summary.  2. Follow up in 3 days    Pravin Murphy RPH

## 2019-06-04 NOTE — OUTREACH NOTE
Medical Week 1 Survey      Responses   Facility patient discharged from?  [place]   Does the patient have one of the following disease processes/diagnoses(primary or secondary)?  Other   Is there a successful TCM telephone encounter documented?  No   Week 1 attempt successful?  Yes   Call start time  1220   Call end time  1225   Is patient permission given to speak with other caregiver?  Yes   List who call center can speak with  Adore   Person spoke with today (if not patient) and relationship  Adore   Medication alerts for this patient  getting antibiotics at dialysis   Meds reviewed with patient/caregiver?  Yes   Is the patient having any side effects they believe may be caused by any medication additions or changes?  No   Does the patient have all medications ordered at discharge?  Yes   Is the patient taking all medications as directed (includes completed medication regime)?  Yes   Comments regarding appointments  patient has appointment later this week   Does the patient have a primary care provider?   Yes   Does the patient have an appointment with their PCP within 7 days of discharge?  Yes   Has the patient kept scheduled appointments due by today?  Yes   What is the Home health agency?   [facility]   Has home health visited the patient within 72 hours of discharge?  Yes   Psychosocial issues?  No   Did the patient receive a copy of their discharge instructions?  Yes   Nursing interventions  Reviewed instructions with patient   What is the patient's perception of their health status since discharge?  Improving [wife feels he is improving]   Is the patient/caregiver able to teach back signs and symptoms related to disease process for when to call PCP?  Yes   Is the patient/caregiver able to teach back signs and symptoms related to disease process for when to call 911?  Yes   Is the patient/caregiver able to teach back the hierarchy of who to call/visit for symptoms/problems? PCP, Specialist, Home health nurse,  Urgent Care, ED, 911  Yes   Week 1 call completed?  Yes   Wrap up additional comments  blood sugars are 100's          Mariposa Adams RN

## 2019-06-06 NOTE — PROGRESS NOTES
CC:f/u hsp for foot ulcer,HTN  Subjective.../HPI  Patient present today with1 ) HTN - fluctuate a lot . In hsp Clonidine stopped due to low BP. BP up today as also taken off Norvasc.  2)DM neuropathy  3) stasis ulcer- led to HSP stay  I have reviewed the patient's medical history in detail and updated the computerized patient record.    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, antibodies, or inhibitors (CMS/[facility])     OTH HEMOR DISORDER DUE INTRIN   • Stroke (CMS/[facility])    • Type 2 diabetes mellitus (CMS/[facility])    • Vitamin D deficiency        Past Surgical History:   Procedure Laterality Date   • ARTERIOVENOUS FISTULA/SHUNT SURGERY Right 11/21/2016    Procedure: RT BRACHIAL CEPHALIC FISTULA;  Surgeon: Ar Muro MD;  Location: [facility] OR;  Service:    • ARTERIOVENOUS FISTULA/SHUNT SURGERY W/ HEMODIALYSIS CATHETER INSERTION Right 12/6/2017    Procedure: RT. ARM FISTULA REVISION/POSS. TUNNELED CATH;  Surgeon: Ar Muro MD;  Location: [facility] OR;  Service:    • COLONOSCOPY  2003   • INCISION AND DRAINAGE LEG Right 5/28/2019    Procedure: RIGHT FOOT DEBRIDEMENT;  Surgeon: Miguelangel Wynne MD;  Location: [facility] OR;  Service: Vascular   • INSERTION HEMODIALYSIS CATHETER N/A 11/25/2016    Procedure: TUNNEL CATHETER INSERTION;  Surgeon: Silvia Lim Jr., MD;  Location: [facility] OR;  Service:        Family History   Problem Relation Age of Onset   • Arthritis Mother    • Diabetes Father    • Heart disease Father    • Hyperlipidemia Father    • Hypertension Father    • Obesity Father    • Cancer Brother         esophagus   • Heart disease Maternal Grandfather        Social History      Socioeconomic History   • Marital status:      Spouse name: Not on file   • Number of children: Not on file   • Years of education: Not on file   • Highest education level: Not on file   Tobacco Use   • Smoking status: Former Smoker     Packs/day: 1.00     Years: 5.00     Pack years: 5.00     Types: Cigarettes     Last attempt to quit: 1966     Years since quittin.4   • Smokeless tobacco: Never Used   Substance and Sexual Activity   • Alcohol use: Yes     Alcohol/week: 0.6 oz     Types: 1 Cans of beer per week     Comment: occasionally    • Drug use: No   • Sexual activity: Defer       Most Recent Immunizations   Administered Date(s) Administered   • Flu Mist 10/02/2017   • Flu Vaccine High Dose PF 65YR+ 10/21/2016   • Flu Vaccine Quad PF >18YRS 10/01/2014   • Influenza, Unspecified 2013   • Pneumococcal Conjugate 13-Valent (PCV13) 2015   • Pneumococcal Polysaccharide (PPSV23) 2014       Review of Systems:   Review of Systems   Constitutional: Negative.    HENT: Negative.    Eyes: Negative.    Respiratory: Negative.    Cardiovascular: Negative.    Gastrointestinal: Negative.    Endocrine: Negative.    Genitourinary: Negative.    Musculoskeletal: Negative.    Skin: Negative.    Allergic/Immunologic: Negative.    Neurological: Negative.    Hematological: Negative.    Psychiatric/Behavioral: Negative.          Physical Exam   Constitutional: He is oriented to person, place, and time. He appears well-developed and well-nourished.   Cardiovascular: Normal rate, regular rhythm and normal heart sounds.   Pulmonary/Chest: Effort normal and breath sounds normal.   Neurological: He is alert and oriented to person, place, and time.   Skin:   Stasis dermatitis   Vitals reviewed.        Vital Signs     Vitals:    19 0936   BP: 173/87   BP Location: Left arm   Patient Position: Sitting   Cuff Size: Small Adult   Pulse: 94   Temp: 98.6 °F (37 °C)   TempSrc: Oral   SpO2: 95%   Weight: 86.2 kg  "(190 lb)   Height: 177.8 cm (70\")          Results Review:      REVIEWED AND DISCUSSED CLINICAL RESULTS WITH PATIENT      Requested Prescriptions      No prescriptions requested or ordered in this encounter         Current Outpatient Medications:   •  ACCU-CHEK FASTCLIX LANCETS [facility], Use as directed for blood glucose testing CHECK 4 TIMES A DAY, Disp: 200 each, Rfl: 5  •  acetaminophen (TYLENOL) 325 MG tablet, Take 2 tablets by mouth Every 6 (Six) Hours As Needed for mild pain (1-3) or fever., Disp: 100 tablet, Rfl: 0  •  allopurinol (ZYLOPRIM) 100 MG tablet, TAKE ONE TABLET BY MOUTH DAILY, Disp: 90 tablet, Rfl: 1  •  aspirin 81 MG tablet, Take 81 mg by mouth Daily., Disp: , Rfl:   •  atorvastatin (LIPITOR) 40 MG tablet, Take 1 tablet by mouth Daily., Disp: 90 tablet, Rfl: 3  •  calcium acetate (PHOS BINDER,) 667 MG capsule capsule, Take 667 mg by mouth Daily., Disp: , Rfl:   •  cefTAZidime (FORTAZ) 2 g/100 mL 0.9% NS IVPB (mpb), Infuse 100 mL into a venous catheter 3 (Three) Times a Week for 4 doses., Disp: 400 mL, Rfl: 0  •  collagenase 250 UNIT/GM ointment, Apply to wound bed daily as directed, Disp: 30 g, Rfl: 0  •  donepezil (ARICEPT) 10 MG tablet, Take 10 mg by mouth Every Night., Disp: , Rfl:   •  DULoxetine (CYMBALTA) 60 MG capsule, Take 1 capsule by mouth Daily., Disp: 90 capsule, Rfl: 3  •  fenofibrate (TRICOR) 48 MG tablet, Take 1 tablet by mouth Daily., Disp: 90 tablet, Rfl: 3  •  gabapentin (NEURONTIN) 100 MG capsule, Take 1 capsule by mouth 3 (Three) Times a Day., Disp: 90 capsule, Rfl: 5  •  glucose blood (ACCU-CHEK GUIDE) test strip, Use as instructed to check 4 times daily, Disp: 150 each, Rfl: 12  •  glucose blood test strip, Use as instructed, Disp: 120 each, Rfl: 12  •  insulin NPH (humuLIN N,novoLIN N) 100 UNIT/ML injection, Inject 30 Units under the skin 2 (Two) Times a Day Before Meals., Disp: 1 Units, Rfl: 12  •  Insulin Pen Needle 32G X 4 MM misc, 4 (Four) Times a Day As Needed., Disp: , " Rfl:   •  insulin regular (humuLIN R,novoLIN R) 100 UNIT/ML injection, Inject 10 Units under the skin 3 (Three) Times a Day Before Meals., Disp: 10 mL, Rfl: 1  •  Lancets Misc. (ACCU-CHEK FASTCLIX LANCET) kit, Use as directed for blood glucose testing, Disp: 200 kit, Rfl: 5  •  metoprolol succinate XL (TOPROL-XL) 50 MG 24 hr tablet, Take 1 tablet by mouth Daily., Disp: 90 tablet, Rfl: 3  •  sodium hypochlorite in sterile water irrigation topical solution 0.0625%, Irrigate with 946 mL to the affected area as directed by provider Every 12 (Twelve) Hours., Disp: 1 bottle, Rfl: 0  •  sodium hypochlorite in sterile water irrigation topical solution 0.0625%, Irrigate with 946 mL to the affected area as directed by provider Every 12 (Twelve) Hours., Disp: 1 bottle, Rfl: 0  •  tamsulosin (FLOMAX) 0.4 MG capsule 24 hr capsule, TAKE ONE CAPSULE BY MOUTH DAILY, Disp: 90 capsule, Rfl: 2  •  Vancomycin HCl (VANCOMYCIN PHARMACY INTERMITTENT DOSING), Daily for 8 doses., Disp: , Rfl:   •  warfarin (COUMADIN) 3 MG tablet, Take 1 tablet by mouth Every Night., Disp: 30 tablet, Rfl: 0  •  mupirocin (BACTROBAN) 2 % cream, , Disp: , Rfl:     Procedures          Diagnoses and all orders for this visit:    Chronic venous insufficiency    Essential hypertension    Other orders  -     mupirocin (BACTROBAN) 2 % cream    Restart Norvasc  There are no Patient Instructions on file for this visit.     No Follow-up on file.    Doroteo Victoria M.D  06/06/19  10:55 AM

## 2019-06-07 NOTE — PROGRESS NOTES
Anticoagulation Clinic Progress Note    Anticoagulation Summary  As of 2019    INR goal:   2.0-3.0   TTR:   46.8 % (2.7 y)   INR used for dosin.60! (2019)   Warfarin maintenance plan:   2.5 mg every Sun, Tue, Thu; 5 mg all other days   Weekly warfarin total:   27.5 mg   No change documented:   Pravin Murphy RPH   Plan last modified:   Suad Ross MA (2019)   Next INR check:   6/10/2019   Target end date:       Indications    History of cardioembolic stroke (Left occipital) [Z86.73]             Anticoagulation Episode Summary     INR check location:   Clinic Lab    Preferred lab:       Send INR reminders to:   EMBER [facility] 9 5894 CLINICAL POOL    Comments:         Anticoagulation Care Providers     Provider Role Specialty Phone number    Doroteo Victoria MD [facility] [phone]        Clinical findings:   Reports missed doses Tu/Wed? Per home health    INR History:  Anticoagulation Monitoring 2019 6/3/2019 2019   INR 2.10 3.10 1.60   INR Date 2019 6/3/2019 2019   INR Goal 2.0-3.0 2.0-3.0 2.0-3.0   Trend Same Same Same   Last Week Total 27.5 mg 18 mg 12.5 mg   Next Week Total 27.5 mg 22.5 mg 27.5 mg   Sun 2.5 mg - 2.5 mg   Mon 5 mg 2.5 mg (6/3) -   Tue 2.5 mg 2.5 mg -   Wed 5 mg 2.5 mg (/) -   Thu 2.5 mg - -   Fri 5 mg - 5 mg   Sat 5 mg - 5 mg   Visit Report - - -   Some recent data might be hidden       Plan:  1. INR is Subtherapeutic today- see above in Anticoagulation Summary.   Provided instructions to Joe with [facility] to Change their warfarin regimen- see above in Anticoagulation Summary.  2. Follow up in 3 days      Pravin Murphy RPH

## 2019-06-10 NOTE — PROGRESS NOTES
Anticoagulation Clinic Progress Note    Anticoagulation Summary  As of 6/10/2019    INR goal:   2.0-3.0   TTR:   46.6 % (2.7 y)   INR used for dosin.50! (6/10/2019)   Warfarin maintenance plan:   2.5 mg every Sun, Tue, Thu; 5 mg all other days   Weekly warfarin total:   27.5 mg   Plan last modified:   Suad Ross MA (2019)   Next INR check:   2019   Target end date:       Indications    History of cardioembolic stroke (Left occipital) [Z86.73]             Anticoagulation Episode Summary     INR check location:   Clinic Lab    Preferred lab:       Send INR reminders to:   EMBER ASHER [facility] 8 6081 CLINICAL POOL    Comments:         Anticoagulation Care Providers     Provider Role Specialty Phone number    Doroteo Victoria MD [facility] [phone]          INR History:  Anticoagulation Monitoring 6/3/2019 2019 6/10/2019   INR 3.10 1.60 1.50   INR Date 6/3/2019 2019 6/10/2019   INR Goal 2.0-3.0 2.0-3.0 2.0-3.0   Trend Same Same Same   Last Week Total 18 mg 12.5 mg 17.5 mg   Next Week Total 22.5 mg 27.5 mg 32.5 mg   Sun - 2.5 mg -   Mon 2.5 mg (6/3) - 7.5 mg (6/10)   Tue 2.5 mg - 5 mg ()   Wed 2.5 mg () - 5 mg   Thu - - -   Fri - 5 mg -   Sat - 5 mg -   Visit Report - - -   Some recent data might be hidden       Plan:  1. INR is Subtherapeutic today- see above in Anticoagulation Summary.   Provided instructions via secure voicemail to Olamide with [facility] to Change their warfarin regimen- see above in Anticoagulation Summary.  2. Follow up in 3 days      Pravin Murphy RPH

## 2019-06-11 NOTE — OUTREACH NOTE
Medical Week 2 Survey      Responses   Facility patient discharged from?  [facility]   Does the patient have one of the following disease processes/diagnoses(primary or secondary)?  Other   Week 2 attempt successful?  Yes   Call start time  3445   Rescheduled  Revoked [He is doing fine.]   Revoke  Decline to participate          Chelsi Srivastava RN

## 2019-06-14 NOTE — PROGRESS NOTES
Anticoagulation Clinic Progress Note    Anticoagulation Summary  As of 2019    INR goal:   2.0-3.0   TTR:   46.4 % (2.7 y)   INR used for dosin.50! (2019)   Warfarin maintenance plan:   2.5 mg every Sun, Tue, Thu; 5 mg all other days   Weekly warfarin total:   27.5 mg   Plan last modified:   Suad Ross MA (2019)   Next INR check:   2019   Target end date:       Indications    History of cardioembolic stroke (Left occipital) [Z86.73]             Anticoagulation Episode Summary     INR check location:   Clinic Lab    Preferred lab:       Send INR reminders to:   EMBER [facility] 8 7598 CLINICAL POOL    Comments:         Anticoagulation Care Providers     Provider Role Specialty Phone number    Doroteo Victoria MD [facility] [phone]        Clinical findings:  Questionable compliance per [facility] RN, [facility]    INR History:  Anticoagulation Monitoring 2019 6/10/2019 2019   INR 1.60 1.50 1.50   INR Date 2019 6/10/2019 2019   INR Goal 2.0-3.0 2.0-3.0 2.0-3.0   Trend Same Same Same   Last Week Total 12.5 mg 17.5 mg 35 mg   Next Week Total 27.5 mg 32.5 mg 35 mg   Sun 2.5 mg - 5 mg ()   Mon - 7.5 mg (6/10) 5 mg   Tue - 5 mg () -   Wed - 5 mg -   Thu - - -   Fri 5 mg - 7.5 mg ()   Sat 5 mg - 7.5 mg (6/15)   Visit Report - - -   Some recent data might be hidden       Plan:  1. INR is Subtherapeutic today- see above in Anticoagulation Summary.   Provided instructions to [facility] with [facility] to Change their warfarin regimen- see above in Anticoagulation Summary.  2. Follow up in 4 days      Pravin Murphy RPH

## 2019-06-18 NOTE — PROGRESS NOTES
Anticoagulation Clinic Progress Note    Anticoagulation Summary  As of 2019    INR goal:   2.0-3.0   TTR:   46.3 % (2.7 y)   INR used for dosin.00 (2019)   Warfarin maintenance plan:   2.5 mg every Sun, Tue, Thu; 5 mg all other days   Weekly warfarin total:   27.5 mg   Plan last modified:   Suad Ross MA (2019)   Next INR check:   2019   Target end date:       Indications    History of cardioembolic stroke (Left occipital) [Z86.73]             Anticoagulation Episode Summary     INR check location:   Clinic Lab    Preferred lab:       Send INR reminders to:   EMBER ASHER [facility] 6 0486 CLINICAL POOL    Comments:         Anticoagulation Care Providers     Provider Role Specialty Phone number    Doroteo Victoria MD [facility] [phone]          INR History:  Anticoagulation Monitoring 6/10/2019 2019 2019   INR 1.50 1.50 2.00   INR Date 6/10/2019 2019 2019   INR Goal 2.0-3.0 2.0-3.0 2.0-3.0   Trend Same Same Same   Last Week Total 17.5 mg 35 mg 40 mg   Next Week Total 32.5 mg 35 mg 32.5 mg   Sun - 5 mg () -   Mon 7.5 mg (6/10) 5 mg -   Tue 5 mg () - 5 mg ()   Wed 5 mg - 5 mg   Thu - - 5 mg ()   Fri - 7.5 mg () -   Sat - 7.5 mg (6/15) -   Visit Report - - -   Some recent data might be hidden       Plan:  1. INR is Therapeutic today- see above in Anticoagulation Summary.   Provided instructions to [facility] with [facility] to Change their warfarin regimen- see above in Anticoagulation Summary.  2. Follow up in 3 days      Pravin Murphy RP

## 2019-06-21 NOTE — PROGRESS NOTES
Anticoagulation Clinic Progress Note    Anticoagulation Summary  As of 6/21/2019    INR goal:   2.0-3.0   TTR:   46.3 % (2.7 y)   INR used for dosing:   3.70! (6/21/2019)   Warfarin maintenance plan:   2.5 mg every Sun, Tue, Thu; 5 mg all other days   Weekly warfarin total:   27.5 mg   Plan last modified:   Suad Ross MA (2/11/2019)   Next INR check:   6/24/2019   Target end date:       Indications    History of cardioembolic stroke (Left occipital) [Z86.73]             Anticoagulation Episode Summary     INR check location:   Clinic Lab    Preferred lab:       Send INR reminders to:   EMBER ASHER [facility] 2 8988 CLINICAL POOL    Comments:         Anticoagulation Care Providers     Provider Role Specialty Phone number    Doroteo Victoria MD [facility] [phone]          INR History:  Anticoagulation Monitoring 6/14/2019 6/18/2019 6/21/2019   INR 1.50 2.00 3.70   INR Date 6/14/2019 6/18/2019 6/21/2019   INR Goal 2.0-3.0 2.0-3.0 2.0-3.0   Trend Same Same Same   Last Week Total 35 mg 40 mg 40 mg   Next Week Total 35 mg 32.5 mg 25 mg   Sun 5 mg (6/16) - 2.5 mg   Mon 5 mg - -   Tue - 5 mg (6/18) -   Wed - 5 mg -   Thu - 5 mg (6/20) -   Fri 7.5 mg (6/14) - 2.5 mg (6/21)   Sat 7.5 mg (6/15) - 5 mg   Visit Report - - -   Some recent data might be hidden       Plan:  1. INR is Supratherapeutic today- see above in Anticoagulation Summary.   Provided instructions to Jair with [facility] to Change their warfarin regimen- see above in Anticoagulation Summary.  2. Follow up in 3 days      Pravin Murphy RP

## 2019-06-24 PROBLEM — I50.43 ACUTE ON CHRONIC COMBINED SYSTOLIC AND DIASTOLIC CHF (CONGESTIVE HEART FAILURE) (HCC): Status: ACTIVE | Noted: 2018-01-01

## 2019-06-24 PROBLEM — I48.91 ATRIAL FIBRILLATION WITH RAPID VENTRICULAR RESPONSE (HCC): Status: ACTIVE | Noted: 2019-01-01

## 2019-06-25 PROBLEM — D63.1 ANEMIA OF CHRONIC KIDNEY FAILURE: Chronic | Status: ACTIVE | Noted: 2019-01-01

## 2019-06-25 PROBLEM — N18.9 ANEMIA OF CHRONIC KIDNEY FAILURE: Chronic | Status: ACTIVE | Noted: 2019-01-01

## 2019-06-25 PROBLEM — Z79.01 CHRONIC ANTICOAGULATION: Chronic | Status: ACTIVE | Noted: 2019-01-01

## 2019-06-28 NOTE — PROGRESS NOTES
Anticoagulation Clinic Progress Note    Anticoagulation Summary  As of 2019    INR goal:   2.0-3.0   TTR:   46.2 % (2.7 y)   INR used for dosin.80 (2019)   Warfarin maintenance plan:   2.5 mg every Sun, Tue, Thu; 5 mg all other days   Weekly warfarin total:   27.5 mg   Plan last modified:   Pravin Murphy RPH (2019)   Next INR check:   2019   Target end date:       Indications    History of cardioembolic stroke (Left occipital) [Z86.73]             Anticoagulation Episode Summary     INR check location:   Clinic Lab    Preferred lab:       Send INR reminders to:   EMBER [facility] 0 5913 CLINICAL POOL    Comments:         Anticoagulation Care Providers     Provider Role Specialty Phone number    Doroteo Victoria MD [facility] [phone]        Pertinent information:  INR 2.58 yesterday despite 3 held doses while in hospital earlier this week. INR increased overnight after 2.5 mg dose yesterday.    INR History:  Anticoagulation Monitoring 2019   INR 2.00 3.70 2.80   INR Date 2019   INR Goal 2.0-3.0 2.0-3.0 2.0-3.0   Trend Same Same Same   Last Week Total 40 mg 40 mg 12.5 mg   Next Week Total 32.5 mg 25 mg 18.75 mg   Sun - 2.5 mg 1.25 mg ()   Mon - - -   Tue 5 mg () - -   Wed 5 mg - -   Thu 5 mg () - -   Fri - 2.5 mg () 1.25 mg ()   Sat - 5 mg 1.25 mg ()   Visit Report - - -   Some recent data might be hidden       Plan:  1. INR is Therapeutic today- see above in Anticoagulation Summary.   Provided instructions to [facility] with [facility] to Change their warfarin regimen- see above in Anticoagulation Summary.  2. Follow up in 3 days      Pravin Muprhy RPH

## 2019-06-29 NOTE — OUTREACH NOTE
Prep Survey      Responses   Facility patient discharged from?  [facility]   Is patient eligible?  Yes   Discharge diagnosis  Afib   Does the patient have one of the following disease processes/diagnoses(primary or secondary)?  Other   Does the patient have Home health ordered?  Yes   What is the Home health agency?   [facility]   Is there a DME ordered?  No   Prep survey completed?  Yes          Henna Cueva RN

## 2019-07-01 NOTE — PROGRESS NOTES
Anticoagulation Clinic Progress Note    Anticoagulation Summary  As of 2019    INR goal:   2.0-3.0   TTR:   46.3 % (2.7 y)   INR used for dosin.90! (2019)   Warfarin maintenance plan:   2.5 mg every Sun, Tue, Thu; 5 mg all other days   Weekly warfarin total:   27.5 mg   No change documented:   Pravin Murphy RPH   Plan last modified:   Pravin Murphy RPH (2019)   Next INR check:   7/3/2019   Target end date:       Indications    History of cardioembolic stroke (Left occipital) [Z86.73]             Anticoagulation Episode Summary     INR check location:   Clinic Lab    Preferred lab:       Send INR reminders to:   EMBER ASHER [facility] 2 3794 CLINICAL POOL    Comments:         Anticoagulation Care Providers     Provider Role Specialty Phone number    Doroteo Victoria MD [facility] [phone]          INR History:  Anticoagulation Monitoring 2019   INR 3.70 2.80 1.90   INR Date 2019   INR Goal 2.0-3.0 2.0-3.0 2.0-3.0   Trend Same Same Same   Last Week Total 40 mg 12.5 mg 10 mg   Next Week Total 25 mg 18.75 mg 27.5 mg   Sun 2.5 mg 1.25 mg () -   Mon - - 5 mg   Tue - - 2.5 mg   Wed - - -   Thu - - -   Fri 2.5 mg () 1.25 mg () -   Sat 5 mg 1.25 mg () -   Visit Report - - -   Some recent data might be hidden       Plan:  1. INR is Subtherapeutic today- see above in Anticoagulation Summary.   Provided instructions to Emily Aldrichk with [facility] to Change their warfarin regimen- see above in Anticoagulation Summary.  2. Follow up in 2 days due to holiday      Pravin Murphy RPH

## 2019-07-01 NOTE — OUTREACH NOTE
Medical Week 1 Survey      Responses   Facility patient discharged from?  [facility]   Does the patient have one of the following disease processes/diagnoses(primary or secondary)?  Other   Is there a successful TCM telephone encounter documented?  No   Week 1 attempt successful?  Yes   Call start time  1044   Call end time  1051   Discharge diagnosis  Afib   Person spoke with today (if not patient) and relationship  amado Avitia   Meds reviewed with patient/caregiver?  Yes   Is the patient having any side effects they believe may be caused by any medication additions or changes?  No   Does the patient have all medications ordered at discharge?  Yes   Is the patient taking all medications as directed (includes completed medication regime)?  Yes   Does the patient have a primary care provider?   Yes   Does the patient have an appointment with their PCP within 7 days of discharge?  Yes   Has the patient kept scheduled appointments due by today?  N/A   Comments  wound healing slowly,   What is the Home health agency?   [facility]   Has home health visited the patient within 72 hours of discharge?  Yes   Psychosocial issues?  No   Comments   visits nsg, and wife changes dsgs BID to right heel wound   Did the patient receive a copy of their discharge instructions?  Yes   Nursing interventions  Reviewed instructions with patient   What is the patient's perception of their health status since discharge?  Improving   Is the patient/caregiver able to teach back signs and symptoms related to disease process for when to call PCP?  Yes   Is the patient/caregiver able to teach back signs and symptoms related to disease process for when to call 911?  Yes   Is the patient/caregiver able to teach back the hierarchy of who to call/visit for symptoms/problems? PCP, Specialist, Home health nurse, Urgent Care, ED, 911  Yes   Additional teach back comments  Adore fischer states wound healing slowly of right heel, but is closing, states good  understanding of S and S of infection   Week 1 call completed?  Yes          Niru Curry RN

## 2019-07-02 NOTE — PROGRESS NOTES
A user error has taken place: duplicate encounter opened in error, closed for administrative reasons. See 7/1/19 Anticoagulation Visit.

## 2019-07-03 NOTE — PROGRESS NOTES
Anticoagulation Clinic Progress Note    Anticoagulation Summary  As of 7/3/2019    INR goal:   2.0-3.0   TTR:   46.3 % (2.7 y)   INR used for dosin.80! (7/3/2019)   Warfarin maintenance plan:   5 mg every Mon, Wed, Fri; 2.5 mg all other days   Weekly warfarin total:   25 mg   Plan last modified:   Pravin Murphy RPH (7/3/2019)   Next INR check:   2019   Target end date:       Indications    History of cardioembolic stroke (Left occipital) [Z86.73]             Anticoagulation Episode Summary     INR check location:   Clinic Lab    Preferred lab:       Send INR reminders to:    [facility] CLINICAL POOL    Comments:         Anticoagulation Care Providers     Provider Role Specialty Phone number    Juan Hyde MD Referring Cardiology [phone]          INR History:  Anticoagulation Monitoring 2019 2019 7/3/2019   INR 2.80 1.90 1.80   INR Date 2019 2019 7/3/2019   INR Goal 2.0-3.0 2.0-3.0 2.0-3.0   Trend Same Same Down   Last Week Total 12.5 mg 10 mg 17.5 mg   Next Week Total 18.75 mg 27.5 mg 25 mg   Sun 1.25 mg () - 2.5 mg   Mon - 5 mg -   Tue - 2.5 mg -   Wed - - 5 mg   Thu - - 2.5 mg   Fri 1.25 mg () - 5 mg   Sat 1.25 mg () - 2.5 mg   Visit Report - - -   Some recent data might be hidden       Plan:  1. INR is Subtherapeutic today- see above in Anticoagulation Summary.   Provided instructions to [facility] with [facility] to Change their warfarin regimen- see above in Anticoagulation Summary.  2. Follow up in 5 days      Pravin Murphy RPH

## 2019-07-05 NOTE — PROGRESS NOTES
"Subjective   Deacon Martin is a 74 y.o. male.   CC: Hospital follow-up, A. fib, antidepressant    Patient presents for hospital follow-up.  This is 74-year-old male patient of Dr. Victoria with history of A. fib on Coumadin, hypertension, CHF, type 2 diabetes, end-stage renal disease on hemodialysis.  He was hospitalized from 6/24/2019 through 6/27/2019 after presenting with shortness of breath.  He was found to be in rapid A. fib with acute on chronic CHF related to the rapid heart rate.  Cardiology saw the patient and adjusted his beta-blockers and he improved clinically.  He also had dialysis in the hospital which helped to resolve pulmonary edema and volume overload.  He presents today for follow-up.  He is being seen 2-3 times weekly by home health who are changing the dressings on the right heel wound which is improving.  He is following with wound care for this as well, who he saw earlier today.  He states that when he was discharged he was told to stop his Cymbalta, which he was taking at 60 mg daily.  He is still taking this medication as he was told by home health that he should not abruptly go off of it.  He is unsure why his Cymbalta was stopped.  He states that he has been on this \"for quite some time\" and it helps his depression greatly.  He denies SI or HI but if possible, would like to stay on the medication.  He denies any palpitations, shortness of breath, chest discomfort or any recurring edema.  He has been going to dialysis routinely, Monday Wednesday Friday.  His nephrologist is Dr. Lawson.  He denies development of any other new issues today.         The following portions of the patient's history were reviewed and updated as appropriate: allergies, current medications, past family history, past medical history, past social history, past surgical history and problem list.    Review of Systems   Constitutional: Negative for activity change, chills, fatigue, fever, unexpected weight gain and " "unexpected weight loss.   HENT: Negative for congestion, hearing loss, postnasal drip, sinus pressure, sneezing, sore throat and tinnitus.    Eyes: Negative for photophobia, pain and visual disturbance.   Respiratory: Negative for cough, chest tightness, shortness of breath and wheezing.    Cardiovascular: Negative for chest pain, palpitations and leg swelling.   Gastrointestinal: Negative for abdominal distention, abdominal pain, constipation, diarrhea, nausea and vomiting.   Endocrine: Negative for polydipsia, polyphagia and polyuria.   Genitourinary: Negative for dysuria, frequency, hematuria and urgency.   Neurological: Negative for dizziness, weakness, numbness and headache.   All other systems reviewed and are negative.      Objective    /79 (BP Location: Left arm, Patient Position: Sitting, Cuff Size: Adult)   Pulse 79   Temp 97.7 °F (36.5 °C) (Oral)   Resp 16   Ht 177.8 cm (70\")   Wt 86.5 kg (190 lb 9.6 oz)   SpO2 97%   BMI 27.35 kg/m²     Physical Exam   Constitutional: He is oriented to person, place, and time. He appears well-developed and well-nourished.   HENT:   Head: Normocephalic and atraumatic.   Right Ear: External ear normal.   Left Ear: External ear normal.   Eyes: EOM are normal. Pupils are equal, round, and reactive to light.   Neck: Normal range of motion. Neck supple. No JVD present.   Cardiovascular: Normal rate, regular rhythm, normal heart sounds and intact distal pulses. Exam reveals no gallop and no friction rub.   No murmur heard.  Pulmonary/Chest: Effort normal and breath sounds normal. No stridor. No respiratory distress. He has no wheezes. He has no rales. He exhibits no tenderness.   Lungs are CTA bilaterally   Musculoskeletal: Normal range of motion.   Neurological: He is alert and oriented to person, place, and time.   Skin: Skin is warm and dry. Capillary refill takes less than 2 seconds.   Psychiatric: He has a normal mood and affect. His behavior is normal. " Judgment and thought content normal.   Nursing note and vitals reviewed.    Current outpatient and discharge medications have been reconciled for the patient.  Reviewed by: INÉS Mcadams      Assessment/Plan   Diagnoses and all orders for this visit:    Hospital discharge follow-up    ESRD (end stage renal disease) on dialysis (CMS/[facility])    Atrial fibrillation, unspecified type (CMS/[facility])    Anticoagulated on Coumadin    Other orders  -     donepezil (ARICEPT) 10 MG tablet; Take 1 tablet by mouth Every Night.      -Hospital follow-up: Reviewed discharge summary and labs from 6/24/2019 through 6/27/2019 hospital stay.  Patient is doing well clinically and has had no further edema, shortness of breath or palpitations.  He is maintained on Coumadin, and home health is checking INRs which are being reported to his cardiology office, Dr. Hyde.    -He gets dialysis Monday Wednesday and Friday.  Nephrologist is Dr. Lawson and he is scheduled for dialysis later today.    -Followed by wound care and home health for right foot wound.  Steadily improving slowly.    -He is Cymbalta was stopped on discharge, and patient is unsure why.  I believe it may be due to his end-stage renal disease, but patient has not gone off of it.  He has been on it for quite some time.  He may continue with the medication as long as his nephrologist is okay with him staying on it as well.  If we need to have him go off of it, I will provide instructions to taper off and we will try an alternative antidepressant.    -Follow-up PRN and routinely with PCP, Dr. Victoria with whom he has an appointment in December.

## 2019-07-08 NOTE — PROGRESS NOTES
Anticoagulation Clinic Progress Note    Anticoagulation Summary  As of 2019    INR goal:   2.0-3.0   TTR:   46.0 % (2.7 y)   INR used for dosin.90! (2019)   Warfarin maintenance plan:   2.5 mg every Sun, Tue, Thu; 5 mg all other days   Weekly warfarin total:   27.5 mg   Plan last modified:   Camille Berman RPH (2019)   Next INR check:   7/15/2019   Target end date:       Indications    History of cardioembolic stroke (Left occipital) [Z86.73]             Anticoagulation Episode Summary     INR check location:   Clinic Lab    Preferred lab:       Send INR reminders to:   [facility] CLINICAL POOL    Comments:         Anticoagulation Care Providers     Provider Role Specialty Phone number    Juan Hyde MD Referring Cardiology [phone]          INR History:  Anticoagulation Monitoring 2019 7/3/2019 2019   INR 1.90 1.80 1.90   INR Date 2019 7/3/2019 2019   INR Goal 2.0-3.0 2.0-3.0 2.0-3.0   Trend Same Down Up   Last Week Total 10 mg 17.5 mg 25 mg   Next Week Total 27.5 mg 25 mg 27.5 mg   Sun - 2.5 mg 2.5 mg   Mon 5 mg - 5 mg   Tue 2.5 mg - 2.5 mg   Wed - 5 mg 5 mg   Thu - 2.5 mg 2.5 mg   Fri - 5 mg 5 mg   Sat - 2.5 mg 5 mg   Visit Report - - -   Some recent data might be hidden       Plan:  1. INR is Subtherapeutic today- see above in Anticoagulation Summary.   Provided instructions to [facility] with [facility] to Change their warfarin regimen- see above in Anticoagulation Summary.  2. Follow up in 1 week  ([facility] [phone])      Camille Berman RPH

## 2019-07-10 NOTE — OUTREACH NOTE
Medical Week 2 Survey      Responses   Facility patient discharged from?  [facility]   Does the patient have one of the following disease processes/diagnoses(primary or secondary)?  Other   Week 2 attempt successful?  No   Unsuccessful attempts  Attempt 1          Fawad Talavera RN

## 2019-07-11 NOTE — OUTREACH NOTE
Medical Week 2 Survey      Responses   Facility patient discharged from?  [facility]   Does the patient have one of the following disease processes/diagnoses(primary or secondary)?  Other   Week 2 attempt successful?  Yes   Call start time  1432   Discharge diagnosis  Afib   Call end time  1435   Is patient permission given to speak with other caregiver?  Yes   List who call center can speak with  Adore, spouse   Person spoke with today (if not patient) and relationship  Adore, spouse   Meds reviewed with patient/caregiver?  Yes   Is the patient taking all medications as directed (includes completed medication regime)?  Yes   Does the patient have a primary care provider?   Yes   Does the patient have an appointment with their PCP within 7 days of discharge?  Yes   Comments regarding PCP  Doroteo Victoria MD. Wife states that patient has seen NP in PCP office since discharge.    Has the patient kept scheduled appointments due by today?  Yes   What is the Home health agency?   [facility]   Has home health visited the patient within 72 hours of discharge?  Yes   Psychosocial issues?  No   Did the patient receive a copy of their discharge instructions?  Yes   Nursing interventions  Reviewed instructions with patient   What is the patient's perception of their health status since discharge?  Improving   Is the patient/caregiver able to teach back signs and symptoms related to disease process for when to call PCP?  Yes   Is the patient/caregiver able to teach back signs and symptoms related to disease process for when to call 911?  Yes   Is the patient/caregiver able to teach back the hierarchy of who to call/visit for symptoms/problems? PCP, Specialist, Home health nurse, Urgent Care, ED, 911  Yes   Week 2 Call Completed?  Yes   Revoked  No further contact(revokes)-requires comment   Graduated/Revoked comments  Wife states that they have a HH nurse and further F/U calls not neccessary.           Lissa Mayes RN

## 2019-07-24 NOTE — PROGRESS NOTES
Anticoagulation Clinic Progress Note    Anticoagulation Summary  As of 2019    INR goal:   2.0-3.0   TTR:   46.2 % (2.8 y)   INR used for dosin.80! (2019)   Warfarin maintenance plan:   2.5 mg every Sun, Tue, Thu; 5 mg all other days   Weekly warfarin total:   27.5 mg   Plan last modified:   Camille Berman RPH (2019)   Next INR check:   2019   Target end date:       Indications    History of cardioembolic stroke (Left occipital) [Z86.73]             Anticoagulation Episode Summary     INR check location:   Clinic Lab    Preferred lab:       Send INR reminders to:    [facility] POOL    Comments:         Anticoagulation Care Providers     Provider Role Specialty Phone number    Juan Hyed MD Referring Cardiology [phone]          INR History:  Anticoagulation Monitoring 7/15/2019 2019 2019   INR 3.60 2.20 1.80   INR Date 7/15/2019 2019 2019   INR Goal 2.0-3.0 2.0-3.0 2.0-3.0   Trend Down Same Up   Last Week Total 27.5 mg 25 mg 25 mg   Next Week Total 22.5 mg 25 mg 27.5 mg   Sun - 2.5 mg 2.5 mg   Mon 2.5 mg (7/15) 5 mg 5 mg   Tue 2.5 mg 2.5 mg 2.5 mg   Wed 5 mg - 5 mg   Thu 2.5 mg - 2.5 mg   Fri - 5 mg 5 mg   Sat - 2.5 mg 5 mg   Visit Report - - -   Some recent data might be hidden       Plan:  1. INR is Subtherapeutic today- see above in Anticoagulation Summary.   Provided instructions to [facility] with [facility] to Increase their warfarin regimen- see above in Anticoagulation Summary.  2. Follow up in 1 week      Camille Berman RPH

## 2019-07-31 NOTE — PROGRESS NOTES
Anticoagulation Clinic Progress Note    Anticoagulation Summary  As of 7/31/2019    INR goal:   2.0-3.0   TTR:   46.3 % (2.8 y)   INR used for dosing:   3.40! (7/31/2019)   Warfarin maintenance plan:   5 mg every Mon, Wed, Fri; 2.5 mg all other days   Weekly warfarin total:   25 mg   Plan last modified:   Pravin Murphy RPH (7/31/2019)   Next INR check:   8/6/2019   Target end date:       Indications    History of cardioembolic stroke (Left occipital) [Z86.73]             Anticoagulation Episode Summary     INR check location:   Clinic Lab    Preferred lab:       Send INR reminders to:    [facility] POOL    Comments:         Anticoagulation Care Providers     Provider Role Specialty Phone number    Juan Hyde MD Referring Cardiology [phone]          INR History:  Anticoagulation Monitoring 7/19/2019 7/24/2019 7/31/2019   INR 2.20 1.80 3.40   INR Date 7/19/2019 7/24/2019 7/31/2019   INR Goal 2.0-3.0 2.0-3.0 2.0-3.0   Trend Same Up Down   Last Week Total 25 mg 25 mg 27.5 mg   Next Week Total 25 mg 27.5 mg 22.5 mg   Sun 2.5 mg 2.5 mg 2.5 mg   Mon 5 mg 5 mg 5 mg   Tue 2.5 mg 2.5 mg -   Wed - 5 mg 2.5 mg (7/31)   Thu - 2.5 mg 2.5 mg   Fri 5 mg 5 mg 5 mg   Sat 2.5 mg 5 mg 2.5 mg   Visit Report - - -   Some recent data might be hidden       Plan:  1. INR is Supratherapeutic today- see above in Anticoagulation Summary.   Provided instructions to [facility] with [facility] to Change their warfarin regimen- see above in Anticoagulation Summary.  2. Follow up in 6 days      Pravin Murphy RPH

## 2019-08-06 NOTE — PROGRESS NOTES
Anticoagulation Clinic Progress Note    Anticoagulation Summary  As of 2019    INR goal:   2.0-3.0   TTR:   46.0 % (2.8 y)   INR used for dosin.50! (2019)   Warfarin maintenance plan:   5 mg every Mon, Wed, Fri; 2.5 mg all other days   Weekly warfarin total:   25 mg   Plan last modified:   Pravin Murphy [facility] (2019)   Next INR check:   2019   Target end date:       Indications    History of cardioembolic stroke (Left occipital) [Z86.73]             Anticoagulation Episode Summary     INR check location:   Clinic Lab    Preferred lab:       Send INR reminders to:    [facility] PO[facility]    Comments:         Anticoagulation Care Providers     Provider Role Specialty Phone number    Juan Hyde MD Referring Cardiology [phone]          INR History:  Anticoagulation Monitoring 2019   INR 1.80 3.40 4.50   INR Date 2019   INR Goal 2.0-3.0 2.0-3.0 2.0-3.0   Trend Up Down Same   Last Week Total 25 mg 27.5 mg 22.5 mg   Next Week Total 27.5 mg 22.5 mg 20 mg   Sun 2.5 mg 2.5 mg -   Mon 5 mg 5 mg -   Tu 2.5 mg - Hold ()   Wed 5 mg 2.5 mg () 2.5 mg ()   u 2.5 mg 2.5 mg 2.5 mg   Fri 5 mg 5 mg -   Sat 5 mg 2.5 mg -   Visit Report - - -   Some recent data might be hidden       Plan:  1. INR is Supratherapeutic today- see above in Anticoagulation Summary.   Provided instructions to Emily with [facility] to change his warfarin regimen. Hold today's dose, then take 2.5mg on Wed and Thursday.  2. Follow up in 3 days on       Lissa Marie [facility]

## 2019-08-09 NOTE — PROGRESS NOTES
Anticoagulation Clinic Progress Note    Anticoagulation Summary  As of 2019    INR goal:   2.0-3.0   TTR:   46.0 % (2.8 y)   INR used for dosin.90! (2019)   Warfarin maintenance plan:   5 mg every Mon, Fri; 2.5 mg all other days   Weekly warfarin total:   22.5 mg   Plan last modified:   Pravin Murphy RPH (2019)   Next INR check:   2019   Target end date:       Indications    History of cardioembolic stroke (Left occipital) [Z86.73]             Anticoagulation Episode Summary     INR check location:   Clinic Lab    Preferred lab:       Send INR reminders to:    [facility] PO[facility]    Comments:         Anticoagulation Care Providers     Provider Role Specialty Phone number    Juan Hyde MD Referring Cardiology [phone]          INR History:  Anticoagulation Monitoring 2019   INR 3.40 4.50 1.90   INR Date 2019   INR Goal 2.0-3.0 2.0-3.0 2.0-3.0   Trend Down Same Down   Last Week Total 27.5 mg 22.5 mg 20 mg   Next Week Total 22.5 mg 20 mg 22.5 mg   Sun 2.5 mg - 2.5 mg   Mon 5 mg - 5 mg   Tue - Hold () -   Wed 2.5 mg () 2.5 mg () -   Thu 2.5 mg 2.5 mg -   Fri 5 mg - 5 mg   Sat 2.5 mg - 2.5 mg   Visit Report - - -   Some recent data might be hidden       Plan:  1. INR is Subtherapeutic today- see above in Anticoagulation Summary.   Provided instructions via secure voicemail to O[facility] with [facility] to Change their warfarin regimen to account for elevated INR earlier this wk - see above in Anticoagulation Summary.  2. Follow up in 4 days      Pravin Murphy RPH

## 2019-08-13 NOTE — PROGRESS NOTES
Anticoagulation Clinic Progress Note    Anticoagulation Summary  As of 8/13/2019    INR goal:   2.0-3.0   TTR:   46.1 % (2.8 y)   INR used for dosing:   3.10! (8/13/2019)   Warfarin maintenance plan:   5 mg every Mon; 2.5 mg all other days   Weekly warfarin total:   20 mg   Plan last modified:   Camille Berman RPH (8/13/2019)   Next INR check:   8/19/2019   Target end date:       Indications    History of cardioembolic stroke (Left occipital) [Z86.73]             Anticoagulation Episode Summary     INR check location:   Clinic Lab    Preferred lab:       Send INR reminders to:    [facility] PO[facility]    Comments:         Anticoagulation Care Providers     Provider Role Specialty Phone number    Juan Hyde MD Referring Cardiology [phone]          INR History:  Anticoagulation Monitoring 8/6/2019 8/9/2019 8/13/2019   INR 4.50 1.90 3.10   INR Date 8/6/2019 8/9/2019 8/13/2019   INR Goal 2.0-3.0 2.0-3.0 2.0-3.0   Trend Same Down Down   Last Week Total 22.5 mg 20 mg 20 mg   Next Week Total 20 mg 22.5 mg 20 mg   Sun - 2.5 mg 2.5 mg   Mon - 5 mg -   Tue Hold (8/6) - 2.5 mg   Wed 2.5 mg (8/7) - 2.5 mg   Thu 2.5 mg - 2.5 mg   Fri - 5 mg 2.5 mg   Sat - 2.5 mg 2.5 mg   Visit Report - - -   Some recent data might be hidden       Plan:  1. INR is Supratherapeutic today- see above in Anticoagulation Summary.   Provided instructions to [facility] with [facility] to Change their warfarin regimen- see above in Anticoagulation Summary.  2. Follow up in 6 days  3. Spoke with [facility] at [phone]      Camille Berman RPH

## 2019-08-15 NOTE — PROGRESS NOTES
Date of Office Visit: 08/15/2019    Patient Name: Deacon Martin  : 1945    Encounter Provider: Juan Hyde MD  Referring Provider: Doroteo Victoria MD  Place of Service: [facility] CARDIOLOGY  Patient Care Team:  Doroteo Victoria MD as PCP - General (Family Medicine)  Pravin Oneill DPM as PCP - Claims Attributed    Atrial fibrillation    History of Present Illness    The patient is a 74-year-old white male that he previously followed your Rap[facility] L[facility].  The patient has been on warfarin for anticoagulation now for many years.  He was recently hospitalized with atrial fibrillation with a rapid ventricular response as well as technically congestive heart failure that was probably mixed systolic and diastolic.  His last ejection fraction was only minimally impaired in the high 40% range.    The patient struggles with diabetes and complications as a result of the diabetes.  He has a nonhealing ulcer.  He also has chronic kidney disease and is presently on dialysis.  He does not complain of chest pain nor does he complain of any shortness of breath.  He does complain of fatigue type issues.    Past Medical History:   Diagnosis Date   • Atrial fibrillation (CMS/HCC)    • CAD (coronary artery disease)    • CHF (congestive heart failure) (CMS/HCC)    • Edema    • ESRD (end stage renal disease) on dialysis (CMS/HCC) 2016    Dr Lawson   • Gout    • HBP (high blood pressure)    • HX: anticoagulation    • Hyperlipidemia    • Memory problem    • DARSHAN (obstructive sleep apnea)    • Other hemorrhagic disorder due to intrinsic circulating anticoagulants, antibodies, or inhibitors (CMS/HCC)     OTH HEMOR DISORDER DUE INTRIN   • Stroke (CMS/HCC)    • Type 2 diabetes mellitus (CMS/HCC)    • Vitamin D deficiency          Past Surgical History:   Procedure Laterality Date   • ARTERIOVENOUS FISTULA/SHUNT SURGERY Right 2016    Procedure: RT BRACHIAL CEPHALIC FISTULA;  Surgeon: Ar  ROSY Muro MD;  Location: [facility] MAIN OR;  Service:    • ARTERIOVENOUS FISTULA/SHUNT SURGERY W/ HEMODIALYSIS CATHETER INSERTION Right 12/6/2017    Procedure: RT. ARM FISTULA REVISION/POSS. TUNNELED CATH;  Surgeon: Ar Muro MD;  Location: [facility] MAIN OR;  Service:    • COLONOSCOPY  2003   • INCISION AND DRAINAGE LEG Right 5/28/2019    Procedure: RIGHT FOOT DEBRIDEMENT;  Surgeon: Miguelangel Wynne MD;  Location: [facility] MAIN OR;  Service: Vascular   • INSERTION HEMODIALYSIS CATHETER N/A 11/25/2016    Procedure: TUNNEL CATHETER INSERTION;  Surgeon: Silvia Lim Jr., MD;  Location: [facility] MAIN OR;  Service:            Current Outpatient Medications:   •  ACCU-CHEK FASTCLIX LANCETS misc, Use as directed for blood glucose testing CHECK 4 TIMES A DAY, Disp: 200 each, Rfl: 5  •  acetaminophen (TYLENOL) 325 MG tablet, Take 2 tablets by mouth Every 6 (Six) Hours As Needed for mild pain (1-3) or fever., Disp: 100 tablet, Rfl: 0  •  allopurinol (ZYLOPRIM) 100 MG tablet, TAKE ONE TABLET BY MOUTH DAILY, Disp: 90 tablet, Rfl: 0  •  atorvastatin (LIPITOR) 40 MG tablet, Take 1 tablet by mouth Daily., Disp: 90 tablet, Rfl: 3  •  calcium acetate (PHOS BINDER,) 667 MG capsule capsule, Take 667 mg by mouth Daily., Disp: , Rfl:   •  donepezil (ARICEPT) 10 MG tablet, Take 1 tablet by mouth Every Night., Disp: 90 tablet, Rfl: 2  •  DULoxetine (CYMBALTA) 60 MG capsule, Take 60 mg by mouth Daily., Disp: , Rfl:   •  glucose blood (ACCU-CHEK GUIDE) test strip, Use as instructed to check 4 times daily, Disp: 150 each, Rfl: 12  •  glucose blood test strip, Use as instructed, Disp: 120 each, Rfl: 12  •  insulin NPH (humuLIN N,novoLIN N) 100 UNIT/ML injection, Inject 30 Units under the skin 2 (Two) Times a Day Before Meals., Disp: 1 Units, Rfl: 12  •  Insulin Pen Needle 32G X 4 MM misc, 4 (Four) Times a Day As Needed., Disp: , Rfl:   •  insulin regular (humuLIN R,novoLIN R) 100 UNIT/ML injection, Inject 5 Units under the skin into  the appropriate area as directed 3 (Three) Times a Day Before Meals., Disp: 10 mL, Rfl: 1  •  Lancets Misc. (ACCU-CHEK FASTCLIX LANCET) kit, Use as directed for blood glucose testing, Disp: 200 kit, Rfl: 5  •  metoprolol succinate XL (TOPROL-XL) 25 MG 24 hr tablet, Take 3 tablets by mouth Daily., Disp: 90 tablet, Rfl: 0  •  sodium hypochlorite in sterile water irrigation topical solution 0.0625%, Irrigate with 946 mL to the affected area as directed by provider Every 12 (Twelve) Hours., Disp: 1 bottle, Rfl: 0  •  tamsulosin (FLOMAX) 0.4 MG capsule 24 hr capsule, TAKE ONE CAPSULE BY MOUTH DAILY, Disp: 90 capsule, Rfl: 2  •  warfarin (COUMADIN) 2.5 MG tablet, Take 1 tablet by mouth Every Night. (Patient taking differently: Take 2.5 mg by mouth Every Night. Take one tablet on Tues, Thurs, Sat, Sun), Disp: 30 tablet, Rfl: 0  •  warfarin (COUMADIN) 5 MG tablet, Take one tablets on Mon, Wed, Fri, Disp: , Rfl:       Social History     Socioeconomic History   • Marital status:      Spouse name: Not on file   • Number of children: 6   • Years of education: Not on file   • Highest education level: Not on file   Occupational History   • Occupation: retired   Tobacco Use   • Smoking status: Former Smoker     Packs/day: 1.00     Years: 5.00     Pack years: 5.00     Types: Cigarettes     Last attempt to quit: 1966     Years since quittin.6   • Smokeless tobacco: Never Used   • Tobacco comment: rare caffeine   Substance and Sexual Activity   • Alcohol use: Yes     Alcohol/week: 0.6 oz     Types: 1 Cans of beer per week     Comment: occasionally    • Drug use: No   • Sexual activity: Defer         Review of Systems   Constitution: Positive for malaise/fatigue.   HENT: Negative.    Eyes: Negative.    Cardiovascular: Positive for palpitations.   Respiratory: Negative.    Endocrine: Negative.    Skin: Negative.    Musculoskeletal: Negative.    Gastrointestinal: Negative.    Neurological: Negative.   "  Psychiatric/Behavioral: Negative.        Procedures      ECG 12 Lead  Date/Time: 8/15/2019 2:40 PM  Performed by: Juan Hyde MD  Authorized by: Juan Hyde MD   Comparison: compared with previous ECG   Rhythm: atrial fibrillation  Rate: normal  Conduction: conduction normal  ST Segments: ST segments normal  QRS axis: normal                  Objective:    /84 (BP Location: Left arm, Patient Position: Sitting, Cuff Size: Adult)   Pulse 77   Ht 177.8 cm (70\")   Wt 84.2 kg (185 lb 9.6 oz)   SpO2 99%   BMI 26.63 kg/m²         Physical Exam   Constitutional: He is oriented to person, place, and time. He appears well-developed and well-nourished.   HENT:   Head: Normocephalic.   Eyes: Pupils are equal, round, and reactive to light.   Neck: Normal range of motion. Carotid bruit is not present. No thyromegaly present.   Cardiovascular: Normal rate, regular rhythm, S1 normal, S2 normal and intact distal pulses. Exam reveals no friction rub.   Murmur heard.   Harsh midsystolic murmur is present with a grade of 1/6 at the upper right sternal border radiating to the neck.  Pulmonary/Chest: Effort normal and breath sounds normal.   Abdominal: Soft. Bowel sounds are normal.   Musculoskeletal: He exhibits no edema.   Neurological: He is alert and oriented to person, place, and time.   Skin: Skin is warm, dry and intact. No erythema.   Psychiatric: He has a normal mood and affect.   Vitals reviewed.          Assessment:       Diagnosis Plan   1. Acute on chronic combined systolic and diastolic CHF (congestive heart failure) (CMS/HCC)     2. Permanent atrial fibrillation (CMS/HCC)     3. Secondary hypertension     4. Nonrheumatic aortic valve stenosis         1. Atrial Fibrillation and Atrial Flutter  Assessment  • The patient has permanent atrial fibrillation  • The patient's CHADS2-VASc score is 6  • A [id number] score of 2 or more is considered a high risk for a thromboembolic event  • Warfarin " prescribed    2. Heart Failure  Assessment  • NYHA class II - There is slight limitation of physical activity. The patient is comfortable at rest, but physical activity results in fatigue, palpitations or shortness of breath.  • Left ventricular function is mildly reduced by qualitative assessment      3.  Hypertension: Controlled  4.  Aortic valve stenosis: Mild  Plan:         EMR Dragon/Transcription disclaimer:   Much of this encounter note is an electronic transcription/translation of spoken language to printed text. The electronic translation of spoken language may permit erroneous, or at times, nonsensical words or phrases to be inadvertently transcribed; Although I have reviewed the note for such errors, some may still exist.

## 2019-08-19 NOTE — PROGRESS NOTES
Anticoagulation Clinic Progress Note    Anticoagulation Summary  As of 8/19/2019    INR goal:   2.0-3.0   TTR:   45.9 % (2.9 y)   INR used for dosing:   3.60! (8/19/2019)   Warfarin maintenance plan:   2.5 mg every day   Weekly warfarin total:   17.5 mg   Plan last modified:   Pravin Murphy RPH (8/19/2019)   Next INR check:   8/26/2019   Target end date:       Indications    History of cardioembolic stroke (Left occipital) [Z86.73]             Anticoagulation Episode Summary     INR check location:   Clinic Lab    Preferred lab:       Send INR reminders to:   [facility]    Comments:         Anticoagulation Care Providers     Provider Role Specialty Phone number    Juan Hyde MD Referring Cardiology [phone]          INR History:  Anticoagulation Monitoring 8/9/2019 8/13/2019 8/19/2019   INR 1.90 3.10 3.60   INR Date 8/9/2019 8/13/2019 8/19/2019   INR Goal 2.0-3.0 2.0-3.0 2.0-3.0   Trend Down Down Down   Last Week Total 20 mg 20 mg 20 mg   Next Week Total 22.5 mg 20 mg 17.5 mg   Sun 2.5 mg 2.5 mg 2.5 mg   Mon 5 mg - 2.5 mg   Tue - 2.5 mg 2.5 mg   Wed - 2.5 mg 2.5 mg   Thu - 2.5 mg 2.5 mg   Fri 5 mg 2.5 mg 2.5 mg   Sat 2.5 mg 2.5 mg 2.5 mg   Visit Report - - -   Some recent data might be hidden       Plan:  1. INR is Supratherapeutic today- see above in Anticoagulation Summary.   Provided instructions to [facility] with [facility] to Change their warfarin regimen- see above in Anticoagulation Summary.  2. Follow up in 1 week      Pravin Murphy RPH

## 2019-08-26 NOTE — PROGRESS NOTES
Anticoagulation Clinic Progress Note    Anticoagulation Summary  As of 8/26/2019    INR goal:   2.0-3.0   TTR:   45.6 % (2.9 y)   INR used for dosing:   3.00 (8/26/2019)   Warfarin maintenance plan:   2.5 mg every day   Weekly warfarin total:   17.5 mg   No change documented:   Pravin Murphy RPH   Plan last modified:   Pravin Murphy RPH (8/19/2019)   Next INR check:   9/4/2019   Target end date:       Indications    History of cardioembolic stroke (Left occipital) [Z86.73]             Anticoagulation Episode Summary     INR check location:   Clinic Lab    Preferred lab:       Send INR reminders to:    [facility]    Comments:         Anticoagulation Care Providers     Provider Role Specialty Phone number    Juan Hyde MD Referring Cardiology [phone]          INR History:  Anticoagulation Monitoring 8/13/2019 8/19/2019 8/26/2019   INR 3.10 3.60 3.00   INR Date 8/13/2019 8/19/2019 8/26/2019   INR Goal 2.0-3.0 2.0-3.0 2.0-3.0   Trend Down Down Same   Last Week Total 20 mg 20 mg 17.5 mg   Next Week Total 20 mg 17.5 mg 17.5 mg   Sun 2.5 mg 2.5 mg 2.5 mg   Mon - 2.5 mg 2.5 mg   Tue 2.5 mg 2.5 mg 2.5 mg   Wed 2.5 mg 2.5 mg 2.5 mg   Thu 2.5 mg 2.5 mg 2.5 mg   Fri 2.5 mg 2.5 mg 2.5 mg   Sat 2.5 mg 2.5 mg 2.5 mg   Visit Report - - -   Some recent data might be hidden       Plan:  1. INR is Therapeutic today- see above in Anticoagulation Summary.   Provided instructions to [facility] with [facility] to Continue their warfarin regimen- see above in Anticoagulation Summary.  2. Follow up in 1 week      Pravin Murphy RPH

## 2019-09-01 PROBLEM — T85.618A SHUNT MALFUNCTION: Status: RESOLVED | Noted: 2017-12-05 | Resolved: 2019-01-01

## 2019-09-01 PROBLEM — K52.9 GASTROENTERITIS: Status: ACTIVE | Noted: 2019-01-01

## 2019-09-01 PROBLEM — R77.8 ELEVATED TROPONIN: Status: ACTIVE | Noted: 2019-01-01

## 2019-09-01 PROBLEM — Z53.21 PATIENT LEFT AFTER TRIAGE: Status: RESOLVED | Noted: 2017-01-25 | Resolved: 2019-01-01

## 2019-09-01 PROBLEM — J81.0 ACUTE PULMONARY EDEMA (HCC): Status: RESOLVED | Noted: 2019-01-01 | Resolved: 2019-01-01

## 2019-09-01 PROBLEM — I48.91 ATRIAL FIBRILLATION WITH TACHYCARDIC VENTRICULAR RATE (HCC): Status: ACTIVE | Noted: 2019-01-01

## 2019-09-01 NOTE — PLAN OF CARE
Problem: Skin Injury Risk (Adult)  Intervention: Promote/Optimize Nutrition   09/01/19 1144   Nutrition Interventions   Oral Nutrition Promotion calorie dense liquids provided;other (see comments)  (Novasource renal supple TID; encourage PO intake)

## 2019-09-01 NOTE — PROGRESS NOTES
LOS: 0 days   Patient Care Team:  Doroteo Victoria MD as PCP - General (Family Medicine)  Pravin Oneill DPM as PCP - Claims Attributed    Subjective     Patient reports the only pain he has in his right heel which apparently he has had for months and is not any different.  He says his memory is really bad he does not really remember why he came to the hospital yesterday apparently he does have dementia.  He denies cough or shortness of breath denies chest pain denies nausea or vomiting or diarrhea denies any abdominal pain denies any urinary urgency or pain with urination.  Apparently he does not make a whole lot of urine anymore he is on dialysis.    Review of Systems:          Objective     Vital Signs  Vital Sign Min/Max for last 24 hours  Temp  Min: 96.3 °F (35.7 °C)  Max: 101.9 °F (38.8 °C)   BP  Min: 114/62  Max: 180/83   Pulse  Min: 91  Max: 144   Resp  Min: 20  Max: 32   SpO2  Min: 80 %  Max: 99 %   Flow (L/min)  Min: 2  Max: 5   Weight  Min: 84.7 kg (186 lb 11.7 oz)  Max: 88.9 kg (196 lb)        Ventilator/Non-Invasive Ventilation Settings (From admission, onward)    None                       Body mass index is 26.79 kg/m².  I/O last 3 completed shifts:  In: 250 [IV Piggyback:250]  Out: -   No intake/output data recorded.        Physical Exam:  General Appearance: Well-developed elderly white male he is resting in bed he does not appear in any acute distress.  He is on 3 L nasal cannula to with oxygen saturations of 100%  Eyes: Conjunctiva are clear and anicteric pupils are about 2-1/2 mm equal and reactive to light  ENT: Nasal and oral mucous membranes are both a little dry no erythema or exudates Mallampati type II airway nasal septum midline.  Neck: No adenopathy or thyromegaly no jugular venous distention trachea midline neck is supple  Lungs: He has a few crackles in the left base no percussible dullness no wheezes nonlabored symmetric expansion  Cardiac: Irregularly irregular heart  rates about 90 on Cardizem drip at 5 mg an hour  Abdomen: Soft nontender no palpable hepatosplenomegaly or masses active bowel sounds  : Not examined  Musculoskeletal: He does have a lesion on his right heel it is a fairly deep wound but it looks like a wound that is closed significantly there is no erythema there is no purulence no drainage  Skin: No jaundice no petechiae skin is warm and dry to touch  Neuro: Patient is very pleasant he is cooperative he is following commands with all 4 extremities.  He knows who he is in the 80s at the hospital he does not know the date time year  Extremities/P Vascular: No clubbing or cyanosis he has palpable radial pulses bilaterally and dorsalis pedis and dorsalis pedis are not the strongest  MSE: He seems pretty appropriate       Labs:  Results from last 7 days   Lab Units 09/01/19  0054   GLUCOSE mg/dL 217*   SODIUM mmol/L 137   POTASSIUM mmol/L 3.7   CO2 mmol/L 25.0   CHLORIDE mmol/L 95*   ANION GAP mmol/L 17.0*   CREATININE mg/dL 5.52*   BUN mg/dL 35*   BUN / CREAT RATIO  6.3*   CALCIUM mg/dL 9.0   EGFR IF NONAFRICN AM mL/min/1.73 10*   ALK PHOS U/L 79   TOTAL PROTEIN g/dL 6.9   ALT (SGPT) U/L 13   AST (SGOT) U/L 12   BILIRUBIN mg/dL 1.2   ALBUMIN g/dL 3.70   GLOBULIN gm/dL 3.2     Estimated Creatinine Clearance: 13.6 mL/min (A) (by C-G formula based on SCr of 5.52 mg/dL (H)).      Results from last 7 days   Lab Units 09/01/19  0054   WBC 10*3/mm3 15.07*   RBC 10*6/mm3 4.05*   HEMOGLOBIN g/dL 12.0*   HEMATOCRIT % 38.7   MCV fL 95.6   MCH pg 29.6   MCHC g/dL 31.0*   RDW % 15.6*   RDW-SD fl 55.3*   MPV fL 9.8   PLATELETS 10*3/mm3 187   NEUTROPHIL % % 89.6*   LYMPHOCYTE % % 4.0*   MONOCYTES % % 5.3   EOSINOPHIL % % 0.2*   BASOPHIL % % 0.4   IMM GRAN % % 0.5   NEUTROS ABS 10*3/mm3 13.51*   LYMPHS ABS 10*3/mm3 0.60*   MONOS ABS 10*3/mm3 0.80   EOS ABS 10*3/mm3 0.03   BASOS ABS 10*3/mm3 0.06   IMMATURE GRANS (ABS) 10*3/mm3 0.07*   NRBC /100 WBC 0.0     Results from last 7 days    Lab Units 09/01/19  0339   PH, ARTERIAL pH units 7.444   PO2 ART mm Hg 71.7*   PCO2, ARTERIAL mm Hg 36.6   HCO3 ART mmol/L 25.1     Results from last 7 days   Lab Units 09/01/19  0054   TROPONIN T ng/mL 0.090*             Results from last 7 days   Lab Units 09/01/19  0650 09/01/19 0054   LACTATE mmol/L 1.9 2.7*   PROCALCITONIN ng/mL  --  0.35*     Results from last 7 days   Lab Units 09/01/19  0054 08/26/19   INR  1.77* 3.00     Microbiology Results (last 10 days)     Procedure Component Value - Date/Time    Respiratory Panel, PCR - Swab, Nasopharynx [[id number]]  (Normal) Collected:  09/01/19 0119    Lab Status:  Final result Specimen:  Swab from Nasopharynx Updated:  09/01/19 0226     ADENOVIRUS, PCR Not Detected     Coronavirus 229E Not Detected     Coronavirus HKU1 Not Detected     Coronavirus NL63 Not Detected     Coronavirus OC43 Not Detected     Human Metapneumovirus Not Detected     Human Rhinovirus/Enterovirus Not Detected     Influenza B PCR Not Detected     Parainfluenza Virus 1 Not Detected     Parainfluenza Virus 2 Not Detected     Parainfluenza Virus 3 Not Detected     Parainfluenza Virus 4 Not Detected     Bordetella pertussis pcr Not Detected     Influenza A H1 2009 PCR Not Detected     Chlamydophila pneumoniae PCR Not Detected     Mycoplasma pneumo by PCR Not Detected     Influenza A PCR Not Detected     Influenza A H3 Not Detected     Influenza A H1 Not Detected     RSV, PCR Not Detected     Bordetella parapertussis PCR Not Detected                donepezil 10 mg Oral Nightly   DULoxetine 60 mg Oral Daily   heparin (porcine) 5,000 Units Subcutaneous Q8H   insulin glargine 30 Units Subcutaneous QAM   insulin lispro 0-14 Units Subcutaneous 4x Daily With Meals & Nightly   metoprolol succinate XL 75 mg Oral Q24H   piperacillin-tazobactam 3.375 g Intravenous Q12H   sodium chloride 10 mL Intravenous Q12H   tamsulosin 0.4 mg Oral Daily       diltiaZEM 5-15 mg/hr Last Rate: 5 mg/hr (09/01/19 0223)    Pharmacy to dose vancomycin     Pharmacy to dose warfarin         Diagnostics:  Xr Chest 1 View    Result Date: 9/1/2019  PORTABLE CHEST X-RAY  CLINICAL HISTORY: fever  COMPARISON: 06/25/2019  FINDINGS: Portable AP view of the chest was obtained with overlying monitor leads in place. Patient is somewhat kyphotic and rotated to the left. The lungs are under aerated. Pulmonary edema clearly show significant improvement. There are calcified residua of granulomatous disease present. There is no active airspace disease. Stable cardiomegaly. No significant pleural fluid.        Improved edema/CHF.    This report was finalized on 9/1/2019 1:13 AM by Miguelangel Alvarez M.D.      Results for orders placed during the hospital encounter of 05/02/17   SCANNED - ECHOCARDIOGRAM       I personally reviewed the chest x-ray there is mild vascular congestion I do not see any definite pleural effusions I am not even certain there is that much edema.    Active Hospital Problems    Diagnosis  POA   • Atrial fibrillation with tachycardic ventricular rate (CMS/HCC) [I48.91]  Yes      Resolved Hospital Problems   No resolved problems to display.         Assessment/Plan     1. Sepsis source not clear he had a trivial procalcitonin elevation in the face of chronic renal failure, viral respiratory panel was negative.  He did have a leukocytosis fever last night I am just really sort of at a loss as to the source.  We will watch cultures and continue antibiotics  2. A. fib with rapid ventricular response he is chronically anticoagulated with Coumadin but was subtherapeutic on admission.  He is on Cardizem now rate is controlled to try and wean this off.  Home metoprolol and see if this will control.  3. Acute on chronic systolic CHF he may be a little volume overloaded and may benefit from dialysis  4. Lactic acidosis probably secondary to 1 through 3 above resolved  5. End-stage renal disease on hemodialysis nephrology to see  6. Elevated  troponin possibly related to chronic kidney disease we will have to follow  7. Obstructive sleep apnea he is Pap intolerant  8. Coronary artery disease  9. Diabetes mellitus type 2 insulin requiring  10. Anemia chronic disease  11. Dementia seems to be reasonably advanced  12. BPH  13. Mild hypoxia requiring supplemental O2  14. Right heel wound looks like it is healing well we will continue continue to keep it elevated off the bed and dressings and wound care to follow.  I doubt that this is the source of his infection.    Plan for disposition:    Isreal Rodriguez MD  09/01/19  8:10 AM    Time: I spent about 40 minutes on patient's care today thus far

## 2019-09-01 NOTE — PLAN OF CARE
Problem: Fall Risk (Adult)  Goal: Identify Related Risk Factors and Signs and Symptoms  Outcome: Ongoing (interventions implemented as appropriate)    Goal: Absence of Fall  Outcome: Ongoing (interventions implemented as appropriate)      Problem: Arrhythmia/Dysrhythmia (Symptomatic) (Adult)  Goal: Signs and Symptoms of Listed Potential Problems Will be Absent, Minimized or Managed (Arrhythmia/Dysrhythmia)  Outcome: Ongoing (interventions implemented as appropriate)      Problem: Patient Care Overview  Goal: Plan of Care Review  Outcome: Ongoing (interventions implemented as appropriate)   09/01/19 9147   OTHER   Outcome Summary Admitted to ICU from ER with suspected sepsis and exacerbation of chronic afib Cardizem gtt weaned off. BP stable throughout the day. He has had some mild bouts of nausea without vomiting and has refused all meals and supplements. Plan for HD in the AM. He is oliguric and voided once today spilling the entire urinal in the bed.  Unable to obtain clean catch urine for culture.  Cardiology work-up echo pending. denies any CP.   1 of 4 blood culture  bottles positive.  See report.  Pharm is dosing Vancomycin.       Goal: Individualization and Mutuality  Outcome: Ongoing (interventions implemented as appropriate)    Goal: Discharge Needs Assessment  Outcome: Ongoing (interventions implemented as appropriate)    Goal: Interprofessional Rounds/Family Conf  Outcome: Ongoing (interventions implemented as appropriate)      Problem: Skin Injury Risk (Adult)  Goal: Identify Related Risk Factors and Signs and Symptoms  Outcome: Ongoing (interventions implemented as appropriate)    Goal: Skin Health and Integrity  Outcome: Ongoing (interventions implemented as appropriate)      Problem: Sepsis/Septic Shock (Adult)  Goal: Signs and Symptoms of Listed Potential Problems Will be Absent, Minimized or Managed (Sepsis/Septic Shock)  Outcome: Ongoing (interventions implemented as appropriate)

## 2019-09-01 NOTE — ED TRIAGE NOTES
Generalized weakness, not acting himself per family.  Diarrhea for a few days earlier in the week.   Dialysis pt had dialysis yesterday.  Pt had nausea vomiting upon arrival.  Family reported to EMS that vomiting is not unusual when the pt reclines or lays flat.

## 2019-09-01 NOTE — PROGRESS NOTES
"Pharmacy to Dose Vancomycin    Patient: Deacon Martin (I373/1, [id number]  LOS: 0 days )  Relevant clinical data and objective history reviewed:  74 y.o. male 177.8 cm (70\") 84.7 kg (186 lb 11.7 oz)  Body mass index is 26.79 kg/m².  he has a past medical history of Atrial fibrillation (CMS/[facility]), CAD (coronary artery disease), CHF (congestive heart failure) (CMS/HCC), Edema, ESRD (end stage renal disease) on dialysis (CMS/HCC) (2016), Gout, HBP (high blood pressure), anticoagulation, Hyperlipidemia, Memory problem, DARSHAN (obstructive sleep apnea), Other hemorrhagic disorder due to intrinsic circulating anticoagulants, antibodies, or inhibitors (CMS/HCC), Stroke (CMS/HCC), Type 2 diabetes mellitus (CMS/HCC), and Vitamin D deficiency.    Day #1  Duration: 7 days  Consult for Dr Patel  Indication: sepsis  Current dose: intermittent vancomycin dosing     Cultures:      Respiratory viral panel - neg   Blood x2 - in process   MRSA screen - to be collected    Other Abx: zosyn    Vancomycin Dosing history/levels:   0205 vanc 1750 mg IV x1      Results from last 7 days   Lab Units 19  0054   WBC 10*3/mm3 15.07*   HEMOGLOBIN g/dL 12.0*   HEMATOCRIT % 38.7   PLATELETS 10*3/mm3 187     Lab Results   Lab Value Date/Time    LACTATE 1.9 2019 0650    LACTATE 2.7 (C) 2019 0054    PROCALCITO 0.35 (H) 2019 0054     Temp (24hrs), Av °F (37.8 °C), Min:96.3 °F (35.7 °C), Max:101.9 °F (38.8 °C)    Serum creatinine: 5.52 mg/dL (H) 19 0054  Estimated creatinine clearance: 13.6 mL/min (A)    Assessment/Plan:   - ESRD on HD MWF, history of MRSA and ESBL klebs in  and  respectively    - will start intermittent vancomycin dosing, random level in AM, with further doses/levels pending HD plan    - CBC, renal function panel, MRSA screen pending    Abad Lyles PharmVASHTI  19 8:47 AM    "

## 2019-09-01 NOTE — CONSULTS
"Adult Nutrition  Assessment/PES    Patient Name:  Deacon Martin  YOB: 1945  MRN: [id number]  Admit Date:  9/1/2019    Assessment Date:  9/1/2019    Comments:  Database consult per nurse admission screen: non-healing wound + pressure injury. Pt gets OP treatment for chronic R heel wound; also with unstageable coccyx PI and R second toe ulcerated area (stage 1). Have added Novasource renal oral supplements TID. May benefit from renal-appropriate mvi/mineral to support wound healing. Will continue to monitor nutrition & skin status. Encourage PO intake. Following.     Reason for Assessment     Row Name 09/01/19 1119          Reason for Assessment    Reason For Assessment  nurse/nurse practitioner consult     Diagnosis  cardiac disease;diabetes diagnosis/complications;renal disease;infection/sepsis;neurologic conditions Sepsis/fever, Afib; DM2, HTN, CKD 4, Dementia     Identified At Risk by Screening Criteria  large or nonhealing wound, burn or pressure injury Gets OP treatment at wound clinic for bilat foot wounds         Nutrition/Diet History     Row Name 09/01/19 1124          Nutrition/Diet History    Typical Food/Fluid Intake  Poor intake Friday/Saturday, vomited x 1 getting in ambulance. Typically gets HD on MWF schedule. Had been very weak, sleeping, not arousing for much of anything. Has cognitive deficits at baseline - has been confused here     Factors Affecting Nutritional Intake  pain;nausea;impaired cognitive status/motor control R heel chronic pain         Anthropometrics     Row Name 09/01/19 1120 09/01/19 0525       Anthropometrics    Height  --  177.8 cm (70\")    Weight  --  84.7 kg (186 lb 11.7 oz)       Admit Weight    Admit Weight  84.4 kg (186 lb)  --       Ideal Body Weight (IBW)    Ideal Body Weight (IBW) (kg)  --  76.48    % Ideal Body Weight  --  110.75       Usual Body Weight (UBW)    Usual Body Weight  -- 177-187# standing weights over 2-3 months; unsure of dry weight  --       " "Body Mass Index (BMI)    BMI (kg/m2)  --  26.85    BMI Assessment  BMI 25-29.9: overweight  --        Labs/Tests/Procedures/Meds     Row Name 09/01/19 1120          Labs/Procedures/Meds    Lab Results Reviewed  reviewed, pertinent     Lab Results Comments  Glu^, Procalc, BUN/Cr        Diagnostic Tests/Procedures    Diagnostic Test/Procedure Reviewed  reviewed, pertinent     Diagnostic Test/Procedures Comments  CXR - CHF, improved edema noted        Medications    Pertinent Medications Reviewed  reviewed, pertinent     Pertinent Medications Comments  Aricept, insulin, heparin, coumadin, cardizem         Physical Findings     Row Name 09/01/19 1122          Physical Findings    Overall Physical Appearance  other (see comments);on oxygen therapy noted to make urine Q 1-2 days     Skin  non-healing wound(s);pressure injury bilat medial coccyx PI, RLE (heel) and R toe pressure injury         Estimated/Assessed Needs     Row Name 09/01/19 1126 09/01/19 0525       Calculation Measurements    Weight Used For Calculations  84.4 kg (186 lb)  --    Height  --  177.8 cm (70\")       Estimated/Assessed Needs    Additional Documentation  Protein Requirements (Group);Fluid Requirements (Group);KCAL/KG (Group)  --       KCAL/KG    KCAL/KG  30 Kcal/Kg (kcal)  --    30 Kcal/Kg (kcal)  2531.07  --       Protein Requirements    Est Protein Requirement Amount (gms/kg)  1.2 gm protein 1.2-1.3 g/kg (HD pt) = 101-109 gm pro/d  --    Estimated Protein Requirements (gms/day)  101.24  --       Fluid Requirements    Estimated Fluid Requirement Method  RDA Method  --     2117 cc (25 cc/kg)  --        Nutrition Prescription Ordered     Row Name 09/01/19 1127          Nutrition Prescription PO    Common Modifiers  Cardiac;Renal         Evaluation of Received Nutrient/Fluid Intake     Row Name 09/01/19 1131          PO Evaluation    % PO Intake  didn't eat breakfast               Problem/Interventions:  Problem 1     Row Name 09/01/19 1132          " Nutrition Diagnoses Problem 1    Problem 1  Increased Nutrient Needs     Macronutrient  Kcal;Protein;Fluid     Etiology (related to)  Medical Diagnosis     Skin  Pressure injury;Non healing wound         Problem 2     Row Name 09/01/19 1132          Nutrition Diagnoses Problem 2    Problem 2  Predicted Suboptimal Intake     Etiology (related to)  Factors Affecting Nutrition     Infectious Disease  Sepsis;Other (comment) fever     Neurological  Dementia     Renal  ESRD;Hemodialysis;CKD     Appetite  Poor at this Time     Signs/Symptoms (evidenced by)  Report of Minimal PO Intake               Intervention Goal     Row Name 09/01/19 1133          Intervention Goal    General  Maintain nutrition;Reduce/improve symptoms;Meet nutritional needs for age/condition;Disease management/therapy     PO  Tolerate PO;Increase intake;PO intake (%)     PO Intake %  75 %     Weight  No significant weight loss         Nutrition Intervention     Row Name 09/01/19 1133          Nutrition Intervention    RD/Tech Action  Interview for preference;Encourage intake;Follow Tx progress;Care plan reviewd;Recommend/ordered     Recommended/Ordered  Supplement         Nutrition Prescription     Row Name 09/01/19 1134          Nutrition Prescription PO    PO Prescription  Begin/change supplement     Supplement  Nova Renal     Supplement Frequency  3 times a day     New PO Prescription Ordered?  Yes         Education/Evaluation     Row Name 09/01/19 1135          Education    Education  Education not appropriate at this time     Please explain  Patient confusion        Monitor/Evaluation    Monitor  Per protocol           Electronically signed by:  China Cuadra RD  09/01/19 11:36 AM

## 2019-09-01 NOTE — ED PROVIDER NOTES
EMERGENCY DEPARTMENT ENCOUNTER    Room Number:  21/21  Date of encounter:  9/1/2019  PCP: Doroteo Victoria MD  Historian: Patient, son and EMS      HPI:  Chief Complaint: Weakness  A complete HPI/ROS/PMH/PSH/SH/FH are limited due to: Nothing    Context: Deacon Martin is a 74 y.o. male who presents to the ED c/o generalized weakness is been present all day.  Son states that he is just been sleeping most of the day and they can get him to get up and do anything.  He did have one episode of vomiting when getting in the ambulance but none before or since.  He does do dialysis on Monday Wednesday and Friday and he last went on Friday as scheduled.  He did have some diarrhea earlier in the week but that has resolved.  He denies any chest pain or shortness of breath.  He denies cough or abdominal pain.  He is a dialysis patient but says he will make urine every day or two.  On the way to the hospital EMS noted that he was in atrial fibrillation with a rapid ventricular response.  Heart rate was in the 150s so they gave him 20 mg of IV diltiazem.  Fever was noted on arrival here.        PAST MEDICAL HISTORY  Active Ambulatory Problems     Diagnosis Date Noted   • Cervical spinal stenosis 03/21/2016   • Cervical osteoarthritis 03/21/2016   • Cervical pain 03/21/2016   • Chronic venous insufficiency 03/21/2016   • Disturbance, visual, subjective 03/21/2016   • Poorly controlled type 2 diabetes mellitus (CMS/[facility]) 03/21/2016   • Essential hypertension 03/21/2016   • Hypertriglyceridemia 03/21/2016   • BPH (benign prostatic hypertrophy) with urinary obstruction 06/17/2016   • Atrial fibrillation (CMS/[facility]) 06/17/2016   • Anticoagulation goal of INR 2 to 3 07/29/2016   • Venous stasis ulcers of both lower extremities (CMS/[facility]) 10/07/2016   • Altered mental status 11/12/2016   • Gouty arthritis 11/14/2016   • Volume overload due to LUIS A 11/14/2016   • LUIS A (acute kidney injury) (CMS/[facility]) 11/14/2016   • CKD (chronic kidney disease)  stage 4, GFR 15-29 ml/min (CMS/HCC) 11/14/2016   • Urinary incontinence without sensory awareness 11/14/2016   • Hemianopia, homonymous, right 11/14/2016   • History of cardioembolic stroke (Left occipital) 11/14/2016   • Hypersomnolence 11/14/2016   • Vascular dementia 11/14/2016   • Anemia of chronic renal failure, stage 4 (severe) (CMS/HCC) 11/14/2016   • Left ventricular hypertrophy 11/14/2016   • Nonrheumatic aortic valve stenosis 11/14/2016   • Patient left after triage 01/25/2017   • Diabetic peripheral neuropathy (CMS/HCC) 06/27/2017   • Shunt malfunction 12/05/2017   • Neuropathy associated with endocrine disorder (CMS/HCC) 06/21/2018   • AF (atrial fibrillation) (CMS/HCC) 07/03/2018   • ESRD (end stage renal disease) on dialysis (CMS/HCC) 11/21/2018   • Acute on chronic combined systolic and diastolic CHF (congestive heart failure) (CMS/HCC) 11/21/2018   • Type 2 diabetes mellitus (CMS/HCC) 11/21/2018   • HTN (hypertension) 11/21/2018   • Vascular dementia without behavioral disturbance 11/21/2018   • Anticoagulated on Coumadin 11/21/2018   • Atrial fibrillation (CMS/HCC) 11/21/2018   • Acute pulmonary edema (CMS/HCC) 01/07/2019   • Ulcer of heel due to diabetes mellitus (CMS/HCC) 05/24/2019   • Sepsis (CMS/HCC) 05/25/2019   • Osteomyelitis of right foot (CMS/HCC) 05/25/2019   • Atrial fibrillation with rapid ventricular response (CMS/HCC) 06/24/2019   • Chronic anticoagulation 06/25/2019   • Anemia of chronic kidney failure 06/25/2019     Resolved Ambulatory Problems     Diagnosis Date Noted   • Controlled diabetes mellitus (CMS/HCC) 03/21/2016   • Diabetes mellitus type 2, uncontrolled (CMS/HCC) 03/21/2016   • Diabetes (CMS/HCC) 03/21/2016   • Unspecified visual disturbance 03/21/2016   • Hyperuricemia 03/21/2016     Past Medical History:   Diagnosis Date   • Atrial fibrillation (CMS/[facility])    • CAD (coronary artery disease)    • CHF (congestive heart failure) (CMS/HCC)    • Edema    • ESRD (end stage  renal disease) on dialysis (CMS/[facility]) 11/2016   • Gout    • HBP (high blood pressure)    • HX: anticoagulation    • Hyperlipidemia    • Memory problem    • DARSHAN (obstructive sleep apnea)    • Other hemorrhagic disorder due to intrinsic circulating anticoagulants, antibodies, or inhibitors (CMS/[facility])    • Stroke (CMS/[facility])    • Type 2 diabetes mellitus (CMS/[facility])    • Vitamin D deficiency          PAST SURGICAL HISTORY  Past Surgical History:   Procedure Laterality Date   • ARTERIOVENOUS FISTULA/SHUNT SURGERY Right 11/21/2016    Procedure: RT BRACHIAL CEPHALIC FISTULA;  Surgeon: Ar Muro MD;  Location: [facility] OR;  Service:    • ARTERIOVENOUS FISTULA/SHUNT SURGERY W/ HEMODIALYSIS CATHETER INSERTION Right 12/6/2017    Procedure: RT. ARM FISTULA REVISION/POSS. TUNNELED CATH;  Surgeon: Ar Muro MD;  Location: [facility] OR;  Service:    • COLONOSCOPY  2003   • INCISION AND DRAINAGE LEG Right 5/28/2019    Procedure: RIGHT FOOT DEBRIDEMENT;  Surgeon: Miguelangel Wynne MD;  Location: [facility] OR;  Service: Vascular   • INSERTION HEMODIALYSIS CATHETER N/A 11/25/2016    Procedure: TUNNEL CATHETER INSERTION;  Surgeon: Silvia Lim Jr., MD;  Location: [facility] OR;  Service:          FAMILY HISTORY  Family History   Problem Relation Age of Onset   • Arthritis Mother    • Diabetes Father    • Heart disease Father    • Hyperlipidemia Father    • Hypertension Father    • Obesity Father    • Stroke Father    • Cancer Brother         esophagus   • Heart disease Maternal Grandfather          SOCIAL HISTORY  Social History     Socioeconomic History   • Marital status:      Spouse name: Not on file   • Number of children: 6   • Years of education: Not on file   • Highest education level: Not on file   Occupational History   • Occupation: retired   Tobacco Use   • Smoking status: Former Smoker     Packs/day: 1.00     Years: 5.00     Pack years: 5.00     Types: Cigarettes     Last attempt to quit: 1966      Years since quittin.7   • Smokeless tobacco: Never Used   • Tobacco comment: rare caffeine   Substance and Sexual Activity   • Alcohol use: Yes     Alcohol/week: 0.6 oz     Types: 1 Cans of beer per week     Comment: occasionally    • Drug use: No   • Sexual activity: Defer         ALLERGIES  Patient has no known allergies.        REVIEW OF SYSTEMS  Review of Systems   Constitutional: Positive for activity change, fatigue and fever. Negative for appetite change.   HENT: Negative for congestion and sore throat.    Eyes: Negative.    Respiratory: Negative for cough and shortness of breath.    Cardiovascular: Negative for chest pain and leg swelling.   Gastrointestinal: Positive for diarrhea and vomiting. Negative for abdominal pain.   Endocrine: Negative.    Genitourinary: Negative for decreased urine volume and dysuria.   Musculoskeletal: Negative for neck pain.   Skin: Positive for wound (Wounds on both feet are being treated by the wound center). Negative for rash.   Allergic/Immunologic: Negative.    Neurological: Positive for weakness. Negative for numbness and headaches.   Hematological: Negative.    Psychiatric/Behavioral: Negative.    All other systems reviewed and are negative.           PHYSICAL EXAM      GENERAL: 74-year-old white male who is drowsy but wakes easily.  He is in no acute distress.  HENT: NCAT, neck supple, trachea midline, mucous membranes are moist  EYES: no scleral icterus, PERRL, normal conjunctiva  CV: Irregularly irregular and tachycardic,3/6 systolic murmur present  RESPIRATORY: unlabored effort, CTAB, no rales or wheezing  ABDOMEN: soft, non-tender, non-distended  MUSCULOSKELETAL: no gross deformity, no edema, he has a wound to the right heel that is packed, there is no surrounding cellulitis.  NEURO: Somnolent but arousable, CN II-XII grossly intact, sensory and motor function of extremities grossly intact.  SKIN: warm, dry, no rash  PSYCH: Flat affect    Vital signs and  nursing notes reviewed.          LAB RESULTS  Recent Results (from the past 24 hour(s))   Comprehensive Metabolic Panel    Collection Time: 09/01/19 12:54 AM   Result Value Ref Range    Glucose 217 (H) 65 - 99 mg/dL    BUN 35 (H) 8 - 23 mg/dL    Creatinine 5.52 (H) 0.76 - 1.27 mg/dL    Sodium 137 136 - 145 mmol/L    Potassium 3.7 3.5 - 5.2 mmol/L    Chloride 95 (L) 98 - 107 mmol/L    CO2 25.0 22.0 - 29.0 mmol/L    Calcium 9.0 8.6 - 10.5 mg/dL    Total Protein 6.9 6.0 - 8.5 g/dL    Albumin 3.70 3.50 - 5.20 g/dL    ALT (SGPT) 13 1 - 41 U/L    AST (SGOT) 12 1 - 40 U/L    Alkaline Phosphatase 79 39 - 117 U/L    Total Bilirubin 1.2 0.2 - 1.2 mg/dL    eGFR Non African Amer 10 (L) >60 mL/min/1.73    eGFR  African Amer      Globulin 3.2 gm/dL    A/G Ratio 1.2 g/dL    BUN/Creatinine Ratio 6.3 (L) 7.0 - 25.0    Anion Gap 17.0 (H) 5.0 - 15.0 mmol/L   Protime-INR    Collection Time: 09/01/19 12:54 AM   Result Value Ref Range    Protime 20.3 (H) 11.7 - 14.2 Seconds    INR 1.77 (H) 0.90 - 1.10   Troponin    Collection Time: 09/01/19 12:54 AM   Result Value Ref Range    Troponin T 0.090 (C) 0.000 - 0.030 ng/mL   Lactic Acid, Plasma    Collection Time: 09/01/19 12:54 AM   Result Value Ref Range    Lactate 2.7 (C) 0.5 - 2.0 mmol/L   Procalcitonin    Collection Time: 09/01/19 12:54 AM   Result Value Ref Range    Procalcitonin 0.35 (H) 0.10 - 0.25 ng/mL   CBC Auto Differential    Collection Time: 09/01/19 12:54 AM   Result Value Ref Range    WBC 15.07 (H) 3.40 - 10.80 10*3/mm3    RBC 4.05 (L) 4.14 - 5.80 10*6/mm3    Hemoglobin 12.0 (L) 13.0 - 17.7 g/dL    Hematocrit 38.7 37.5 - 51.0 %    MCV 95.6 79.0 - 97.0 fL    MCH 29.6 26.6 - 33.0 pg    MCHC 31.0 (L) 31.5 - 35.7 g/dL    RDW 15.6 (H) 12.3 - 15.4 %    RDW-SD 55.3 (H) 37.0 - 54.0 fl    MPV 9.8 6.0 - 12.0 fL    Platelets 187 140 - 450 10*3/mm3    Neutrophil % 89.6 (H) 42.7 - 76.0 %    Lymphocyte % 4.0 (L) 19.6 - 45.3 %    Monocyte % 5.3 5.0 - 12.0 %    Eosinophil % 0.2 (L) 0.3 - 6.2 %     Basophil % 0.4 0.0 - 1.5 %    Immature Grans % 0.5 0.0 - 0.5 %    Neutrophils, Absolute 13.51 (H) 1.70 - 7.00 10*3/mm3    Lymphocytes, Absolute 0.60 (L) 0.70 - 3.10 10*3/mm3    Monocytes, Absolute 0.80 0.10 - 0.90 10*3/mm3    Eosinophils, Absolute 0.03 0.00 - 0.40 10*3/mm3    Basophils, Absolute 0.06 0.00 - 0.20 10*3/mm3    Immature Grans, Absolute 0.07 (H) 0.00 - 0.05 10*3/mm3    nRBC 0.0 0.0 - 0.2 /100 WBC   Lactic Acid, Reflex Timer (This will reflex a repeat order 3-3:15 hours after ordered.)    Collection Time: 09/01/19 12:54 AM   Result Value Ref Range    Extra Tube Hold for add-ons.    Respiratory Panel, PCR - Swab, Nasopharynx    Collection Time: 09/01/19  1:19 AM   Result Value Ref Range    ADENOVIRUS, PCR Not Detected Not Detected    Coronavirus 229E Not Detected Not Detected    Coronavirus HKU1 Not Detected Not Detected    Coronavirus NL63 Not Detected Not Detected    Coronavirus OC43 Not Detected Not Detected    Human Metapneumovirus Not Detected Not Detected    Human Rhinovirus/Enterovirus Not Detected Not Detected    Influenza B PCR Not Detected Not Detected    Parainfluenza Virus 1 Not Detected Not Detected    Parainfluenza Virus 2 Not Detected Not Detected    Parainfluenza Virus 3 Not Detected Not Detected    Parainfluenza Virus 4 Not Detected Not Detected    Bordetella pertussis pcr Not Detected Not Detected    Influenza A H1 2009 PCR Not Detected Not Detected    Chlamydophila pneumoniae PCR Not Detected Not Detected    Mycoplasma pneumo by PCR Not Detected Not Detected    Influenza A PCR Not Detected Not Detected    Influenza A H3 Not Detected Not Detected    Influenza A H1 Not Detected Not Detected    RSV, PCR Not Detected Not Detected    Bordetella parapertussis PCR Not Detected Not Detected   Blood Gas, Arterial    Collection Time: 09/01/19  3:39 AM   Result Value Ref Range    Site Arterial: right radial     Jermaine's Test Positive     pH, Arterial 7.444 7.350 - 7.450 pH units    pCO2, Arterial  36.6 35.0 - 45.0 mm Hg    pO2, Arterial 71.7 (L) 80.0 - 100.0 mm Hg    HCO3, Arterial 25.1 22.0 - 28.0 mmol/L    Base Excess, Arterial 1.1 0.0 - 2.0 mmol/L    O2 Saturation Calculated 94.9 92.0 - 99.0 %    Barometric Pressure for Blood Gas 754.7 mmHg    Modality Cannula     Flow Rate 2 lpm    Rate 15 Breaths/minute       Ordered the above labs and reviewed the results.        RADIOLOGY  Xr Chest 1 View    Result Date: 9/1/2019  PORTABLE CHEST X-RAY  CLINICAL HISTORY: fever  COMPARISON: 06/25/2019  FINDINGS: Portable AP view of the chest was obtained with overlying monitor leads in place. Patient is somewhat kyphotic and rotated to the left. The lungs are under aerated. Pulmonary edema clearly show significant improvement. There are calcified residua of granulomatous disease present. There is no active airspace disease. Stable cardiomegaly. No significant pleural fluid.        Improved edema/CHF.    This report was finalized on 9/1/2019 1:13 AM by Miguelangel Alvarez M.D.        I ordered the above noted radiological studies. Interpreted by radiologist. Images independently reviewed by me in PACS.    PROCEDURES  Critical Care  Performed by: Doroteo Soler MD  Authorized by: Doroteo Soler MD     Critical care provider statement:     Critical care time (minutes):  45    Critical care time was exclusive of:  Separately billable procedures and treating other patients    Critical care was necessary to treat or prevent imminent or life-threatening deterioration of the following conditions:  Sepsis and cardiac failure    Critical care was time spent personally by me on the following activities:  Discussions with consultants, evaluation of patient's response to treatment, examination of patient, ordering and performing treatments and interventions, ordering and review of laboratory studies, pulse oximetry, ordering and review of radiographic studies, re-evaluation of patient's condition and review of old charts    I assumed  direction of critical care for this patient from another provider in my specialty: no          EKG:           EKG time: 00 39  Rhythm/Rate: Atrial fibrillation, ventricular rate 110  P waves and SD: None  QRS, axis: Normal  ST and T waves: Nonspecific ST-T wave changes    Interpreted Contemporaneously by me, independently viewed  Comparison: No change from 6/24/2019        MEDICATIONS GIVEN IN ER  Medications   sodium chloride 0.9 % flush 10 mL (not administered)   diltiaZEM (CARDIZEM) 100 mg/100 mL (1 mg/mL) 0.9% NS (5 mg/hr Intravenous New Bag 9/1/19 0223)   piperacillin-tazobactam (ZOSYN) 3.375 g in iso-osmotic dextrose 50 ml (premix) (not administered)   acetaminophen (TYLENOL) tablet 1,000 mg (1,000 mg Oral Given 9/1/19 0059)   sodium chloride 0.9 % bolus 250 mL (0 mL Intravenous Stopped 9/1/19 0151)   vancomycin 1750 mg/500 mL 0.9% NS IVPB ([facility]) (1,750 mg Intravenous New Bag 9/1/19 0205)   ondansetron (ZOFRAN) injection 4 mg (4 mg Intravenous Given 9/1/19 0130)   diltiaZEM (CARDIZEM) injection 10 mg (10 mg Intravenous Given 9/1/19 0222)       PROGRESS AND CONSULTS  ED Course as of Sep 01 0438   Sun Sep 01, 2019   0324 Temperature did not come down after Tylenol.  He was given a small fluid bolus.  Remains tachycardic.  Diltiazem 10 mg bolus and diltiazem drip ordered.  Vancomycin and Zosyn ordered for sepsis without known cause.  When he falls asleep his O2 sat will drift into the mid 80s.  Improves on O2.  Suspect he has some component of sleep apnea.  Will check ABG prior to admission.  [EB]   0437 Cussed with Dr. Patel for admission to the ICU.  [EB]      ED Course User Index  [EB] Doroteo Soler MD           MEDICAL DECISION MAKING  Review of old medical records -patient was last admitted in June for A. fib with RVR and some congestive heart failure.  Improved after dialysis.    MDM           DIAGNOSIS  Final diagnoses:   Atrial fibrillation with tachycardic ventricular rate (CMS/HCC)   Sepsis, due to  unspecified organism (CMS/HCC)   ESRD (end stage renal disease) (CMS/[facility])         DISPOSITION  ADMISSION    Discussed treatment plan and reason for admission with pt/family and admitting physician.  Pt/family voiced understanding of the plan for admission for further testing/treatment as needed.       Doroteo Hyman MD  09/01/19 0438

## 2019-09-01 NOTE — PLAN OF CARE
Problem: Arrhythmia/Dysrhythmia (Symptomatic) (Adult)  Goal: Signs and Symptoms of Listed Potential Problems Will be Absent, Minimized or Managed (Arrhythmia/Dysrhythmia)  Outcome: Ongoing (interventions implemented as appropriate)   09/01/19 0803   Goal/Outcome Evaluation   Problems Assessed (Arrhythmia/Dysrhythmia) all   Problems Present (Dysrhythmia) electrophysiologic conduction defect;hemodynamic instability;situational response

## 2019-09-01 NOTE — CONSULTS
Date of Hospital Visit: 19  Encounter Provider: Herman Saavedra MD  Place of Service: [facility] CARDIOLOGY  Patient Name: Deacon Martin  :1945  Referral Provider: Kaylene Patel MD    Chief complaint: nausea, vomiting    History of Present Illness    Mr Martin is a 75yo man who follows with Dr. Hyde.  He has a history of chronic dCHF (but predominantly has an issue with volume overload due to ESRD).  He has chronic AF.  He has chronic venous stasis.  He has dementia.    An echo in 2017 showed an EF of 45% and mild AS.    He presents with nausea and vomiting.  His rate was rapid upon arrival.  He's been given IVF and diltiazem and his rate is in the 80s.  Troponins were checked and it's 0.3.  He is very very lethargic and can't remember anything so I can't get much history out of him, but he is lying on his back and is breathing very comfortably.  He was febrile upon admission.    Past Medical History:   Diagnosis Date   • Chronic atrial fibrillation (CMS/HCC)    • Chronic diastolic (congestive) heart failure (CMS/HCC)    • Edema    • ESRD (end stage renal disease) on dialysis (CMS/HCC) 2016    Dr Lawson   • Gout    • HX: anticoagulation    • Hyperlipidemia    • Hypertension    • Memory problem    • Nonrheumatic aortic valve stenosis 2016   • DARSHAN (obstructive sleep apnea)    • Other hemorrhagic disorder due to intrinsic circulating anticoagulants, antibodies, or inhibitors (CMS/HCC)     OTH HEMOR DISORDER DUE INTRIN   • Stroke (CMS/HCC)    • Type 2 diabetes mellitus (CMS/HCC)    • Vascular dementia 2016   • Vitamin D deficiency        Past Surgical History:   Procedure Laterality Date   • ARTERIOVENOUS FISTULA/SHUNT SURGERY Right 2016    Procedure: RT BRACHIAL CEPHALIC FISTULA;  Surgeon: Ar Muro MD;  Location: [facility];  Service:    • ARTERIOVENOUS FISTULA/SHUNT SURGERY W/ HEMODIALYSIS CATHETER INSERTION Right 2017    Procedure: RT.  ARM FISTULA REVISION/POSS. TUNNELED CATH;  Surgeon: Ar Muro MD;  Location: [facility] OR;  Service:    • COLONOSCOPY  2003   • INCISION AND DRAINAGE LEG Right 5/28/2019    Procedure: RIGHT FOOT DEBRIDEMENT;  Surgeon: Miguelangel Wynne MD;  Location: [facility] OR;  Service: Vascular   • INSERTION HEMODIALYSIS CATHETER N/A 11/25/2016    Procedure: TUNNEL CATHETER INSERTION;  Surgeon: Silvia Lim Jr., MD;  Location: [facility] OR;  Service:        Prior to Admission medications    Medication Sig Start Date End Date Taking? Authorizing Provider   ACCU-CHEK FASTCLIX LANCETS misc Use as directed for blood glucose testing CHECK 4 TIMES A DAY 6/27/18  Yes Doroteo Victoria MD   acetaminophen (TYLENOL) 325 MG tablet Take 2 tablets by mouth Every 6 (Six) Hours As Needed for mild pain (1-3) or fever. 11/27/16  Yes Doroteo Victoria MD   allopurinol (ZYLOPRIM) 100 MG tablet TAKE ONE TABLET BY MOUTH DAILY 8/5/19  Yes Doroteo Victoria MD   amLODIPine (NORVASC) 5 MG tablet Take 5 mg by mouth Daily.   Yes Zahraa Reaves MD   aspirin 81 MG EC tablet Take 81 mg by mouth Daily.   Yes Zahraa Reaves MD   atorvastatin (LIPITOR) 40 MG tablet Take 1 tablet by mouth Daily. 6/21/18  Yes Doroteo Victoria MD   calcium acetate (PHOS BINDER,) 667 MG capsule capsule Take 667 mg by mouth Daily. 5/25/17  Yes Zahraa Reaves MD   cephalexin (KEFLEX) 500 MG capsule Take 500 mg by mouth 2 (Two) Times a Day.   Yes Zahraa Reaves MD   CloNIDine (CATAPRES) 0.1 MG tablet Take 0.1 mg by mouth 2 (Two) Times a Day.   Yes Zahraa Reaves MD   donepezil (ARICEPT) 10 MG tablet Take 1 tablet by mouth Every Night. 7/5/19  Yes Toshia Rico APRN   DULoxetine (CYMBALTA) 60 MG capsule Take 60 mg by mouth Daily.   Yes Zahraa Reaves MD   glucose blood (ACCU-CHEK GUIDE) test strip Use as instructed to check 4 times daily 1/16/19  Yes Carlos Schafer III NP-C   glucose blood test strip Use as  instructed 18  Yes Doroteo Victoria MD   insulin NPH (humuLIN N,novoLIN N) 100 UNIT/ML injection Inject 30 Units under the skin 2 (Two) Times a Day Before Meals. 17  Yes Shauna Loya APRN   Insulin Pen Needle 32G X 4 MM misc 4 (Four) Times a Day As Needed. 14  Yes Zahraa Reaves MD   insulin regular (humuLIN R,novoLIN R) 100 UNIT/ML injection Inject 5 Units under the skin into the appropriate area as directed 3 (Three) Times a Day Before Meals. 19  Yes Deacon Matos MD   Lancets Misc. (ACCU-CHEK FASTCLIX LANCET) kit Use as directed for blood glucose testing 16  Yes Shauna Loya APRN   metoprolol succinate XL (TOPROL-XL) 25 MG 24 hr tablet Take 3 tablets by mouth Daily. 19  Yes Deacon Matos MD   tamsulosin (FLOMAX) 0.4 MG capsule 24 hr capsule TAKE ONE CAPSULE BY MOUTH DAILY 19  Yes Doroteo Victoria MD   torsemide (DEMADEX) 10 MG tablet Take 10 mg by mouth Daily.   Yes Zahraa Reaves MD   warfarin (COUMADIN) 5 MG tablet Take one tablets on Mon, Wed, 19  Yes Zahraa Reaves MD   sodium hypochlorite in sterile water irrigation topical solution 0.0625% Irrigate with 946 mL to the affected area as directed by provider Every 12 (Twelve) Hours. 19   Yury Traore MD   warfarin (COUMADIN) 2.5 MG tablet Take 1 tablet by mouth Every Night.  Patient taking differently: Take 2.5 mg by mouth Every Night. Take one tablet on Tu, Thurs, Sat, Sun 19   Deacon Matos MD       Social History     Socioeconomic History   • Marital status:      Spouse name: Not on file   • Number of children: 6   • Years of education: Not on file   • Highest education level: Not on file   Occupational History   • Occupation: retired   Tobacco Use   • Smoking status: Former Smoker     Packs/day: 1.00     Years: 5.00     Pack years: 5.00     Types: Cigarettes     Last attempt to quit: 1966     Years since quittin.7   • Smokeless tobacco: Never Used  "  • Tobacco comment: rare caffeine   Substance and Sexual Activity   • Alcohol use: Yes     Alcohol/week: 0.6 oz     Types: 1 Cans of beer per week     Comment: occasionally    • Drug use: No   • Sexual activity: Defer       Family History   Problem Relation Age of Onset   • Arthritis Mother    • Diabetes Father    • Heart disease Father    • Hyperlipidemia Father    • Hypertension Father    • Obesity Father    • Stroke Father    • Cancer Brother         esophagus   • Heart disease Maternal Grandfather        Review of Systems   Unable to perform ROS: Dementia        Objective:     Vitals:    09/01/19 0903 09/01/19 1003 09/01/19 1118 09/01/19 1204   BP: 136/74 155/76  139/77   BP Location:       Patient Position:       Pulse: 89 94  83   Resp:    24   Temp:   97.6 °F (36.4 °C)    TempSrc:   Oral    SpO2: 96% 92%  92%   Weight:       Height:         Body mass index is 26.79 kg/m².  Flowsheet Rows      First Filed Value   Admission Height  172.7 cm (68\") Documented at 09/01/2019 0034   Admission Weight  88.9 kg (196 lb) Documented at 09/01/2019 0034          Physical Exam   Constitutional: He appears lethargic. He has a sickly appearance.   HENT:   Head: Normocephalic.   Nose: Nose normal.   Mouth/Throat: Oropharynx is clear and moist.   Eyes: Conjunctivae and EOM are normal. Pupils are equal, round, and reactive to light.   Tiny pupils   Neck: Normal range of motion.   Cardiovascular: Normal rate and intact distal pulses. An irregularly irregular rhythm present.   Murmur heard.   Systolic murmur is present with a grade of 2/6.  Pulmonary/Chest: Breath sounds normal.   Abdominal: Soft. There is no tenderness.   Musculoskeletal: He exhibits edema (stasis changes, bandaged right heel).   Neurological: He appears lethargic.   Skin: Skin is warm. No erythema.   Vitals reviewed.              Lab Review:                Results from last 7 days   Lab Units 09/01/19  0054   SODIUM mmol/L 137   POTASSIUM mmol/L 3.7   CHLORIDE " mmol/L 95*   CO2 mmol/L 25.0   BUN mg/dL 35*   CREATININE mg/dL 5.52*   GLUCOSE mg/dL 217*   CALCIUM mg/dL 9.0     Results from last 7 days   Lab Units 09/01/19  1041 09/01/19  0054   TROPONIN T ng/mL 0.360* 0.090*     Results from last 7 days   Lab Units 09/01/19  0054   WBC 10*3/mm3 15.07*   HEMOGLOBIN g/dL 12.0*   HEMATOCRIT % 38.7   PLATELETS 10*3/mm3 187     Results from last 7 days   Lab Units 09/01/19  0054 08/26/19   INR  1.77* 3.00                     I personally viewed and interpreted the patient's EKG/Telemetry data -- EKG pending    Assessment/Plan:     1.  Sepsis  2.  Suspected gastroenteritis  3.  Chronic atrial fibrillation with rapid rate due to the above  4.  Elevated troponin without documented history of CAD  5.  History of dCHF/EF 45% in 2017  6.  Mild AS in 2017  7.  Diabetic foot ulcer  8.  Dementia    I am most suspicious that the Tn elevation is due to sepsis, rapid rate, and ESRD and not ACS.  He cannot offer much in the way of history.  I cannot see the EKG that was performed in the ED; it's not scanned in.  I will repeat it.      I'll check an echo to evaluate EF/wall motion and AS.     I think a conservative approach will be best in this gentleman.  Resume aspirin.

## 2019-09-01 NOTE — H&P
Patient Care Team:  Doroteo Victoria MD as PCP - General (Family Medicine)  Pravin Oneill DPM as PCP - Claims Attributed    Chief complaint:  Does not know.  He was brought for symptoms of weakness    History of present illness:  Subjective     This is a 74-year-old male patient with history of dementia and ESRD on HD.    History was limited.  Patient stated that he does not remember well and he appeared to Per ED physician, patient be confused.  He first told me that he does not remember why he came to the hospital but then said because his right foot was not doing well.    Per ED physician, patient was brought to the hospital for weakness and increased somnolence.  He was noted to be tachycardic with A. fib by EMS assessment and he had an episode of vomiting on the way to the hospital.  He received 20 mg of IV diltiazem by EMS.    EKG in the ED (not scanned yet) reportedly showed A. fib with RVR with no ischemic changes.      In addition, patient was noted to have a fever of 101.9F in the ED.  Blood pressure was normal but he received 250 mL saline.    Data reviewed:  Echo on 5/2/2017: dilated left atrium EF 45%; mild aortic stenosis; dilated left atrium    Labs reviewed:  ABG 7.4/36/71; troponin 0 0.09; glucose 217; lactic acid 2.7; procalcitonin 0.3; WBC 15; hemoglobin 12    Review of Systems:  Constitutional: No fever or chills.  No change in appetite  ENMT: No sinus congestion or postnasal drip  Cardiovascular: No chest pain, palpitation or legs swelling.    Respiratory: Dyspnea but denies cough or wheezing  Gastrointestinal: No constipation, diarrhea or abdominal pain.  No nausea or vomiting  Neurology: No headache, weakness, numbness or dizziness.   Musculoskeletal: No joints pain, stiffness or swelling but reported pain in his right heel.  Psychiatry: No depression or anxiety.  Genitourinary: No dysuria or frequent urination  Endo: No weight changes. No cold or warm  intolerance.  Lymphatic: No swollen glands.  Integumentary: No rash.    History  Past Medical History:   Diagnosis Date   • Atrial fibrillation (CMS/[facility])    • CAD (coronary artery disease)    • CHF (congestive heart failure) (CMS/[facility])    • Edema    • ESRD (end stage renal disease) on dialysis (CMS/[facility]) 11/2016    Dr Lawson   • Gout    • HBP (high blood pressure)    • HX: anticoagulation    • Hyperlipidemia    • Memory problem    • DARSHAN (obstructive sleep apnea)    • Other hemorrhagic disorder due to intrinsic circulating anticoagulants, antibodies, or inhibitors (CMS/[facility])     OTH HEMOR DISORDER DUE INTRIN   • Stroke (CMS/[facility])    • Type 2 diabetes mellitus (CMS/[facility])    • Vitamin D deficiency      Past Surgical History:   Procedure Laterality Date   • ARTERIOVENOUS FISTULA/SHUNT SURGERY Right 11/21/2016    Procedure: RT BRACHIAL CEPHALIC FISTULA;  Surgeon: Ar Muro MD;  Location: [facility] OR;  Service:    • ARTERIOVENOUS FISTULA/SHUNT SURGERY W/ HEMODIALYSIS CATHETER INSERTION Right 12/6/2017    Procedure: RT. ARM FISTULA REVISION/POSS. TUNNELED CATH;  Surgeon: Ar Muro MD;  Location: [facility] OR;  Service:    • COLONOSCOPY  2003   • INCISION AND DRAINAGE LEG Right 5/28/2019    Procedure: RIGHT FOOT DEBRIDEMENT;  Surgeon: Miguelangel Wynne MD;  Location: [facility] OR;  Service: Vascular   • INSERTION HEMODIALYSIS CATHETER N/A 11/25/2016    Procedure: TUNNEL CATHETER INSERTION;  Surgeon: Silvia Lim Jr., MD;  Location: [facility] OR;  Service:      Family History   Problem Relation Age of Onset   • Arthritis Mother    • Diabetes Father    • Heart disease Father    • Hyperlipidemia Father    • Hypertension Father    • Obesity Father    • Stroke Father    • Cancer Brother         esophagus   • Heart disease Maternal Grandfather      Social History     Tobacco Use   • Smoking status: Former Smoker     Packs/day: 1.00     Years: 5.00     Pack years: 5.00     Types: Cigarettes     Last  attempt to quit: 1966     Years since quittin.7   • Smokeless tobacco: Never Used   • Tobacco comment: rare caffeine   Substance Use Topics   • Alcohol use: Yes     Alcohol/week: 0.6 oz     Types: 1 Cans of beer per week     Comment: occasionally    • Drug use: No     Medications Prior to Admission   Medication Sig Dispense Refill Last Dose   • ACCU-CHEK FASTCLIX LANCETS misc Use as directed for blood glucose testing CHECK 4 TIMES A  each 5 Taking   • acetaminophen (TYLENOL) 325 MG tablet Take 2 tablets by mouth Every 6 (Six) Hours As Needed for mild pain (1-3) or fever. 100 tablet 0 Taking   • allopurinol (ZYLOPRIM) 100 MG tablet TAKE ONE TABLET BY MOUTH DAILY 90 tablet 0 Taking   • amLODIPine (NORVASC) 5 MG tablet Take 5 mg by mouth Daily.      • aspirin 81 MG EC tablet Take 81 mg by mouth Daily.      • atorvastatin (LIPITOR) 40 MG tablet Take 1 tablet by mouth Daily. 90 tablet 3 Taking   • calcium acetate (PHOS BINDER,) 667 MG capsule capsule Take 667 mg by mouth Daily.   Taking   • cephalexin (KEFLEX) 500 MG capsule Take 500 mg by mouth 2 (Two) Times a Day.      • CloNIDine (CATAPRES) 0.1 MG tablet Take 0.1 mg by mouth 2 (Two) Times a Day.      • donepezil (ARICEPT) 10 MG tablet Take 1 tablet by mouth Every Night. 90 tablet 2 Taking   • DULoxetine (CYMBALTA) 60 MG capsule Take 60 mg by mouth Daily.   Taking   • glucose blood (ACCU-CHEK GUIDE) test strip Use as instructed to check 4 times daily 150 each 12 Taking   • glucose blood test strip Use as instructed 120 each 12 Taking   • insulin NPH (humuLIN N,novoLIN N) 100 UNIT/ML injection Inject 30 Units under the skin 2 (Two) Times a Day Before Meals. 1 Units 12 Taking   • Insulin Pen Needle 32G X 4 MM misc 4 (Four) Times a Day As Needed.   Taking   • insulin regular (humuLIN R,novoLIN R) 100 UNIT/ML injection Inject 5 Units under the skin into the appropriate area as directed 3 (Three) Times a Day Before Meals. 10 mL 1 Taking   • Lancets Misc.  (ACCU-CHEK FASTCLIX LANCET) kit Use as directed for blood glucose testing 200 kit 5 Taking   • metoprolol succinate XL (TOPROL-XL) 25 MG 24 hr tablet Take 3 tablets by mouth Daily. 90 tablet 0 Taking   • tamsulosin (FLOMAX) 0.4 MG capsule 24 hr capsule TAKE ONE CAPSULE BY MOUTH DAILY 90 capsule 2 Taking   • torsemide (DEMADEX) 10 MG tablet Take 10 mg by mouth Daily.      • warfarin (COUMADIN) 5 MG tablet Take one tablets on Mon, Wed, Fri   Taking   • sodium hypochlorite in sterile water irrigation topical solution 0.0625% Irrigate with 946 mL to the affected area as directed by provider Every 12 (Twelve) Hours. 1 bottle 0 Taking   • warfarin (COUMADIN) 2.5 MG tablet Take 1 tablet by mouth Every Night. (Patient taking differently: Take 2.5 mg by mouth Every Night. Take one tablet on Tues, Thurs, Sat, Sun) 30 tablet 0 Taking     Allergies:  Patient has no known allergies.    Objective   Vital Signs  Temp:  [96.3 °F (35.7 °C)-101.9 °F (38.8 °C)] 99.9 °F (37.7 °C)  Heart Rate:  [] 99  Resp:  [20-32] 20  BP: (117-180)/(69-88) 117/70    Physical Exam:  Constitutional: Elderly white male, pupils equal reactive to light.  Not in acute distress.  Eyes: Injected conjunctiva, EOMI.  ENMT: [place] 4. No oral thrush.  Dry tongue.    Neck: No thyromegaly.  Trachea midline  Heart: Regular rhythm and rate.  No pitting edema  Lungs: Slightly diminished but equal air entry bilaterally.  Bilateral crackles up to third of the chest posteriorly.  No wheezing. Nonlabored breathing  Abdomen: Obese. Soft. No tenderness or dullness.  Positive bowel sounds  Extremities: No cyanosis, clubbing. Moves all extremities.  Warm extremities and well-perfused  Neuro: Conscious, alert, disoriented x3.  Strength 5/5 overall  Psych: Appropriate mood and affect.    Integumentary: No rash or ecchymosis  Lymphatic: No palpable cervical or supraclavicular lymph nodes.      Diagnostic imaging:  I personally and independently reviewed the following  images:   CXR 9/1/2019  Increased pulmonary vascular markings.        Assessment     1. Sepsis, unknown origin  2. Fever  3. A. fib with RVR  4. Lactic acidosis  5. Acute on chronic systolic CHF  6. Mild pulmonary vascular edema  7. ESRD on HD MWF  8. DARSHAN, intolerant to CPAP  9. Elevated troponin, probably related to CKD  10. CAD  11. Anticoagulation with Coumadin  12. Subtherapeutic INR  13. Insulin-dependent diabetes mellitus type 2, last A1c 7.3 on 6/13/2019  14. Chronic anemia  15. BPH  16. Dementia      Plan:  · Empiric Zosyn and Vanco for sepsis awaiting blood cultures.  Check MRSA screen.  Check UA and culture  · Repeat lactic acid  · Cardizem for A. fib.  Hold amlodipine and clonidine due to borderline blood pressure and concomitant use of Cardizem.  · Lantus 30 units daily for diabetes.  May need higher dose since he takes NPH 30 units twice a day.  Also initiate moderate dose sliding scale.  · Pharmacy to dose warfarin for A. fib  · Tylenol for fever  · Consult nephrology for ESRD requiring HD  · Resume tamsulosin for BPH and Aricept for dementia          I discussed the patients findings and my recommendations with patient, nursing staff and ED physician.     Kaylene Patel MD  09/01/19  6:50 AM    Time: Critical care 41 min      This note was dictated utilizing [employer] dictation

## 2019-09-01 NOTE — PROGRESS NOTES
Pharmacy Consult: Warfarin Dosing/ Monitoring    Deacon Martin is a 74 y.o. male, estimated creatinine clearance is 13.6 mL/min (A) (by C-G formula based on SCr of 5.52 mg/dL (H)). weighing 84.7 kg (186 lb 11.7 oz).     has a past medical history of Atrial fibrillation (CMS/[facility]), CAD (coronary artery disease), CHF (congestive heart failure) (CMS/[facility]), Edema, ESRD (end stage renal disease) on dialysis (CMS/[facility]) (2016), Gout, HBP (high blood pressure), anticoagulation, Hyperlipidemia, Memory problem, DARSHAN (obstructive sleep apnea), Other hemorrhagic disorder due to intrinsic circulating anticoagulants, antibodies, or inhibitors (CMS/[facility]), Stroke (CMS/[facility]), Type 2 diabetes mellitus (CMS/[facility]), and Vitamin D deficiency.    Social History     Tobacco Use   • Smoking status: Former Smoker     Packs/day: 1.00     Years: 5.00     Pack years: 5.00     Types: Cigarettes     Last attempt to quit: 1966     Years since quittin.7   • Smokeless tobacco: Never Used   • Tobacco comment: rare caffeine   Substance Use Topics   • Alcohol use: Yes     Alcohol/week: 0.6 oz     Types: 1 Cans of beer per week     Comment: occasionally    • Drug use: No       Results from last 7 days   Lab Units 19   INR  1.77* 3.00   HEMOGLOBIN g/dL 12.0*  --    HEMATOCRIT % 38.7  --    PLATELETS 10*3/mm3 187  --      Results from last 7 days   Lab Units 19   SODIUM mmol/L 137   POTASSIUM mmol/L 3.7   CHLORIDE mmol/L 95*   CO2 mmol/L 25.0   BUN mg/dL 35*   CREATININE mg/dL 5.52*   CALCIUM mg/dL 9.0   BILIRUBIN mg/dL 1.2   ALK PHOS U/L 79   ALT (SGPT) U/L 13   AST (SGOT) U/L 12   GLUCOSE mg/dL 217*     Anticoagulation history: takes 5 mg on HD days only (MWF)    Hospital Anticoagulation:  Consulting provider: Dr Patel  Start date:   Indication: afib  Target INR: 2-3  Expected duration: n/a   Bridge Therapy: No                  Date             INR 1.77            Warfarin dose               Potential drug  interactions: zosyn    Relevant nutrition status: Reg cardiac, check flow sheet daily for intake assessment,  nothing charted yet    Other:     Education complete?/ Date: very confused upon admission, defer for now    Assessment/Plan:  Dose will dose by levels for now, 5 mg today  Monitor INR daily  Follow up in AM    Pharmacy will continue to follow until discharge or discontinuation of warfarin.   Abad Lyles [facility]  9/1/2019

## 2019-09-01 NOTE — CONSULTS
Referring Provider: Dr. Kaylene Patel  Reason for Consultation: ESRD    Subjective     Chief complaint   Chief Complaint   Patient presents with   • Weakness - Generalized       History of present illness:  75 [place] [place] AV[place]; Dr. Lawson) admitted yesterday for further eval of weakness and confusion.  In the emergency room was noted to have high fever as well as RVR of his chronic atrial fibrillation.  Renal consulted to provide dialysis.  PMH outlined below; pertinent is ESRD for 2-3 years; hypertension; IDDM; chronic right heel wound; dementia; and atrial fibrillation on anticoagulation.  Hospital course: Cardizem drip started with good response; rising troponin noted; stable blood pressures; empiric antibiotics.  ROS not especially valuable as he has very little memory of any recent events    Past Medical History:   Diagnosis Date   • Atrial fibrillation (CMS/HCC)    • CAD (coronary artery disease)    • CHF (congestive heart failure) (CMS/HCC)    • Edema    • ESRD (end stage renal disease) on dialysis (CMS/HCC) 11/2016    Dr Lawson   • Gout    • HBP (high blood pressure)    • HX: anticoagulation    • Hyperlipidemia    • Memory problem    • DARSHAN (obstructive sleep apnea)    • Other hemorrhagic disorder due to intrinsic circulating anticoagulants, antibodies, or inhibitors (CMS/HCC)     OTH HEMOR DISORDER DUE INTRIN   • Stroke (CMS/HCC)    • Type 2 diabetes mellitus (CMS/HCC)    • Vitamin D deficiency      Past Surgical History:   Procedure Laterality Date   • ARTERIOVENOUS FISTULA/SHUNT SURGERY Right 11/21/2016    Procedure: RT BRACHIAL CEPHALIC FISTULA;  Surgeon: Ar Muro MD;  Location: [facility] OR;  Service:    • ARTERIOVENOUS FISTULA/SHUNT SURGERY W/ HEMODIALYSIS CATHETER INSERTION Right 12/6/2017    Procedure: RT. ARM FISTULA REVISION/POSS. TUNNELED CATH;  Surgeon: Ar Muro MD;  Location: [facility] OR;  Service:    • COLONOSCOPY  2003   • INCISION AND  DRAINAGE LEG Right 2019    Procedure: RIGHT FOOT DEBRIDEMENT;  Surgeon: Miguelangel Wynne MD;  Location: [facility] MAIN OR;  Service: Vascular   • INSERTION HEMODIALYSIS CATHETER N/A 2016    Procedure: TUNNEL CATHETER INSERTION;  Surgeon: Silvia Lim Jr., MD;  Location: [facility] MAIN OR;  Service:      Family History   Problem Relation Age of Onset   • Arthritis Mother    • Diabetes Father    • Heart disease Father    • Hyperlipidemia Father    • Hypertension Father    • Obesity Father    • Stroke Father    • Cancer Brother         esophagus   • Heart disease Maternal Grandfather      Social History     Tobacco Use   • Smoking status: Former Smoker     Packs/day: 1.00     Years: 5.00     Pack years: 5.00     Types: Cigarettes     Last attempt to quit: 1966     Years since quittin.7   • Smokeless tobacco: Never Used   • Tobacco comment: rare caffeine   Substance Use Topics   • Alcohol use: Yes     Alcohol/week: 0.6 oz     Types: 1 Cans of beer per week     Comment: occasionally    • Drug use: No     Medications Prior to Admission   Medication Sig Dispense Refill Last Dose   • ACCU-CHEK FASTCLIX LANCETS misc Use as directed for blood glucose testing CHECK 4 TIMES A  each 5 Taking   • acetaminophen (TYLENOL) 325 MG tablet Take 2 tablets by mouth Every 6 (Six) Hours As Needed for mild pain (1-3) or fever. 100 tablet 0 Taking   • allopurinol (ZYLOPRIM) 100 MG tablet TAKE ONE TABLET BY MOUTH DAILY 90 tablet 0 Taking   • amLODIPine (NORVASC) 5 MG tablet Take 5 mg by mouth Daily.      • aspirin 81 MG EC tablet Take 81 mg by mouth Daily.      • atorvastatin (LIPITOR) 40 MG tablet Take 1 tablet by mouth Daily. 90 tablet 3 Taking   • calcium acetate (PHOS BINDER,) 667 MG capsule capsule Take 667 mg by mouth Daily.   Taking   • cephalexin (KEFLEX) 500 MG capsule Take 500 mg by mouth 2 (Two) Times a Day.      • CloNIDine (CATAPRES) 0.1 MG tablet Take 0.1 mg by mouth 2 (Two) Times a Day.      • donepezil  (ARICEPT) 10 MG tablet Take 1 tablet by mouth Every Night. 90 tablet 2 Taking   • DULoxetine (CYMBALTA) 60 MG capsule Take 60 mg by mouth Daily.   Taking   • glucose blood (ACCU-CHEK GUIDE) test strip Use as instructed to check 4 times daily 150 each 12 Taking   • glucose blood test strip Use as instructed 120 each 12 Taking   • insulin NPH (humuLIN N,novoLIN N) 100 UNIT/ML injection Inject 30 Units under the skin 2 (Two) Times a Day Before Meals. 1 Units 12 Taking   • Insulin Pen Needle 32G X 4 MM misc 4 (Four) Times a Day As Needed.   Taking   • insulin regular (humuLIN R,novoLIN R) 100 UNIT/ML injection Inject 5 Units under the skin into the appropriate area as directed 3 (Three) Times a Day Before Meals. 10 mL 1 Taking   • Lancets Misc. (ACCU-CHEK FASTCLIX LANCET) kit Use as directed for blood glucose testing 200 kit 5 Taking   • metoprolol succinate XL (TOPROL-XL) 25 MG 24 hr tablet Take 3 tablets by mouth Daily. 90 tablet 0 Taking   • tamsulosin (FLOMAX) 0.4 MG capsule 24 hr capsule TAKE ONE CAPSULE BY MOUTH DAILY 90 capsule 2 Taking   • torsemide (DEMADEX) 10 MG tablet Take 10 mg by mouth Daily.      • warfarin (COUMADIN) 5 MG tablet Take one tablets on Mon, Wed, Fri   Taking   • sodium hypochlorite in sterile water irrigation topical solution 0.0625% Irrigate with 946 mL to the affected area as directed by provider Every 12 (Twelve) Hours. 1 bottle 0 Taking   • warfarin (COUMADIN) 2.5 MG tablet Take 1 tablet by mouth Every Night. (Patient taking differently: Take 2.5 mg by mouth Every Night. Take one tablet on Tues, Thurs, Sat, Sun) 30 tablet 0 Taking     Allergies:  Patient has no known allergies.    Review of Systems  14-point ROS performed (though of limited value given his poor short-term memory) and all negative except for pertinent +/-'s detailed in HPI.     Objective     Vital Signs  Temp:  [96.3 °F (35.7 °C)-101.9 °F (38.8 °C)] 97.6 °F (36.4 °C)  Heart Rate:  [] 94  Resp:  [20-32] 20  BP:  "114180)/6188) 155/76    Flowsheet Rows      First Filed Value   Admission Height  172.7 cm (68\") Documented at 09/01/2019 0034   Admission Weight  88.9 kg (196 lb) Documented at 09/01/2019 0034           I/O this shift:  In: 120 [P.O.:120]  Out: -   I/O last 3 completed shifts:  In: 250 [IV Piggyback:250]  Out: -     Intake/Output Summary (Last 24 hours) at 9/1/2019 1203  Last data filed at 9/1/2019 0800  Gross per 24 hour   Intake 370 ml   Output --   Net 370 ml       Physical Exam:  NAD; pleasant; not oriented; looks older than stated age  Chronically ill-appearing; overweight  MMM; AT/NC   No eye discharge; no scleral icterus  No JVD; no carotid bruits  Course bilat with a few crackles in bases; not labored on supplemental O2  Irregularly irregular, tachycardic, no rub  Soft, NT, ND, BS+  No edema  Right arm AV fistula patent  Right foot wrapped  No clubbing  No asterixis  Moves all extremities   Mood normal and affect flat  Speech is fluent    Results Review:  Results from last 7 days   Lab Units 09/01/19 0054   SODIUM mmol/L 137   POTASSIUM mmol/L 3.7   CHLORIDE mmol/L 95*   CO2 mmol/L 25.0   BUN mg/dL 35*   CREATININE mg/dL 5.52*   CALCIUM mg/dL 9.0   BILIRUBIN mg/dL 1.2   ALK PHOS U/L 79   ALT (SGPT) U/L 13   AST (SGOT) U/L 12   GLUCOSE mg/dL 217*       Estimated Creatinine Clearance: 13.6 mL/min (A) (by C-G formula based on SCr of 5.52 mg/dL (H)).          Results from last 7 days   Lab Units 09/01/19 0054   WBC 10*3/mm3 15.07*   HEMOGLOBIN g/dL 12.0*   PLATELETS 10*3/mm3 187       Results from last 7 days   Lab Units 09/01/19 0054 08/26/19   INR  1.77* 3.00       Active Medications    donepezil 10 mg Oral Nightly   DULoxetine 60 mg Oral Daily   heparin (porcine) 5,000 Units Subcutaneous Q8H   insulin glargine 30 Units Subcutaneous QAM   insulin lispro 0-14 Units Subcutaneous 4x Daily With Meals & Nightly   metoprolol succinate XL 75 mg Oral Q24H   piperacillin-tazobactam 3.375 g Intravenous Q12H "   sodium chloride 10 mL Intravenous Q12H   tamsulosin 0.4 mg Oral Daily   warfarin (COUMADIN) (dosing per levels)  Does not apply Daily   warfarin 5 mg Oral Once       diltiaZEM 5-15 mg/hr Last Rate: 5 mg/hr (09/01/19 0223)   Pharmacy to dose vancomycin     Pharmacy to dose warfarin         Assessment/Plan   Assessment  1.  ESRD: Mild volume excess by exam and imaging; electrolytes compensated.  Last HD was 8/30/2019.  2.  CAF with RVR: Better rate control on Cardizem drip.  On chronic AC  3.  Fever  4.  Confusion and lethargy superimposed on baseline dementia  5.  Rising troponin  6.  Right heel ulcer  7.  DM2 with hyperglycemia      Atrial fibrillation with tachycardic ventricular rate (CMS/HCC)      Plan  1.  HD tomorrow with volume removal as hemodynamics allow  2.  Empiric antibiotics; cultures pending  3.  Rate control  4.  Cardiology evaluation pending    I discussed the patient's findings and my recommendations with Breonna ICU RN    Angel Durbin MD  09/01/19  12:03 PM

## 2019-09-02 NOTE — PROGRESS NOTES
LOS: 1 day   Patient Care Team:  Doroteo Victoria MD as PCP - General (Family Medicine)  Pravin Oneill DPM as PCP - Claims Attributed    Chief Complaint: AF       Interval History: Awake, pleasantly confused, on HD right now.  Denies CP/SOA.      Objective   Vital Signs  Temp:  [97.6 °F (36.4 °C)-98.6 °F (37 °C)] 98.1 °F (36.7 °C)  Heart Rate:  [68-93] 84  Resp:  [21-24] 21  BP: (103-151)/(46-90) 127/69    Intake/Output Summary (Last 24 hours) at 9/2/2019 1102  Last data filed at 9/2/2019 0600  Gross per 24 hour   Intake 905 ml   Output 0 ml   Net 905 ml           Physical Exam   HENT:   Head: Normocephalic.   Nose: Nose normal.   Mouth/Throat: Oropharynx is clear and moist.   Eyes: Conjunctivae and EOM are normal.   Very small pupils   Neck: Normal range of motion.   Cardiovascular: Normal rate and intact distal pulses. An irregularly irregular rhythm present.   Murmur heard.   Systolic murmur is present with a grade of 2/6.  Pulmonary/Chest: Effort normal and breath sounds normal.   Abdominal: Soft. There is no tenderness.   Musculoskeletal: He exhibits no edema.   Bandaged right heel, + venous stasis changes     Neurological: He is alert. No cranial nerve deficit.   Skin: Skin is warm. No erythema. There is pallor.   Psychiatric: He has a normal mood and affect.   Vitals reviewed.      Results Review:      Results from last 7 days   Lab Units 09/02/19  0600 09/01/19  0054   SODIUM mmol/L 134* 137   POTASSIUM mmol/L 4.2 3.7   CHLORIDE mmol/L 94* 95*   CO2 mmol/L 22.8 25.0   BUN mg/dL 51* 35*   CREATININE mg/dL 7.70* 5.52*   GLUCOSE mg/dL 101* 217*   CALCIUM mg/dL 8.5* 9.0     Results from last 7 days   Lab Units 09/01/19  1041 09/01/19  0054   TROPONIN T ng/mL 0.360* 0.090*     Results from last 7 days   Lab Units 09/02/19  0601 09/01/19  0054   WBC 10*3/mm3 10.60 15.07*   HEMOGLOBIN g/dL 11.6* 12.0*   HEMATOCRIT % 36.7* 38.7   PLATELETS 10*3/mm3 146 187     Results from last 7 days   Lab Units  09/02/19  0600 09/01/19  0054   INR  2.56* 1.77*         Results from last 7 days   Lab Units 09/02/19  0600   MAGNESIUM mg/dL 1.7           I reviewed the patient's new clinical results.  I personally viewed and interpreted the patient's EKG/Telemetry data        Medication Review:     aspirin 81 mg Oral Daily   ceFAZolin 2 g Intravenous Q24H   clindamycin 900 mg Intravenous Q8H   donepezil 10 mg Oral Nightly   DULoxetine 60 mg Oral Daily   heparin (porcine) 5,000 Units Subcutaneous Q8H   insulin glargine 30 Units Subcutaneous QAM   insulin lispro 0-14 Units Subcutaneous 4x Daily With Meals & Nightly   metoprolol succinate XL 75 mg Oral Q24H   sodium chloride 10 mL Intravenous Q12H   tamsulosin 0.4 mg Oral Daily   warfarin (COUMADIN) (dosing per levels)  Does not apply Daily   warfarin 2.5 mg Oral Once         diltiaZEM 5-15 mg/hr Last Rate: Stopped (09/01/19 1403)   Pharmacy to dose warfarin         Assessment/Plan     1.  AF -- rate elevated due to sepsis.  Off IV diltiazem, rate controlled on oral BB.  BP tolerating metoprolol. On warfarin.    2.  Elevated Tn -- no chest pain, no ischemic EKG changes.  Echo shows LVEF 45-50%, no change from prior study, no regional wall motion abnormalities.  This is not ACS.    3.  Moderate AS -- follow up as outpatient.    We will see as outpatient.     Herman Saavedra MD  09/02/19  11:02 AM

## 2019-09-02 NOTE — PROGRESS NOTES
"   LOS: 1 day    Patient Care Team:  Doroteo Victoria MD as PCP - General (Family Medicine)  Pravin Oneill DPM as PCP - Claims Attributed    Chief Complaint:    Chief Complaint   Patient presents with   • Weakness - Generalized     Follow-up end-stage renal disease  Subjective     Interval History:   The patient is seen and examined while on hemodialysis today, he is feeling much better, denies any chest pain or shortness of air, no nausea or vomiting, no abdominal pain, he has large ulcer on his right heel appears to be clean no erythema around the ulcer.  Also his atrial fibrillation is much better controlled.  Now he is afebrile    Review of Systems:   As noted above    Objective     Vital Signs  Temp:  [97.6 °F (36.4 °C)-98.6 °F (37 °C)] 98.1 °F (36.7 °C)  Heart Rate:  [68-94] 87  Resp:  [21-24] 22  BP: (103-155)/(46-90) 128/71    Flowsheet Rows      First Filed Value   Admission Height  172.7 cm (68\") Documented at 09/01/2019 0034   Admission Weight  88.9 kg (196 lb) Documented at 09/01/2019 0034          No intake/output data recorded.  I/O last 3 completed shifts:  In: 1275 [P.O.:480; I.V.:265; IV Piggyback:530]  Out: 0     Intake/Output Summary (Last 24 hours) at 9/2/2019 0933  Last data filed at 9/2/2019 0600  Gross per 24 hour   Intake 905 ml   Output 0 ml   Net 905 ml       Physical Exam:  Blood pressure 146/90, heart rate 76/min,  cc/min, ultrafiltration goal 2600 cc  General Appearance: alert, oriented x 3, no acute distress, appears to be chronically  Skin: warm and dry  HEENT: pupils round and reactive to light, oral mucosa normal,   Neck: supple, no JVD, trachea midline  Lungs: CTA, unlabored breathing effort  Heart: Irregularly irregular, no rub  Abdomen: soft, non-tender,  present bowel sounds to auscultation  : no palpable bladder,  Extremities: no edema, cyanosis or clubbing, large ulcer on the right heel she did clean and no erythema around the edges, he has functional AV fistula " in the right upper  Neuro: normal speech and mental status        Results Review:    Results from last 7 days   Lab Units 09/02/19  0600 09/01/19  0054   SODIUM mmol/L 134* 137   POTASSIUM mmol/L 4.2 3.7   CHLORIDE mmol/L 94* 95*   CO2 mmol/L 22.8 25.0   BUN mg/dL 51* 35*   CREATININE mg/dL 7.70* 5.52*   CALCIUM mg/dL 8.5* 9.0   BILIRUBIN mg/dL  --  1.2   ALK PHOS U/L  --  79   ALT (SGPT) U/L  --  13   AST (SGOT) U/L  --  12   GLUCOSE mg/dL 101* 217*       Estimated Creatinine Clearance: 9.8 mL/min (A) (by C-G formula based on SCr of 7.7 mg/dL (H)).    Results from last 7 days   Lab Units 09/02/19  0600   MAGNESIUM mg/dL 1.7   PHOSPHORUS mg/dL 4.8*             Results from last 7 days   Lab Units 09/02/19  0601 09/01/19  0054   WBC 10*3/mm3 10.60 15.07*   HEMOGLOBIN g/dL 11.6* 12.0*   PLATELETS 10*3/mm3 146 187       Results from last 7 days   Lab Units 09/02/19  0600 09/01/19  0054   INR  2.56* 1.77*         Imaging Results (last 24 hours)     ** No results found for the last 24 hours. **          aspirin 81 mg Oral Daily   ceFAZolin 2 g Intravenous Q24H   clindamycin 900 mg Intravenous Q8H   donepezil 10 mg Oral Nightly   DULoxetine 60 mg Oral Daily   heparin (porcine) 5,000 Units Subcutaneous Q8H   insulin glargine 30 Units Subcutaneous QAM   insulin lispro 0-14 Units Subcutaneous 4x Daily With Meals & Nightly   metoprolol succinate XL 75 mg Oral Q24H   sodium chloride 10 mL Intravenous Q12H   tamsulosin 0.4 mg Oral Daily   warfarin (COUMADIN) (dosing per levels)  Does not apply Daily   warfarin 2.5 mg Oral Once       diltiaZEM 5-15 mg/hr Last Rate: Stopped (09/01/19 4426)   Pharmacy to dose warfarin         Medication Review:   Current Facility-Administered Medications   Medication Dose Route Frequency Provider Last Rate Last Dose   • albumin human 25 % IV SOLN 12.5 g  12.5 g Intravenous PRN Angel Durbingio, MD       • aspirin chewable tablet 81 mg  81 mg Oral Daily Herman Saavedra MD   81 mg at 09/01/19  1421   • ceFAZolin in dextrose (ANCEF) IVPB solution 2 g  2 g Intravenous Q24H Corby Potter MD       • clindamycin (CLEOCIN) 900 mg in dextrose 5% 50 mL IVPB (premix)  900 mg Intravenous Q8H Isreal Rodriguez MD 50 mL/hr at 09/02/19 0319 900 mg at 09/02/19 0319   • dextrose (D50W) 25 g/ 50mL Intravenous Solution 25 g  25 g Intravenous Q15 Min PRN Kaylene Patel MD       • dextrose (GLUTOSE) oral gel 15 g  15 g Oral Q15 Min PRN Kaylene Patel MD       • diltiaZEM (CARDIZEM) 100 mg/100 mL (1 mg/mL) 0.9% NS  5-15 mg/hr Intravenous Titrated Doroteo Soler MD   Stopped at 09/01/19 1403   • donepezil (ARICEPT) tablet 10 mg  10 mg Oral Nightly Kaylene Patel MD   10 mg at 09/01/19 2100   • DULoxetine (CYMBALTA) DR capsule 60 mg  60 mg Oral Daily Kaylene Patel MD   60 mg at 09/01/19 0931   • glucagon (human recombinant) (GLUCAGEN DIAGNOSTIC) injection 1 mg  1 mg Subcutaneous PRN Kaylene Patel MD       • heparin (porcine) 5000 UNIT/ML injection 5,000 Units  5,000 Units Subcutaneous Q8H Kaylene Patel MD   5,000 Units at 09/02/19 0600   • insulin glargine (LANTUS) injection 30 Units  30 Units Subcutaneous QAM Kaylene Patel MD   30 Units at 09/02/19 0703   • insulin lispro (humaLOG) injection 0-14 Units  0-14 Units Subcutaneous 4x Daily With Meals & Nightly Kaylene Patel MD   3 Units at 09/01/19 2200   • metoprolol succinate XL (TOPROL-XL) 24 hr tablet 75 mg  75 mg Oral Q24H Kaylene Patel MD   75 mg at 09/01/19 0931   • ondansetron (ZOFRAN) injection 4 mg  4 mg Intravenous Q6H PRN Isreal Rodriguez MD   4 mg at 09/02/19 0130   • Pharmacy to dose warfarin   Does not apply Continuous PRN Kaylene Patel MD       • sodium chloride 0.9 % flush 10 mL  10 mL Intravenous PRN Doroteo Soler MD       • sodium chloride 0.9 % flush 10 mL  10 mL Intravenous Q12H Kaylene Patel MD   10 mL at 09/01/19 2100   • sodium chloride 0.9 % flush 10 mL  10 mL Intravenous PRN Kaylene Patel MD       • tamsulosin (FLOMAX) 24  hr capsule 0.4 mg  0.4 mg Oral Daily Kaylene Patel MD   0.4 mg at 09/01/19 0931   • warfarin (COUMADIN) (dosing per levels)   Does not apply Daily Kaylene Patel MD       • warfarin (COUMADIN) tablet 2.5 mg  2.5 mg Oral Once Kaylene Patel MD           Assessment/Plan   1.  End-stage renal disease on maintenance hemodialysis every Monday, Wednesday and Friday, his electrolytes within acceptable range and his volume status within acceptable range, undergoing dialysis today and he is tolerating the treatment quite well.  2.  Fever, currently afebrile  3.  Chronic A. fib, initially with rapid ventricular response currently controlled ventricular response, chronically anticoagulated  4.  Confusion and lethargy imposed on baseline dementia, improved since the  5.  Increased troponin being observed  6.  Diabetic foot ulcer with large right heel ulcer  7.  Diabetes mellitus type 2    Plan:  1.  Continue the same treatment  2.  Surveillance labs          Segundo Lawson MD  09/02/19  9:33 AM

## 2019-09-02 NOTE — NURSING NOTE
09/02/19 1200   NPWT (Negative Pressure Wound Therapy) 09/02/19 1200 Right heel   Placement Date/Time: 09/02/19 1200   Location: Right heel   Therapy Setting continuous therapy   Dressing foam, black   Pressure Setting 125 mmHg   Sponges Inserted 1   CWOCN consult for right heel wound.  Patient had home wound vac in place upon admission.  Patient is unable to state duration of this therapy but likely long term.  Wound bed pink/ moist with closed edges noted.  Wound packed with black foam and placed to suction at 125 mmhg continuous.  Patient is seen by [facility] for this therapy.  Unable to visualize coccyx d/t dialysis treatment; photo reviewed.  DTI vs stage 1 from photo; wound treat with barrier cream and use a sacral optifoam to protect as patient is not shifted much with dialysis treatment.

## 2019-09-02 NOTE — PROGRESS NOTES
Pharmacy Consult: Warfarin Dosing/ Monitoring    Deacon Martin is a 74 y.o. male, estimated creatinine clearance is 9.8 mL/min (A) (by C-G formula based on SCr of 7.7 mg/dL (H)). weighing 84.6 kg (186 lb 8.2 oz).     has a past medical history of Atrial fibrillation (CMS/[facility]), CAD (coronary artery disease), CHF (congestive heart failure) (CMS/[facility]), Edema, ESRD (end stage renal disease) on dialysis (CMS/[facility]) (2016), Gout, HBP (high blood pressure), anticoagulation, Hyperlipidemia, Memory problem, DARSHAN (obstructive sleep apnea), Other hemorrhagic disorder due to intrinsic circulating anticoagulants, antibodies, or inhibitors (CMS/[facility]), Stroke (CMS/[facility]), Type 2 diabetes mellitus (CMS/[facility]), and Vitamin D deficiency.    Social History     Tobacco Use   • Smoking status: Former Smoker     Packs/day: 1.00     Years: 5.00     Pack years: 5.00     Types: Cigarettes     Last attempt to quit: 1966     Years since quittin.7   • Smokeless tobacco: Never Used   • Tobacco comment: rare caffeine   Substance Use Topics   • Alcohol use: Yes     Alcohol/week: 0.6 oz     Types: 1 Cans of beer per week     Comment: occasionally    • Drug use: No       Results from last 7 days   Lab Units 19  0601 19  0619  0054   INR   --  2.56* 1.77*   HEMOGLOBIN g/dL 11.6*  --  12.0*   HEMATOCRIT % 36.7*  --  38.7   PLATELETS 10*3/mm3 146  --  187     Results from last 7 days   Lab Units 19  0600 19  0054   SODIUM mmol/L 134* 137   POTASSIUM mmol/L 4.2 3.7   CHLORIDE mmol/L 94* 95*   CO2 mmol/L 22.8 25.0   BUN mg/dL 51* 35*   CREATININE mg/dL 7.70* 5.52*   CALCIUM mg/dL 8.5* 9.0   BILIRUBIN mg/dL  --  1.2   ALK PHOS U/L  --  79   ALT (SGPT) U/L  --  13   AST (SGOT) U/L  --  12   GLUCOSE mg/dL 101* 217*     Anticoagulation history: Per [facility] anticoagulation clinic visit note on 19: warfarin dose is 2.5 mg daily.    Hospital Anticoagulation:  Consulting provider: Dr Patel  Start date:   Indication:  afib  Target INR: 2-3  Expected duration: n/a   Bridge Therapy: No                  Date 9/1 9/2           INR 1.77 2.56           Warfarin dose 5 mg 2.5 mg             Potential drug interactions:  Zosyn - may enhance anticoagulant effect of warfarin  Home meds: Aspirin, Duloxetine    Relevant nutrition status: cardiac diet, pt has refused all meals and supplements yesterday (as charted by RN)    Other:     Education complete?/ Date: very confused upon admission, defer for now    Assessment/Plan:  INR 2.56 today. Will give warfarin 2.5 mg today. May consider resuming home warfarin 2.5 mg daily dose starting tomorrow depending on INR and nutrition status.  Monitor INR daily  Follow up in AM    Pharmacy will continue to follow until discharge or discontinuation of warfarin.   Maria Luisa Drummond, PharmD, BCPS   9/2/2019

## 2019-09-02 NOTE — PROGRESS NOTES
LOS: 1 day   Patient Care Team:  Doroteo Victoria MD as PCP - General (Family Medicine)  Pravin Oneill DPM as PCP - Claims Attributed    Subjective     Patient really does not have any complaints today no nausea no pain    Review of Systems:          Objective     Vital Signs  Vital Sign Min/Max for last 24 hours  Temp  Min: 97.6 °F (36.4 °C)  Max: 98.6 °F (37 °C)   BP  Min: 103/61  Max: 155/76   Pulse  Min: 68  Max: 102   Resp  Min: 23  Max: 24   SpO2  Min: 80 %  Max: 99 %   Flow (L/min)  Min: 2  Max: 2   No Data Recorded        Ventilator/Non-Invasive Ventilation Settings (From admission, onward)    None                       Body mass index is 26.79 kg/m².  I/O last 3 completed shifts:  In: 885 [P.O.:240; I.V.:265; IV Piggyback:380]  Out: -   No intake/output data recorded.        Physical Exam:  General Appearance: Well-developed elderly white male he is resting in bed he does not appear in any acute distress.  He is on 1 L nasal cannula to with oxygen saturations of 98%  Eyes: Conjunctiva are clear and anicteric pupils are about 2-1/2 mm equal   ENT: Nasal and oral mucous membranes are both a little dry no erythema or exudates Mallampati type II airway nasal septum midline.  Neck: No adenopathy or thyromegaly no jugular venous distention trachea midline neck is supple  Lungs: Completely clear nonlabored symmetric expansion  Cardiac: Irregularly irregular heart rate about 80   Abdomen: Soft nontender no palpable hepatosplenomegaly or masses active bowel sounds  : Not examined  Musculoskeletal: He does have a lesion on his right heel it is a fairly deep wound but it looks like a wound that is closed significantly there is no erythema there is no purulence no drainage.  His right arm dialysis fistula looks good there is no erythema no fluctuance good thrill  Skin: No jaundice no petechiae skin is warm and dry to touch, other than a few minor scratches on his hands and arms and what looks like  some okay on a couple of fingers and none of these scratches look particularly infected no significant erythema or drainage  Neuro: Patient is very pleasant he is cooperative he is following commands with all 4 extremities.    Extremities/P Vascular: No clubbing or cyanosis he has palpable radial pulses bilaterally and dorsalis pedis and dorsalis pedis are not the strongest  MSE: Somewhat flat affect       Labs:  Results from last 7 days   Lab Units 09/01/19  0054   GLUCOSE mg/dL 217*   SODIUM mmol/L 137   POTASSIUM mmol/L 3.7   CO2 mmol/L 25.0   CHLORIDE mmol/L 95*   ANION GAP mmol/L 17.0*   CREATININE mg/dL 5.52*   BUN mg/dL 35*   BUN / CREAT RATIO  6.3*   CALCIUM mg/dL 9.0   EGFR IF NONAFRICN AM mL/min/1.73 10*   ALK PHOS U/L 79   TOTAL PROTEIN g/dL 6.9   ALT (SGPT) U/L 13   AST (SGOT) U/L 12   BILIRUBIN mg/dL 1.2   ALBUMIN g/dL 3.70   GLOBULIN gm/dL 3.2     Estimated Creatinine Clearance: 13.6 mL/min (A) (by C-G formula based on SCr of 5.52 mg/dL (H)).      Results from last 7 days   Lab Units 09/01/19  0054   WBC 10*3/mm3 15.07*   RBC 10*6/mm3 4.05*   HEMOGLOBIN g/dL 12.0*   HEMATOCRIT % 38.7   MCV fL 95.6   MCH pg 29.6   MCHC g/dL 31.0*   RDW % 15.6*   RDW-SD fl 55.3*   MPV fL 9.8   PLATELETS 10*3/mm3 187   NEUTROPHIL % % 89.6*   LYMPHOCYTE % % 4.0*   MONOCYTES % % 5.3   EOSINOPHIL % % 0.2*   BASOPHIL % % 0.4   IMM GRAN % % 0.5   NEUTROS ABS 10*3/mm3 13.51*   LYMPHS ABS 10*3/mm3 0.60*   MONOS ABS 10*3/mm3 0.80   EOS ABS 10*3/mm3 0.03   BASOS ABS 10*3/mm3 0.06   IMMATURE GRANS (ABS) 10*3/mm3 0.07*   NRBC /100 WBC 0.0     Results from last 7 days   Lab Units 09/01/19  0339   PH, ARTERIAL pH units 7.444   PO2 ART mm Hg 71.7*   PCO2, ARTERIAL mm Hg 36.6   HCO3 ART mmol/L 25.1     Results from last 7 days   Lab Units 09/01/19  1041 09/01/19  0054   TROPONIN T ng/mL 0.360* 0.090*     Results from last 7 days   Lab Units 09/01/19  0054   PROBNP pg/mL 21,806.0*         Results from last 7 days   Lab Units  09/01/19  0650 09/01/19  0054   LACTATE mmol/L 1.9 2.7*   PROCALCITONIN ng/mL  --  0.35*     Results from last 7 days   Lab Units 09/01/19  0054   INR  1.77*     Microbiology Results (last 10 days)     Procedure Component Value - Date/Time    Blood Culture - Blood, Arm, Left [[id number]]  (Abnormal) Collected:  09/01/19 0205    Lab Status:  Preliminary result Specimen:  Blood from Arm, Left Updated:  09/01/19 1624     Blood Culture Abnormal Stain     Gram Stain Anaerobic Bottle Gram positive cocci in chains    Blood Culture ID, PCR - Blood, Arm, Left [[id number]]  (Abnormal) Collected:  09/01/19 0205    Lab Status:  Final result Specimen:  Blood from Arm, Left Updated:  09/01/19 1733     BCID, PCR Streptococcus pyogenes (Group A). Identification by BCID PCR.    Blood Culture - Blood, Arm, Left [[id number]]  (Abnormal) Collected:  09/01/19 0153    Lab Status:  Preliminary result Specimen:  Blood from Arm, Left Updated:  09/01/19 1827     Blood Culture Abnormal Stain     Gram Stain Aerobic Bottle Gram positive cocci    Respiratory Panel, PCR - Swab, Nasopharynx [[id number]]  (Normal) Collected:  09/01/19 0119    Lab Status:  Final result Specimen:  Swab from Nasopharynx Updated:  09/01/19 0226     ADENOVIRUS, PCR Not Detected     Coronavirus 229E Not Detected     Coronavirus HKU1 Not Detected     Coronavirus NL63 Not Detected     Coronavirus OC43 Not Detected     Human Metapneumovirus Not Detected     Human Rhinovirus/Enterovirus Not Detected     Influenza B PCR Not Detected     Parainfluenza Virus 1 Not Detected     Parainfluenza Virus 2 Not Detected     Parainfluenza Virus 3 Not Detected     Parainfluenza Virus 4 Not Detected     Bordetella pertussis pcr Not Detected     Influenza A H1 2009 PCR Not Detected     Chlamydophila pneumoniae PCR Not Detected     Mycoplasma pneumo by PCR Not Detected     Influenza A PCR Not Detected     Influenza A H3 Not Detected     Influenza A H1 Not Detected     RSV, PCR Not Detected      Bordetella parapertussis PCR Not Detected                aspirin 81 mg Oral Daily   clindamycin 900 mg Intravenous Q8H   donepezil 10 mg Oral Nightly   DULoxetine 60 mg Oral Daily   heparin (porcine) 5,000 Units Subcutaneous Q8H   insulin glargine 30 Units Subcutaneous QAM   insulin lispro 0-14 Units Subcutaneous 4x Daily With Meals & Nightly   metoprolol succinate XL 75 mg Oral Q24H   piperacillin-tazobactam 3.375 g Intravenous Q12H   sodium chloride 10 mL Intravenous Q12H   tamsulosin 0.4 mg Oral Daily   warfarin (COUMADIN) (dosing per levels)  Does not apply Daily       diltiaZEM 5-15 mg/hr Last Rate: Stopped (09/01/19 1403)   Pharmacy to dose vancomycin     Pharmacy to dose warfarin         Diagnostics:  Xr Chest 1 View    Result Date: 9/1/2019  PORTABLE CHEST X-RAY  CLINICAL HISTORY: fever  COMPARISON: 06/25/2019  FINDINGS: Portable AP view of the chest was obtained with overlying monitor leads in place. Patient is somewhat kyphotic and rotated to the left. The lungs are under aerated. Pulmonary edema clearly show significant improvement. There are calcified residua of granulomatous disease present. There is no active airspace disease. Stable cardiomegaly. No significant pleural fluid.        Improved edema/CHF.    This report was finalized on 9/1/2019 1:13 AM by Miguelangel Alvarez M.D.      Results for orders placed during the hospital encounter of 09/01/19   Adult Transthoracic Echo Complete W/ Cont if Necessary Per Protocol    Narrative · Left ventricular systolic function is low normal. Calculated EF = 48.0%.   Estimated EF was in agreement with the calculated EF. Normal left   ventricular cavity size noted. Left ventricular wall thickness is   consistent with mild concentric hypertrophy. Left ventricular diastolic   function was indeterminate.  · Left atrial cavity size is moderately dilated.  · Right atrial cavity size is moderately dilated.  · Moderate aortic valve stenosis is present.          I personally  reviewed the chest x-ray there is mild vascular congestion I do not see any definite pleural effusions I am not even certain there is that much edema.    Active Hospital Problems    Diagnosis  POA   • Gastroenteritis [K52.9]  Unknown   • Elevated troponin [R74.8]  Unknown   • Chronic atrial fibrillation (CMS/HCC) [I48.2]  Yes   • Chronic diastolic (congestive) heart failure (CMS/HCC) [I50.32]  Yes   • Chronic anticoagulation [Z79.01]  Not Applicable   • Sepsis (CMS/HCC) [A41.9]  Yes   • Osteomyelitis of right foot (CMS/HCC) [M86.9]  Yes   • Ulcer of heel due to diabetes mellitus (CMS/HCC) [E11.621, L97.409]  Yes   • Type 2 diabetes mellitus (CMS/[facility]) [E11.9]  Yes   • Vascular dementia [F01.50]  Yes   • Nonrheumatic aortic valve stenosis [I35.0]  Yes      Resolved Hospital Problems   No resolved problems to display.     EKG yesterday A. fib no definite acute ST changes  A.m. labs today still pending  Assessment/Plan     1. Sepsis source not clear his right heel wound looks good but we got 2 blood cultures positive looks like strep pyogenes.  Clindamycin added yesterday and infectious disease consult ordered.  2. A. fib with rapid ventricular response he is chronically anticoagulated with Coumadin but was subtherapeutic on admission.  He is on Cardizem now rate is controlled to try and wean this off.  Home metoprolol and see if this will control.  3. Acute on chronic systolic CHF he may be a little volume overloaded and may benefit from dialysis  4. Lactic acidosis probably secondary to 1 through 3 above resolved  5. End-stage renal disease on hemodialysis nephrology to see  6. Elevated troponin did rise yesterday EKG does not look like acute changes echocardiogram does not sound like there is significant wall motion abnormalities.  I suspect this is a type II MI demand ischemia related to sepsis and atrial fibrillation with rapid ventricular response.  Cardiology is following  7. Obstructive sleep apnea he is Pap  intolerant  8. Coronary artery disease  9. Diabetes mellitus type 2 insulin requiring  10. Anemia chronic disease  11. Dementia seems to be reasonably advanced  12. BPH  13. Mild hypoxia requiring supplemental O2 looks like it resolved we will try him off O2  14. Right heel wound stage IV, looks like it is healing well we will continue  to keep it elevated off the bed and dressings and wound care to follow.  I doubt that this is the source of his infection but do not have another good source we will see what infectious disease thinks    Plan for disposition:    Isreal Rodriguez MD  09/02/19  6:10 AM    Time: Spent about 34 minutes on patient's care thus far today

## 2019-09-02 NOTE — CONSULTS
Referring Provider: Kaylene Patel MD  [address]  [address] [address]    Reason for Consultation: sepsis    History of present illness:  Mr Martin is a 75 YO who I am asked to evaluate and give opinion for sepsis. History is obtained from the patient (limited), Dr Rodriguez, RN Nicci, and review of the old medical records which I summarize/synthesize as follows: He presented to the ER on 9/1/19 with generalized weakness and lethargy. He had one episode of vomiting. No associated fevers. No alleviating factors. His son called EMS who found him to be in Afib with RVR. Labs were notable for WBC 15k and he had a lactic acidosis. He was admitted to the ICU and seen by cardiology for heart rate control. He was briefly on pressors but no longer. His blood cultures are positive for Group A Strep in 2/2 sets and ID is asked to make further recommendations.      He has some scabbing on his right hand that does not appear infected. He has a R lateral heel wound that is without erythema or purulent drainage. He does not have a sacral ulcer. I d/w the HD RN who says the fistula site was not erythematous or inflamed.    PMH:  AFib on coumadin  ESRD on HD  CVA with memory loss  DM2  VIt D deficiency  MRSA graft infection  HLD  Diabetic foot ulcer  Morganella septicemia    Past Surgical History:   Procedure Laterality Date   • ARTERIOVENOUS FISTULA/SHUNT SURGERY Right 11/21/2016    Procedure: RT BRACHIAL CEPHALIC FISTULA;  Surgeon: Ar Muro MD;  Location: [facility] OR;  Service:    • ARTERIOVENOUS FISTULA/SHUNT SURGERY W/ HEMODIALYSIS CATHETER INSERTION Right 12/6/2017    Procedure: RT. ARM FISTULA REVISION/POSS. TUNNELED CATH;  Surgeon: Ar Muro MD;  Location: [facility] OR;  Service:    • COLONOSCOPY  2003   • INCISION AND DRAINAGE LEG Right 5/28/2019    Procedure: RIGHT FOOT DEBRIDEMENT;  Surgeon: Miguelangel Wynne MD;  Location: [facility] OR;  Service: Vascular   • INSERTION HEMODIALYSIS  CATHETER N/A 11/25/2016    Procedure: TUNNEL CATHETER INSERTION;  Surgeon: Silvia Lim Jr., MD;  Location: [facility];  Service:      Social History:  Quit smoking 1966  Occ EtOH use  Retired from [employer]  Lives w/ wife and sons     Family History:  Mom: OA  Dad: Obesity, CAD     Allergies:  No Antibiotic Allergies    Medications:    Current Facility-Administered Medications:   •  albumin human 25 % IV SOLN 12.5 g, 12.5 g, Intravenous, PRN, Angel Durbin MD  •  aspirin chewable tablet 81 mg, 81 mg, Oral, Daily, Herman Saavedra MD, 81 mg at 09/01/19 1421  •  clindamycin (CLEOCIN) 900 mg in dextrose 5% 50 mL IVPB (premix), 900 mg, Intravenous, Q8H, Isreal Rodriguez MD, Last Rate: 50 mL/hr at 09/02/19 0319, 900 mg at 09/02/19 0319  •  dextrose (D50W) 25 g/ 50mL Intravenous Solution 25 g, 25 g, Intravenous, Q15 Min PRN, Kaylene Patel MD  •  dextrose (GLUTOSE) oral gel 15 g, 15 g, Oral, Q15 Min PRN, Kaylene Patel MD  •  diltiaZEM (CARDIZEM) 100 mg/100 mL (1 mg/mL) 0.9% NS, 5-15 mg/hr, Intravenous, Titrated, Doroteo Soler MD, Stopped at 09/01/19 1403  •  donepezil (ARICEPT) tablet 10 mg, 10 mg, Oral, Nightly, Kaylene Patel MD, 10 mg at 09/01/19 2100  •  DULoxetine (CYMBALTA) DR capsule 60 mg, 60 mg, Oral, Daily, Kaylene Patel MD, 60 mg at 09/01/19 0931  •  glucagon (human recombinant) (GLUCAGEN DIAGNOSTIC) injection 1 mg, 1 mg, Subcutaneous, PRN, Kaylene Patel MD  •  heparin (porcine) 5000 UNIT/ML injection 5,000 Units, 5,000 Units, Subcutaneous, Q8H, Kaylene Patel MD, 5,000 Units at 09/02/19 0600  •  insulin glargine (LANTUS) injection 30 Units, 30 Units, Subcutaneous, QAM, Kaylene Patel MD, 30 Units at 09/02/19 0703  •  insulin lispro (humaLOG) injection 0-14 Units, 0-14 Units, Subcutaneous, 4x Daily With Meals & Nightly, Kaylene Patel MD, 3 Units at 09/01/19 2200  •  metoprolol succinate XL (TOPROL-XL) 24 hr tablet 75 mg, 75 mg, Oral, Q24H, Kaylene Patel MD, 75 mg at 09/01/19 0931  •   ondansetron (ZOFRAN) injection 4 mg, 4 mg, Intravenous, Q6H PRN, Isreal Rodriguez MD, 4 mg at 09/02/19 0130  •  Pharmacy to dose vancomycin, , Does not apply, Continuous PRN, Kaylene Patel MD  •  Pharmacy to dose warfarin, , Does not apply, Continuous PRN, Kaylene Patel MD  •  piperacillin-tazobactam (ZOSYN) 3.375 g in iso-osmotic dextrose 50 ml (premix), 3.375 g, Intravenous, Q12H, Kaylene Patel MD, 3.375 g at 09/02/19 0300  •  [COMPLETED] Insert peripheral IV, , , Once **AND** sodium chloride 0.9 % flush 10 mL, 10 mL, Intravenous, PRN, Doroteo Soler MD  •  sodium chloride 0.9 % flush 10 mL, 10 mL, Intravenous, Q12H, Kaylene Patel MD, 10 mL at 09/01/19 2100  •  sodium chloride 0.9 % flush 10 mL, 10 mL, Intravenous, PRN, Kaylene Patel MD  •  tamsulosin (FLOMAX) 24 hr capsule 0.4 mg, 0.4 mg, Oral, Daily, Kaylene Patel MD, 0.4 mg at 09/01/19 0931  •  Vancomycin Pharmacy Intermittent Dosing, , Does not apply, Daily, Kaylene Patel MD  •  warfarin (COUMADIN) (dosing per levels), , Does not apply, Daily, Kaylene Patel MD    Review of Systems  All systems were reviewed and are negative unless otherwise stated above in the HPI    Objective   Vital Signs   Temp:  [97.6 °F (36.4 °C)-98.6 °F (37 °C)] 98.1 °F (36.7 °C)  Heart Rate:  [68-94] 80  Resp:  [23-24] 23  BP: (103-155)/(46-77) 103/60    Physical Exam:   General: awake, chronically ill appearing, answers al questions  Head: Normocephalic, atraumatic  Eyes: pupils equal, no scleral icterus  ENT: MMM, OP clear, no thrush.  Neck: Supple  Cardiovascular: NR, irregular rhythm, no murmurs, rubs, or gallops; no LE edema  Respiratory: Lungs are clear to auscultation bilaterally, no rales or wheezing; normal work of breathing on ambient air  GI: Abdomen is soft, non-tender, non-distended  : no De Leon catheter present  Musculoskeletal: R lateral heel wound w/o erythema; OA changes of hands and toes  Skin: No embolic phenomenon  Neurological: Alert and oriented x 3,   motor strength 4/5 and symmetric in all four extremities  Psychiatric: Normal mood and affect   Vasc: no cyanosis; PIV w/o erythema; RUE AV fistula accessed for HD    Labs:     Lab Results   Component Value Date    WBC 10.60 09/02/2019    HGB 11.6 (L) 09/02/2019    HCT 36.7 (L) 09/02/2019    MCV 95.1 09/02/2019     09/02/2019       Lab Results   Component Value Date    GLUCOSE 101 (H) 09/02/2019    BUN 51 (H) 09/02/2019    CREATININE 7.70 (H) 09/02/2019    EGFRIFNONA 7 (L) 09/02/2019    EGFRIFAFRI  09/02/2019      Comment:      <15 Indicative of kidney failure.    BCR 6.6 (L) 09/02/2019    CO2 22.8 09/02/2019    CALCIUM 8.5 (L) 09/02/2019    PROTENTOTREF 7.1 08/16/2018    ALBUMIN 3.30 (L) 09/02/2019    LABIL2 1.7 08/16/2018    AST 12 09/01/2019    ALT 13 09/01/2019     Lab Results   Component Value Date    CRP 9.85 (H) 05/24/2019     Lab Results   Component Value Date    HGBA1C 7.30 (H) 06/13/2019       Procal 0.3  BNP 21,806    Microbiology:  9/1 RVP: negative  9/1 BCx: Group A Strep in 2/2 sets     Radiology (personally reviewed report):  CXR with improved pulmonary edema; no pleural effusion; no evidence of pneumonia    Assessment/Plan   1. Sepsis due to Group A Strep septicemia  2. ESRD on HD  3. Atrial fibrillation with rapid venticular response  4. Uncontrolled DM2  5. Dementia  6. Right heel wound  7. Chronic diastolic heart fialure    No clear cause of the group A Strep septicemia. He seems to be responding well with resolution of shock, resolution of fevers, and improved HR and WBC. He does not appear to have any infected wounds after full body inspection. His RUE AV graft site is not erythematous but probably warrants at least an ultrasound to make sure no surrounding fluid collections. From an antibiotic standpoint, stop vancomycin and Zosyn. Start cefazolin 2 g IV q24h. OK to continue clindamycin for 24 more hours. Repeat blood cultures to document sterility. Duration of antibiotics TBD but likely  2 week course with HD.     Thank you for this consult. ID will follow.

## 2019-09-02 NOTE — PLAN OF CARE
Problem: Fall Risk (Adult)  Goal: Identify Related Risk Factors and Signs and Symptoms  Outcome: Ongoing (interventions implemented as appropriate)   09/02/19 1935   Fall Risk (Adult)   Related Risk Factors (Fall Risk) age-related changes;confusion/agitation;fatigue/slow reaction;gait/mobility problems;sleep pattern alteration;environment unfamiliar   Signs and Symptoms (Fall Risk) presence of risk factors       Problem: Arrhythmia/Dysrhythmia (Symptomatic) (Adult)  Goal: Signs and Symptoms of Listed Potential Problems Will be Absent, Minimized or Managed (Arrhythmia/Dysrhythmia)  Outcome: Ongoing (interventions implemented as appropriate)   09/02/19 1935   Goal/Outcome Evaluation   Problems Assessed (Arrhythmia/Dysrhythmia) all   Problems Present (Dysrhythmia) electrophysiologic conduction defect       Problem: Patient Care Overview  Goal: Plan of Care Review  Outcome: Ongoing (interventions implemented as appropriate)   09/02/19 1935   OTHER   Outcome Summary Pt had HD today with 2 liters fluid removal. Alicia well. Remains Afib with controlled rate. Denies c/o pain or SOA. Remains on RA. Slept most of day intermittently. Wife @ [facility] earlier today.    Coping/Psychosocial   Plan of Care Reviewed With patient;spouse   Plan of Care Review   Progress improving       Problem: Skin Injury Risk (Adult)  Goal: Identify Related Risk Factors and Signs and Symptoms  Outcome: Ongoing (interventions implemented as appropriate)   09/02/19 1935   Skin Injury Risk (Adult)   Related Risk Factors (Skin Injury Risk) cognitive impairment;advanced age;critical care admission;infection;medical devices;mobility impaired;moisture

## 2019-09-03 NOTE — PROGRESS NOTES
LOS: 2 days     Chief Complaint:  Follow-up Group A Strep septicemia    Interval History:  No acute events. No fevers. Afib rate controlled. WBC normalized. Tolerating cefazolin and clindamycin w/o rash or diarrhea. Wound vac replaced to R heel area. He reports mild dull intermittent pain there without alleviating factors.     ROS:  No chest pain or SOA; no rash    Vital Signs  Temp:  [97 °F (36.1 °C)-98.1 °F (36.7 °C)] 97 °F (36.1 °C)  Heart Rate:  [65-90] 78  Resp:  [14-23] 19  BP: ()/(43-90) 105/63    Physical Exam:  General: awake, more alert today, poor memory  Head: Normocephalic  Eyes: pupils equal, no scleral icterus  ENT: MMM, OP clear, no thrush.  Cardiovascular: NR, irregular rhythm, no LE edema  Respiratory: normal work of breathing on ambient air  GI: Abdomen is soft, non-tender, non-distended  : no De Leon catheter present  Musculoskeletal: R lateral heel wound w/ wound vac present  Skin: No embolic phenomenon  Neurological: Alert and oriented x 3, motor strength 4/5 and symmetric in all four extremities  Vasc:  RUE AV fistula without erythema; good thrill    Antibiotics:  •  ceFAZolin in dextrose (ANCEF) IVPB solution 2 g, 2 g, Intravenous, Q24H    LABS:  CBC, micro reviewed today  Lab Results   Component Value Date    WBC 6.31 09/03/2019    HGB 11.0 (L) 09/03/2019    HCT 34.7 (L) 09/03/2019    MCV 97.2 (H) 09/03/2019     (L) 09/03/2019     Lab Results   Component Value Date    GLUCOSE 101 (H) 09/02/2019    CALCIUM 8.5 (L) 09/02/2019     (L) 09/02/2019    K 4.2 09/02/2019    CO2 22.8 09/02/2019    CL 94 (L) 09/02/2019    BUN 51 (H) 09/02/2019    CREATININE 7.70 (H) 09/02/2019    EGFRIFAFRI  09/02/2019      Comment:      <15 Indicative of kidney failure.    EGFRIFNONA 7 (L) 09/02/2019    BCR 6.6 (L) 09/02/2019    ANIONGAP 17.2 (H) 09/02/2019     Microbiology:  9/1 RVP: negative  9/1 BCx: Group A Strep in 2/2 sets   9/1 MRSA nares screen: positive  9/2 BCx: NGTD    Radiology  (personally reviewed report):   RUE Duplex negative for abscess near fistula; proximal stenosis noted    Assessment/Plan   1. Sepsis due to Group A Strep septicemia  2. ESRD on HD  3. Atrial fibrillation with rapid venticular response  4. Uncontrolled DM2  5. Dementia  6. Right heel wound  7. Chronic diastolic heart fialure     Patient continues to improve with resolution of RVR, leukocytosis, and fever. He is not on any vasopressors. RUE AV access site w/o abscess on ultrasound. Continue cefazolin 2 g IV q24h while in the hospital. At discharge, will transition to cefazolin 2g/2g/3g with MWF HD x 2 week course with stop date 9/16/19. DC clindamycin. He will continue with outpatient wound care for his R heel. Wound vac has been reapplied.      Thank you for this consult. ID will follow.

## 2019-09-03 NOTE — PLAN OF CARE
Problem: Patient Care Overview  Goal: Plan of Care Review   09/03/19 2774   OTHER   Outcome Summary Pt admitted w/ sepsis, chronic afib, on HD, R heel wound w/ wound vac; wears post op shoes when up. H/o dementia, difficult historian. Pt reports using RW in home, thinks he probably has fallen recently. Today pt demonstrates generalized weakness, impaired balance and activity tolerance but able to take few steps at EOB w/ min A x2. DC TBD pending level of assist home.    Coping/Psychosocial   Plan of Care Reviewed With patient

## 2019-09-03 NOTE — PROGRESS NOTES
"   LOS: 2 days    Patient Care Team:  Doroteo Victoria MD as PCP - General (Family Medicine)  Pravin Oneill DPM as PCP - Claims Attributed    Chief Complaint:    Chief Complaint   Patient presents with   • Weakness - Generalized     Follow-up ESRD  Subjective     Interval History:   Working with PT . Says no bm for 4 days, but documented this am. Feels urge to void, but unable.  No fever. Hungry, but food not palatable .    Review of Systems:   As noted above    Objective     Vital Signs  Temp:  [97 °F (36.1 °C)-98.1 °F (36.7 °C)] 97.2 °F (36.2 °C)  Heart Rate:  [65-90] 81  Resp:  [14-20] 20  BP: ()/(43-81) 138/80    Flowsheet Rows      First Filed Value   Admission Height  172.7 cm (68\") Documented at 09/01/2019 0034   Admission Weight  88.9 kg (196 lb) Documented at 09/01/2019 0034          I/O this shift:  In: 240 [P.O.:240]  Out: -   I/O last 3 completed shifts:  In: 1360 [P.O.:960; IV Piggyback:400]  Out: 2000 [Other:2000]    Intake/Output Summary (Last 24 hours) at 9/3/2019 1322  Last data filed at 9/3/2019 0900  Gross per 24 hour   Intake 920 ml   Output --   Net 920 ml       Physical Exam:    General Appearance: alert, oriented to hospital. no acute distress,  Chronically ill  Skin: warm and dry  HEENT: pupils round and reactive to light, oral mucosa normal  Neck: supple, no JVD, trachea midline  Lungs: Clear to auscultation   Heart: Irregularly irregular, no rub  Abdomen: soft, non-tender,  +bs  Extremities: no edema, cyanosis or clubbing, large ulcer on the right heel. VAC.  AVF right upper ext thrill, bruit .  Neuro: normal speech and mental status        Results Review:    Results from last 7 days   Lab Units 09/03/19  0413 09/02/19  0600 09/01/19  0054   SODIUM mmol/L 133* 134* 137   POTASSIUM mmol/L 3.7 4.2 3.7   CHLORIDE mmol/L 95* 94* 95*   CO2 mmol/L 25.4 22.8 25.0   BUN mg/dL 29* 51* 35*   CREATININE mg/dL 5.26* 7.70* 5.52*   CALCIUM mg/dL 8.4* 8.5* 9.0   BILIRUBIN mg/dL 0.7  --  1.2 "   ALK PHOS U/L 61  --  79   ALT (SGPT) U/L 12  --  13   AST (SGOT) U/L 19  --  12   GLUCOSE mg/dL 73 101* 217*       Estimated Creatinine Clearance: 13.9 mL/min (A) (by C-G formula based on SCr of 5.26 mg/dL (H)).    Results from last 7 days   Lab Units 09/03/19  0413 09/02/19  0600   MAGNESIUM mg/dL  --  1.7   PHOSPHORUS mg/dL 4.2 4.8*             Results from last 7 days   Lab Units 09/03/19  0413 09/02/19  0601 09/01/19  0054   WBC 10*3/mm3 6.31 10.60 15.07*   HEMOGLOBIN g/dL 11.0* 11.6* 12.0*   PLATELETS 10*3/mm3 133* 146 187       Results from last 7 days   Lab Units 09/03/19 0413 09/02/19  0600 09/01/19  0054   INR  3.78* 2.56* 1.77*         Imaging Results (last 24 hours)     ** No results found for the last 24 hours. **          aspirin 81 mg Oral Daily   ceFAZolin 2 g Intravenous Q24H   donepezil 10 mg Oral Nightly   DULoxetine 60 mg Oral Daily   insulin glargine 30 Units Subcutaneous QAM   insulin lispro 0-14 Units Subcutaneous 4x Daily With Meals & Nightly   metoprolol succinate XL 75 mg Oral Q24H   sodium chloride 10 mL Intravenous Q12H   tamsulosin 0.4 mg Oral Daily   warfarin (COUMADIN) (dosing per levels)  Does not apply Daily       Pharmacy to dose warfarin        Medication Review:   Current Facility-Administered Medications   Medication Dose Route Frequency Provider Last Rate Last Dose   • aspirin chewable tablet 81 mg  81 mg Oral Daily Herman Saavedra MD   81 mg at 09/03/19 0824   • ceFAZolin in dextrose (ANCEF) IVPB solution 2 g  2 g Intravenous Q24H Corby Potter MD   2 g at 09/03/19 1050   • dextrose (D50W) 25 g/ 50mL Intravenous Solution 25 g  25 g Intravenous Q15 Min PRN Kaylene Patel MD       • dextrose (GLUTOSE) oral gel 15 g  15 g Oral Q15 Min PRN Kaylene Patel MD       • donepezil (ARICEPT) tablet 10 mg  10 mg Oral Nightly Kaylene Patel MD   10 mg at 09/02/19 2007   • DULoxetine (CYMBALTA) DR capsule 60 mg  60 mg Oral Daily Kaylene Patel MD   60 mg at 09/03/19 0824   •  glucagon (human recombinant) (GLUCAGEN DIAGNOSTIC) injection 1 mg  1 mg Subcutaneous PRN Kaylene Patel MD       • insulin glargine (LANTUS) injection 30 Units  30 Units Subcutaneous QAM Kaylene Patel MD   30 Units at 09/03/19 0822   • insulin lispro (humaLOG) injection 0-14 Units  0-14 Units Subcutaneous 4x Daily With Meals & Nightly Kaylene Patel MD   3 Units at 09/02/19 2007   • metoprolol succinate XL (TOPROL-XL) 24 hr tablet 75 mg  75 mg Oral Q24H Kaylene Patel MD   75 mg at 09/03/19 0824   • ondansetron (ZOFRAN) injection 4 mg  4 mg Intravenous Q6H PRN Isreal Rodriguez MD   4 mg at 09/02/19 0130   • Pharmacy to dose warfarin   Does not apply Continuous PRN Kaylene Patel MD       • sodium chloride 0.9 % flush 10 mL  10 mL Intravenous PRN Doroteo oSler MD       • sodium chloride 0.9 % flush 10 mL  10 mL Intravenous Q12H Kaylene Patel MD   10 mL at 09/03/19 0824   • sodium chloride 0.9 % flush 10 mL  10 mL Intravenous PRN Kaylene Patel MD       • tamsulosin (FLOMAX) 24 hr capsule 0.4 mg  0.4 mg Oral Daily Kaylene Patel MD   0.4 mg at 09/03/19 0824   • warfarin (COUMADIN) (dosing per levels)   Does not apply Daily Kaylene Patel MD           Assessment/Plan   1.  ESRD. Dialysis MWF. Waste products controlled.   2.  Group A strep sepsis.   3.  Chronic A. fib, initially with rapid ventricular response currently controlled ventricular response, chronically anticoagulated.  4.  Delirium  on baseline dementia, improved.  5.  Increased troponin, thought to be demand ischemia .  6.  Diabetic foot ulcer with large right heel ulcer. VAC in place.   7.  Diabetes mellitus type 2  8. RUE AVF proximal stenosis.  No difficulty with stick. Pressures not high.  Will ask vascular to address as outpt. I spoke to Dr. Muro.  Will need to call office to arrange fistulogram on dc from hospital .    Plan:  1.  Dialysis tomorrow  2. Arrange outpt fistulogram with Dr. Muro.           Charisma Mejia,  MD  09/03/19  1:22 PM

## 2019-09-03 NOTE — THERAPY EVALUATION
Patient Name: Deacon Martin  : 1945    MRN: [id number]                              Today's Date: 9/3/2019       Admit Date: 2019    Visit Dx:     ICD-10-CM ICD-9-CM   1. Atrial fibrillation with tachycardic ventricular rate (CMS/HCC) I48.91 427.31   2. Sepsis, due to unspecified organism (CMS/HCC) A41.9 038.9     995.91   3. ESRD (end stage renal disease) (CMS/HCC) N18.6 585.6     Patient Active Problem List   Diagnosis   • Cervical spinal stenosis   • Cervical osteoarthritis   • Cervical pain   • Chronic venous insufficiency   • Disturbance, visual, subjective   • Poorly controlled type 2 diabetes mellitus (CMS/[facility])   • Essential hypertension   • Hypertriglyceridemia   • BPH (benign prostatic hypertrophy) with urinary obstruction   • Anticoagulation goal of INR 2 to 3   • Venous stasis ulcers of both lower extremities (CMS/[facility])   • Gouty arthritis   • Hemianopia, homonymous, right   • History of cardioembolic stroke (Left occipital)   • Hypersomnolence   • Vascular dementia   • Anemia of chronic renal failure, stage 4 (severe) (CMS/[facility])   • Nonrheumatic aortic valve stenosis   • Diabetic peripheral neuropathy (CMS/[facility])   • Neuropathy associated with endocrine disorder (CMS/[facility])   • ESRD (end stage renal disease) on dialysis (CMS/[facility])   • Type 2 diabetes mellitus (CMS/[facility])   • HTN (hypertension)   • Vascular dementia without behavioral disturbance   • Anticoagulated on Coumadin   • Ulcer of heel due to diabetes mellitus (CMS/[facility])   • Sepsis (CMS/[facility])   • Osteomyelitis of right foot (CMS/[facility])   • Chronic anticoagulation   • Anemia of chronic kidney failure   • Chronic atrial fibrillation (CMS/[facility])   • Chronic diastolic (congestive) heart failure (CMS/[facility])   • Gastroenteritis   • Elevated troponin     Past Medical History:   Diagnosis Date   • Chronic atrial fibrillation (CMS/[facility])    • Chronic diastolic (congestive) heart failure (CMS/[facility])    • Edema    • ESRD (end stage renal disease) on dialysis (CMS/[facility])  11/2016    Dr Lawson   • Gout    • HX: anticoagulation    • Hyperlipidemia    • Hypertension    • Memory problem    • Nonrheumatic aortic valve stenosis 11/14/2016   • DARSHAN (obstructive sleep apnea)    • Other hemorrhagic disorder due to intrinsic circulating anticoagulants, antibodies, or inhibitors (CMS/HCC)     OTH HEMOR DISORDER DUE INTRIN   • Stroke (CMS/HCC)    • Type 2 diabetes mellitus (CMS/HCC)    • Vascular dementia 11/14/2016   • Vitamin D deficiency      Past Surgical History:   Procedure Laterality Date   • ARTERIOVENOUS FISTULA/SHUNT SURGERY Right 11/21/2016    Procedure: RT BRACHIAL CEPHALIC FISTULA;  Surgeon: Ar Muro MD;  Location: [facility] OR;  Service:    • ARTERIOVENOUS FISTULA/SHUNT SURGERY W/ HEMODIALYSIS CATHETER INSERTION Right 12/6/2017    Procedure: RT. ARM FISTULA REVISION/POSS. TUNNELED CATH;  Surgeon: Ar Muro MD;  Location: [facility] OR;  Service:    • COLONOSCOPY  2003   • INCISION AND DRAINAGE LEG Right 5/28/2019    Procedure: RIGHT FOOT DEBRIDEMENT;  Surgeon: Miguelangel Wynne MD;  Location: [facility] OR;  Service: Vascular   • INSERTION HEMODIALYSIS CATHETER N/A 11/25/2016    Procedure: TUNNEL CATHETER INSERTION;  Surgeon: Silvia Lim Jr., MD;  Location: [facility] OR;  Service:      General Information     Row Name 09/03/19 5418          PT Evaluation Time/Intention    Document Type  evaluation  -AR     Mode of Treatment  physical therapy  -AR     Row Name 09/03/19 3261          General Information    Patient Profile Reviewed?  yes  -AR     Prior Level of Function  mod assist: poor historian 2/2 dementia.  RW/WC in home  -AR     Existing Precautions/Restrictions  fall unsure of WB precautions w/ R foot   -AR     Row Name 09/03/19 1330          Relationship/Environment    Lives With  spouse;child(itz), adult  -AR     Row Name 09/03/19 1331          Resource/Environmental Concerns    Current Living Arrangements  home/apartment/condo  -AR     Row Name  09/03/19 1337          Home Main Entrance    Number of Stairs, Main Entrance  -- pt unable to state  -AR     Row Name 09/03/19 1337          Cognitive Assessment/Intervention- PT/OT    Orientation Status (Cognition)  oriented to;person;place  -AR       User Key  (r) = Recorded By, (t) = Taken By, (c) = Cosigned By    Initials Name Provider Type    AR Guillermina Cuello, PT Physical Therapist        Mobility     Row Name 09/03/19 1338          Bed Mobility Assessment/Treatment    Bed Mobility Assessment/Treatment  supine-sit;sit-supine  -AR     Supine-Sit [place] (Bed Mobility)  moderate assist (50% patient effort)  -AR     Sit-Supine [place] (Bed Mobility)  moderate assist (50% patient effort);minimum assist (75% patient effort);2 person assist  -AR     Assistive Device (Bed Mobility)  bed rails;head of bed elevated  -AR     Row Name 09/03/19 1338          Transfer Assessment/Treatment    Comment (Transfers)  sit<>stand 3x  -AR     Row Name 09/03/19 1338          Sit-Stand Transfer    Sit-Stand [place] (Transfers)  minimum assist (75% patient effort);2 person assist  -AR     Assistive Device (Sit-Stand Transfers)  -- HHAx2  -AR     Row Name 09/03/19 1338          Gait/Stairs Assessment/Training    Gait/Stairs Assessment/Training  gait/ambulation independence  -AR     [place] Level (Gait)  minimum assist (75% patient effort);contact guard;2 person assist  -AR     Assistive Device (Gait)  -- HHAx2  -AR     Distance in Feet (Gait)  2' few shuffling steps to HOB.  B black post op shoes on   -AR     Deviations/Abnormal Patterns (Gait)  festinating/shuffling;montserrat decreased  -AR     Bilateral Gait Deviations  heel strike decreased;forward flexed posture  -AR       User Key  (r) = Recorded By, (t) = Taken By, (c) = Cosigned By    Initials Name Provider Type    AR Guillermina Cuello, PT Physical Therapist        Obj/Interventions     Row Name 09/03/19 1340          General ROM    GENERAL ROM COMMENTS  B LE  WFL  -AR     Row Name 09/03/19 1340          MMT (Manual Muscle Testing)    General MMT Comments  B LE grossly +3/5 during mobility  -AR     Row Name 09/03/19 1340          Therapeutic Exercise    Comment (Therapeutic Exercise)  B AP, LAQ 10x, SLR 2-3x   -AR     Row Name 09/03/19 1340          Static Sitting Balance    Level of [place] (Unsupported Sitting, Static Balance)  supervision;contact guard assist  -AR     Sitting Position (Unsupported Sitting, Static Balance)  sitting on edge of bed  -AR     Time Able to Maintain Position (Unsupported Sitting, Static Balance)  3 to 4 minutes  -AR       User Key  (r) = Recorded By, (t) = Taken By, (c) = Cosigned By    Initials Name Provider Type    AR Guillermina Cuello, PT Physical Therapist        Goals/Plan     Row Name 09/03/19 1342          Bed Mobility Goal 1 (PT)    Activity/Assistive Device (Bed Mobility Goal 1, PT)  sit to supine/supine to sit  -AR     [place] Level/Cues Needed (Bed Mobility Goal 1, PT)  supervision required  -AR     Time Frame (Bed Mobility Goal 1, PT)  1 week  -AR     Row Name 09/03/19 1342          Transfer Goal 1 (PT)    Activity/Assistive Device (Transfer Goal 1, PT)  sit-to-stand/stand-to-sit;walker, rolling  -AR     [place] Level/Cues Needed (Transfer Goal 1, PT)  contact guard assist  -AR     Time Frame (Transfer Goal 1, PT)  1 week  -AR     Row Name 09/03/19 1342          Gait Training Goal 1 (PT)    Activity/Assistive Device (Gait Training Goal 1, PT)  gait (walking locomotion);walker, rolling  -AR     [place] Level (Gait Training Goal 1, PT)  contact guard assist  -AR     Distance (Gait Goal 1, PT)  50  -AR     Time Frame (Gait Training Goal 1, PT)  1 week  -AR       User Key  (r) = Recorded By, (t) = Taken By, (c) = Cosigned By    Initials Name Provider Type    Guillermina Howard, PT Physical Therapist        Clinical Impression     Row Name 09/03/19 1340          Pain Assessment    Additional Documentation  Pain  Scale: Numbers Pre/Post-Treatment (Group)  -AR     Row Name 09/03/19 1340          Pain Scale: Numbers Pre/Post-Treatment    Pain Scale: Numbers, Pretreatment  0/10 - no pain  -AR     Pain Scale: Numbers, Post-Treatment  0/10 - no pain  -AR     Pre/Post Treatment Pain Comment  denies any pain  -AR     Pain Intervention(s)  Repositioned  -AR     Row Name 09/03/19 1340          Physical Therapy Clinical Impression    Patient/Family Goals Statement (PT Clinical Impression)  Goal to go home  -AR     Criteria for Skilled Interventions Met (PT Clinical Impression)  yes  -AR     Rehab Potential (PT Clinical Summary)  good, to achieve stated therapy goals  -AR     Row Name 09/03/19 1340          Vital Signs    O2 Delivery Pre Treatment  room air  -AR     O2 Delivery Intra Treatment  room air  -AR     O2 Delivery Post Treatment  room air  -AR     Row Name 09/03/19 1340          Positioning and Restraints    Pre-Treatment Position  in bed  -AR     Post Treatment Position  bed  -AR     In Bed  notified nsg;supine;call light within reach;encouraged to call for assist;exit alarm on;with other staff  -AR       User Key  (r) = Recorded By, (t) = Taken By, (c) = Cosigned By    Initials Name Provider Type    Guillermina Howard, PT Physical Therapist        Outcome Measures     Row Name 09/03/19 1342          How much help from another person do you currently need...    Turning from your back to your side while in flat bed without using bedrails?  4  -AR     Moving from lying on back to sitting on the side of a flat bed without bedrails?  2  -AR     Moving to and from a bed to a chair (including a wheelchair)?  2  -AR     Standing up from a chair using your arms (e.g., wheelchair, bedside chair)?  2  -AR     Climbing 3-5 steps with a railing?  1  -AR     To walk in hospital room?  2  -AR     AM-PAC 6 Clicks Score (PT)  13  -AR     Row Name 09/03/19 1342          Functional Assessment    Outcome Measure Options  AM-PAC 6 Clicks Basic  Mobility (PT)  -AR       User Key  (r) = Recorded By, (t) = Taken By, (c) = Cosigned By    Initials Name Provider Type    Guillermina Howard PT Physical Therapist        Physical Therapy Education     Title: PT OT SLP Therapies (In Progress)     Topic: Physical Therapy (In Progress)     Point: Mobility training (In Progress)     Learning Progress Summary           Patient Acceptance, E, NR by AR at 9/3/2019  1:43 PM                   Point: Home exercise program (In Progress)     Learning Progress Summary           Patient Acceptance, E, NR by AR at 9/3/2019  1:43 PM                   Point: Body mechanics (In Progress)     Learning Progress Summary           Patient Acceptance, E, NR by AR at 9/3/2019  1:43 PM                   Point: Precautions (In Progress)     Learning Progress Summary           Patient Acceptance, E, NR by AR at 9/3/2019  1:43 PM                               User Key     Initials Effective Dates Name Provider Type Discipline    AR 04/03/18 -  Guillermina Cuello, PT Physical Therapist PT              PT Recommendation and Plan  Planned Therapy Interventions (PT Eval): balance training, bed mobility training, gait training, home exercise program, strengthening, stair training, ROM (range of motion), patient/family education, transfer training  Outcome Summary/Treatment Plan (PT)  Anticipated Discharge Disposition (PT): skilled nursing facility, home with home health, home with 24/7 care(pending progress/PLOF/level of assist available at home)  Plan of Care Reviewed With: patient  Outcome Summary: Pt admitted w/ sepsis, chronic afib, on HD, R heel wound w/ wound vac; wears post op shoes when up.  H/o dementia, difficult historian.  Pt reports using RW in home, thinks he probably has fallen recently.  Today pt demonstrates generalized weakness, impaired balance and activity tolerance but able to take few steps at EOB w/ min A x2.  DC TBD pending level of assist home.       Time Calculation:      Therapy Charges for Today     Code Description Service Date Service Provider Modifiers Qty    75185743205 HC PT EVAL MOD COMPLEXITY 2 9/3/2019 Guillermina Cuello, PT GP 1    55201591733 HC PT THER PROC EA 15 MIN 9/3/2019 Guillermina Cuello, PT GP 1    49144136977 HC PT THER SUPP EA 15 MIN 9/3/2019 Guillermina Cuello, PT GP 2          PT G-Codes  Outcome Measure Options: AM-PAC 6 Clicks Basic Mobility (PT)  AM-PAC 6 Clicks Score (PT): 13    Guillermina Cuello, PT  9/3/2019

## 2019-09-03 NOTE — PROGRESS NOTES
LOS: 2 days   Patient Care Team:  Doroteo Victoria MD as PCP - General (Family Medicine)  Pravin Oneill DPM as PCP - Claims Attributed    Subjective     Patient really does not have any complaints today no nausea no pain except his right heel    Review of Systems:          Objective     Vital Signs  Vital Sign Min/Max for last 24 hours  Temp  Min: 97.4 °F (36.3 °C)  Max: 98.1 °F (36.7 °C)   BP  Min: 81/43  Max: 146/90   Pulse  Min: 65  Max: 90   Resp  Min: 14  Max: 23   SpO2  Min: 91 %  Max: 100 %   No Data Recorded   Weight  Min: 84.6 kg (186 lb 8.2 oz)  Max: 84.6 kg (186 lb 8.2 oz)        Ventilator/Non-Invasive Ventilation Settings (From admission, onward)    None                       Body mass index is 26.76 kg/m².  I/O last 3 completed shifts:  In: 1895 [P.O.:1200; I.V.:265; IV Piggyback:430]  Out: 2000 [Other:2000]  No intake/output data recorded.        Physical Exam:  General Appearance: Well-developed elderly white male he is resting in bed he does not appear in any acute distress.  Oxygen saturations 98% on room air  Eyes: Conjunctiva are clear and anicteric pupils are about 2-1/2 mm equal   ENT: Nasal and oral mucous membranes are both moist no erythema or exudates Mallampati type II airway nasal septum midline.  Neck: No adenopathy or thyromegaly no jugular venous distention trachea midline neck is supple  Lungs: Completely clear nonlabored symmetric expansion  Cardiac: Irregularly irregular heart rate about 80   Abdomen: Soft nontender no palpable hepatosplenomegaly or masses active bowel sounds  : Not examined  Musculoskeletal: He does have a lesion on his right heel VAC is now in place  Skin: No jaundice no petechiae skin is warm and dry to touch, other than a few minor scratches on his hands and arms and what looks like some okay on a couple of fingers and none of these scratches look particularly infected no significant erythema or drainage  Neuro: Patient is very pleasant he is  cooperative he is following commands with all 4 extremities.  Oriented to person and place.  Extremities/P Vascular: No clubbing or cyanosis he has palpable radial pulses bilaterally and dorsalis pedis and dorsalis pedis are not the strongest  MSE: Somewhat flat affect       Labs:  Results from last 7 days   Lab Units 09/03/19 0413 09/02/19  0600 09/01/19 0054   GLUCOSE mg/dL  --  101* 217*   SODIUM mmol/L  --  134* 137   POTASSIUM mmol/L  --  4.2 3.7   MAGNESIUM mg/dL  --  1.7  --    CO2 mmol/L  --  22.8 25.0   CHLORIDE mmol/L  --  94* 95*   ANION GAP mmol/L  --  17.2* 17.0*   CREATININE mg/dL  --  7.70* 5.52*   BUN mg/dL  --  51* 35*   BUN / CREAT RATIO   --  6.6* 6.3*   CALCIUM mg/dL  --  8.5* 9.0   EGFR IF NONAFRICN AM mL/min/1.73  --  7* 10*   ALK PHOS U/L 61  --  79   TOTAL PROTEIN g/dL 6.0  --  6.9   ALT (SGPT) U/L 12  --  13   AST (SGOT) U/L 19  --  12   BILIRUBIN mg/dL 0.7  --  1.2   ALBUMIN g/dL 2.90* 3.30* 3.70   GLOBULIN gm/dL  --   --  3.2     Estimated Creatinine Clearance: 9.8 mL/min (A) (by C-G formula based on SCr of 7.7 mg/dL (H)).      Results from last 7 days   Lab Units 09/03/19 0413 09/02/19  0601 09/01/19 0054   WBC 10*3/mm3 6.31 10.60 15.07*   RBC 10*6/mm3 3.57* 3.86* 4.05*   HEMOGLOBIN g/dL 11.0* 11.6* 12.0*   HEMATOCRIT % 34.7* 36.7* 38.7   MCV fL 97.2* 95.1 95.6   MCH pg 30.8 30.1 29.6   MCHC g/dL 31.7 31.6 31.0*   RDW % 15.6* 15.7* 15.6*   RDW-SD fl 55.9* 55.4* 55.3*   MPV fL 10.7 10.0 9.8   PLATELETS 10*3/mm3 133* 146 187   NEUTROPHIL % %  --   --  89.6*   LYMPHOCYTE % %  --   --  4.0*   MONOCYTES % %  --   --  5.3   EOSINOPHIL % %  --   --  0.2*   BASOPHIL % %  --   --  0.4   IMM GRAN % %  --   --  0.5   NEUTROS ABS 10*3/mm3  --  10.06* 13.51*   LYMPHS ABS 10*3/mm3  --   --  0.60*   MONOS ABS 10*3/mm3  --   --  0.80   EOS ABS 10*3/mm3  --   --  0.03   BASOS ABS 10*3/mm3  --   --  0.06   IMMATURE GRANS (ABS) 10*3/mm3  --   --  0.07*   NRBC /100 WBC  --   --  0.0     Results from last  7 days   Lab Units 09/01/19  0339   PH, ARTERIAL pH units 7.444   PO2 ART mm Hg 71.7*   PCO2, ARTERIAL mm Hg 36.6   HCO3 ART mmol/L 25.1     Results from last 7 days   Lab Units 09/01/19  1041 09/01/19  0054   TROPONIN T ng/mL 0.360* 0.090*     Results from last 7 days   Lab Units 09/01/19  0054   PROBNP pg/mL 21,806.0*         Results from last 7 days   Lab Units 09/01/19  0650 09/01/19  0054   LACTATE mmol/L 1.9 2.7*   PROCALCITONIN ng/mL  --  0.35*     Results from last 7 days   Lab Units 09/03/19  0413 09/02/19  0600 09/01/19  0054   INR  3.78* 2.56* 1.77*     Microbiology Results (last 10 days)     Procedure Component Value - Date/Time    MRSA Screen Culture - Swab, Nares [[id number]]  (Abnormal) Collected:  09/01/19 0939    Lab Status:  Final result Specimen:  Swab from Nares Updated:  09/02/19 0950     MRSA SCREEN CX Staphylococcus aureus, MRSA     Comment:   Methicillin resistant Staphylococcus aureus, Patient may be an isolation risk.       Blood Culture - Blood, Arm, Left [[id number]]  (Abnormal)  (Susceptibility) Collected:  09/01/19 0205    Lab Status:  Final result Specimen:  Blood from Arm, Left Updated:  09/03/19 0639     Blood Culture Streptococcus pyogenes (Group A)     Gram Stain Anaerobic Bottle Gram positive cocci in chains    Susceptibility      Streptococcus pyogenes (Group A)     NAYA     Ceftriaxone Susceptible     Clindamycin Susceptible     Inducible Clindamycin Resistance Negative     Levofloxacin Susceptible     Penicillin G Susceptible     Vancomycin Susceptible                    Blood Culture ID, PCR - Blood, Arm, Left [[id number]]  (Abnormal) Collected:  09/01/19 0205    Lab Status:  Final result Specimen:  Blood from Arm, Left Updated:  09/01/19 1733     BCID, PCR Streptococcus pyogenes (Group A). Identification by BCID PCR.    Blood Culture - Blood, Arm, Left [[id number]]  (Abnormal) Collected:  09/01/19 0153    Lab Status:  Preliminary result Specimen:  Blood from Arm, Left Updated:   09/02/19 0648     Blood Culture Streptococcus pyogenes (Group A)     Isolated from Aerobic Bottle     STREP GROUPING A     Gram Stain Aerobic Bottle Gram positive cocci    Respiratory Panel, PCR - Swab, Nasopharynx [[id number]]  (Normal) Collected:  09/01/19 0119    Lab Status:  Final result Specimen:  Swab from Nasopharynx Updated:  09/01/19 0226     ADENOVIRUS, PCR Not Detected     Coronavirus 229E Not Detected     Coronavirus HKU1 Not Detected     Coronavirus NL63 Not Detected     Coronavirus OC43 Not Detected     Human Metapneumovirus Not Detected     Human Rhinovirus/Enterovirus Not Detected     Influenza B PCR Not Detected     Parainfluenza Virus 1 Not Detected     Parainfluenza Virus 2 Not Detected     Parainfluenza Virus 3 Not Detected     Parainfluenza Virus 4 Not Detected     Bordetella pertussis pcr Not Detected     Influenza A H1 2009 PCR Not Detected     Chlamydophila pneumoniae PCR Not Detected     Mycoplasma pneumo by PCR Not Detected     Influenza A PCR Not Detected     Influenza A H3 Not Detected     Influenza A H1 Not Detected     RSV, PCR Not Detected     Bordetella parapertussis PCR Not Detected                aspirin 81 mg Oral Daily   ceFAZolin 2 g Intravenous Q24H   clindamycin 900 mg Intravenous Q8H   donepezil 10 mg Oral Nightly   DULoxetine 60 mg Oral Daily   insulin glargine 30 Units Subcutaneous QAM   insulin lispro 0-14 Units Subcutaneous 4x Daily With Meals & Nightly   metoprolol succinate XL 75 mg Oral Q24H   sodium chloride 10 mL Intravenous Q12H   tamsulosin 0.4 mg Oral Daily   warfarin (COUMADIN) (dosing per levels)  Does not apply Daily       diltiaZEM 5-15 mg/hr Last Rate: Stopped (09/01/19 1403)   Pharmacy to dose warfarin         Diagnostics:  Xr Chest 1 View    Result Date: 9/1/2019  PORTABLE CHEST X-RAY  CLINICAL HISTORY: fever  COMPARISON: 06/25/2019  FINDINGS: Portable AP view of the chest was obtained with overlying monitor leads in place. Patient is somewhat kyphotic and  rotated to the left. The lungs are under aerated. Pulmonary edema clearly show significant improvement. There are calcified residua of granulomatous disease present. There is no active airspace disease. Stable cardiomegaly. No significant pleural fluid.        Improved edema/CHF.    This report was finalized on 9/1/2019 1:13 AM by Miguelangel Alvarez M.D.      Results for orders placed during the hospital encounter of 09/01/19   Adult Transthoracic Echo Complete W/ Cont if Necessary Per Protocol    Narrative · Left ventricular systolic function is low normal. Calculated EF = 48.0%.   Estimated EF was in agreement with the calculated EF. Normal left   ventricular cavity size noted. Left ventricular wall thickness is   consistent with mild concentric hypertrophy. Left ventricular diastolic   function was indeterminate.  · Left atrial cavity size is moderately dilated.  · Right atrial cavity size is moderately dilated.  · Moderate aortic valve stenosis is present.          I personally reviewed the chest x-ray there is mild vascular congestion I do not see any definite pleural effusions I am not even certain there is that much edema.    Active Hospital Problems    Diagnosis  POA   • Gastroenteritis [K52.9]  Unknown   • Elevated troponin [R74.8]  Unknown   • Chronic atrial fibrillation (CMS/HCC) [I48.2]  Yes   • Chronic diastolic (congestive) heart failure (CMS/HCC) [I50.32]  Yes   • Chronic anticoagulation [Z79.01]  Not Applicable   • Sepsis (CMS/HCC) [A41.9]  Yes   • Osteomyelitis of right foot (CMS/HCC) [M86.9]  Yes   • Ulcer of heel due to diabetes mellitus (CMS/HCC) [E11.621, L97.409]  Yes   • Type 2 diabetes mellitus (CMS/HCC) [E11.9]  Yes   • Vascular dementia [F01.50]  Yes   • Nonrheumatic aortic valve stenosis [I35.0]  Yes      Resolved Hospital Problems   No resolved problems to display.     Dialysis access site ultrasound no fluid collections good flow some stenosis noted  Assessment/Plan     1. Sepsis source not  clear his right heel wound looks good but we got 2 blood cultures positive looks like strep pyogenes.  Continue cefazolin per infectious disease they will determine duration.  2. A. fib with rapid ventricular response he is chronically anticoagulated with Coumadin supratherapeutic today we will have to hold Coumadin pharmacy is following and helping adjust dosing.  Rate seems to be well controlled now  3. Acute on chronic systolic CHF seems to be well compensated currently  4. Lactic acidosis probably secondary to 1 through 3 above resolved  5. End-stage renal disease on hemodialysis nephrology managing.  6. Elevated troponin did rise yesterday EKG does not look like acute changes echocardiogram does not sound like there is significant wall motion abnormalities.  I suspect this is a type II MI demand ischemia related to sepsis and atrial fibrillation with rapid ventricular response.  7. Moderate aortic stenosis he will follow with cardiology as outpatient  8. Obstructive sleep apnea he is Pap intolerant  9. Coronary artery disease  10. Diabetes mellitus type 2 insulin requiring blood sugars adequate  11. Anemia chronic disease  12. Dementia seems to be reasonably advanced  13. BPH  14. Mild hypoxia resolved  15. Right heel wound stage IV, continue wound VAC wound care following    Plan for disposition: Transfer to floor today    Isreal Rodriguez MD  09/03/19  6:59 AM    Time: Spent about 38 minutes on patient's care thus far today

## 2019-09-03 NOTE — PROGRESS NOTES
Pharmacy Consult: Warfarin Dosing/ Monitoring    Deacon Martin is a 74 y.o. male, estimated creatinine clearance is 13.9 mL/min (A) (by C-G formula based on SCr of 5.26 mg/dL (H)). weighing 82.3 kg (181 lb 7 oz).     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    Social History     Tobacco Use   • Smoking status: Former Smoker     Packs/day: 1.00     Years: 5.00     Pack years: 5.00     Types: Cigarettes     Last attempt to quit: 1966     Years since quittin.7   • Smokeless tobacco: Never Used   • Tobacco comment: rare caffeine   Substance Use Topics   • Alcohol use: Yes     Alcohol/week: 0.6 oz     Types: 1 Cans of beer per week     Comment: occasionally    • Drug use: No       Results from last 7 days   Lab Units 19  0054   INR  3.78*  --  2.56* 1.77*   HEMOGLOBIN g/dL 11.0* 11.6*  --  12.0*   HEMATOCRIT % 34.7* 36.7*  --  38.7   PLATELETS 10*3/mm3 133* 146  --  187     Results from last 7 days   Lab Units 19  0619  0054   SODIUM mmol/L 133* 134* 137   POTASSIUM mmol/L 3.7 4.2 3.7   CHLORIDE mmol/L 95* 94* 95*   CO2 mmol/L 25.4 22.8 25.0   BUN mg/dL 29* 51* 35*   CREATININE mg/dL 5.26* 7.70* 5.52*   CALCIUM mg/dL 8.4* 8.5* 9.0   BILIRUBIN mg/dL 0.7  --  1.2   ALK PHOS U/L 61  --  79   ALT (SGPT) U/L 12  --  13   AST (SGOT) U/L 19  --  12   GLUCOSE mg/dL 73 101* 217*     Anticoagulation history: Per [facility] anticoagulation  clinic visit note on 8/26/19: warfarin dose is 2.5 mg daily.    Hospital Anticoagulation:  Consulting provider: Dr Patel  Start date: 9/1  Indication: afib  Target INR: 2-3  Expected duration: n/a   Bridge Therapy: No                  Date 9/1 9/2 9/3          INR 1.77 2.56 3.78          Warfarin dose 5 mg 2.5 mg HOLD            Potential drug interactions:  cefazolin - may enhance anticoagulant effect of warfarin  Home meds: Aspirin, Duloxetine    Relevant nutrition status: diet regular  Other:     Education complete?/ Date: very confused upon admission, defer for now    Assessment/Plan:  INR 3.78 today. Will hold warfarin today. Will evaluate tomorrow for restarting home dose.  Monitor INR daily  Follow up in AM    Pharmacy will continue to follow until discharge or discontinuation of warfarin.   Carina Reed RPh  9/3/2019

## 2019-09-03 NOTE — PLAN OF CARE
Problem: Fall Risk (Adult)  Goal: Identify Related Risk Factors and Signs and Symptoms  Outcome: Outcome(s) achieved Date Met: 09/03/19    Goal: Absence of Fall  Outcome: Ongoing (interventions implemented as appropriate)      Problem: Arrhythmia/Dysrhythmia (Symptomatic) (Adult)  Goal: Signs and Symptoms of Listed Potential Problems Will be Absent, Minimized or Managed (Arrhythmia/Dysrhythmia)  Outcome: Ongoing (interventions implemented as appropriate)      Problem: Patient Care Overview  Goal: Plan of Care Review  Outcome: Ongoing (interventions implemented as appropriate)   09/03/19 1626   OTHER   Outcome Summary Pt. VS WNL. No c/o pain. Pt. A&O x4, forgetful at times. Pt. with minimal UOP, HD MWF. Bladder scan only 75ml. Pt. with BM x2 today. Pt. with wound vac to right heel, clean, dry, intact. Pt. in A_fib, rate controlled. Pt. receiving IV abx. Pt. up with assist x 1-2 to bathroom/BSC. Pt. being transferred out of ICU, report called to 4east nurse. Pt. resting comfortably at present, will continue to monitor closely.   Coping/Psychosocial   Plan of Care Reviewed With patient   Plan of Care Review   Progress improving     Goal: Individualization and Mutuality  Outcome: Ongoing (interventions implemented as appropriate)    Goal: Discharge Needs Assessment  Outcome: Ongoing (interventions implemented as appropriate)      Problem: Skin Injury Risk (Adult)  Goal: Identify Related Risk Factors and Signs and Symptoms  Outcome: Outcome(s) achieved Date Met: 09/03/19    Goal: Skin Health and Integrity  Outcome: Ongoing (interventions implemented as appropriate)      Problem: Sepsis/Septic Shock (Adult)  Goal: Signs and Symptoms of Listed Potential Problems Will be Absent, Minimized or Managed (Sepsis/Septic Shock)  Outcome: Ongoing (interventions implemented as appropriate)

## 2019-09-04 NOTE — PROGRESS NOTES
Continued Stay Note  [facility]     Patient Name: Deacon Martin  MRN: [id number]  Today's Date: 9/4/2019    Admit Date: 9/1/2019    Discharge Plan     Row Name 09/04/19 1540       Plan    Plan  Home with wife and [facility] vs SNF    Patient/Family in Agreement with Plan  yes    Plan Comments  Received pt from ICU 9/3 at 1700. Attempted to screen pt earlier but pt not in room. Pt currently receiving HD in room.  Pt has dementia and unable to give accurate information.  Attempted to call Adore Martin, wife, [phone]. No answer but left VM and call back number.  Reviewed old chart and MD notes.  Pt receives HD MWF at [facility].  He is being seen at [facility] for a foot wound.  Currently pt has a wound vac to the foot wound.  Pt came from home to ED with wound vac in place.  Pt is current with [facility].  Referral placed.  Pt has Group A Strep septicemia and will require cefazolin  2g/2g/3g with MWF HD x 2 week course with stop date 9/16/19. Weekly CBC w/ diff and BMP faxed to ID at [phone].  Physical therapy did not see today.  Last seen 9/3 and pt was able to take 2 shuffling steps. I will need to speak to wife to get pt's baseline mobility and discuss discharge plan for home with HH vs SNF.  Will follow up in the morning.  Prakash Hodgson RN-BC        Discharge Codes    No documentation.             Prakash Hodgson RN

## 2019-09-04 NOTE — PLAN OF CARE
Problem: Patient Care Overview  Goal: Plan of Care Review  Outcome: Ongoing (interventions implemented as appropriate)   09/04/19 4847   OTHER   Outcome Summary Vitals stable. No pain. Dialysis done today. Low air loss mattress ordered. Wound vac changed. Will continue to monitor.    Coping/Psychosocial   Plan of Care Reviewed With patient   Plan of Care Review   Progress improving       Problem: Skin Injury Risk (Adult)  Goal: Skin Health and Integrity  Outcome: Ongoing (interventions implemented as appropriate)      Problem: Sepsis/Septic Shock (Adult)  Goal: Signs and Symptoms of Listed Potential Problems Will be Absent, Minimized or Managed (Sepsis/Septic Shock)  Outcome: Ongoing (interventions implemented as appropriate)

## 2019-09-04 NOTE — PROGRESS NOTES
"   LOS: 3 days    Patient Care Team:  Doroteo Victoria MD as PCP - General (Family Medicine)  Pravin Oneill DPM as PCP - Claims Attributed    Chief Complaint:    Chief Complaint   Patient presents with   • Weakness - Generalized     Follow-up ESRD  Subjective     Interval History:   He is feeling much better today, appetite is improving, denies any chest pain or shortness of air, no orthopnea PND, no nausea or vomiting, no abdominal pain, no lightheadedness    Review of Systems:   As noted above    Objective     Vital Signs  Temp:  [96.8 °F (36 °C)-98.3 °F (36.8 °C)] 96.8 °F (36 °C)  Heart Rate:  [73-87] 79  Resp:  [16-20] 16  BP: (118-156)/(66-82) 156/74    Flowsheet Rows      First Filed Value   Admission Height  172.7 cm (68\") Documented at 09/01/2019 0034   Admission Weight  88.9 kg (196 lb) Documented at 09/01/2019 0034          No intake/output data recorded.  I/O last 3 completed shifts:  In: 580 [P.O.:480; IV Piggyback:100]  Out: -     Intake/Output Summary (Last 24 hours) at 9/4/2019 0821  Last data filed at 9/4/2019 0500  Gross per 24 hour   Intake 480 ml   Output --   Net 480 ml       Physical Exam:    General Appearance: alert, oriented to hospital. no acute distress,  Chronically ill  Skin: warm and dry  HEENT: pupils round and reactive to light, oral mucosa normal  Neck: supple, no JVD, trachea midline  Lungs: Clear to auscultation   Heart: Irregularly irregular, no rub  Abdomen: soft, non-tender,  +bs  Extremities: no edema, cyanosis or clubbing, large ulcer on the right heel. VAC.  AVF right upper ext thrill, bruit .  Neuro: normal speech and mental status        Results Review:    Results from last 7 days   Lab Units 09/03/19  0413 09/02/19  0600 09/01/19  0054   SODIUM mmol/L 133* 134* 137   POTASSIUM mmol/L 3.7 4.2 3.7   CHLORIDE mmol/L 95* 94* 95*   CO2 mmol/L 25.4 22.8 25.0   BUN mg/dL 29* 51* 35*   CREATININE mg/dL 5.26* 7.70* 5.52*   CALCIUM mg/dL 8.4* 8.5* 9.0   BILIRUBIN mg/dL 0.7  -- "  1.2   ALK PHOS U/L 61  --  79   ALT (SGPT) U/L 12  --  13   AST (SGOT) U/L 19  --  12   GLUCOSE mg/dL 73 101* 217*       Estimated Creatinine Clearance: 14.3 mL/min (A) (by C-G formula based on SCr of 5.26 mg/dL (H)).    Results from last 7 days   Lab Units 09/03/19 0413 09/02/19  0600   MAGNESIUM mg/dL  --  1.7   PHOSPHORUS mg/dL 4.2 4.8*             Results from last 7 days   Lab Units 09/03/19  0413 09/02/19  0601 09/01/19  0054   WBC 10*3/mm3 6.31 10.60 15.07*   HEMOGLOBIN g/dL 11.0* 11.6* 12.0*   PLATELETS 10*3/mm3 133* 146 187       Results from last 7 days   Lab Units 09/03/19 0413 09/02/19  0600 09/01/19  0054   INR  3.78* 2.56* 1.77*         Imaging Results (last 24 hours)     ** No results found for the last 24 hours. **          aspirin 81 mg Oral Daily   ceFAZolin 2 g Intravenous Q24H   donepezil 10 mg Oral Nightly   DULoxetine 60 mg Oral Daily   insulin glargine 30 Units Subcutaneous QAM   insulin lispro 0-14 Units Subcutaneous 4x Daily With Meals & Nightly   metoprolol succinate XL 75 mg Oral Q24H   sodium chloride 10 mL Intravenous Q12H   tamsulosin 0.4 mg Oral Daily   warfarin (COUMADIN) (dosing per levels)  Does not apply Daily       Pharmacy to dose warfarin        Medication Review:   Current Facility-Administered Medications   Medication Dose Route Frequency Provider Last Rate Last Dose   • aspirin chewable tablet 81 mg  81 mg Oral Daily Herman Saavedra MD   81 mg at 09/03/19 0824   • ceFAZolin in dextrose (ANCEF) IVPB solution 2 g  2 g Intravenous Q24H Corby Potter MD   2 g at 09/03/19 1050   • dextrose (D50W) 25 g/ 50mL Intravenous Solution 25 g  25 g Intravenous Q15 Min PRN Kaylene Patel MD       • dextrose (GLUTOSE) oral gel 15 g  15 g Oral Q15 Min PRN Kaylene Patel MD       • donepezil (ARICEPT) tablet 10 mg  10 mg Oral Nightly Kaylene Patel MD   10 mg at 09/03/19 7538   • DULoxetine (CYMBALTA) DR capsule 60 mg  60 mg Oral Daily Kaylene Patel MD   60 mg at 09/03/19 5075    • glucagon (human recombinant) (GLUCAGEN DIAGNOSTIC) injection 1 mg  1 mg Subcutaneous PRN Kaylene Patel MD       • insulin glargine (LANTUS) injection 30 Units  30 Units Subcutaneous QAM Kaylene Patel MD   30 Units at 09/04/19 0615   • insulin lispro (humaLOG) injection 0-14 Units  0-14 Units Subcutaneous 4x Daily With Meals & Nightly Kalyene Patel MD   3 Units at 09/03/19 2131   • metoprolol succinate XL (TOPROL-XL) 24 hr tablet 75 mg  75 mg Oral Q24H Kaylene Patel MD   75 mg at 09/03/19 0824   • ondansetron (ZOFRAN) injection 4 mg  4 mg Intravenous Q6H PRN Isreal Rodriguez MD   4 mg at 09/02/19 0130   • Pharmacy to dose warfarin   Does not apply Continuous PRN Kaylene Patel MD       • sodium chloride 0.9 % flush 10 mL  10 mL Intravenous PRN Doroteo Soler MD       • sodium chloride 0.9 % flush 10 mL  10 mL Intravenous Q12H Kaylene Patel MD   10 mL at 09/03/19 2148   • sodium chloride 0.9 % flush 10 mL  10 mL Intravenous PRN Kaylene Patel MD       • tamsulosin (FLOMAX) 24 hr capsule 0.4 mg  0.4 mg Oral Daily Kaylene Patel MD   0.4 mg at 09/03/19 0824   • warfarin (COUMADIN) (dosing per levels)   Does not apply Daily Kaylene Patel MD   Stopped at 09/03/19 1800       Assessment/Plan   1.  ESRD. Dialysis MWF. Waste products controlled.   2.  Group A strep sepsis.   3.  Chronic A. fib, initially with rapid ventricular response currently controlled ventricular response, chronically anticoagulated.  4.  Delirium  on baseline dementia, resolved.  5.  Increased troponin, thought to be demand ischemia .  6.  Diabetic foot ulcer with large right heel ulcer. VAC in place.   7.  Diabetes mellitus type 2  8. RUE AVF proximal stenosis.  No difficulty with stick. Pressures not high.  Will ask vascular to address as outpt. I spoke to Dr. Muro.  Will need to call office to arrange fistulogram on dc from hospital .    Plan:  1.  Dialysis today  2.  Continue the same treatment  3.  Surveillance  labs          Segundo Lawson MD  09/04/19  8:21 AM

## 2019-09-04 NOTE — SIGNIFICANT NOTE
09/04/19 1329   Rehab Treatment   Discipline physical therapist   Reason Treatment Not Performed unavailable for treatment  (pt in dialysis this afternoon per RN. Will follow up tomorrow.)   Recommendation   PT - Next Appointment 09/05/19

## 2019-09-04 NOTE — DISCHARGE PLACEMENT REQUEST
"Carli Martin (74 y.o. Male)     Date of Birth Social Security Number Address Home Phone MRN    1945  [address] [place] [id number] [phone] [id number]    [employer] Marital Status          Samaritan        Admission Date Admission Type Admitting Provider Attending Provider Department, Room/Bed    9/1/19 Emergency Kaylene Patel MD Jambeih, Rami, MD [address], [address]    Discharge Date Discharge Disposition Discharge Destination                       Attending Provider:  Kaylene Patel MD    Allergies:  No Known Allergies    Isolation:  Contact   Infection:  MRSA (01/07/19)   Code Status:  CPR    Ht:  177.8 cm (70\")   Wt:  84.4 kg (186 lb)    Admission Cmt:  None   Principal Problem:  None                Active Insurance as of 9/1/2019     Primary Coverage     Payor Plan Insurance Group Employer/Plan Group    MEDICARE MEDICARE A & B      Payor Plan Address Payor Plan Phone Number Payor Plan Fax Number Effective Dates    [address] [phone]  2/1/2008 - None Entered    [place] [address]       Subscriber Name Subscriber Birth Date Member ID       CARLI MARTIN 1945 [id number]                 Emergency Contacts      (Rel.) Home Phone Work Phone Mobile Phone    Adore Martin (Spouse) [phone] -- [phone]    GABRIELA MARTIN (Son) [phone] -- [phone]              "

## 2019-09-04 NOTE — NURSING NOTE
09/04/19 0900   Wound 09/01/19 0600 Right lower heel Pressure Injury   Date first assessed/Time first assessed: 09/01/19 0600   Present on Hospital Admission: Yes  Side: Right  Orientation: lower  Location: heel  Primary Wound Type: Pressure Injury  Stage, Pressure Injury: unstageable  Additional Comments: Healing   Dressing Appearance dry;intact   Closure None   Base clean;moist;red   Edges rolled/closed   Drainage Amount none   Care, Wound cleansed with;sterile normal saline;negative pressure wound therapy;other (see comments)  (maribeth in wound bed)   NPWT (Negative Pressure Wound Therapy) 09/02/19 1200 Right heel   Placement Date/Time: 09/02/19 1200   Location: Right heel   Therapy Setting continuous therapy   Dressing foam, black   Pressure Setting 125 mmHg   Sponges Inserted 1   Sponges Removed 1   CWOCN follow up for wound vac dressing change.  Wound bed clean, red, moist.  Maribeth (collagen dressing) added today to wound bed to promote healing; packed with black foam and placed to suction at 125 mmhg continuous.  Assessed gluteal skin; right gluteal with blanchable erythema, left gluteal with maroon/ purple discoloration but skin firm and intact.  I have ordered a RAQUEL mattress for patient today; requested heel protectors.

## 2019-09-04 NOTE — PLAN OF CARE
Problem: Fall Risk (Adult)  Goal: Absence of Fall  Outcome: Ongoing (interventions implemented as appropriate)      Problem: Arrhythmia/Dysrhythmia (Symptomatic) (Adult)  Goal: Signs and Symptoms of Listed Potential Problems Will be Absent, Minimized or Managed (Arrhythmia/Dysrhythmia)  Outcome: Ongoing (interventions implemented as appropriate)      Problem: Patient Care Overview  Goal: Plan of Care Review  Outcome: Ongoing (interventions implemented as appropriate)   09/04/19 050   OTHER   Outcome Summary no complaints of pain, dialysis in AM, VSS, will continue to monitor   Coping/Psychosocial   Plan of Care Reviewed With patient   Plan of Care Review   Progress improving       Problem: Skin Injury Risk (Adult)  Goal: Skin Health and Integrity  Outcome: Ongoing (interventions implemented as appropriate)      Problem: Sepsis/Septic Shock (Adult)  Goal: Signs and Symptoms of Listed Potential Problems Will be Absent, Minimized or Managed (Sepsis/Septic Shock)  Outcome: Ongoing (interventions implemented as appropriate)

## 2019-09-04 NOTE — PROGRESS NOTES
"Continued Stay Note  [facility]     Patient Name: Deacon Martin  MRN: [id number]  Today's Date: 9/4/2019    Admit Date: 9/1/2019    Discharge Plan     Row Name 09/04/19 1840       Plan    Plan  SNF; Referral to [facility]    Patient/Family in Agreement with Plan  yes    Plan Comments  Met with pt and Adore Martin, wife, [phone] at bedside. Wife states pt's normal is to be \"wobbly\" with ambulation.  Discussed Physical Therapy recommending he go to rehab prior to returning home.  Pt and wife are both agreeable to go to SNF.  Pt and wife chose [facility] for SNF because they have onsite HD.  Referral placed in [facility] for [facility].  Will call Randi/Randy in the morning to check if bed is available.  Prakash OLMSTEAD    Row Name 09/04/19 1735       Plan    Plan  Home with wife and [facility] vs SNF    Patient/Family in Agreement with Plan  yes    Plan Comments  Received pt from ICU 9/3 at 1700. Attempted to screen pt earlier but pt not in room. Pt currently receiving HD in room.  Pt has dementia and unable to give accurate information.  Attempted to call Adore Martin, wife, [phone]. No answer but left VM and call back number.  Reviewed old chart and MD notes.  Pt receives HD MWF at [facility].  He is being seen at [facility] for a foot wound.  Currently pt has a wound vac to the foot wound.  Pt came from home to ED with wound vac in place.  Pt is current with [facility].  Referral placed.  Pt has Group A Strep septicemia and will require cefazolin  2g/2g/3g with MWF HD x 2 week course with stop date 9/16/19. Weekly CBC w/ diff and BMP faxed to ID at [phone].  Physical therapy did not see today.  Last seen 9/3 and pt was able to take 2 shuffling steps. I will need to speak to wife to get pt's baseline mobility and discuss discharge plan for home with HH vs SNF.  Will follow up in the morning.  Prakash Hodgson RN-BC        Discharge Codes    No documentation.             Prakash Hodgson, " RN

## 2019-09-04 NOTE — PROGRESS NOTES
" LOS: 3 days     Chief Complaint:  Follow-up Group A Strep septicemia    Interval History:  No acute events. No fevers. \"I feel much better.\" Tolerating cefazolin w/o rash or diarrhea. Wound vac being placed this AM. HD today.    ROS:  No chest pain or SOA; no rash    Vital Signs  Temp:  [96.8 °F (36 °C)-98.3 °F (36.8 °C)] 96.8 °F (36 °C)  Heart Rate:  [73-87] 79  Resp:  [16-20] 16  BP: (118-156)/(66-82) 156/74    Physical Exam:  General: awake, alert, NAD, eating breakfast  Head: Normocephalic  Eyes: no scleral icterus  ENT: MMM, OP clear, no thrush.  Cardiovascular: NR, irregular rhythm, no LE edema  Respiratory: normal work of breathing on ambient air  GI: Abdomen is soft, non-tender, non-distended  : no De Leon catheter present  Musculoskeletal: R lateral heel w/ clean wound  Skin: No embolic phenomenon  Neurological: Alert and oriented x 3, motor strength 4/5 and symmetric in all four extremities  Vasc:  RUE AV fistula without erythema; good thrill    Antibiotics:  •  ceFAZolin in dextrose (ANCEF) IVPB solution 2 g, 2 g, Intravenous, Q24H    LABS:  CBC, micro reviewed today  Lab Results   Component Value Date    WBC 8.62 09/04/2019    HGB 11.1 (L) 09/04/2019    HCT 35.2 (L) 09/04/2019    MCV 96.7 09/04/2019     09/04/2019     Lab Results   Component Value Date    GLUCOSE 73 09/03/2019    CALCIUM 8.4 (L) 09/03/2019     (L) 09/03/2019    K 3.7 09/03/2019    CO2 25.4 09/03/2019    CL 95 (L) 09/03/2019    BUN 29 (H) 09/03/2019    CREATININE 5.26 (H) 09/03/2019    EGFRIFAFRI  09/03/2019      Comment:      <15 Indicative of kidney failure.    EGFRIFNONA 11 (L) 09/03/2019    BCR 5.5 (L) 09/03/2019    ANIONGAP 12.6 09/03/2019     Microbiology:  9/1 RVP: negative  9/1 BCx: Group A Strep in 2/2 sets   9/1 MRSA nares screen: positive  9/2 BCx: NGTD    Radiology (prior):  RUE Duplex negative for abscess near fistula; proximal stenosis noted    Assessment/Plan   1. Sepsis due to Group A Strep septicemia  2. " ESRD on HD  3. Atrial fibrillation with rapid venticular response  4. Uncontrolled DM2  5. Dementia  6. Right heel wound  7. Chronic diastolic heart fialure     Change cefazolin to 2g/2g/3g with MWF HD x 2 week course with stop date 9/16/19. Weekly CBC w/ diff and BMP faxed to me at [phone]. He will not need follow-up in the ID clinic but I am available at [phone] with any questions. He will continue to follow with the wound care clinic for his right heel.    Thank you for allowing me to be involved in the care of this patient. Infectious diseases will sign off at this time with antibiotics plan in place but please call me at [phone] if any further questions.

## 2019-09-05 NOTE — DISCHARGE SUMMARY
Date of Discharge:  9/5/2019    Discharge Diagnoses:  1. Streptococcus pyogenes bacteremic sepsis source unclear  2. Atrial fibrillation with rapid ventricular response  3. Acute on chronic systolic CHF  4. Lactic acidosis  5. End-stage renal disease  6. Non-ST elevation type II demand ischemia MI  7. Moderate aortic stenosis  8. Obstructive sleep apnea Pap intolerant  9. Coronary artery disease  10. Diabetes mellitus type 2 insulin requiring  11. Anemia of chronic disease  12. Dementia  13. Right he heel pressure wound stage IV  14. BPH  15. Mild hypoxemia  16. 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 male presented with multiple problems he was found to be septic he is a dialysis patient with end-stage renal disease and chronic atrial fibrillation he was in rapid ventricular response on presentation had acute on chronic systolic CHF he had an elevated troponin no ST segment changes were felt to be a type II demand ischemia situation he also has some underlying aortic stenosis.  He is a diabetic he has a stage IV right heel diabetic pressure wound actually looked very good we were not certain the source of his infection he was seen by infectious disease Dr. gordillo.  Work-up included echocardiogram which did not show any evidence of vegetation his AV fistula did not show any evidence of infection or nino-fistula abscess.  The ultrasound did show some proximal stenosis of the fistula but it is been working well he is to follow-up as an outpatient with Dr. Muro vascular surgery for treatment of this.  Infectious diseases recommended full 2 weeks of antibiotic therapy for his strep infection they want cefazolin 2 g on Monday and Wednesday and 3 g on Friday with his dialysis treatments.  Patient does have a wound VAC on his right heel that should be continued.  He has very severe dementia but he is seems to be in very good spirits and looks amazingly  well at this time for the condition he came in and his chronic problems.  He is good to be transferred to the [facility] where he will receive physical therapy for strengthening continue his wound VAC and dialysis    Procedures Performed         Consults:   Consults     Date and Time Order Name Status Description    9/1/2019 1836 Inpatient Infectious Diseases Consult Completed     9/1/2019 0739 Inpatient Cardiology Consult Completed     9/1/2019 0717 Inpatient Nephrology Consult      9/1/2019 0349 Pulmonology (on-call MD unless specified) Completed     9/1/2019 0155 LHA (on-call MD unless specified) Details Completed           Pertinent Test Results:   Labs:  Results from last 7 days   Lab Units 09/05/19  0446 09/03/19  0413 09/02/19  0600 09/01/19  0054   GLUCOSE mg/dL 108* 73 101* 217*   SODIUM mmol/L 135* 133* 134* 137   POTASSIUM mmol/L 3.4* 3.7 4.2 3.7   MAGNESIUM mg/dL  --   --  1.7  --    CO2 mmol/L 24.8 25.4 22.8 25.0   CHLORIDE mmol/L 96* 95* 94* 95*   ANION GAP mmol/L 14.2 12.6 17.2* 17.0*   CREATININE mg/dL 4.92* 5.26* 7.70* 5.52*   BUN mg/dL 24* 29* 51* 35*   BUN / CREAT RATIO  4.9* 5.5* 6.6* 6.3*   CALCIUM mg/dL 8.5* 8.4* 8.5* 9.0   EGFR IF NONAFRICN AM mL/min/1.73 12* 11* 7* 10*   ALK PHOS U/L  --  61  --  79   TOTAL PROTEIN g/dL  --  6.0  --  6.9   ALT (SGPT) U/L  --  12  --  13   AST (SGOT) U/L  --  19  --  12   BILIRUBIN mg/dL  --  0.7  --  1.2   ALBUMIN g/dL 3.30* 2.90* 3.30* 3.70   GLOBULIN gm/dL  --   --   --  3.2     Estimated Creatinine Clearance: 15.1 mL/min (A) (by C-G formula based on SCr of 4.92 mg/dL (H)).      Results from last 7 days   Lab Units 09/05/19  0446 09/04/19  0815 09/03/19  0413 09/02/19  0601 09/01/19  0054   WBC 10*3/mm3 6.62 8.62 6.31 10.60 15.07*   RBC 10*6/mm3 3.87* 3.64* 3.57* 3.86* 4.05*   HEMOGLOBIN g/dL 11.4* 11.1* 11.0* 11.6* 12.0*   HEMATOCRIT % 37.2* 35.2* 34.7* 36.7* 38.7   MCV fL 96.1 96.7 97.2* 95.1 95.6   MCH pg 29.5 30.5 30.8 30.1 29.6   MCHC g/dL 30.6* 31.5  31.7 31.6 31.0*   RDW % 15.5* 15.4 15.6* 15.7* 15.6*   RDW-SD fl 55.3* 55.1* 55.9* 55.4* 55.3*   MPV fL 10.2 10.5 10.7 10.0 9.8   PLATELETS 10*3/mm3 164 153 133* 146 187   NEUTROPHIL % % 60.3  --   --   --  89.6*   LYMPHOCYTE % % 23.1  --   --   --  4.0*   MONOCYTES % % 11.5  --   --   --  5.3   EOSINOPHIL % % 3.6  --   --   --  0.2*   BASOPHIL % % 0.9  --   --   --  0.4   IMM GRAN % % 0.6*  --   --   --  0.5   NEUTROS ABS 10*3/mm3 3.99  --   --  10.06* 13.51*   LYMPHS ABS 10*3/mm3 1.53  --   --   --  0.60*   MONOS ABS 10*3/mm3 0.76  --   --   --  0.80   EOS ABS 10*3/mm3 0.24  --   --   --  0.03   BASOS ABS 10*3/mm3 0.06  --   --   --  0.06   IMMATURE GRANS (ABS) 10*3/mm3 0.04  --   --   --  0.07*   NRBC /100 WBC 0.0  --   --   --  0.0     Results from last 7 days   Lab Units 09/01/19  0339   PH, ARTERIAL pH units 7.444   PO2 ART mm Hg 71.7*   PCO2, ARTERIAL mm Hg 36.6   HCO3 ART mmol/L 25.1     Results from last 7 days   Lab Units 09/01/19  1041 09/01/19  0054   TROPONIN T ng/mL 0.360* 0.090*     Results from last 7 days   Lab Units 09/01/19  0054   PROBNP pg/mL 21,806.0*         Results from last 7 days   Lab Units 09/01/19  0650 09/01/19  0054   LACTATE mmol/L 1.9 2.7*   PROCALCITONIN ng/mL  --  0.35*     Results from last 7 days   Lab Units 09/05/19  0446 09/04/19  0815 09/03/19  0413   INR  2.36* 3.42* 3.78*       Imaging Results (last 72 hours)     ** No results found for the last 72 hours. **        Results for orders placed during the hospital encounter of 09/01/19   Adult Transthoracic Echo Complete W/ Cont if Necessary Per Protocol    Narrative · Left ventricular systolic function is low normal. Calculated EF = 48.0%.   Estimated EF was in agreement with the calculated EF. Normal left   ventricular cavity size noted. Left ventricular wall thickness is   consistent with mild concentric hypertrophy. Left ventricular diastolic   function was indeterminate.  · Left atrial cavity size is moderately dilated.  ·  Right atrial cavity size is moderately dilated.  · Moderate aortic valve stenosis is present.              Condition on Discharge: Much improved    Vital Signs  Temp:  [96.9 °F (36.1 °C)-98.6 °F (37 °C)] 97.7 °F (36.5 °C)  Heart Rate:  [66-89] 89  Resp:  [16] 16  BP: (132-158)/(74-91) 147/91    Physical Exam:  General Appearance: Well-developed white male he sitting on side of bed in no apparent distress on room air  Eyes: Conjunctiva are clear and anicteric  ENT: Mucous membranes moist no exudates  Neck: No jugular venous distention trachea midline  Lungs: Clear no wheezes rales or rhonchi nonlabored symmetric expansion  Cardiac: Irregularly irregular no murmur  Abdomen: Soft nontender no palpable organomegaly or masses  : Not examined  Musc/Skel: I did not take his wound VAC off but there is no erythema or drainage really noted around the wound VAC on the right heel.  Skin: No jaundice no petechiae skin is warm and dry  Neuro: He is very pleasant very forgetful but cooperative moving all 4 extremities  Extremities/P Vascular: No clubbing no cyanosis no edema palpable radial and dorsalis pedis pulses bilaterally  MSE: Seems to be in very good spirits      Discharge Disposition  Skilled Nursing Facility (DC - External)    Discharge Medications     Discharge Medications      New Medications      Instructions Start Date   ceFAZolin in dextrose 2-4 GM/100ML-% solution IVPB  Commonly known as:  ANCEF   2 g, Intravenous, 2 Times Weekly, Administer with Monday and Wednesday dialysis ending 9/16/2019      ceFAZolin in Sodium Chloride 3-0.9 GM/100ML-% solution IVPB  Commonly known as:  ANCEF   3 g, Intravenous, Weekly, Administer on Friday with dialysis and date of antibiotics 9/16/2019   Start Date:  9/6/2019     insulin glargine 100 UNIT/ML injection  Commonly known as:  LANTUS   30 Units, Subcutaneous, Every Morning   Start Date:  9/6/2019     insulin lispro 100 UNIT/ML injection  Commonly known as:  humaLOG   0-14  Units, Subcutaneous, 4 Times Daily With Meals & Nightly         Changes to Medications      Instructions Start Date   warfarin 2.5 MG tablet  Commonly known as:  COUMADIN  What changed:    · how much to take  · how to take this  · when to take this  · additional instructions  · Another medication with the same name was removed. Continue taking this medication, and follow the directions you see here.   2.5 mg daily with frequent PT/INR monitoring while on antibiotics and adjustment to goal PT/INR of 2-3         Continue These Medications      Instructions Start Date   ACCU-CHEK FASTCLIX LANCET kit   Use as directed for blood glucose testing      ACCU-CHEK FASTCLIX LANCETS misc   Use as directed for blood glucose testing CHECK 4 TIMES A DAY      acetaminophen 325 MG tablet  Commonly known as:  TYLENOL   650 mg, Oral, Every 6 Hours PRN      allopurinol 100 MG tablet  Commonly known as:  ZYLOPRIM   TAKE ONE TABLET BY MOUTH DAILY      aspirin 81 MG EC tablet   81 mg, Oral, Daily      atorvastatin 40 MG tablet  Commonly known as:  LIPITOR   40 mg, Oral, Daily      calcium acetate 667 MG capsule capsule  Commonly known as:  PHOS BINDER)   667 mg, Oral, Daily      donepezil 10 MG tablet  Commonly known as:  ARICEPT   10 mg, Oral, Nightly      DULoxetine 60 MG capsule  Commonly known as:  CYMBALTA   60 mg, Oral, Daily      glucose blood test strip   Use as instructed      glucose blood test strip  Commonly known as:  ACCU-CHEK GUIDE   Use as instructed to check 4 times daily      Insulin Pen Needle 32G X 4 MM misc   Does not apply, 4 Times Daily PRN      metoprolol succinate XL 25 MG 24 hr tablet  Commonly known as:  TOPROL-XL   75 mg, Oral, Every 24 Hours Scheduled   Start Date:  9/6/2019     tamsulosin 0.4 MG capsule 24 hr capsule  Commonly known as:  FLOMAX   TAKE ONE CAPSULE BY MOUTH DAILY         Stop These Medications    amLODIPine 5 MG tablet  Commonly known as:  NORVASC     cephalexin 500 MG capsule  Commonly known  as:  KEFLEX     CloNIDine 0.1 MG tablet  Commonly known as:  CATAPRES     insulin  UNIT/ML injection  Commonly known as:  humuLIN N,novoLIN N     insulin regular 100 UNIT/ML injection  Commonly known as:  humuLIN R,novoLIN R     sodium hypochlorite in sterile water irrigation topical solution 0.0625%     torsemide 10 MG tablet  Commonly known as:  DEMADEX            Discharge Diet: Healthy heart consistent carbohydrate diet    Activity at Discharge:     Follow-up Appointments  Future Appointments   Date Time Provider Department Center   9/6/2019  8:45 AM Melania Peralta APRN [facility]   12/5/2019  3:45 PM Doroteo Victoria MD [id number] None   2/27/2020 12:45 PM Juan Hyde MD [id number] None         Test Results Pending at Discharge   Order Current Status    Blood Culture - Blood, Arm, Left Preliminary result    Blood Culture - Blood, Arm, Left Preliminary result           Isreal Rodriguez MD  09/05/19  2:31 PM    Time:

## 2019-09-05 NOTE — PROGRESS NOTES
Continued Stay Note  [facility]     Patient Name: Deacon Martin  MRN: [id number]  Today's Date: 9/5/2019    Admit Date: 9/1/2019    Discharge Plan     Row Name 09/05/19 0933       Plan    Plan  [facility] Home with HD at [facility]     Patient/Family in Agreement with Plan  yes    Plan Comments  Spoke to Jose De Jesus and can accept pt. Stated process to obtain HD chair at [facility] in process and they should have verification of chair time in a few hours.  Once chair time obtained [facility] can accept anytime.  Prakash Hodgson RN-BC        Discharge Codes    No documentation.             Prakash Hodgson, RN

## 2019-09-05 NOTE — SIGNIFICANT NOTE
09/05/19 1433   PT Discharge Summary   Reason for Discharge Discharge from facility   Discharge Destination SNF

## 2019-09-05 NOTE — PROGRESS NOTES
Continued Stay Note  [facility]     Patient Name: Deacon Martin  MRN: [id number]  Today's Date: 9/5/2019    Admit Date: 9/1/2019    Discharge Plan     Row Name 09/05/19 1243       Plan    Plan  [facility] Home with HD at [facility]     Patient/Family in Agreement with Plan  yes    Plan Comments  Spoke with Jose De Jesus and pt has a chair time tomorrow at [facility] and can be be discharged.  Prakash Hodgson RN-BC        Discharge Codes    No documentation.             Prakash Hodgson, RN

## 2019-09-05 NOTE — PROGRESS NOTES
Continued Stay Note  [facility]     Patient Name: Deacon Martin  MRN: [id number]  Today's Date: 9/5/2019    Admit Date: 9/1/2019    Discharge Plan     Row Name 09/05/19 1500       Plan    Plan  [facility] Home with HD at [facility]    Patient/Family in Agreement with Plan  yes    Plan Comments  Discharge order noted.  [facility].  Stated pt had a bed at [facility] room 210.  Called and spoke to Adore Martin, wife, [phone] and informed of discharge.  Discussed options for transportation including Ambulance and that we do not guarantee insurance will pay, wheelchair van which is private pay, or transport by family.  Wife stated she would transport pt to [facility].  Packet given to nurse.  Prakash Hodgson RN-BC     Row Name 09/05/19 1243       Plan    Plan  [facility] Home with HD at [facility]     Patient/Family in Agreement with Plan  yes    Plan Comments  [facility] at [facility] and can be discharged.  Prakash Hodgson RN-BC        Discharge Codes    No documentation.       Expected Discharge Date and Time     Expected Discharge Date Expected Discharge Time    Sep 5, 2019             Prakash Hodgson RN

## 2019-09-05 NOTE — PLAN OF CARE
Problem: Fall Risk (Adult)  Goal: Absence of Fall  Outcome: Ongoing (interventions implemented as appropriate)      Problem: Arrhythmia/Dysrhythmia (Symptomatic) (Adult)  Goal: Signs and Symptoms of Listed Potential Problems Will be Absent, Minimized or Managed (Arrhythmia/Dysrhythmia)  Outcome: Ongoing (interventions implemented as appropriate)      Problem: Patient Care Overview  Goal: Plan of Care Review  Outcome: Ongoing (interventions implemented as appropriate)   09/05/19 0533   OTHER   Outcome Summary no complaints of pain, wound vac continued, pt resting comfortably, VSS, will continue to monitor   Coping/Psychosocial   Plan of Care Reviewed With patient   Plan of Care Review   Progress improving       Problem: Skin Injury Risk (Adult)  Goal: Skin Health and Integrity  Outcome: Ongoing (interventions implemented as appropriate)      Problem: Sepsis/Septic Shock (Adult)  Goal: Signs and Symptoms of Listed Potential Problems Will be Absent, Minimized or Managed (Sepsis/Septic Shock)  Outcome: Ongoing (interventions implemented as appropriate)

## 2019-09-05 NOTE — PROGRESS NOTES
LOS: 3 days   Patient Care Team:  Doroteo Victoria MD as PCP - General (Family Medicine)  Pravin Oneill DPM as PCP - Claims Attributed    Subjective     Patient really does not have any complaints today no nausea no pain even says his right heel is a little better today    Review of Systems:          Objective     Vital Signs  Vital Sign Min/Max for last 24 hours  Temp  Min: 96.8 °F (36 °C)  Max: 98.6 °F (37 °C)   BP  Min: 118/66  Max: 156/74   Pulse  Min: 66  Max: 83   Resp  Min: 16  Max: 20   SpO2  Min: 95 %  Max: 97 %   No Data Recorded   Weight  Min: 84.4 kg (186 lb)  Max: 84.4 kg (186 lb)        Ventilator/Non-Invasive Ventilation Settings (From admission, onward)    None                       Body mass index is 26.69 kg/m².  I/O last 3 completed shifts:  In: 1140 [P.O.:1140]  Out: -   No intake/output data recorded.        Physical Exam:  General Appearance: Well-developed elderly white male he is resting in bed he does not appear in any acute distress.  Oxygen saturations 96% on room air  Eyes: Conjunctiva are clear and anicteric pupils are about 3mm equal   ENT: Nasal and oral mucous membranes are both moist no erythema or exudates Mallampati type II airway nasal septum midline.  Neck: No adenopathy or thyromegaly no jugular venous distention trachea midline neck is supple  Lungs: Completely clear nonlabored symmetric expansion  Cardiac: Irregularly irregular heart rate about 80   Abdomen: Soft nontender no palpable hepatosplenomegaly or masses active bowel sounds  : Not examined  Musculoskeletal: He does have a lesion on his right heel wound VAC is now in place  Skin: No jaundice no petechiae skin is warm and dry to touch,   Neuro: Patient is very pleasant he is cooperative he is following commands with all 4 extremities.  Oriented to person and place.  Extremities/P Vascular: No clubbing or cyanosis he has palpable radial pulses bilaterally and dorsalis pedis and dorsalis pedis are not  the strongest  MSE: A little more interactive today       Labs:  Results from last 7 days   Lab Units 09/03/19 0413 09/02/19  0600 09/01/19  0054   GLUCOSE mg/dL 73 101* 217*   SODIUM mmol/L 133* 134* 137   POTASSIUM mmol/L 3.7 4.2 3.7   MAGNESIUM mg/dL  --  1.7  --    CO2 mmol/L 25.4 22.8 25.0   CHLORIDE mmol/L 95* 94* 95*   ANION GAP mmol/L 12.6 17.2* 17.0*   CREATININE mg/dL 5.26* 7.70* 5.52*   BUN mg/dL 29* 51* 35*   BUN / CREAT RATIO  5.5* 6.6* 6.3*   CALCIUM mg/dL 8.4* 8.5* 9.0   EGFR IF NONAFRICN AM mL/min/1.73 11* 7* 10*   ALK PHOS U/L 61  --  79   TOTAL PROTEIN g/dL 6.0  --  6.9   ALT (SGPT) U/L 12  --  13   AST (SGOT) U/L 19  --  12   BILIRUBIN mg/dL 0.7  --  1.2   ALBUMIN g/dL 2.90* 3.30* 3.70   GLOBULIN gm/dL  --   --  3.2     Estimated Creatinine Clearance: 14.3 mL/min (A) (by C-G formula based on SCr of 5.26 mg/dL (H)).      Results from last 7 days   Lab Units 09/04/19  0815 09/03/19 0413 09/02/19  0601 09/01/19 0054   WBC 10*3/mm3 8.62 6.31 10.60 15.07*   RBC 10*6/mm3 3.64* 3.57* 3.86* 4.05*   HEMOGLOBIN g/dL 11.1* 11.0* 11.6* 12.0*   HEMATOCRIT % 35.2* 34.7* 36.7* 38.7   MCV fL 96.7 97.2* 95.1 95.6   MCH pg 30.5 30.8 30.1 29.6   MCHC g/dL 31.5 31.7 31.6 31.0*   RDW % 15.4 15.6* 15.7* 15.6*   RDW-SD fl 55.1* 55.9* 55.4* 55.3*   MPV fL 10.5 10.7 10.0 9.8   PLATELETS 10*3/mm3 153 133* 146 187   NEUTROPHIL % %  --   --   --  89.6*   LYMPHOCYTE % %  --   --   --  4.0*   MONOCYTES % %  --   --   --  5.3   EOSINOPHIL % %  --   --   --  0.2*   BASOPHIL % %  --   --   --  0.4   IMM GRAN % %  --   --   --  0.5   NEUTROS ABS 10*3/mm3  --   --  10.06* 13.51*   LYMPHS ABS 10*3/mm3  --   --   --  0.60*   MONOS ABS 10*3/mm3  --   --   --  0.80   EOS ABS 10*3/mm3  --   --   --  0.03   BASOS ABS 10*3/mm3  --   --   --  0.06   IMMATURE GRANS (ABS) 10*3/mm3  --   --   --  0.07*   NRBC /100 WBC  --   --   --  0.0     Results from last 7 days   Lab Units 09/01/19  0339   PH, ARTERIAL pH units 7.444   PO2 ART mm Hg  71.7*   PCO2, ARTERIAL mm Hg 36.6   HCO3 ART mmol/L 25.1     Results from last 7 days   Lab Units 09/01/19  1041 09/01/19  0054   TROPONIN T ng/mL 0.360* 0.090*     Results from last 7 days   Lab Units 09/01/19  0054   PROBNP pg/mL 21,806.0*         Results from last 7 days   Lab Units 09/01/19  0650 09/01/19  0054   LACTATE mmol/L 1.9 2.7*   PROCALCITONIN ng/mL  --  0.35*     Results from last 7 days   Lab Units 09/04/19  0815 09/03/19  0413 09/02/19  0600 09/01/19  0054   INR  3.42* 3.78* 2.56* 1.77*     Microbiology Results (last 10 days)     Procedure Component Value - Date/Time    Blood Culture - Blood, Arm, Left [[id number]] Collected:  09/02/19 1037    Lab Status:  Preliminary result Specimen:  Blood from Arm, Left Updated:  09/04/19 1100     Blood Culture No growth at 2 days    Blood Culture - Blood, Arm, Left [[id number]] Collected:  09/02/19 1003    Lab Status:  Preliminary result Specimen:  Blood from Arm, Left Updated:  09/04/19 1015     Blood Culture No growth at 2 days    MRSA Screen Culture - Swab, Nares [[id number]]  (Abnormal) Collected:  09/01/19 0939    Lab Status:  Final result Specimen:  Swab from Nares Updated:  09/02/19 0950     MRSA SCREEN CX Staphylococcus aureus, MRSA     Comment:   Methicillin resistant Staphylococcus aureus, Patient may be an isolation risk.       Blood Culture - Blood, Arm, Left [[id number]]  (Abnormal)  (Susceptibility) Collected:  09/01/19 0205    Lab Status:  Final result Specimen:  Blood from Arm, Left Updated:  09/03/19 0639     Blood Culture Streptococcus pyogenes (Group A)     Gram Stain Anaerobic Bottle Gram positive cocci in chains    Susceptibility      Streptococcus pyogenes (Group A)     NAYA     Ceftriaxone Susceptible     Clindamycin Susceptible     Inducible Clindamycin Resistance Negative     Levofloxacin Susceptible     Penicillin G Susceptible     Vancomycin Susceptible                    Blood Culture ID, PCR - Blood, Arm, Left [[id number]]  (Abnormal)  Collected:  09/01/19 0205    Lab Status:  Final result Specimen:  Blood from Arm, Left Updated:  09/01/19 1733     BCID, PCR Streptococcus pyogenes (Group A). Identification by BCID PCR.    Blood Culture - Blood, Arm, Left [[id number]]  (Abnormal) Collected:  09/01/19 0153    Lab Status:  Final result Specimen:  Blood from Arm, Left Updated:  09/04/19 1435     Blood Culture Streptococcus pyogenes (Group A)     Comment: Refer to previous blood culture collected on 9/1/2019 0205 for NAYA's.          Isolated from Aerobic Bottle     STREP GROUPING A     Gram Stain Aerobic Bottle Gram positive cocci    Respiratory Panel, PCR - Swab, Nasopharynx [[id number]]  (Normal) Collected:  09/01/19 0119    Lab Status:  Final result Specimen:  Swab from Nasopharynx Updated:  09/01/19 0226     ADENOVIRUS, PCR Not Detected     Coronavirus 229E Not Detected     Coronavirus HKU1 Not Detected     Coronavirus NL63 Not Detected     Coronavirus OC43 Not Detected     Human Metapneumovirus Not Detected     Human Rhinovirus/Enterovirus Not Detected     Influenza B PCR Not Detected     Parainfluenza Virus 1 Not Detected     Parainfluenza Virus 2 Not Detected     Parainfluenza Virus 3 Not Detected     Parainfluenza Virus 4 Not Detected     Bordetella pertussis pcr Not Detected     Influenza A H1 2009 PCR Not Detected     Chlamydophila pneumoniae PCR Not Detected     Mycoplasma pneumo by PCR Not Detected     Influenza A PCR Not Detected     Influenza A H3 Not Detected     Influenza A H1 Not Detected     RSV, PCR Not Detected     Bordetella parapertussis PCR Not Detected                aspirin 81 mg Oral Daily   ceFAZolin 2 g Intravenous Once per day on Mon Wed   [START ON 9/6/2019] ceFAZolin 3 g Intravenous Weekly   donepezil 10 mg Oral Nightly   DULoxetine 60 mg Oral Daily   insulin glargine 30 Units Subcutaneous QAM   insulin lispro 0-14 Units Subcutaneous 4x Daily With Meals & Nightly   metoprolol succinate XL 75 mg Oral Q24H   sodium chloride  10 mL Intravenous Q12H   tamsulosin 0.4 mg Oral Daily   warfarin (COUMADIN) (dosing per levels)  Does not apply Daily       Pharmacy to dose warfarin        Diagnostics:  Xr Chest 1 View    Result Date: 9/1/2019  PORTABLE CHEST X-RAY  CLINICAL HISTORY: fever  COMPARISON: 06/25/2019  FINDINGS: Portable AP view of the chest was obtained with overlying monitor leads in place. Patient is somewhat kyphotic and rotated to the left. The lungs are under aerated. Pulmonary edema clearly show significant improvement. There are calcified residua of granulomatous disease present. There is no active airspace disease. Stable cardiomegaly. No significant pleural fluid.        Improved edema/CHF.    This report was finalized on 9/1/2019 1:13 AM by Miguelangel Alvarez M.D.      Results for orders placed during the hospital encounter of 09/01/19   Adult Transthoracic Echo Complete W/ Cont if Necessary Per Protocol    Narrative · Left ventricular systolic function is low normal. Calculated EF = 48.0%.   Estimated EF was in agreement with the calculated EF. Normal left   ventricular cavity size noted. Left ventricular wall thickness is   consistent with mild concentric hypertrophy. Left ventricular diastolic   function was indeterminate.  · Left atrial cavity size is moderately dilated.  · Right atrial cavity size is moderately dilated.  · Moderate aortic valve stenosis is present.          I personally reviewed the chest x-ray there is mild vascular congestion I do not see any definite pleural effusions I am not even certain there is that much edema.    Active Hospital Problems    Diagnosis  POA   • Gastroenteritis [K52.9]  Unknown   • Elevated troponin [R74.8]  Unknown   • Chronic atrial fibrillation (CMS/HCC) [I48.2]  Yes   • Chronic diastolic (congestive) heart failure (CMS/HCC) [I50.32]  Yes   • Chronic anticoagulation [Z79.01]  Not Applicable   • Sepsis (CMS/HCC) [A41.9]  Yes   • Osteomyelitis of right foot (CMS/HCC) [M86.9]  Yes   •  Ulcer of heel due to diabetes mellitus (CMS/HCC) [E11.621, L97.409]  Yes   • Type 2 diabetes mellitus (CMS/HCC) [E11.9]  Yes   • Vascular dementia [F01.50]  Yes   • Nonrheumatic aortic valve stenosis [I35.0]  Yes      Resolved Hospital Problems   No resolved problems to display.     Dialysis access site ultrasound no fluid collections good flow some stenosis noted  Assessment/Plan     1. Sepsis source not clear his right heel wound looks good but we got 2 blood cultures positive looks like strep pyogenes.  Continue cefazolin per infectious disease they plan dosing with dialysis 2 g/2 g / 3 g with Monday/Wednesday/Friday hemodialysis and continue through 9/16/2019  2. A. fib with rapid ventricular response he is chronically anticoagulated with Coumadin supratherapeutic today we will have to hold Coumadin pharmacy is following and helping adjust dosing.  Rate seems to be well controlled now  3. Acute on chronic systolic CHF seems to be well compensated currently  4. Lactic acidosis probably secondary to 1 through 3 above resolved  5. End-stage renal disease on hemodialysis nephrology managing.  6. Elevated troponin did rise yesterday EKG does not look like acute changes echocardiogram does not sound like there is significant wall motion abnormalities.  I suspect this is a type II MI demand ischemia related to sepsis and atrial fibrillation with rapid ventricular response.  7. Moderate aortic stenosis he will follow with cardiology as outpatient  8. Obstructive sleep apnea he is Pap intolerant  9. Coronary artery disease  10. Diabetes mellitus type 2 insulin requiring blood sugars adequate  11. Anemia chronic disease  12. Dementia seems to be reasonably advanced  13. BPH  14. Mild hypoxia resolved  15. Right heel wound stage IV, continue wound VAC wound care following    Plan for disposition: To [place] when all can be arranged    Isreal Rodriguez MD  09/04/19  8:23 PM    Time:

## 2019-09-05 NOTE — PROGRESS NOTES
"   LOS: 4 days    Patient Care Team:  Doroteo Victoria MD as PCP - General (Family Medicine)  Pravin Oneill DPM as PCP - Claims Attributed    Chief Complaint:    Chief Complaint   Patient presents with   • Weakness - Generalized     Follow-up ESRD  Subjective     Interval History:   He is feeling much better today, appetite is improving, denies any chest pain or shortness of air, no orthopnea PND, no nausea or vomiting, no abdominal pain, no lightheadedness    Review of Systems:   As noted above    Objective     Vital Signs  Temp:  [96.8 °F (36 °C)-98.6 °F (37 °C)] 98 °F (36.7 °C)  Heart Rate:  [66-79] 72  Resp:  [16] 16  BP: (132-156)/(74-78) 137/74    Flowsheet Rows      First Filed Value   Admission Height  172.7 cm (68\") Documented at 09/01/2019 0034   Admission Weight  88.9 kg (196 lb) Documented at 09/01/2019 0034          No intake/output data recorded.  I/O last 3 completed shifts:  In: 780 [P.O.:780]  Out: 0     Intake/Output Summary (Last 24 hours) at 9/5/2019 0726  Last data filed at 9/5/2019 0322  Gross per 24 hour   Intake 660 ml   Output 0 ml   Net 660 ml       Physical Exam:    General Appearance: alert, oriented to hospital. no acute distress,  Chronically ill  Skin: warm and dry  HEENT: pupils round and reactive to light, oral mucosa normal  Neck: supple, no JVD, trachea midline  Lungs: Clear to auscultation   Heart: Irregularly irregular, no rub  Abdomen: soft, non-tender,  +bs  Extremities: no edema, cyanosis or clubbing, large ulcer on the right heel. VAC.  AVF right upper ext thrill, bruit .  Neuro: normal speech and mental status        Results Review:    Results from last 7 days   Lab Units 09/05/19  0446 09/03/19  0413 09/02/19  0600 09/01/19  0054   SODIUM mmol/L 135* 133* 134* 137   POTASSIUM mmol/L 3.4* 3.7 4.2 3.7   CHLORIDE mmol/L 96* 95* 94* 95*   CO2 mmol/L 24.8 25.4 22.8 25.0   BUN mg/dL 24* 29* 51* 35*   CREATININE mg/dL 4.92* 5.26* 7.70* 5.52*   CALCIUM mg/dL 8.5* 8.4* 8.5* " 9.0   BILIRUBIN mg/dL  --  0.7  --  1.2   ALK PHOS U/L  --  61  --  79   ALT (SGPT) U/L  --  12  --  13   AST (SGOT) U/L  --  19  --  12   GLUCOSE mg/dL 108* 73 101* 217*       Estimated Creatinine Clearance: 15.1 mL/min (A) (by C-G formula based on SCr of 4.92 mg/dL (H)).    Results from last 7 days   Lab Units 09/05/19 0446 09/03/19 0413 09/02/19  0600   MAGNESIUM mg/dL  --   --  1.7   PHOSPHORUS mg/dL 4.0 4.2 4.8*             Results from last 7 days   Lab Units 09/05/19 0446 09/04/19 0815 09/03/19 0413 09/02/19  0601 09/01/19  0054   WBC 10*3/mm3 6.62 8.62 6.31 10.60 15.07*   HEMOGLOBIN g/dL 11.4* 11.1* 11.0* 11.6* 12.0*   PLATELETS 10*3/mm3 164 153 133* 146 187       Results from last 7 days   Lab Units 09/05/19 0446 09/04/19  0815 09/03/19  0413 09/02/19  0600 09/01/19  0054   INR  2.36* 3.42* 3.78* 2.56* 1.77*         Imaging Results (last 24 hours)     ** No results found for the last 24 hours. **          aspirin 81 mg Oral Daily   ceFAZolin 2 g Intravenous Once per day on Mon Wed   [START ON 9/6/2019] ceFAZolin 3 g Intravenous Weekly   donepezil 10 mg Oral Nightly   DULoxetine 60 mg Oral Daily   insulin glargine 30 Units Subcutaneous QAM   insulin lispro 0-14 Units Subcutaneous 4x Daily With Meals & Nightly   metoprolol succinate XL 75 mg Oral Q24H   sodium chloride 10 mL Intravenous Q12H   tamsulosin 0.4 mg Oral Daily   warfarin (COUMADIN) (dosing per levels)  Does not apply Daily       Pharmacy to dose warfarin        Medication Review:   Current Facility-Administered Medications   Medication Dose Route Frequency Provider Last Rate Last Dose   • aspirin chewable tablet 81 mg  81 mg Oral Daily Herman Saavedra MD   81 mg at 09/04/19 0852   • ceFAZolin in dextrose (ANCEF) IVPB solution 2 g  2 g Intravenous Once per day on Mon Wed Corby Potter MD   2 g at 09/04/19 1819   • [START ON 9/6/2019] ceFAZolin in Sodium Chloride (ANCEF) IVPB solution 3 g  3 g Intravenous Weekly Corby Potter  MD Sang       • dextrose (D50W) 25 g/ 50mL Intravenous Solution 25 g  25 g Intravenous Q15 Min PRN Kaylene Patel MD       • dextrose (GLUTOSE) oral gel 15 g  15 g Oral Q15 Min PRN Kaylene Patel MD       • donepezil (ARICEPT) tablet 10 mg  10 mg Oral Nightly Kaylene Patel MD   10 mg at 09/04/19 2120   • DULoxetine (CYMBALTA) DR capsule 60 mg  60 mg Oral Daily Kaylene Patel MD   60 mg at 09/04/19 0852   • glucagon (human recombinant) (GLUCAGEN DIAGNOSTIC) injection 1 mg  1 mg Subcutaneous PRN Kaylene Patel MD       • insulin glargine (LANTUS) injection 30 Units  30 Units Subcutaneous QAM Kaylene Patel MD   30 Units at 09/04/19 0615   • insulin lispro (humaLOG) injection 0-14 Units  0-14 Units Subcutaneous 4x Daily With Meals & Nightly Kaylene Patel MD   5 Units at 09/04/19 2119   • metoprolol succinate XL (TOPROL-XL) 24 hr tablet 75 mg  75 mg Oral Q24H Kaylene Patel MD   75 mg at 09/04/19 0852   • ondansetron (ZOFRAN) injection 4 mg  4 mg Intravenous Q6H PRN Isreal Rodriguez MD   4 mg at 09/02/19 0130   • Pharmacy to dose warfarin   Does not apply Continuous PRN Kaylene Patel MD       • sodium chloride 0.9 % flush 10 mL  10 mL Intravenous PRN Doroteo Soler MD       • sodium chloride 0.9 % flush 10 mL  10 mL Intravenous Q12H Kaylene Patel MD   10 mL at 09/04/19 2120   • sodium chloride 0.9 % flush 10 mL  10 mL Intravenous PRN Kaylene Patel MD       • tamsulosin (FLOMAX) 24 hr capsule 0.4 mg  0.4 mg Oral Daily Kaylene Patel MD   0.4 mg at 09/04/19 0852   • warfarin (COUMADIN) (dosing per levels)   Does not apply Daily Kaylene Patel MD   Stopped at 09/03/19 1800       Assessment/Plan   1.  ESRD. Dialysis MWF.  Dialysis yesterday without any difficulty  2.  Group A strep sepsis.   3.  Chronic A. fib, initially with rapid ventricular response currently controlled ventricular response, chronically anticoagulated.  4.  Delirium  on baseline dementia, resolved.  5.  Increased troponin, thought to be  demand ischemia .  6.  Diabetic foot ulcer with large right heel ulcer. VAC in place.   7.  Diabetes mellitus type 2  8. RUE AVF proximal stenosis.  No difficulty with stick. Pressures not high.  Will ask vascular to address as outpt. I spoke to Dr. Muro.  Will need to call office to arrange fistulogram on dc from hospital .  9.  Chronic kidney disease, hemoglobin today 11.4    Plan:  1.  Patient could be discharged from the renal standpoint to the [facility] and he will have his dialysis done at the [facility] dialysis clinic Monday, Wednesday and Friday.  2.  If not discharged will dialyze tomorrow as an inpatient  3.  Surveillance labs          Segundo Lawson MD  09/05/19  7:26 AM

## 2019-09-05 NOTE — PROGRESS NOTES
Pharmacy Consult: Warfarin Dosing/ Monitoring    Deacon Martin is a 74 y.o. male, estimated creatinine clearance is 13.9 mL/min (A) (by C-G formula based on SCr of 5.26 mg/dL (H)). weighing 82.3 kg (181 lb 7 oz).      Results from last 7 days   Lab Units 09/05/19 0446 09/04/19  0815 09/03/19 0413 09/02/19  0601 09/02/19  0600 09/01/19  0054   INR  2.36* 3.42* 3.78*  --  2.56* 1.77*   HEMOGLOBIN g/dL 11.4* 11.1* 11.0* 11.6*  --  12.0*   HEMATOCRIT % 37.2* 35.2* 34.7* 36.7*  --  38.7   PLATELETS 10*3/mm3 164 153 133* 146  --  187     Results from last 7 days   Lab Units 09/05/19 0446 09/03/19 0413 09/02/19 0600 09/01/19  0054   SODIUM mmol/L 135* 133* 134* 137   POTASSIUM mmol/L 3.4* 3.7 4.2 3.7   CHLORIDE mmol/L 96* 95* 94* 95*   CO2 mmol/L 24.8 25.4 22.8 25.0   BUN mg/dL 24* 29* 51* 35*   CREATININE mg/dL 4.92* 5.26* 7.70* 5.52*   CALCIUM mg/dL 8.5* 8.4* 8.5* 9.0   BILIRUBIN mg/dL  --  0.7  --  1.2   ALK PHOS U/L  --  61  --  79   ALT (SGPT) U/L  --  12  --  13   AST (SGOT) U/L  --  19  --  12   GLUCOSE mg/dL 108* 73 101* 217*     Anticoagulation history: Per [facility] anticoagulation clinic visit note on 8/26/19: warfarin dose is 2.5 mg daily.    Hospital Anticoagulation:  Consulting provider: Dr Patel  Start date: 9/1  Indication: afib  Target INR: 2-3  Expected duration: n/a   Bridge Therapy: No                  Date 9/1 9/2 9/3 9/4 9/5        INR 1.77 2.56 3.78 3.42 2.36        Warfarin dose 5 mg 2.5 mg HOLD HOLD 2.5 mg          Potential drug interactions:  cefazolin - may enhance anticoagulant effect of warfarin  Home meds: Aspirin, Duloxetine    Relevant nutrition status: cardiac diet, pt eating better  9/4 breakfast and dinner 100%  lunch 0%    Other:     Education complete?/ Date: very confused upon admission, defer for now    Assessment/Plan:  Will give 1 x dose of warfarin 2.5mg po today   inr therapeutic at  2.36   Will review INR in am again.     Pharmacy will continue to follow until discharge or  discontinuation of warfarin.      Lissa Navarro, [facility]            A

## 2019-09-06 NOTE — PROGRESS NOTES
Case Management Discharge Note    Final Note: Melba with HD at [facility].  Transported by family    Destination - Selection Complete      Service Provider Request Status Selected Services Address Phone Number Fax Number    [facility] Selected Skilled Nursing [address] [place] [address] [phone]      Durable Medical Equipment      No service has been selected for the patient.      Dialysis/Infusion - Selection Complete      Service Provider Request Status Selected Services Address Phone Number Fax Number    [facility] [place] Selected Dialysis [address] [address] [phone]      Home Medical Care      No service has been selected for the patient.      Therapy      No service has been selected for the patient.      Community Resources      No service has been selected for the patient.        Transportation Services  Private: Car    Final Discharge Disposition Code: 03 - skilled nursing facility (SNF)

## 2019-09-20 NOTE — PROGRESS NOTES
Anticoagulation Clinic Progress Note    Anticoagulation Summary  As of 2019    INR goal:   2.0-3.0   TTR:   45.8 % (2.9 y)   INR used for dosin.30 (2019)   Warfarin maintenance plan:   2.5 mg every day   Weekly warfarin total:   17.5 mg   Plan last modified:   Pravin Murphy RPH (2019)   Next INR check:   2019   Target end date:       Indications    History of cardioembolic stroke (Left occipital) [Z86.73]             Anticoagulation Episode Summary     INR check location:   Clinic Lab    Preferred lab:       Send INR reminders to:   [facility]    Comments:         Anticoagulation Care Providers     Provider Role Specialty Phone number    Jaun Hyde MD Referring Cardiology [phone]          INR History:  Anticoagulation Monitoring 2019   INR 3.60 3.00 2.30   INR Date 2019   INR Goal 2.0-3.0 2.0-3.0 2.0-3.0   Trend Down Same Same   Last Week Total 20 mg 17.5 mg 17.5 mg   Next Week Total 17.5 mg 17.5 mg 17.5 mg   Sun 2.5 mg 2.5 mg 2.5 mg   Mon 2.5 mg 2.5 mg -   Tue 2.5 mg 2.5 mg -   Wed 2.5 mg 2.5 mg -   Thu 2.5 mg 2.5 mg -   Fri 2.5 mg 2.5 mg 2.5 mg   Sat 2.5 mg 2.5 mg 2.5 mg   Visit Report - - -   Some recent data might be hidden       Plan:  1. INR is Therapeutic today- see above in Anticoagulation Summary.   Provided instructions to [facility] with [facility] to Change their warfarin regimen- see above in Anticoagulation Summary.  2. Follow up in 3 days      Camille Berman RPH

## 2019-09-23 PROBLEM — A04.72 C. DIFFICILE DIARRHEA: Status: ACTIVE | Noted: 2019-01-01

## 2019-09-23 NOTE — PROGRESS NOTES
Anticoagulation Clinic Progress Note    Anticoagulation Summary  As of 2019    INR goal:   2.0-3.0   TTR:   45.9 % (2.9 y)   INR used for dosin.10 (2019)   Warfarin maintenance plan:   2.5 mg every day   Weekly warfarin total:   17.5 mg   No change documented:   Pravin Murphy RPH   Plan last modified:   Pravin Murphy RPH (2019)   Next INR check:   2019   Target end date:       Indications    History of cardioembolic stroke (Left occipital) [Z86.73]             Anticoagulation Episode Summary     INR check location:   Clinic Lab    Preferred lab:       Send INR reminders to:    [facility]    Comments:         Anticoagulation Care Providers     Provider Role Specialty Phone number    Juan Hyde MD Referring Cardiology [phone]        Pertinent information:  Diarrhea since ; weak, no appetite;  nurse concerned for C diff; pt planning to visit ED for evaluation.    INR History:  Anticoagulation Monitoring 2019   INR 3.00 2.30 2.10   INR Date 2019   INR Goal 2.0-3.0 2.0-3.0 2.0-3.0   Trend Same Same Same   Last Week Total 17.5 mg 17.5 mg 17.5 mg   Next Week Total 17.5 mg 17.5 mg 17.5 mg   Sun 2.5 mg 2.5 mg -   Mon 2.5 mg - 2.5 mg   Tue 2.5 mg - 2.5 mg   Wed 2.5 mg - -   Thu 2.5 mg - -   Fri 2.5 mg 2.5 mg -   Sat 2.5 mg 2.5 mg -   Visit Report - - -   Some recent data might be hidden       Plan:  1. INR is Therapeutic today- see above in Anticoagulation Summary.   Provided instructions to [facility] with [facility] to Continue their warfarin regimen- see above in Anticoagulation Summary.  2. Follow up in 2 days      Pravin Murphy RPH

## 2019-09-28 NOTE — OUTREACH NOTE
Prep Survey      Responses   Facility patient discharged from?  [facility]   Is patient eligible?  Yes   Discharge diagnosis  c-diff   Does the patient have one of the following disease processes/diagnoses(primary or secondary)?  Other   Does the patient have Home health ordered?  Yes   What is the Home health agency?   [facility]   Is there a DME ordered?  No   Prep survey completed?  Yes          Lou Curran RN

## 2019-09-30 NOTE — PROGRESS NOTES
Anticoagulation Clinic Progress Note    Anticoagulation Summary  As of 9/30/2019    INR goal:   2.0-3.0   TTR:   45.9 % (2.9 y)   INR used for dosing:   3.10! (9/30/2019)   Warfarin maintenance plan:   2.5 mg every day   Weekly warfarin total:   17.5 mg   Plan last modified:   Pravin Murphy RPH (8/19/2019)   Next INR check:   10/4/2019   Target end date:       Indications    History of cardioembolic stroke (Left occipital) [Z86.73]             Anticoagulation Episode Summary     INR check location:   Clinic Lab    Preferred lab:       Send INR reminders to:   [facility] CLINICAL [place]    Comments:         Anticoagulation Care Providers     Provider Role Specialty Phone number    Juan Hyde MD Referring Cardiology [phone]          INR History:  Anticoagulation Monitoring 9/20/2019 9/23/2019 9/30/2019   INR 2.30 2.10 3.10   INR Date 9/20/2019 9/23/2019 9/30/2019   INR Goal 2.0-3.0 2.0-3.0 2.0-3.0   Trend Same Same Same   Last Week Total 17.5 mg 17.5 mg 17.5 mg   Next Week Total 17.5 mg 17.5 mg 15 mg   Sun 2.5 mg - -   Mon - 2.5 mg Hold (9/30)   Tue - 2.5 mg 2.5 mg   Wed - - 2.5 mg   Thu - - 2.5 mg   Fri 2.5 mg - -   Sat 2.5 mg - -   Visit Report - - -   Some recent data might be hidden       Plan:  1. INR is Supratherapeutic today- see above in Anticoagulation Summary.   Provided instructions to [facility] with [facility] to Change their warfarin regimen- see above in Anticoagulation Summary.  2. Follow up in 4 days      Pravin Murphy RPH

## 2019-10-01 NOTE — OUTREACH NOTE
Medical Week 1 Survey      Responses   Facility patient discharged from?  [place]   Does the patient have one of the following disease processes/diagnoses(primary or secondary)?  Other   Is there a successful TCM telephone encounter documented?  No   Week 1 attempt successful?  No   Unsuccessful attempts  Attempt 1          Karen Ni RN

## 2019-10-04 NOTE — PROGRESS NOTES
Anticoagulation Clinic Progress Note    Anticoagulation Summary  As of 10/4/2019    INR goal:   2.0-3.0   TTR:   45.7 % (2.9 y)   INR used for dosing:   3.70! (10/4/2019)   Warfarin maintenance plan:   2.5 mg every day   Weekly warfarin total:   17.5 mg   Plan last modified:   Camille Berman RPH (10/4/2019)   Next INR check:   10/7/2019   Target end date:       Indications    History of cardioembolic stroke (Left occipital) [Z86.73]             Anticoagulation Episode Summary     INR check location:   Clinic Lab    Preferred lab:       Send INR reminders to:   [facility] CLINICAL [place]    Comments:         Anticoagulation Care Providers     Provider Role Specialty Phone number    Juan Hyde MD Referring Cardiology [phone]          INR History:  Anticoagulation Monitoring 9/23/2019 9/30/2019 10/4/2019   INR 2.10 3.10 3.70   INR Date 9/23/2019 9/30/2019 10/4/2019   INR Goal 2.0-3.0 2.0-3.0 2.0-3.0   Trend Same Same Same   Last Week Total 17.5 mg 17.5 mg 15 mg   Next Week Total 17.5 mg 15 mg 12.5 mg   Sun - - 2.5 mg   Mon 2.5 mg Hold (9/30) -   Tue 2.5 mg 2.5 mg -   Wed - 2.5 mg -   Thu - 2.5 mg -   Fri - - Hold (10/4)   Sat - - Hold (10/5)   Visit Report - - -   Some recent data might be hidden       Plan:  1. INR is Supratherapeutic today and patient is on metronidazole- see above in Anticoagulation Summary.   Provided instructions to [facility] with [facility] to HOLD their warfarin regimen x 2 doses, then resume-- see above in Anticoagulation Summary.  2. Follow up in 3 days      Camille Berman RPH

## 2019-10-04 NOTE — OUTREACH NOTE
Medical Week 1 Survey      Responses   Facility patient discharged from?  [place]   Does the patient have one of the following disease processes/diagnoses(primary or secondary)?  Other   Is there a successful TCM telephone encounter documented?  No   Week 1 attempt successful?  Yes   Call start time  1423   Call end time  1426   Discharge diagnosis  c-diff   Is patient permission given to speak with other caregiver?  Yes   List who call center can speak with  spouse- Adore   Person spoke with today (if not patient) and relationship  spouse- Adore   Meds reviewed with patient/caregiver?  Yes   Is the patient having any side effects they believe may be caused by any medication additions or changes?  No   Does the patient have all medications ordered at discharge?  Yes   Is the patient taking all medications as directed (includes completed medication regime)?  Yes   Does the patient have a primary care provider?   Yes   Does the patient have an appointment with their PCP within 7 days of discharge?  No   What is preventing the patient from scheduling follow up appointments within 7 days of discharge?  Waiting on return call   Nursing Interventions  Advised patient to make appointment   Has the patient kept scheduled appointments due by today?  Yes   Comments  Pt saw his foot wound doctor today.   What is the Home health agency?   [facility]   Has home health visited the patient within 72 hours of discharge?  Yes   Psychosocial issues?  No   Did the patient receive a copy of their discharge instructions?  Yes   Nursing interventions  Reviewed instructions with patient   What is the patient's perception of their health status since discharge?  Improving   Is the patient/caregiver able to teach back the hierarchy of who to call/visit for symptoms/problems? PCP, Specialist, Home health nurse, Urgent Care, ED, 911  Yes   Additional teach back comments  Per spouse, pt is doing much better, no questions or concerns at this time.    Week 1 call completed?  Yes          Linda Smith RN

## 2019-10-07 NOTE — PROGRESS NOTES
Anticoagulation Clinic Progress Note    Anticoagulation Summary  As of 10/7/2019    INR goal:   2.0-3.0   TTR:   45.7 % (2.9 y)   INR used for dosin.60 (10/7/2019)   Warfarin maintenance plan:   2.5 mg every day   Weekly warfarin total:   17.5 mg   No change documented:   Pravin Murphy RPH   Plan last modified:   Camille Berman RPH (10/4/2019)   Next INR check:   10/11/2019   Target end date:       Indications    History of cardioembolic stroke (Left occipital) [Z86.73]             Anticoagulation Episode Summary     INR check location:   Clinic Lab    Preferred lab:       Send INR reminders to:    [facility]    Comments:         Anticoagulation Care Providers     Provider Role Specialty Phone number    Juan Hyde MD Referring Cardiology [phone]        Pertinent info:  Metronidazole ending today (or tomorrow?)    INR History:  Anticoagulation Monitoring 2019 10/4/2019 10/7/2019   INR 3.10 3.70 2.60   INR Date 2019 10/4/2019 10/7/2019   INR Goal 2.0-3.0 2.0-3.0 2.0-3.0   Trend Same Same Same   Last Week Total 17.5 mg 15 mg 10 mg   Next Week Total 15 mg 12.5 mg 17.5 mg   Sun - 2.5 mg -   Mon Hold () - 2.5 mg   Tue 2.5 mg - 2.5 mg   Wed 2.5 mg - 2.5 mg   Thu 2.5 mg - 2.5 mg   Fri - Hold (10/4) -   Sat - Hold (10/5) -   Visit Report - - -   Some recent data might be hidden       Plan:  1. INR is Therapeutic today- see above in Anticoagulation Summary.   Provided instructions to [facility] with [facility] to Resume their warfarin regimen- see above in Anticoagulation Summary.  2. Follow up in 4 days      Pravin Murphy RPH

## 2019-10-11 NOTE — OUTREACH NOTE
Medical Week 2 Survey      Responses   Facility patient discharged from?  [place]   Does the patient have one of the following disease processes/diagnoses(primary or secondary)?  Other   Week 2 attempt successful?  Yes   Call start time  1530   Discharge diagnosis  c-diff   Call end time  1532   Is patient permission given to speak with other caregiver?  Yes   List who call center can speak with  spouse- Adore   Person spoke with today (if not patient) and relationship  spouse- Adore   Meds reviewed with patient/caregiver?  Yes   Is the patient taking all medications as directed (includes completed medication regime)?  Yes   Medication comments  Finished vanc last Tuesday   Has the patient kept scheduled appointments due by today?  Yes   What is the Home health agency?   [facility]   Comments  HH drawing INR   What is the patient's perception of their health status since discharge?  New symptoms unrelated to diagnosis   Is the patient/caregiver able to teach back signs and symptoms related to disease process for when to call PCP?  Yes   Is the patient/caregiver able to teach back signs and symptoms related to disease process for when to call 911?  Yes   Is the patient/caregiver able to teach back the hierarchy of who to call/visit for symptoms/problems? PCP, Specialist, Home health nurse, Urgent Care, ED, 911  Yes   Additional teach back comments  Wife states pt no longer having diarrhea stools but he is having some issues with his leg.   Week 2 Call Completed?  Yes          Cassidy Gill RN

## 2019-10-14 NOTE — PROGRESS NOTES
Anticoagulation Clinic Progress Note    Anticoagulation Summary  As of 10/14/2019    INR goal:   2.0-3.0   TTR:   45.5 % (2.9 y)   INR used for dosin.40! (10/14/2019)   Warfarin maintenance plan:   2.5 mg every day   Weekly warfarin total:   17.5 mg   Plan last modified:   Pravin Murphy RPH (10/14/2019)   Next INR check:   10/16/2019   Target end date:       Indications    History of cardioembolic stroke (Left occipital) [Z86.73]             Anticoagulation Episode Summary     INR check location:   Clinic Lab    Preferred lab:       Send INR reminders to:   [facility] CLINICAL POOL    Comments:         Anticoagulation Care Providers     Provider Role Specialty Phone number    Juan Hyde MD Referring Cardiology [phone]          INR History:  Anticoagulation Monitoring 10/7/2019 10/11/2019 10/14/2019   INR 2.60 3.90 4.40   INR Date 10/7/2019 10/11/2019 10/14/2019   INR Goal 2.0-3.0 2.0-3.0 2.0-3.0   Trend Same Same Same   Last Week Total 10 mg 12.5 mg 15 mg   Next Week Total 17.5 mg 15 mg 12.5 mg   Sun - 2.5 mg -   Mon 2.5 mg - Hold (10/14)   Tue 2.5 mg - Hold (10/15)   Wed 2.5 mg - -   Thu 2.5 mg - -   Fri - Hold (10/11) -   Sat - 2.5 mg -   Visit Report - - -   Some recent data might be hidden       Plan:  1. INR is Supratherapeutic today- see above in Anticoagulation Summary.   Provided instructions to [facility] with [facility] to HOLD their warfarin regimen until INR check in 2 days- see above in Anticoagulation Summary.  2. Follow up in 2 days      Pravin Murphy RPH

## 2019-10-16 NOTE — PROGRESS NOTES
Anticoagulation Clinic Progress Note    Anticoagulation Summary  As of 10/16/2019    INR goal:   2.0-3.0   TTR:   45.5 % (2.9 y)   INR used for dosin.50 (10/16/2019)   Warfarin maintenance plan:   2.5 mg every day   Weekly warfarin total:   17.5 mg   Plan last modified:   Pravin Murphy RPH (10/14/2019)   Next INR check:   10/18/2019   Target end date:       Indications    History of cardioembolic stroke (Left occipital) [Z86.73]             Anticoagulation Episode Summary     INR check location:   Clinic Lab    Preferred lab:       Send INR reminders to:   [facility] CLINICAL [place]    Comments:         Anticoagulation Care Providers     Provider Role Specialty Phone number    Juan Hyde MD Referring Cardiology [phone]          INR History:  Anticoagulation Monitoring 10/11/2019 10/14/2019 10/16/2019   INR 3.90 4.40 2.50   INR Date 10/11/2019 10/14/2019 10/16/2019   INR Goal 2.0-3.0 2.0-3.0 2.0-3.0   Trend Same Same Same   Last Week Total 12.5 mg 15 mg 10 mg   Next Week Total 15 mg 12.5 mg 16.25 mg   Sun 2.5 mg - -   Mon - Hold (10/14) -   Tue - Hold (10/15) -   Wed - - 2.5 mg   Thu - - 1.25 mg (10/17)   Fri Hold (10/11) - -   Sat 2.5 mg - -   Visit Report - - -   Some recent data might be hidden       Plan:  1. INR is Therapeutic today- see above in Anticoagulation Summary.   Provided instructions to [facility] to Change their warfarin regimen- see above in Anticoagulation Summary.  2. Follow up in 2 days      Pravin Murphy RPH

## 2019-10-16 NOTE — PATIENT INSTRUCTIONS
Please keep a very close eye on your INRs while on the Flagyl.  Please keep an eye out for any blood in the stool, sputum or urine.  Please bring back the C. difficile sample as soon as possible for testing.

## 2019-10-16 NOTE — PROGRESS NOTES
Subjective   Deacon Martin is a 74 y.o. male.   CC: Hospital follow-up, C. difficile diarrhea    Patient presents for hospital follow-up.  This is a 74-year-old male patient of Dr. Victoria with a history of end-stage renal disease on hemodialysis, type 2 diabetes, A. fib on Coumadin, hyperlipidemia and hypertension.  He was hospitalized from 9/23/2019 through 9/27/2019.  Previously he has had a hospital stay in early September for lactic acidosis, CHF and A. fib with RVR.  He was found to have a bloodstream infection with strep pyogenes.  He also has a chronic right heel diabetic ulcer which is treated with a wound VAC.  He was placed on IV Kefzol with his hemodialysis after the first hospital stay.  He ended up developing weakness and C. difficile diarrhea and was brought to the ER.  He was started on IV Flagyl and then transition to p.o. vancomycin.  He was discharged with a course of p.o. vancomycin but wife states that this was not covered by insurance and the vancomycin was changed to p.o. Flagyl, 10-day course.  They present today for follow-up.  He completed the 10-day course of Flagyl 500 mg 3 times daily about 5 days ago.  Patient is being seen by home health 3 times weekly, who are monitoring his INRs, wound care for his right heel for which he still has the wound VAC.  Patient reports that the diarrhea has never slowed down.  He is still having extremely loose stools multiple times per day.  He is having no blood in the stool or urine.  Patient reports that he did finish the Flagyl in full about 5 days ago.  He denies headache, visual changes, nausea, vomiting, shortness of breath, fever, chills, abdominal pain.  Hemodialysis is Monday Wednesday Friday.  He denies development of any other new issues today.         The following portions of the patient's history were reviewed and updated as appropriate: allergies, current medications, past family history, past medical history, past social history, past  "surgical history and problem list.    Review of Systems   Constitutional: Positive for fatigue. Negative for activity change, chills, fever, unexpected weight gain and unexpected weight loss.   HENT: Negative for congestion, hearing loss, postnasal drip, sinus pressure, sneezing, sore throat, swollen glands and tinnitus.    Eyes: Negative for photophobia, pain and visual disturbance.   Respiratory: Negative for cough, chest tightness, shortness of breath and wheezing.    Cardiovascular: Negative for chest pain, palpitations and leg swelling.   Gastrointestinal: Positive for diarrhea. Negative for abdominal distention, abdominal pain, constipation, nausea and vomiting.   Endocrine: Negative for polydipsia, polyphagia and polyuria.   Genitourinary: Negative for dysuria, frequency, hematuria and urgency.   Neurological: Negative for dizziness, weakness, numbness and headache.   All other systems reviewed and are negative.      Objective    /80 (BP Location: Left arm, Patient Position: Sitting, Cuff Size: Adult)   Pulse 63   Temp 98.3 °F (36.8 °C) (Oral)   Resp 16   Ht 175.3 cm (69\")   Wt 83.9 kg (185 lb)   SpO2 99%   BMI 27.32 kg/m²     Physical Exam   Constitutional: He is oriented to person, place, and time. He appears well-developed and well-nourished. No distress.   HENT:   Head: Normocephalic and atraumatic.   Right Ear: External ear normal.   Left Ear: External ear normal.   Mouth/Throat: Oropharynx is clear and moist.   Eyes: EOM are normal. Pupils are equal, round, and reactive to light.   Neck: Normal range of motion. Neck supple.   Cardiovascular: Normal rate, regular rhythm, normal heart sounds and intact distal pulses.   No peripheral pitting edema   Pulmonary/Chest: Effort normal and breath sounds normal. No stridor. No respiratory distress. He has no wheezes. He has no rales. He exhibits no tenderness.   Lungs are CTA bilaterally   Abdominal: Soft. Bowel sounds are normal. He exhibits no " distension. There is no tenderness.   Bowel sounds active x4 quadrants.  No pain to light or deep palpation x4 quadrants   Musculoskeletal: Normal range of motion.   Neurological: He is alert and oriented to person, place, and time.   Skin: Skin is warm and dry. Capillary refill takes less than 2 seconds. He is not diaphoretic.   Wound VAC to right heel   Psychiatric: He has a normal mood and affect. His behavior is normal. Judgment and thought content normal.   Nursing note and vitals reviewed.    Current outpatient and discharge medications have been reconciled for the patient.  Reviewed by: INÉS Mcadams      Assessment/Plan   Deacon was seen today for follow-up.    Diagnoses and all orders for this visit:    Hospital discharge follow-up  -     Clostridium Difficile Toxin, PCR - Stool, Per Rectum    Clostridium difficile infection  -     Clostridium Difficile Toxin, PCR - Stool, Per Rectum  -     metroNIDAZOLE (FLAGYL) 500 MG tablet; Take 1 tablet by mouth 3 (Three) Times a Day for 10 days.    ESRD (end stage renal disease) on dialysis (CMS/[facility])    Anticoagulated on Coumadin      -Reviewed hospital stay from 9/23/2019 through 9/27/2019.  He is still having persistent diarrhea that has not slowed down at all since finishing the Flagyl 5 days ago.  He reports that insurance would absolutely not cover vancomycin.  We will get a repeat C. difficile sample and start him on a second course of Flagyl.  If the C. difficile comes back positive again we will try to get him switched to vancomycin and hopefully insurance will cover.    -Reviewed the importance of very close monitoring of INRs while on the antibiotics.  Home health is checking them routinely.    -Continue home health multiple times per week.  Hemodialysis Monday Wednesday Friday as scheduled, correlated by nephrology.  Labs with hemodialysis and I have asked wife to have nephrology forward me his most recent CBC and CMP from hemodialysis.    -Continue close  following with wound care for the right heel wound VAC, ulceration.    -We will get patient the results of his C. difficile and any further recommendations.  Follow-up PRN and he will see Dr. Victoria, PCP in December.

## 2019-10-18 NOTE — PROGRESS NOTES
Anticoagulation Clinic Progress Note    Anticoagulation Summary  As of 10/18/2019    INR goal:   2.0-3.0   TTR:   45.6 % (2.9 y)   INR used for dosin.00 (10/18/2019)   Warfarin maintenance plan:   2.5 mg every day   Weekly warfarin total:   17.5 mg   Plan last modified:   Pravin Murphy RPH (10/14/2019)   Next INR check:   10/21/2019   Target end date:       Indications    History of cardioembolic stroke (Left occipital) [Z86.73]             Anticoagulation Episode Summary     INR check location:   Clinic Lab    Preferred lab:       Send INR reminders to:   [facility]    Comments:         Anticoagulation Care Providers     Provider Role Specialty Phone number    Juan Hyde MD Referring Cardiology [phone]        Pertinent info:  Restarted metronidazole x 10 days for C diff 10/16.    INR History:  Anticoagulation Monitoring 10/14/2019 10/16/2019 10/18/2019   INR 4.40 2.50 2.00   INR Date 10/14/2019 10/16/2019 10/18/2019   INR Goal 2.0-3.0 2.0-3.0 2.0-3.0   Trend Same Same Same   Last Week Total 15 mg 10 mg 8.75 mg   Next Week Total 12.5 mg 16.25 mg 16.25 mg   Sun - - 1.25 mg (10/20)   Mon Hold (10/14) - -   Tue Hold (10/15) - -   Wed - 2.5 mg -   Thu - 1.25 mg (10/17) -   Fri - - 2.5 mg   Sat - - 2.5 mg   Visit Report - - -   Some recent data might be hidden       Plan:  1. INR is Therapeutic today- see above in Anticoagulation Summary.   Provided instructions to [facility] with [facility] to Change their warfarin regimen- see above in Anticoagulation Summary.  2. Follow up in 3 days      Pravin Murphy RPH

## 2019-10-19 NOTE — ED PROVIDER NOTES
EMERGENCY DEPARTMENT ENCOUNTER    Room Number:  27/27  Date of encounter:  10/19/2019  PCP: Doroteo Victoria MD  Historian: Patient and wife      HPI:  Chief Complaint: Altered mental status and hypoglycemia  A complete HPI/ROS/PMH/PSH/SH/FH are unobtainable due to: Nothing    Context: Deacon Martin is a 74 y.o. male who presents to the ED c/o patient had a shaking episode with altered mental status, and he was found to have a blood sugar of 30.  He received an amp of D50 by EMS and his blood sugar improved to 120.    Wife states that he takes insulin for his diabetes, and that he ate a late breakfast then fell asleep today.  He had not had anything to eat since.    Patient is currently on Flagyl for C. difficile colitis, but otherwise he is been in stable health.      PAST MEDICAL HISTORY  Active Ambulatory Problems     Diagnosis Date Noted   • Cervical spinal stenosis 03/21/2016   • Cervical osteoarthritis 03/21/2016   • Cervical pain 03/21/2016   • Chronic venous insufficiency 03/21/2016   • Disturbance, visual, subjective 03/21/2016   • Poorly controlled type 2 diabetes mellitus (CMS/[facility]) 03/21/2016   • Essential hypertension 03/21/2016   • Hypertriglyceridemia 03/21/2016   • BPH (benign prostatic hypertrophy) with urinary obstruction 06/17/2016   • Anticoagulation goal of INR 2 to 3 07/29/2016   • Venous stasis ulcers of both lower extremities (CMS/[facility]) 10/07/2016   • Gouty arthritis 11/14/2016   • Hemianopia, homonymous, right 11/14/2016   • History of cardioembolic stroke (Left occipital) 11/14/2016   • Hypersomnolence 11/14/2016   • Vascular dementia (CMS/[facility]) 11/14/2016   • Anemia of chronic renal failure, stage 4 (severe) (CMS/[facility]) 11/14/2016   • Nonrheumatic aortic valve stenosis 11/14/2016   • Diabetic peripheral neuropathy (CMS/[facility]) 06/27/2017   • Neuropathy associated with endocrine disorder (CMS/[facility]) 06/21/2018   • ESRD (end stage renal disease) on dialysis (CMS/[facility]) 11/21/2018   • Type 2 diabetes  mellitus (CMS/[facility]) 11/21/2018   • HTN (hypertension) 11/21/2018   • Vascular dementia without behavioral disturbance (CMS/HCC) 11/21/2018   • Anticoagulated on Coumadin 11/21/2018   • Ulcer of heel due to diabetes mellitus (CMS/HCC) 05/24/2019   • Sepsis (CMS/HCC) 05/25/2019   • Osteomyelitis of right foot (CMS/HCC) 05/25/2019   • Chronic anticoagulation 06/25/2019   • Anemia of chronic kidney failure 06/25/2019   • Chronic atrial fibrillation    • Chronic diastolic (congestive) heart failure (CMS/[facility])    • Gastroenteritis 09/01/2019   • Elevated troponin 09/01/2019   • C. difficile diarrhea 09/23/2019     Resolved Ambulatory Problems     Diagnosis Date Noted   • Controlled diabetes mellitus (CMS/HCC) 03/21/2016   • Diabetes mellitus type 2, uncontrolled (CMS/HCC) 03/21/2016   • Diabetes (CMS/HCC) 03/21/2016   • Unspecified visual disturbance 03/21/2016   • Hyperuricemia 03/21/2016   • Altered mental status 11/12/2016   • Volume overload due to LUIS A 11/14/2016   • LUIS A (acute kidney injury) (CMS/HCC) 11/14/2016   • CKD (chronic kidney disease) stage 4, GFR 15-29 ml/min (CMS/HCC) 11/14/2016   • Urinary incontinence without sensory awareness 11/14/2016   • Patient left after triage 01/25/2017   • Shunt malfunction 12/05/2017   • Acute pulmonary edema (CMS/HCC) 01/07/2019     Past Medical History:   Diagnosis Date   • Chronic atrial fibrillation    • Chronic diastolic (congestive) heart failure (CMS/HCC)    • Edema    • ESRD (end stage renal disease) on dialysis (CMS/HCC) 11/2016   • Gout    • HX: anticoagulation    • Hyperlipidemia    • Hypertension    • Memory problem    • Nonrheumatic aortic valve stenosis 11/14/2016   • DARSHAN (obstructive sleep apnea)    • Other hemorrhagic disorder due to intrinsic circulating anticoagulants, antibodies, or inhibitors (CMS/[facility])    • Stroke (CMS/HCC)    • Type 2 diabetes mellitus (CMS/HCC)    • Vascular dementia (CMS/HCC) 11/14/2016   • Vitamin D deficiency          PAST SURGICAL  HISTORY  Past Surgical History:   Procedure Laterality Date   • ARTERIOVENOUS FISTULA/SHUNT SURGERY Right 2016    Procedure: RT BRACHIAL CEPHALIC FISTULA;  Surgeon: Ar Muro MD;  Location: [facility] OR;  Service:    • ARTERIOVENOUS FISTULA/SHUNT SURGERY W/ HEMODIALYSIS CATHETER INSERTION Right 2017    Procedure: RT. ARM FISTULA REVISION/POSS. TUNNELED CATH;  Surgeon: Ar Muro MD;  Location: [facility] OR;  Service:    • COLONOSCOPY     • INCISION AND DRAINAGE LEG Right 2019    Procedure: RIGHT FOOT DEBRIDEMENT;  Surgeon: Miguelangel Wynne MD;  Location: [facility] OR;  Service: Vascular   • INSERTION HEMODIALYSIS CATHETER N/A 2016    Procedure: TUNNEL CATHETER INSERTION;  Surgeon: Silvia Lim Jr., MD;  Location: [facility] OR;  Service:          FAMILY HISTORY  Family History   Problem Relation Age of Onset   • Arthritis Mother    • Diabetes Father    • Heart disease Father    • Hyperlipidemia Father    • Hypertension Father    • Obesity Father    • Stroke Father    • Cancer Brother         esophagus   • Heart disease Maternal Grandfather          SOCIAL HISTORY  Social History     Socioeconomic History   • Marital status:      Spouse name: Not on file   • Number of children: 6   • Years of education: Not on file   • Highest education level: Not on file   Occupational History   • Occupation: retired   Tobacco Use   • Smoking status: Former Smoker     Packs/day: 1.00     Years: 5.00     Pack years: 5.00     Types: Cigarettes     Last attempt to quit: 1966     Years since quittin.8   • Smokeless tobacco: Never Used   • Tobacco comment: rare caffeine   Substance and Sexual Activity   • Alcohol use: No     Frequency: Never   • Drug use: No   • Sexual activity: Defer         ALLERGIES  Patient has no known allergies.        REVIEW OF SYSTEMS  Review of Systems     All systems reviewed and negative except for those discussed in HPI.       PHYSICAL  EXAM    I have reviewed the triage vital signs and nursing notes.    ED Triage Vitals   Temp Heart Rate Resp BP SpO2   10/19/19 1913 10/19/19 1910 10/19/19 1910 10/19/19 1910 10/19/19 1925   98 °F (36.7 °C) 72 18 147/69 100 %      Temp src Heart Rate Source Patient Position BP Location FiO2 (%)   10/19/19 1910 10/19/19 1910 -- -- --   Tympanic Monitor          Physical Exam  GENERAL: not distressed  HENT: nares patent  EYES: no scleral icterus  CV: regular rhythm, regular rate  RESPIRATORY: normal effort  ABDOMEN: soft  MUSCULOSKELETAL: no deformity  NEURO: alert, moves all extremities, follows commands  SKIN: warm, dry        LAB RESULTS  Recent Results (from the past 24 hour(s))   Comprehensive Metabolic Panel    Collection Time: 10/19/19  7:34 PM   Result Value Ref Range    Glucose 83 65 - 99 mg/dL    BUN 21 8 - 23 mg/dL    Creatinine 4.88 (H) 0.76 - 1.27 mg/dL    Sodium 138 136 - 145 mmol/L    Potassium 3.5 3.5 - 5.2 mmol/L    Chloride 93 (L) 98 - 107 mmol/L    CO2 29.3 (H) 22.0 - 29.0 mmol/L    Calcium 8.2 (L) 8.6 - 10.5 mg/dL    Total Protein 6.4 6.0 - 8.5 g/dL    Albumin 2.40 (L) 3.50 - 5.20 g/dL    ALT (SGPT) 10 1 - 41 U/L    AST (SGOT) 13 1 - 40 U/L    Alkaline Phosphatase 101 39 - 117 U/L    Total Bilirubin 0.5 0.2 - 1.2 mg/dL    eGFR Non African Amer 12 (L) >60 mL/min/1.73    eGFR  African Amer      Globulin 4.0 gm/dL    A/G Ratio 0.6 g/dL    BUN/Creatinine Ratio 4.3 (L) 7.0 - 25.0    Anion Gap 15.7 (H) 5.0 - 15.0 mmol/L   Protime-INR    Collection Time: 10/19/19  7:34 PM   Result Value Ref Range    Protime 22.5 (H) 11.7 - 14.2 Seconds    INR 2.02 (H) 0.90 - 1.10   CBC Auto Differential    Collection Time: 10/19/19  7:34 PM   Result Value Ref Range    WBC 15.70 (H) 3.40 - 10.80 10*3/mm3    RBC 3.23 (L) 4.14 - 5.80 10*6/mm3    Hemoglobin 9.9 (L) 13.0 - 17.7 g/dL    Hematocrit 29.4 (L) 37.5 - 51.0 %    MCV 91.0 79.0 - 97.0 fL    MCH 30.7 26.6 - 33.0 pg    MCHC 33.7 31.5 - 35.7 g/dL    RDW 13.6 12.3 - 15.4 %     RDW-SD 45.5 37.0 - 54.0 fl    MPV 9.7 6.0 - 12.0 fL    Platelets 259 140 - 450 10*3/mm3    Neutrophil % 89.9 (H) 42.7 - 76.0 %    Lymphocyte % 3.4 (L) 19.6 - 45.3 %    Monocyte % 5.3 5.0 - 12.0 %    Eosinophil % 0.5 0.3 - 6.2 %    Basophil % 0.4 0.0 - 1.5 %    Immature Grans % 0.5 0.0 - 0.5 %    Neutrophils, Absolute 14.11 (H) 1.70 - 7.00 10*3/mm3    Lymphocytes, Absolute 0.54 (L) 0.70 - 3.10 10*3/mm3    Monocytes, Absolute 0.83 0.10 - 0.90 10*3/mm3    Eosinophils, Absolute 0.08 0.00 - 0.40 10*3/mm3    Basophils, Absolute 0.06 0.00 - 0.20 10*3/mm3    Immature Grans, Absolute 0.08 (H) 0.00 - 0.05 10*3/mm3    nRBC 0.0 0.0 - 0.2 /100 WBC   POC Glucose Once    Collection Time: 10/19/19  7:36 PM   Result Value Ref Range    Glucose 76 70 - 130 mg/dL   POC Glucose Once    Collection Time: 10/19/19  8:23 PM   Result Value Ref Range    Glucose 80 70 - 130 mg/dL   POC Glucose Once    Collection Time: 10/19/19  9:48 PM   Result Value Ref Range    Glucose 97 70 - 130 mg/dL   POC Glucose Once    Collection Time: 10/19/19 11:13 PM   Result Value Ref Range    Glucose 84 70 - 130 mg/dL       Ordered the above labs and independently reviewed the results.        RADIOLOGY  Ct Head Without Contrast    Result Date: 10/19/2019  CRANIAL CT SCAN WITHOUT CONTRAST  CLINICAL HISTORY: reported seizure  COMPARISON: 11/12/2016.  TECHNIQUE: Radiation dose reduction techniques were utilized, including automated exposure control and exposure modulation based on body size. Multiple axial images of the head were obtained without contrast.  FINDINGS:  There is a stable focus of encephalomalacia in the left occipital region from prior, remote insult. There is cortical atrophy. Mild to moderately extensive chronic-appearing white matter changes are noted and may have progressed slightly since the previous exam. The caliber and configuration of the ventricular system is unchanged. Bone window images show chronic appearing paranasal sinus disease to  the right of midline.         1. No acute intracranial abnormality.              I ordered the above noted radiological studies. Reviewed by me and discussed with radiologist.  See dictation for official radiology interpretation.      PROCEDURES    Procedures      MEDICATIONS GIVEN IN ER    Medications   sodium chloride 0.9 % flush 10 mL (not administered)         PROGRESS, DATA ANALYSIS, CONSULTS, AND MEDICAL DECISION MAKING    All labs have been independently reviewed by me.  All radiology studies have been reviewed by me and discussed with radiologist dictating the report.   EKG's independently viewed and interpreted by me.  Discussion below represents my analysis of pertinent findings related to patient's condition, differential diagnosis, treatment plan and final disposition.        ED Course as of Oct 19 2348   Sat Oct 19, 2019   2347 Chemistry reveals a slight leukocytosis, creatinine of 4.88, and an INR of 2  [DP]   2347 Patient showing no signs of acute infection, but he does have C. difficile which is being treated for.  [DP]   2347 He is in end-stage renal disease patient, so the creatinine of nearly 5 is not surprising.  His potassium is normal and his bicarb is normal  [DP]   2347 Patient's been observed now for several hours.  He has eaten a complete meal, and did receive D50.  He is not on any oral hypoglycemic medications, and only on insulin  [DP]   2348 I discussed with the wife and the patient to hold his nighttime insulin tonight, watch his glucose closely and eat before he goes to bed.  [DP]      ED Course User Index  [DP] Mitesh William MD       AS OF 11:48 PM VITALS:    BP - 125/81  HR - 107  TEMP - 98 °F (36.7 °C)  02 SATS - 100%        DIAGNOSIS  Final diagnoses:   Hypoglycemia   ESRD (end stage renal disease) (CMS/[facility])         DISPOSITION  Discharge           Mitesh William MD  10/19/19 2348

## 2019-10-19 NOTE — ED TRIAGE NOTES
Sugar was 31 on arrival.  Pt had sz activity.  Given 1 amp d50 at 1820.  blood sugar was 129 on first recheck.  Ate peanutbutter crackers and lemonade.  2nd recheck 120 sugar.

## 2019-10-19 NOTE — ED NOTES
Pt glucose 76 given box lunch per MD William.  Pt A&ox2 which is baseline.     Kathleen Bhakta, RN  10/19/19 1946

## 2019-10-20 NOTE — ED NOTES
Pts wife reports pt eating 2 pieces of Persian toast this am and taking his insulin.  Pt then took a nap and didn't eat lunch.  Wife reports son called her in the room because pt was jerking and waving at the air.  On EMS arrival pt blood glucose 31.     Kathleen Bhakta, JEFE  10/19/19 2041

## 2019-10-21 NOTE — OUTREACH NOTE
Medical Week 3 Survey      Responses   Facility patient discharged from?  [place]   Does the patient have one of the following disease processes/diagnoses(primary or secondary)?  Other   Week 3 attempt successful?  Yes   Call start time  1448   Call end time  1503   General alerts for this patient  Patient was seen in the ED on 10/19/2019   Discharge diagnosis  c-diff   Is patient permission given to speak with other caregiver?  Yes   List who call center can speak with  spouse- Adore   Person spoke with today (if not patient) and relationship  spouse- Adore   Medication alerts for this patient  Restarted on Antibiotics- Vancomycin   Meds reviewed with patient/caregiver?  Yes   Is the patient having any side effects they believe may be caused by any medication additions or changes?  No   Does the patient have all medications ordered at discharge?  Yes   Is the patient taking all medications as directed (includes completed medication regime)?  Yes   Does the patient have a primary care provider?   Yes   Comments regarding PCP  Dr Victoria   Does the patient have an appointment with their PCP within 7 days of discharge?  Yes   Has the patient kept scheduled appointments due by today?  Yes   What is the Home health agency?   [facility]   Has home health visited the patient within 72 hours of discharge?  Yes   Psychosocial issues?  No   Did the patient receive a copy of their discharge instructions?  Yes   Nursing interventions  Reviewed instructions with patient   What is the patient's perception of their health status since discharge?  Worsening   Additional teach back comments  Wife reports has started to have loose diarrhea stools again. He has tested positive for Cdiff. He has been placed on antibiotics again- Vancomycin. He is closely being monitered by PCP- if s/s of dehydration will seek medical attention right away. .    Week 3 Call Completed?  Yes          Syed Velasco RN

## 2019-10-21 NOTE — PROGRESS NOTES
Please let Mr. Martin and his family know that his C. difficile did come back positive again.  Go ahead and discontinue the Flagyl and we will try to get the vancomycin ordered for him now.  This will be dosed at 4 times per day x10 days.  It is extremely important that he makes his nephrologist/dialysis center aware that he is on vancomycin so that they can check vancomycin levels and his kidney function levels.  Please stressed the importance of this.  I have ordered a repeat C. difficile sample to be taken in 2 weeks.

## 2019-10-21 NOTE — PROGRESS NOTES
Anticoagulation Clinic Progress Note    Anticoagulation Summary  As of 10/21/2019    INR goal:   2.0-3.0   TTR:   45.8 % (3 y)   INR used for dosin.80 (10/21/2019)   Warfarin maintenance plan:   2.5 mg every day   Weekly warfarin total:   17.5 mg   Plan last modified:   Pravin Murphy RPH (10/14/2019)   Next INR check:   10/23/2019   Target end date:       Indications    History of cardioembolic stroke (Left occipital) [Z86.73]             Anticoagulation Episode Summary     INR check location:   Clinic Lab    Preferred lab:       Send INR reminders to:   [facility]    Comments:         Anticoagulation Care Providers     Provider Role Specialty Phone number    Juan Hyde MD Referring Cardiology [phone]          INR History:  Anticoagulation Monitoring 10/16/2019 10/18/2019 10/21/2019   INR 2.50 2.00 2.80   INR Date 10/16/2019 10/18/2019 10/21/2019   INR Goal 2.0-3.0 2.0-3.0 2.0-3.0   Trend Same Same Same   Last Week Total 10 mg 8.75 mg 10 mg   Next Week Total 16.25 mg 16.25 mg 16.25 mg   Sun - 1.25 mg (10/20) -   Mon - - 2.5 mg   Tue - - 1.25 mg (10/22)   Wed 2.5 mg - -   Thu 1.25 mg (10/17) - -   Fri - 2.5 mg -   Sat - 2.5 mg -   Visit Report - - -   Some recent data might be hidden       Plan:  1. INR is Therapeutic today- see above in Anticoagulation Summary.   Provided instructions to [facility] with [facility] to Change their warfarin regimen- see above in Anticoagulation Summary.  2. Follow up in 2 days      Pravin Murphy RPH

## 2019-10-23 NOTE — PROGRESS NOTES
Anticoagulation Clinic Progress Note    Anticoagulation Summary  As of 10/23/2019    INR goal:   2.0-3.0   TTR:   45.7 % (3 y)   INR used for dosing:   3.50! (10/23/2019)   Warfarin maintenance plan:   2.5 mg every day   Weekly warfarin total:   17.5 mg   Plan last modified:   Pravin Murphy RPH (10/14/2019)   Next INR check:   10/25/2019   Target end date:       Indications    History of cardioembolic stroke (Left occipital) [Z86.73]             Anticoagulation Episode Summary     INR check location:   Clinic Lab    Preferred lab:       Send INR reminders to:   [facility] CLINICAL [place]    Comments:         Anticoagulation Care Providers     Provider Role Specialty Phone number    Juan Hyde MD Referring Cardiology [phone]        Pertinent info:  C diff confirmed; metronidazole d/c'd in favor of PO vanc.    INR History:  Anticoagulation Monitoring 10/18/2019 10/21/2019 10/23/2019   INR 2.00 2.80 3.50   INR Date 10/18/2019 10/21/2019 10/23/2019   INR Goal 2.0-3.0 2.0-3.0 2.0-3.0   Trend Same Same Same   Last Week Total 8.75 mg 10 mg 13.75 mg   Next Week Total 16.25 mg 16.25 mg 13.75 mg   Sun 1.25 mg (10/20) - -   Mon - 2.5 mg -   Tue - 1.25 mg (10/22) -   Wed - - Hold (10/23)   Thu - - 1.25 mg (10/24)   Fri 2.5 mg - -   Sat 2.5 mg - -   Visit Report - - -   Some recent data might be hidden       Plan:  1. INR is Supratherapeutic today- see above in Anticoagulation Summary.   Provided instructions to [facility] with [facility] to Change their warfarin regimen- see above in Anticoagulation Summary.  2. Follow up in 2 days      Pravin Murphy RPH

## 2019-10-25 NOTE — PROGRESS NOTES
Anticoagulation Clinic Progress Note    Anticoagulation Summary  As of 10/25/2019    INR goal:   2.0-3.0   TTR:   45.7 % (3 y)   INR used for dosin.90 (10/25/2019)   Warfarin maintenance plan:   1.25 mg every Sun, Wed; 2.5 mg all other days   Weekly warfarin total:   15 mg   Plan last modified:   Camille Berman RPH (10/25/2019)   Next INR check:   10/28/2019   Target end date:       Indications    History of cardioembolic stroke (Left occipital) [Z86.73]             Anticoagulation Episode Summary     INR check location:   Clinic Lab    Preferred lab:       [facility] [place]    Comments:         Anticoagulation Care Providers     Provider Role Specialty Phone number    Juan Hyde MD Referring Cardiology [phone]          INR History:  Anticoagulation Monitoring 10/21/2019 10/23/2019 10/25/2019   INR 2.80 3.50 2.90   INR Date 10/21/2019 10/23/2019 10/25/2019   INR Goal 2.0-3.0 2.0-3.0 2.0-3.0   Trend Same Same Down   Last Week Total 10 mg 13.75 mg 11.25 mg   Next Week Total 16.25 mg 13.75 mg 15 mg   Sun - - 1.25 mg (10/27)   Mon 2.5 mg - -   Tue 1.25 mg (10/22) - -   Wed - Hold (10/23) -   Thu - 1.25 mg (10/24) -   Fri - - 2.5 mg   Sat - - 2.5 mg   Visit Report - - -   Some recent data might be hidden       Plan:  1. INR is Therapeutic today- see above in Anticoagulation Summary.   Provided instructions to [facility] with [facility] to Change their warfarin regimen- see above in Anticoagulation Summary.  2. Follow up in 3 days      Camille Berman RPH

## 2019-10-25 NOTE — TELEPHONE ENCOUNTER
[phone]  9:35am    C RN called asking for refill on p[facility].  [facility]

## 2019-10-28 NOTE — PROGRESS NOTES
Anticoagulation Clinic Progress Note    Anticoagulation Summary  As of 10/28/2019    INR goal:   2.0-3.0   TTR:   45.6 % (3 y)   INR used for dosin.20! (10/28/2019)   Warfarin maintenance plan:   1.25 mg every Sun, Wed; 2.5 mg all other days   Weekly warfarin total:   15 mg   Plan last modified:   Camille Berman RPH (10/25/2019)   Next INR check:   10/31/2019   Target end date:       Indications    History of cardioembolic stroke (Left occipital) [Z86.73]             Anticoagulation Episode Summary     INR check location:   Clinic Lab    Preferred lab:       Send INR reminders to:    [facility] [place]    Comments:         Anticoagulation Care Providers     Provider Role Specialty Phone number    Juan Hyde MD Referring Cardiology [phone]          INR History:  Anticoagulation Monitoring 10/23/2019 10/25/2019 10/28/2019   INR 3.50 2.90 4.20   INR Date 10/23/2019 10/25/2019 10/28/2019   INR Goal 2.0-3.0 2.0-3.0 2.0-3.0   Trend Same Down Same   Last Week Total 13.75 mg 11.25 mg 11.25 mg   Next Week Total 13.75 mg 15 mg 11.25 mg   Sun - 1.25 mg (10/27) -   Mon - - Hold (10/28)   Tu - - 1.25 mg (10/29)   Wed Hold (10/23) - 1.25 mg   Thu 1.25 mg (10/24) - -   Fri - 2.5 mg -   Sat - 2.5 mg -   Visit Report - - -   Some recent data might be hidden       Plan:  1. INR is Supratherapeutic today- see above in Anticoagulation Summary.   Provided instructions to [facility] with [facility] to Change their warfarin regimen- see above in Anticoagulation Summary.  2. Follow up in 3 days (unable to check in 2 days)      Pravin Murphy RP

## 2019-10-30 PROBLEM — R65.21 SEPTIC SHOCK (HCC): Status: ACTIVE | Noted: 2019-01-01

## 2019-10-30 PROBLEM — A41.9 SEPTIC SHOCK (HCC): Status: ACTIVE | Noted: 2019-01-01

## 2019-10-30 NOTE — OUTREACH NOTE
Medical Week 4 Survey      Responses   Facility patient discharged from?  [place]   Does the patient have one of the following disease processes/diagnoses(primary or secondary)?  Other   Week 4 attempt successful?  No   Revoke  Readmitted          Katie Shabazz RN

## 2019-10-31 NOTE — PLAN OF CARE
Problem: Patient Care Overview  Goal: Plan of Care Review  Outcome: Ongoing (interventions implemented as appropriate)   01/10/19 1610   Coping/Psychosocial   Plan of Care Reviewed With patient   Plan of Care Review   Progress improving   OTHER   Outcome Summary patient demonstrates significant improvement in functional mobility today, able to stand with CGA, ambulated 100 feet in hallway with rwx and Flako. Patient required less assistance today and able to tolerate further ambulation distance.           Unna Boot Text: An Unna boot was placed to help immobilize the limb and facilitate more rapid healing.

## 2019-11-01 NOTE — DISCHARGE SUMMARY
DISCHARGE SUMMARY    Patient Name: Deacon Martin  Age/Sex: 74 y.o. male  : 1945  MRN: [id number]  Patient Care Team:  Doroteo Victoria MD as PCP - General (Family Medicine)  Pravin Oneill DPM as PCP - Claims Attributed       Date of Admit: 10/30/2019  Date of Discharge:  10/31/2019    Date of Death:  10/31/2019  Time of Death:  4:08 a.m.  Cause of Death: Septic shock due to osteomyelitis and gangrenous foot  Disposition: [facility]   Final Diagnoses:   1. Septic shock  2. Ischemic lower extremity with poorly healing ulcer, gangrene and osteomyelitis likely the main source of the sepsisLeft lower lobe pneumonia  3. Osteomyelitis   4. Lactic acidosis  5. C. difficile colitis, recent  6. Diastolic congestive heart failure  7. End-stage renal disease  8. Anemia of chronic disease  9. Over therapeutic INR  10. Chronic atrial fibrillation    History of Present Illness  Deacon Martin is a 74 y.o. male with history of end-stage renal disease, history of C. difficile colitis that has been going on for several weeks and finally started getting better after he was approved for the oral vancomycin therapy.  Patient presented to the emergency department 11 days ago with mental status changes and was found to be hypoglycemic with a blood sugar of 30, he was treated in the ER and was discharged home.  He was admitted earlier on 2019 for 4 days when he presented with C. difficile colitis diarrhea that was complicated by his comorbidities including the end-stage renal disease, chronic A. fib, diastolic congestive heart failure, he was also diagnosed with osteomyelitis of the right foot with anemia of chronic disease and venous stasis ulcer of both lower extremity with vascular dementia.  According to the family he did not have any fever or chills, he did not have any cough except some mild postprandial chronic cough, he was having the diarrhea which was slightly better but still foul-smelling.  He was acting different  for the last 5 to 7 days, and that was noted also by the dialysis staff.  Patient usually has dialysis on  and Friday but his last session was this past Monday 2 days ago.  He did not have any dialysis today he was brought to the hospital, was evaluated in the ER, was found hypotensive and was given IV pressors given his renal disease, and was admitted to the ICU on pressors for further care.  He is a DNR/DNI but they would like to do anything else to help keep him alive.  The patient has significant cyanosis of the toes on the left lower extremity which was also cold to touch, he has some also chronic ulcer on the other leg but does not look as bad.  He does have some sacral ulcers that are dressed and they do not look infected.  He is arousable and responsive but is unable to provide me with any history the history was obtained from the family.  Comorbidities: A. fib, diastolic congestive heart failure, peripheral vascular disease, end-stage renal disease, C. difficile colitis, osteomyelitis and poorly healing lower extremity ulcers and venous stasis ulcers.  He already have a wound VAC over the right lower extremity and is followed by vascular surgery    Hospital Course  Deacon Martin is a 74 y.o. male admitted with septic shock, altered mental status, with multiple comorbidities.  Was seen in consultation by vascular surgery and by infectious disease, and initial CODE STATUS was DNR/DNI with the plan to treat according to the wife.  After the patient was seen in consultation by nephrology, she was approached and patient's wife realized the poor quality of life measures and the worsening status after lower extremity amputation which was what the vascular surgery recommended at the only approach to save his life.  After she discussed it with nephrology, she decided to go for comfort care only, patient was transferred to the palliative care unit where he  under comfort measures  only    Procedures Performed  Procedure(s):  AMPUTATION ABOVE KNEE       Consults:   Consults     Date and Time Order Name Status Description    10/30/2019 1457 Inpatient Vascular Surgery Consult Completed     10/30/2019 1457 Inpatient Infectious Diseases Consult Completed     10/30/2019 1032 Pulmonology (on-call MD unless specified) Completed           Pertinent Test Results:   Results from last 7 days   Lab Units 10/30/19  0855   SODIUM mmol/L 131*   POTASSIUM mmol/L 4.6   CHLORIDE mmol/L 86*   CO2 mmol/L 21.5*   BUN mg/dL 49*   CREATININE mg/dL 6.33*   GLUCOSE mg/dL 344*   CALCIUM mg/dL 9.1   AST (SGOT) U/L 67*   ALT (SGPT) U/L 85*     Results from last 7 days   Lab Units 10/30/19  0855   TROPONIN T ng/mL 0.229*     Results from last 7 days   Lab Units 10/30/19  0855   WBC 10*3/mm3 39.09*   HEMOGLOBIN g/dL 10.7*   HEMATOCRIT % 33.3*   PLATELETS 10*3/mm3 342   MCV fL 95.7   MCH pg 30.7   MCHC g/dL 32.1   RDW % 14.5   RDW-SD fl 49.3   MPV fL 11.0   NEUTROS ABS 10*3/mm3 36.74*     Results from last 7 days   Lab Units 10/30/19  1640 10/30/19  0855 10/28/19   INR  4.66* 3.76* 4.20     Results from last 7 days   Lab Units 10/30/19  0855   MAGNESIUM mg/dL 2.4           Invalid input(s): LDLCALC  Results from last 7 days   Lab Units 10/30/19  0855   PROBNP pg/mL >70,000.0*             Results from last 7 days   Lab Units 10/30/19  1640 10/30/19  0855   PROCALCITONIN ng/mL  --  1.33*   LACTATE mmol/L 5.2* 7.3*             Results from last 7 days   Lab Units 10/30/19  0855   BLOODCX  No growth at 2 days  No growth at 2 days                   Imaging Results:  Imaging Results (All)     Procedure Component Value Units Date/Time    XR Chest 1 View [[id number]] Collected:  10/30/19 0909     Updated:  10/30/19 0915    Narrative:       PORTAL CHEST 10/30/2019 AT 8:56 AM     CLINICAL HISTORY: Hypertension. Diabetic. Confusion.     Compared to the previous chest x-ray dated 09/01/2019.     There is mild elevation of the left  hemidiaphragm with mild increased  density at the left lung base likely due to atelectasis. No definite  acute focal infiltrates are identified. Calcified left hilar lymph nodes  are noted. The heart is borderline enlarged and unchanged.     This report was finalized on 10/30/2019 9:12 AM by Dr. Fawad Meng M.D.             Sloan Mckeon MD  11/01/19  12:03 PM    Dictated utilizing Dragon dictation

## 2019-11-04 ENCOUNTER — RESULTS ENCOUNTER (OUTPATIENT)
Dept: INTERNAL MEDICINE | Facility: CLINIC | Age: 74
End: 2019-11-04

## 2019-11-04 DIAGNOSIS — A04.71 RECURRENT CLOSTRIDIOIDES DIFFICILE DIARRHEA: ICD-10-CM

## 2019-11-04 LAB
BACTERIA SPEC AEROBE CULT: NORMAL
BACTERIA SPEC AEROBE CULT: NORMAL

## 2021-01-18 NOTE — PROGRESS NOTES
Anticoagulation Clinic Progress Note    Anticoagulation Summary  As of 2019    INR goal:   2.0-3.0   TTR:   46.2 % (2.8 y)   INR used for dosin.20 (2019)   Warfarin maintenance plan:   5 mg every Mon, Wed, Fri; 2.5 mg all other days   Weekly warfarin total:   25 mg   No change documented:   Pravin Murphy RPH   Plan last modified:   Pravin Murphy RPH (7/15/2019)   Next INR check:   2019   Target end date:       Indications    History of cardioembolic stroke (Left occipital) [Z86.73]             Anticoagulation Episode Summary     INR check location:   Clinic Lab    Preferred lab:       Send INR reminders to:    [facility]    Comments:         Anticoagulation Care Providers     Provider Role Specialty Phone number    Juan Hyde MD Referring Cardiology [phone]          INR History:  Anticoagulation Monitoring 2019 7/15/2019 2019   INR 1.90 3.60 2.20   INR Date 2019 7/15/2019 2019   INR Goal 2.0-3.0 2.0-3.0 2.0-3.0   Trend Up Down Same   Last Week Total 25 mg 27.5 mg 25 mg   Next Week Total 27.5 mg 22.5 mg 25 mg   Sun 2.5 mg - 2.5 mg   Mon 5 mg 2.5 mg (7/15) 5 mg   Tue 2.5 mg 2.5 mg 2.5 mg   Wed 5 mg 5 mg -   Thu 2.5 mg 2.5 mg -   Fri 5 mg - 5 mg   Sat 5 mg - 2.5 mg   Visit Report - - -   Some recent data might be hidden       Plan:  1. INR is Therapeutic today- see above in Anticoagulation Summary.   Provided instructions to [facility] with [facility] to Continue their warfarin regimen- see above in Anticoagulation Summary.  2. Follow up in 5 days      Pravin Murphy RPH     ambulatory

## 2022-09-12 NOTE — H&P
Patient Care Team:  Doroteo Victoria MD as PCP - General (Family Medicine)    Chief complaint:  Shortness of breath    History of present illness:  This is a 73-year-old male patient with history of hypertension and ESRD on HD, MWF.  He follows with Dr. Lawson for dialysis.  According to the wife, his postdialysis weight is around 200 pounds.  He has not missed any dialysis session.  Patient has dementia and history was somewhat limited and mainly provided by the wife.    Patient does not use oxygen at home.  He woke up this morning around 4 AM complaining of difficulty breathing.  This was associated with cough productive of clear phlegm.  No reports of chest pain, fever, chills or flulike illness.    On arrival to the ED, patient was in respiratory distress.  Blood pressure was 206/138 with a heart rate of 115.  He was placed on BiPAP for desaturation and increased work of breathing.    Concerning diabetes, patient reportedly takes his medications and insulin regularly.  His wife who manages his medications in a box for her.  She stated that sometimes she does have to remind him to take the pills.    Patient has obstructive sleep apnea.  He was previously treated with CPAP.  His wife stated that after his stroke about 3 years ago, he started sleeping in a recliner and stopped using his machine.    Labs reviewed:  ABG 7.36/39/92 on BiPAP; glucose 441; bicarbonate 21; creatinine 8.3; WBC 12.5; hemoglobin 13; troponin 0.06    Data:  Echocardiogram dated 5/2/17: EF 45%; RV mildly dilated; left atrial cavity is moderately dilated     Review of Systems:  Constitutional: No fever or chills.   ENMT: No sinus congestion. snoring  Cardiovascular: No chest pain, palpitation but legs swelling.    Respiratory: Dyspnea and cough as above.    Gastrointestinal: No constipation, diarrhea or abdominal pain   Neurology: No headache, weakness, numbness or dizziness.   Musculoskeletal: No joints pain, stiffness or  swelling.   Psychiatry: No depression.  Genitourinary: No dysuria or frequent urination  Endo: No weight changes. No cold or warm intolerance.  Lymphatic: No swollen glands.  Integumentary: No rash.    History  Past Medical History:   Diagnosis Date   • Atrial fibrillation (CMS/HCC)    • Edema    • Gout    • HBP (high blood pressure)    • HX: anticoagulation    • Hyperlipidemia    • Memory problem    • Other hemorrhagic disorder due to intrinsic circulating anticoagulants, antibodies, or inhibitors (CMS/HCC)     OTH HEMOR DISORDER DUE INTRIN   • Renal failure    • Stroke (CMS/HCC)    • Type 2 diabetes mellitus (CMS/HCC)    • Vitamin D deficiency      Past Surgical History:   Procedure Laterality Date   • ARTERIOVENOUS FISTULA/SHUNT SURGERY Right 2016    Procedure: RT BRACHIAL CEPHALIC FISTULA;  Surgeon: Ar Muro MD;  Location: [facility];  Service:    • ARTERIOVENOUS FISTULA/SHUNT SURGERY W/ HEMODIALYSIS CATHETER INSERTION Right 2017    Procedure: RT. ARM FISTULA REVISION/POSS. TUNNELED CATH;  Surgeon: Ar Muro MD;  Location: [facility];  Service:    • COLONOSCOPY     • INSERTION HEMODIALYSIS CATHETER N/A 2016    Procedure: TUNNEL CATHETER INSERTION;  Surgeon: Silvia Lim Jr., MD;  Location: [facility];  Service:      Family History   Problem Relation Age of Onset   • Arthritis Mother    • Diabetes Father    • Heart disease Father    • Hyperlipidemia Father    • Hypertension Father    • Obesity Father    • Cancer Brother         esophagus   • Heart disease Maternal Grandfather      Social History     Tobacco Use   • Smoking status: Former Smoker     Packs/day: 1.00     Years: 5.00     Pack years: 5.00     Types: Cigarettes     Last attempt to quit: 1966     Years since quittin.0   • Smokeless tobacco: Never Used   Substance Use Topics   • Alcohol use: Yes     Alcohol/week: 0.6 oz     Types: 1 Cans of beer per week     Comment: occasionally    • Drug use:  No     Medications Prior to Admission   Medication Sig Dispense Refill Last Dose   • ACCU-CHEK FASTCLIX LANCETS misc Use as directed for blood glucose testing CHECK 4 TIMES A  each 5 1/7/2019   • allopurinol (ZYLOPRIM) 100 MG tablet TAKE ONE TABLET BY MOUTH DAILY 90 tablet 1 1/6/2019 at 1900   • amLODIPine (NORVASC) 5 MG tablet TAKE ONE TABLET BY MOUTH DAILY 30 tablet 3 1/6/2019 at 1900   • aspirin 81 MG tablet Take 81 mg by mouth Daily.   1/6/2019 at 1900   • atorvastatin (LIPITOR) 40 MG tablet Take 1 tablet by mouth Daily. 90 tablet 3 1/6/2019 at 1900   • calcium acetate (PHOS BINDER,) 667 MG capsule capsule Take 667 mg by mouth Daily.   1/6/2019 at 1900   • CloNIDine (CATAPRES) 0.1 MG tablet TAKE ONE TABLET BY MOUTH TWICE A  tablet 2 1/6/2019 at 1900   • donepezil (ARICEPT) 10 MG tablet Take 10 mg by mouth Every Night.   1/6/2019 at 2100   • DULoxetine (CYMBALTA) 60 MG capsule Take 1 capsule by mouth Daily. 90 capsule 3 1/6/2019 at 1900   • fenofibrate (TRICOR) 48 MG tablet Take 1 tablet by mouth Daily. 90 tablet 3    • gabapentin (NEURONTIN) 100 MG capsule Take 1 capsule by mouth 3 (Three) Times a Day. 90 capsule 5 1/6/2019 at 2000   • glucose blood (ACCU-CHEK GUIDE) test strip Use as instructed to check 4 times daily 150 each 5 1/6/2019 at Unknown time   • glucose blood test strip Use as instructed 120 each 12 1/6/2019 at Unknown time   • insulin NPH (humuLIN N,novoLIN N) 100 UNIT/ML injection Inject 30 Units under the skin 2 (Two) Times a Day Before Meals. 1 Units 12 1/6/2019 at 1800   • Insulin Pen Needle 32G X 4 MM misc 4 (Four) Times a Day As Needed.   1/6/2019 at 1800   • insulin regular (humuLIN R,novoLIN R) 100 UNIT/ML injection Inject 10 Units under the skin 3 (Three) Times a Day Before Meals. 10 mL 1 1/6/2019 at Unknown time   • Lancets Misc. (ACCU-CHEK FASTCLIX LANCET) kit Use as directed for blood glucose testing 200 kit 5 1/6/2019 at Unknown time   • metoprolol succinate XL (TOPROL-XL)  50 MG 24 hr tablet Take 1 tablet by mouth Daily. 90 tablet 3 1/6/2019 at 1900   • tamsulosin (FLOMAX) 0.4 MG capsule 24 hr capsule Take 1 capsule by mouth Daily. 90 capsule 3 1/6/2019 at 1900   • torsemide (DEMADEX) 10 MG tablet TAKE TWO TABLETS BY MOUTH DAILY 60 tablet 3 1/6/2019 at 1900   • warfarin (COUMADIN) 5 MG tablet TAKE ONE TABLET BY MOUTH DAILY (Patient taking differently: TAKE ONE TABLET BY MOUTH DAILY mg changes with INR currently 5mg then 2.5 on 3rd night changed by MD on 1/3/19) 90 tablet 2 Patient Taking Differently at Unknown time   • acetaminophen (TYLENOL) 325 MG tablet Take 2 tablets by mouth Every 6 (Six) Hours As Needed for mild pain (1-3) or fever. 100 tablet 0 More than a month at Unknown time     Allergies:  Patient has no known allergies.    Vital Signs  Temp:  [96.7 °F (35.9 °C)-98.2 °F (36.8 °C)] 98.2 °F (36.8 °C)  Heart Rate:  [] 118  Resp:  [20-25] 21  BP: (135-206)/() 150/103  FiO2 (%):  [30 %] 30 %    Physical Exam:  Constitutional: Alert.  In mild respiratory distress  Eyes: Injected conjunctiva, EOMI.  ENMT: [place] 3. No oral thrush. Tonsils grade 1  Heart: RRR, no murmur  Lungs: bilateral crackles anteriorly auscultation.  No wheezing.   Slightly labored breathing.  Abdomen: Obese. Soft. No tenderness or dullness.  Extremities: No cyanosis, clubbing but grade II-III pitting edema. Moves all extremities.  Neuro: Conscious, alert, oriented x3  Psych: Appropriate mood and affect.    Integumentary: No rash  Lymphatic: No palpable cervical or supraclavicular lymph nodes.            Diagnostic imaging:  I personally and independently reviewed the following images:     CXR 1/7/19 11/21/18  Pulmonary edema.  Cardiomegaly.  Trachea is midline      EKG 1/7/19, A. fib with RVR      Assessment:  1. Pulmonary edema   2. Acute hypoxic respiratory failure, secondary #1  3. A. fib with RVR  4. ESRD on HD   5. Hypertensive  emergency  6. D M II with uncontrolled Hyperglycemia   7. Mild leukocytosis, likely stress-induced doubt infection  8. Elevated troponin, likely secondary to ESRD  9. Subtherapeutic INR    Plan:  · Bumex administered in ED.  Agree with that.  Discussed with the ED physician.    ·  stat hemodialysis.  Nephrology consult.  · I made few adjustment on the BiPAP.  reduced respiratory rate to improve synchronization.   · Noninvasive ventilation and will attempt to take him off after hemodialysis.  · Nitroglycerin drip for hypertensive emergency.  We'll titrate for blood pressure 160.  Avoid rapid reduction of the blood pressure to prevent ischemia.    · Start NPH 15 units twice a day.  Will increase to his usual home dose of 30 units twice a day when he starts diet.  · Check daily INR.  Resume usual dose of Coumadin.  Not clear if the subtherapeutic INR related to the patient's skipping his Coumadin on the fact that the doses not sufficient.  · Metoprolol for A. fib.  Infiltrates remains uncontrolled and will consider Cardizem drip.      I discussed the patients findings and my recommendations with patient, family and nursing staff.     Kaylene Patel MD  01/07/19  8:29 AM    Time: Critical care >35 min      This note was dictated utilizing Dragon dictation   yes

## 2023-07-11 NOTE — PROGRESS NOTES
Anticoagulation Clinic Progress Note    Anticoagulation Summary  As of 7/15/2019    INR goal:   2.0-3.0   TTR:   46.1 % (2.8 y)   INR used for dosing:   3.60! (7/15/2019)   Warfarin maintenance plan:   5 mg every Mon, Wed, Fri; 2.5 mg all other days   Weekly warfarin total:   25 mg   Plan last modified:   Pravin Murphy RPH (7/15/2019)   Next INR check:   7/19/2019   Target end date:       Indications    History of cardioembolic stroke (Left occipital) [Z86.73]             Anticoagulation Episode Summary     INR check location:   Clinic Lab    Preferred lab:       Send INR reminders to:    [facility]    Comments:         Anticoagulation Care Providers     Provider Role Specialty Phone number    Juan Hyde MD Referring Cardiology [phone]          INR History:  Anticoagulation Monitoring 7/3/2019 7/8/2019 7/15/2019   INR 1.80 1.90 3.60   INR Date 7/3/2019 7/8/2019 7/15/2019   INR Goal 2.0-3.0 2.0-3.0 2.0-3.0   Trend Down Up Down   Last Week Total 17.5 mg 25 mg 27.5 mg   Next Week Total 25 mg 27.5 mg 22.5 mg   Sun 2.5 mg 2.5 mg -   Mon - 5 mg 2.5 mg (7/15)   Tue - 2.5 mg 2.5 mg   Wed 5 mg 5 mg 5 mg   Thu 2.5 mg 2.5 mg 2.5 mg   Fri 5 mg 5 mg -   Sat 2.5 mg 5 mg -   Visit Report - - -   Some recent data might be hidden       Plan:  1. INR is Supratherapeutic today- see above in Anticoagulation Summary.   Provided instructions to Jair with [facility] to Change their warfarin regimen- see above in Anticoagulation Summary.  2. Follow up in 4 days      Pravin Murphy RPH     No

## 2024-03-05 NOTE — PROGRESS NOTES
Anticoagulation Clinic Progress Note    Anticoagulation Summary  As of 10/11/2019    INR goal:   2.0-3.0   TTR:   45.7 % (2.9 y)   INR used for dosing:   3.90! (10/11/2019)   Warfarin maintenance plan:   2.5 mg every day   Weekly warfarin total:   17.5 mg   Plan last modified:   Camille Berman RPH (10/4/2019)   Next INR check:   10/14/2019   Target end date:       Indications    History of cardioembolic stroke (Left occipital) [Z86.73]             Anticoagulation Episode Summary     INR check location:   Clinic Lab    Preferred lab:       Send INR reminders to:   [facility] CLINICAL POOL    Comments:         Anticoagulation Care Providers     Provider Role Specialty Phone number    Juan Hyde MD Referring Cardiology [phone]          INR History:  Anticoagulation Monitoring 10/4/2019 10/7/2019 10/11/2019   INR 3.70 2.60 3.90   INR Date 10/4/2019 10/7/2019 10/11/2019   INR Goal 2.0-3.0 2.0-3.0 2.0-3.0   Trend Same Same Same   Last Week Total 15 mg 10 mg 12.5 mg   Next Week Total 12.5 mg 17.5 mg 15 mg   Sun 2.5 mg - 2.5 mg   Mon - 2.5 mg -   Tue - 2.5 mg -   Wed - 2.5 mg -   Thu - 2.5 mg -   Fri Hold (10/4) - Hold (10/11)   Sat Hold (10/5) - 2.5 mg   Visit Report - - -   Some recent data might be hidden       Plan:  1. INR is Supratherapeutic today- see above in Anticoagulation Summary.   Provided instructions to [facility] with [facility] to Change their warfarin regimen- see above in Anticoagulation Summary.  2. Follow up in 3 days      Pravin Murphy RPH    
show

## (undated) DEVICE — SYR LUERLOK 5CC

## (undated) DEVICE — PK AV FISTL/SHNT 40

## (undated) DEVICE — GLV SURG SENSICARE GREEN W/ALOE PF LF 8.5 STRL

## (undated) DEVICE — GOWN,PREVENTION PLUS,XLONG/XLARGE,STRL: Brand: MEDLINE

## (undated) DEVICE — SUT SILK 3/0 SH CR5 18IN C0135

## (undated) DEVICE — 3M™ IOBAN™ 2 ANTIMICROBIAL INCISE DRAPE 6640EZ: Brand: IOBAN™ 2

## (undated) DEVICE — CULT AER/ANAEROB FASTIDIOUS BACT

## (undated) DEVICE — GLV SURG BIOGEL LTX PF 7 1/2

## (undated) DEVICE — BNDG ELAS ELITE V/CLOSE 4IN 5YD LF STRL

## (undated) DEVICE — SUT SILK 2/0 TIES 18IN A185H

## (undated) DEVICE — BIOPATCH™ ANTIMICROBIAL DRESSING WITH CHLORHEXIDINE GLUCONATE IS A HYDROPHILLIC POLYURETHANE ABSORPTIVE FOAM WITH CHLORHEXIDINE GLUCONATE (CHG) WHICH INHIBITS BACTERIAL GROWTH UNDER THE DRESSING. THE DRESSING IS INTENDED TO BE USED TO ABSORB EXUDATE, COVER A WOUND CAUSED BY VASCULAR AND NONVASCULAR PERCUTANEOUS MEDICAL DEVICES DURING SURGERY, AS WELL AS REDUCE LOCAL INFECTION AND COLONIZATION OF MICROORGANISMS.: Brand: BIOPATCH

## (undated) DEVICE — SUT SILK 3/0 TIES 18IN A184H

## (undated) DEVICE — SUT PROLN 5/0 RB1 D/A 36IN 8556H

## (undated) DEVICE — SOL NS 500ML

## (undated) DEVICE — ANTIBACTERIAL UNDYED BRAIDED (POLYGLACTIN 910), SYNTHETIC ABSORBABLE SUTURE: Brand: COATED VICRYL

## (undated) DEVICE — SPNG GZ WOVN 4X4IN 12PLY 10/BX STRL

## (undated) DEVICE — SUT ETHLN 2/0 PS 18IN 585H

## (undated) DEVICE — DRSNG PAD ABD 8X10IN STRL

## (undated) DEVICE — GAUZE,SPONGE,FLUFF,6"X6.75",STRL,10/TRAY: Brand: MEDLINE

## (undated) DEVICE — ISOLATION BAG: Brand: CONVERTORS

## (undated) DEVICE — DRP C/ARM 41X74IN

## (undated) DEVICE — BANDAGE,GAUZE,BULKEE II,4.5"X4.1YD,STRL: Brand: MEDLINE

## (undated) DEVICE — ADHS SKIN DERMABOND TOP ADVANCED

## (undated) DEVICE — GAUZE,PACKING STRIP,IODOFORM,1/2"X5YD,ST: Brand: CURAD

## (undated) DEVICE — PK ORTHO MINOR 40

## (undated) DEVICE — 3M™ STERI-DRAPE™ INSTRUMENT POUCH 1018: Brand: STERI-DRAPE™

## (undated) DEVICE — SUT PROLN 6/0 BV1 D/A 30IN 8709H

## (undated) DEVICE — SPNG LAP 18X18IN LF STRL PK/5

## (undated) DEVICE — GLV SURG BIOGEL LTX PF 8 1/2